# Patient Record
Sex: MALE | Race: OTHER | Employment: OTHER | URBAN - METROPOLITAN AREA
[De-identification: names, ages, dates, MRNs, and addresses within clinical notes are randomized per-mention and may not be internally consistent; named-entity substitution may affect disease eponyms.]

---

## 2017-03-21 ENCOUNTER — OFFICE VISIT (OUTPATIENT)
Dept: FAMILY MEDICINE CLINIC | Facility: CLINIC | Age: 67
End: 2017-03-21

## 2017-03-21 ENCOUNTER — HOSPITAL ENCOUNTER (OUTPATIENT)
Dept: GENERAL RADIOLOGY | Age: 67
Discharge: HOME | End: 2017-03-21
Attending: FAMILY MEDICINE
Payer: COMMERCIAL

## 2017-03-21 VITALS
HEART RATE: 120 BPM | SYSTOLIC BLOOD PRESSURE: 133 MMHG | HEIGHT: 69.5 IN | DIASTOLIC BLOOD PRESSURE: 80 MMHG | TEMPERATURE: 98 F | RESPIRATION RATE: 20 BRPM | WEIGHT: 161 LBS | BODY MASS INDEX: 23.31 KG/M2

## 2017-03-21 DIAGNOSIS — Z79.4 UNCONTROLLED TYPE 2 DIABETES MELLITUS WITH CHRONIC KIDNEY DISEASE, WITH LONG-TERM CURRENT USE OF INSULIN, UNSPECIFIED CKD STAGE: ICD-10-CM

## 2017-03-21 DIAGNOSIS — R05.9 COUGH: ICD-10-CM

## 2017-03-21 DIAGNOSIS — Z72.0 TOBACCO USE: ICD-10-CM

## 2017-03-21 DIAGNOSIS — J18.9 PNEUMONIA OF RIGHT UPPER LOBE DUE TO INFECTIOUS ORGANISM: ICD-10-CM

## 2017-03-21 DIAGNOSIS — I10 ESSENTIAL HYPERTENSION: Primary | ICD-10-CM

## 2017-03-21 DIAGNOSIS — E11.22 UNCONTROLLED TYPE 2 DIABETES MELLITUS WITH CHRONIC KIDNEY DISEASE, WITH LONG-TERM CURRENT USE OF INSULIN, UNSPECIFIED CKD STAGE: ICD-10-CM

## 2017-03-21 DIAGNOSIS — E11.65 UNCONTROLLED TYPE 2 DIABETES MELLITUS WITH CHRONIC KIDNEY DISEASE, WITH LONG-TERM CURRENT USE OF INSULIN, UNSPECIFIED CKD STAGE: ICD-10-CM

## 2017-03-21 PROCEDURE — 99212 OFFICE O/P EST SF 10 MIN: CPT | Performed by: FAMILY MEDICINE

## 2017-03-21 PROCEDURE — 71020 XR CHEST PA + LAT CHEST (CPT=71020): CPT

## 2017-03-21 PROCEDURE — 99214 OFFICE O/P EST MOD 30 MIN: CPT | Performed by: FAMILY MEDICINE

## 2017-03-21 RX ORDER — AMOXICILLIN AND CLAVULANATE POTASSIUM 875; 125 MG/1; MG/1
1 TABLET, FILM COATED ORAL 2 TIMES DAILY
Qty: 20 TABLET | Refills: 0 | Status: SHIPPED | OUTPATIENT
Start: 2017-03-21 | End: 2017-03-31

## 2017-03-21 NOTE — PROGRESS NOTES
HPI:   Ellen Milian is a 61year old male who presents for upper respiratory symptoms for  3  weeks. Patient reports dry cough mostly but sometimes mucous. No fevers. Has not smoked since the 2nd. Was smoking a pack a day before that.    I asked pt why he Cigarettes    Smokeless Status: Former User                         Quit date: 03/01/2017    Comment: Pt has quit    Alcohol Use: No                  REVIEW OF SYSTEMS:   GENERAL: feels well otherwise  SKIN: no rashes  EYES:no redness or discharge  HEENT: or worsen.     Carlos Enrique Grimaldo MD

## 2017-04-01 ENCOUNTER — LAB ENCOUNTER (OUTPATIENT)
Dept: LAB | Age: 67
End: 2017-04-01
Attending: FAMILY MEDICINE
Payer: COMMERCIAL

## 2017-04-01 DIAGNOSIS — E11.22 UNCONTROLLED TYPE 2 DIABETES MELLITUS WITH CHRONIC KIDNEY DISEASE, WITH LONG-TERM CURRENT USE OF INSULIN, UNSPECIFIED CKD STAGE: ICD-10-CM

## 2017-04-01 DIAGNOSIS — E11.65 UNCONTROLLED TYPE 2 DIABETES MELLITUS WITH CHRONIC KIDNEY DISEASE, WITH LONG-TERM CURRENT USE OF INSULIN, UNSPECIFIED CKD STAGE: ICD-10-CM

## 2017-04-01 DIAGNOSIS — R05.9 COUGH: ICD-10-CM

## 2017-04-01 DIAGNOSIS — Z79.4 UNCONTROLLED TYPE 2 DIABETES MELLITUS WITH CHRONIC KIDNEY DISEASE, WITH LONG-TERM CURRENT USE OF INSULIN, UNSPECIFIED CKD STAGE: ICD-10-CM

## 2017-04-01 PROCEDURE — 80053 COMPREHEN METABOLIC PANEL: CPT

## 2017-04-01 PROCEDURE — 36415 COLL VENOUS BLD VENIPUNCTURE: CPT

## 2017-04-01 PROCEDURE — 82043 UR ALBUMIN QUANTITATIVE: CPT

## 2017-04-01 PROCEDURE — 82570 ASSAY OF URINE CREATININE: CPT

## 2017-04-01 PROCEDURE — 85025 COMPLETE CBC W/AUTO DIFF WBC: CPT

## 2017-04-01 PROCEDURE — 83036 HEMOGLOBIN GLYCOSYLATED A1C: CPT

## 2017-04-03 NOTE — PROGRESS NOTES
Quick Note:    Diabetes is significantly worse than last time seen. Please call pt. Has to see Dr. Mindy Tran.  And new anemia - make sure he sets up an appointment with me also for follow up  ______

## 2017-04-04 ENCOUNTER — TELEPHONE (OUTPATIENT)
Dept: NEPHROLOGY | Facility: CLINIC | Age: 67
End: 2017-04-04

## 2017-04-04 ENCOUNTER — TELEPHONE (OUTPATIENT)
Dept: FAMILY MEDICINE CLINIC | Facility: CLINIC | Age: 67
End: 2017-04-04

## 2017-04-04 ENCOUNTER — OFFICE VISIT (OUTPATIENT)
Dept: NEPHROLOGY | Facility: CLINIC | Age: 67
End: 2017-04-04

## 2017-04-04 VITALS
HEIGHT: 69.5 IN | WEIGHT: 167 LBS | BODY MASS INDEX: 24.18 KG/M2 | DIASTOLIC BLOOD PRESSURE: 71 MMHG | HEART RATE: 99 BPM | SYSTOLIC BLOOD PRESSURE: 146 MMHG | TEMPERATURE: 99 F

## 2017-04-04 DIAGNOSIS — D64.89 ANEMIA DUE TO OTHER CAUSE: ICD-10-CM

## 2017-04-04 DIAGNOSIS — N18.2 CKD (CHRONIC KIDNEY DISEASE), STAGE II: ICD-10-CM

## 2017-04-04 DIAGNOSIS — E83.52 HYPERCALCEMIA: ICD-10-CM

## 2017-04-04 DIAGNOSIS — R80.9 PROTEINURIA, UNSPECIFIED TYPE: Primary | ICD-10-CM

## 2017-04-04 DIAGNOSIS — E11.21 DIABETIC NEPHROPATHY ASSOCIATED WITH TYPE 2 DIABETES MELLITUS (HCC): ICD-10-CM

## 2017-04-04 PROCEDURE — 99244 OFF/OP CNSLTJ NEW/EST MOD 40: CPT | Performed by: INTERNAL MEDICINE

## 2017-04-04 NOTE — TELEPHONE ENCOUNTER
Dr. Daisy Taylor please see message below. Spoke to pt, informed pt of additional lab work that were ordered after he left clinic by Dr. Daisy Taylor, Pt verbalized understanding of whole message. Pt states had last Colonoscopy done 5 years ago by Dr. Saeid Christina.

## 2017-04-04 NOTE — TELEPHONE ENCOUNTER
Please inform patient that Some more lab work was ordered after he left clinic   Iron studies given anemia, stool for occult blood.  Please inform to get all the test done ordered by me    Also pls inquire about his last colonoscopy

## 2017-04-04 NOTE — PROGRESS NOTES
Consult Requested By: Dr. Christina Patten    Reason for Consult: CKD     HPI:     Helene Berkowitz is a 61 yrs old male with pmh of DM II x II poorly control, HTN, HL, CKD stage II, tobacco abuse who presented today for work up and follow up     Recent lab w today's visit):    Current Outpatient Prescriptions:  Insulin Pen Needle 31G X 8 MM Does not apply Misc by Does not apply route. Disp:  Rfl:    Glucose Blood In Vitro Strip by Other route.  Disp:  Rfl:    MetFORMIN HCl 500 MG Oral Tab Take 1 tablet (500 mg alert and responsive   Head/Face: normocephalic  Eyes/Vision: normal extraocular motion is intact  Nose/Mouth/Throat:mucous membranes are moist   Neck/Thyroid: neck is supple without adenopathy  Lymphatic: no abnormal cervical, supraclavicular adenopathy i

## 2017-04-04 NOTE — TELEPHONE ENCOUNTER
----- Message from Kobe August MD sent at 4/3/2017 12:56 PM CDT -----  Diabetes is significantly worse than last time seen. Please call pt. Has to see Dr. Cally Babcock.  And new anemia - make sure he sets up an appointment with me also for follow up

## 2017-04-10 NOTE — TELEPHONE ENCOUNTER
Patient read Dr Lindy Loco result note on MyChart on 4/4/17, and has scheduled appt with Dr Erlin Marquis for 4/18/17.     Entered by Afia Monaco MD at 4/3/2017 12:56 PM      Read by Mayelin Chaparro at 4/4/2017 11:38 AM     Diabetes is significantly worse than las

## 2017-04-18 ENCOUNTER — OFFICE VISIT (OUTPATIENT)
Dept: ENDOCRINOLOGY CLINIC | Facility: CLINIC | Age: 67
End: 2017-04-18

## 2017-04-18 VITALS
BODY MASS INDEX: 24.51 KG/M2 | WEIGHT: 171.19 LBS | DIASTOLIC BLOOD PRESSURE: 68 MMHG | HEART RATE: 84 BPM | HEIGHT: 70 IN | SYSTOLIC BLOOD PRESSURE: 140 MMHG

## 2017-04-18 DIAGNOSIS — E11.69 DYSLIPIDEMIA ASSOCIATED WITH TYPE 2 DIABETES MELLITUS (HCC): ICD-10-CM

## 2017-04-18 DIAGNOSIS — Z79.4 UNCONTROLLED TYPE 2 DIABETES MELLITUS WITH HYPERGLYCEMIA, WITH LONG-TERM CURRENT USE OF INSULIN (HCC): Primary | ICD-10-CM

## 2017-04-18 DIAGNOSIS — E78.5 DYSLIPIDEMIA ASSOCIATED WITH TYPE 2 DIABETES MELLITUS (HCC): ICD-10-CM

## 2017-04-18 DIAGNOSIS — E11.65 UNCONTROLLED TYPE 2 DIABETES MELLITUS WITH HYPERGLYCEMIA, WITH LONG-TERM CURRENT USE OF INSULIN (HCC): Primary | ICD-10-CM

## 2017-04-18 PROCEDURE — 99204 OFFICE O/P NEW MOD 45 MIN: CPT | Performed by: INTERNAL MEDICINE

## 2017-04-18 PROCEDURE — 82962 GLUCOSE BLOOD TEST: CPT | Performed by: INTERNAL MEDICINE

## 2017-04-18 PROCEDURE — 36416 COLLJ CAPILLARY BLOOD SPEC: CPT | Performed by: INTERNAL MEDICINE

## 2017-04-18 RX ORDER — GLIPIZIDE 5 MG/1
7.5 TABLET ORAL
Qty: 90 TABLET | Refills: 0 | Status: SHIPPED | OUTPATIENT
Start: 2017-04-18 | End: 2017-05-16

## 2017-04-18 NOTE — H&P
New Patient Visit - Diabetes    CONSULT - Reason for Visit:  Diabetes management. Requesting Physician: Yolanda Fox    CHIEF COMPLAINT:    DM     HISTORY OF PRESENT ILLNESS:   Riley Rock is a 61year old male who presents to establish care for DM. SURGICAL HISTORY:       Past Surgical History    OTHER SURGICAL HISTORY  7/6/2010    Comment Polyps Dr.M Josefa South    COLONOSCOPY  7/6/15       ALLERGIES:    Sulfa Antibiotics       Unknown    Comment:As a child    SOCIAL HISTORY:    Social History    Blanca Figueroa affect  EYES:  No proptosis, no ptosis, conjunctiva normal  ENT:  Normocephalic, atraumatic  NECK: no adenopathy, thyroid symmetric, not enlarged and no tenderness,  LUNGS: clear to auscultation bilaterally, no crackles or wheezing  CARDIOVASCULAR:  regula next visit. f). Life style changes: Diet: low carbohydrate diet discussed, Exercise: should target a weight loss of 7% and increase exercise to at least 150min a week.  g). Hypoglycemia recognition and management discussed    2.  Patient’s BP is normal tod

## 2017-04-18 NOTE — H&P
New Patient Visit - Diabetes    CONSULT - Reason for Visit:  Diabetes management. Requesting Physician: Jeanne Stahl    CHIEF COMPLAINT:    DM     HISTORY OF PRESENT ILLNESS:   Gabi Church is a 61year old male who presents to establish care for DM. PAST SURGICAL HISTORY:       Past Surgical History    OTHER SURGICAL HISTORY  7/6/2010    Comment Polyps Dr.M Purvi Jordan    COLONOSCOPY  7/6/15       ALLERGIES:    Sulfa Antibiotics       Unknown    Comment:As a child    SOCIAL HISTORY:    Social Histo distress  PSYCH: normal affect  EYES:  No proptosis, no ptosis, conjunctiva normal  ENT:  Normocephalic, atraumatic  NECK: no adenopathy, thyroid symmetric, not enlarged and no tenderness,  LUNGS: clear to auscultation bilaterally, no crackles or wheezing get log and glucometer on next visit. f). Life style changes: Diet: low carbohydrate diet discussed, Exercise: should target a weight loss of 7% and increase exercise to at least 150min a week.  g). Hypoglycemia recognition and management discussed    2.

## 2017-04-21 ENCOUNTER — APPOINTMENT (OUTPATIENT)
Dept: LAB | Age: 67
End: 2017-04-21
Attending: INTERNAL MEDICINE
Payer: COMMERCIAL

## 2017-04-21 DIAGNOSIS — E83.52 HYPERCALCEMIA: ICD-10-CM

## 2017-04-21 DIAGNOSIS — E11.21 DIABETIC NEPHROPATHY ASSOCIATED WITH TYPE 2 DIABETES MELLITUS (HCC): ICD-10-CM

## 2017-04-21 DIAGNOSIS — N18.2 CKD (CHRONIC KIDNEY DISEASE), STAGE II: ICD-10-CM

## 2017-04-21 DIAGNOSIS — R80.9 PROTEINURIA, UNSPECIFIED TYPE: ICD-10-CM

## 2017-04-21 DIAGNOSIS — D64.89 ANEMIA DUE TO OTHER CAUSE: ICD-10-CM

## 2017-04-21 PROCEDURE — 82652 VIT D 1 25-DIHYDROXY: CPT

## 2017-04-21 PROCEDURE — 86334 IMMUNOFIX E-PHORESIS SERUM: CPT

## 2017-04-21 PROCEDURE — 80069 RENAL FUNCTION PANEL: CPT

## 2017-04-21 PROCEDURE — 36415 COLL VENOUS BLD VENIPUNCTURE: CPT

## 2017-04-21 PROCEDURE — 84466 ASSAY OF TRANSFERRIN: CPT

## 2017-04-21 PROCEDURE — 83970 ASSAY OF PARATHORMONE: CPT | Performed by: INTERNAL MEDICINE

## 2017-04-21 PROCEDURE — 82306 VITAMIN D 25 HYDROXY: CPT | Performed by: INTERNAL MEDICINE

## 2017-04-21 PROCEDURE — 82728 ASSAY OF FERRITIN: CPT

## 2017-04-21 PROCEDURE — 81001 URINALYSIS AUTO W/SCOPE: CPT

## 2017-04-21 PROCEDURE — 84165 PROTEIN E-PHORESIS SERUM: CPT

## 2017-04-21 PROCEDURE — 83540 ASSAY OF IRON: CPT

## 2017-04-24 ENCOUNTER — APPOINTMENT (OUTPATIENT)
Dept: LAB | Age: 67
End: 2017-04-24
Attending: INTERNAL MEDICINE
Payer: COMMERCIAL

## 2017-04-24 PROCEDURE — 84156 ASSAY OF PROTEIN URINE: CPT

## 2017-04-26 ENCOUNTER — APPOINTMENT (OUTPATIENT)
Dept: LAB | Age: 67
End: 2017-04-26
Attending: FAMILY MEDICINE
Payer: COMMERCIAL

## 2017-04-26 ENCOUNTER — HOSPITAL ENCOUNTER (OUTPATIENT)
Dept: CT IMAGING | Facility: HOSPITAL | Age: 67
Discharge: HOME | End: 2017-04-26
Attending: FAMILY MEDICINE
Payer: COMMERCIAL

## 2017-04-26 DIAGNOSIS — D64.89 ANEMIA DUE TO OTHER CAUSE: ICD-10-CM

## 2017-04-26 DIAGNOSIS — Z72.0 TOBACCO USE: ICD-10-CM

## 2017-04-26 PROCEDURE — 82272 OCCULT BLD FECES 1-3 TESTS: CPT

## 2017-04-28 ENCOUNTER — TELEPHONE (OUTPATIENT)
Dept: NEPHROLOGY | Facility: CLINIC | Age: 67
End: 2017-04-28

## 2017-04-28 NOTE — TELEPHONE ENCOUNTER
Pts wife Landon Blackwell called to find out if pt is still supposed to have renal u/s done. Pt has not scheduled appt yet. Please call.

## 2017-04-28 NOTE — TELEPHONE ENCOUNTER
Contacted wife. Notified her Roz Mustafa is to do the renal ultrasound. She stated understanding and that she would call to schedule the appointment now.

## 2017-05-01 NOTE — PROGRESS NOTES
Quick Note:    Please make appointment for follow up on the pneumonia and to review the results this week. You may still have an infection present.  You will need follow up imaging in 1-2 months.  ______

## 2017-05-02 ENCOUNTER — TELEPHONE (OUTPATIENT)
Dept: FAMILY MEDICINE CLINIC | Facility: CLINIC | Age: 67
End: 2017-05-02

## 2017-05-02 ENCOUNTER — HOSPITAL ENCOUNTER (OUTPATIENT)
Dept: ULTRASOUND IMAGING | Age: 67
Discharge: HOME | End: 2017-05-02
Attending: INTERNAL MEDICINE
Payer: COMMERCIAL

## 2017-05-02 DIAGNOSIS — R80.9 PROTEINURIA, UNSPECIFIED TYPE: ICD-10-CM

## 2017-05-02 DIAGNOSIS — E11.21 DIABETIC NEPHROPATHY ASSOCIATED WITH TYPE 2 DIABETES MELLITUS (HCC): ICD-10-CM

## 2017-05-02 DIAGNOSIS — N18.2 CKD (CHRONIC KIDNEY DISEASE), STAGE II: ICD-10-CM

## 2017-05-02 PROCEDURE — 76770 US EXAM ABDO BACK WALL COMP: CPT

## 2017-05-02 NOTE — TELEPHONE ENCOUNTER
Wife calling states Dr asked to see him to have a follow up appt and discuss results this week. No appt are available this week after 4pm or on a Saturday. Wife requesting pt be added please advise.

## 2017-05-03 ENCOUNTER — TELEPHONE (OUTPATIENT)
Dept: FAMILY MEDICINE CLINIC | Facility: CLINIC | Age: 67
End: 2017-05-03

## 2017-05-03 ENCOUNTER — OFFICE VISIT (OUTPATIENT)
Dept: FAMILY MEDICINE CLINIC | Facility: CLINIC | Age: 67
End: 2017-05-03

## 2017-05-03 VITALS
SYSTOLIC BLOOD PRESSURE: 157 MMHG | HEART RATE: 83 BPM | BODY MASS INDEX: 24.94 KG/M2 | DIASTOLIC BLOOD PRESSURE: 79 MMHG | WEIGHT: 174.19 LBS | HEIGHT: 70 IN

## 2017-05-03 DIAGNOSIS — R93.89 ABNORMAL CHEST CT: Primary | ICD-10-CM

## 2017-05-03 PROCEDURE — 99213 OFFICE O/P EST LOW 20 MIN: CPT | Performed by: FAMILY MEDICINE

## 2017-05-03 PROCEDURE — 99212 OFFICE O/P EST SF 10 MIN: CPT | Performed by: FAMILY MEDICINE

## 2017-05-03 RX ORDER — LEVOFLOXACIN 500 MG/1
500 TABLET, FILM COATED ORAL DAILY
Qty: 10 TABLET | Refills: 0 | Status: SHIPPED | OUTPATIENT
Start: 2017-05-03 | End: 2017-05-13

## 2017-05-03 NOTE — TELEPHONE ENCOUNTER
----- Message from Joanna Beach MD sent at 5/1/2017  4:00 PM CDT -----  Please make appointment for follow up on the pneumonia and to review the results  this week. You may still have an infection present. You will need follow up imaging in 1-2 months.

## 2017-05-03 NOTE — PROGRESS NOTES
Daksha Garcia is a 61year old male. Patient presents with:  Test Results: Ct lungs - 04/26/2017. HPI:   Follow up on CT chest.  Pt reports continued cough and mild pain in right mid back. Reports feels much better than before. Finished off antibiotics. apparent distress  SKIN: no rashes,no suspicious lesions  NECK: supple,no adenopathy,no bruits  LUNGS: clear to auscultation  CARDIO: RRR without murmur  EXTREMITIES: no cyanosis, clubbing or edema      ASSESSMENT AND PLAN:   1.  Abnormal chest CT  Looks li

## 2017-05-04 ENCOUNTER — OFFICE VISIT (OUTPATIENT)
Dept: NEPHROLOGY | Facility: CLINIC | Age: 67
End: 2017-05-04

## 2017-05-04 VITALS
WEIGHT: 174.19 LBS | SYSTOLIC BLOOD PRESSURE: 170 MMHG | HEART RATE: 77 BPM | DIASTOLIC BLOOD PRESSURE: 73 MMHG | TEMPERATURE: 98 F | BODY MASS INDEX: 24.94 KG/M2 | HEIGHT: 70.25 IN

## 2017-05-04 DIAGNOSIS — E11.21 DIABETIC NEPHROPATHY ASSOCIATED WITH TYPE 2 DIABETES MELLITUS (HCC): Primary | ICD-10-CM

## 2017-05-04 DIAGNOSIS — E83.52 HYPERCALCEMIA: ICD-10-CM

## 2017-05-04 PROCEDURE — 99214 OFFICE O/P EST MOD 30 MIN: CPT | Performed by: INTERNAL MEDICINE

## 2017-05-04 PROCEDURE — 99212 OFFICE O/P EST SF 10 MIN: CPT | Performed by: INTERNAL MEDICINE

## 2017-05-04 NOTE — PROGRESS NOTES
Progress Note:     Odalis Winter is a 61 yrs old male with pmh of DM II x 45 yrs poorly control, HTN, HL, CKD stage II, tobacco abuse who presented today for follow up    Recent lab work showed BUN/Cr 18/1.06 mg/dl with an eGFR >60 ml/min, albumin 2.4, K 5. visit):    Current Outpatient Prescriptions:  levofloxacin (LEVAQUIN) 500 MG Oral Tab Take 1 tablet (500 mg total) by mouth daily. Disp: 10 tablet Rfl: 0   NIFEdipine ER 30 MG Oral Tablet 24 Hr Take 1 tablet (30 mg total) by mouth daily.  Disp: 90 tablet Rf neck is supple without adenopathy  Lymphatic: no abnormal cervical, supraclavicular adenopathy is noted  Respiratory:  lungs are clear to auscultation bilaterally  Cardiovascular: regular rate and rhythm  Abdomen: soft, non-tender, non-distended, BS normal

## 2017-05-04 NOTE — PATIENT INSTRUCTIONS
Urine calcium test   Follow up with Dr. Jesusita Abad for high calcium  Follow up in 3 - 4 months   Call back in 2 weeks with home blood pressure readings

## 2017-05-11 ENCOUNTER — PATIENT MESSAGE (OUTPATIENT)
Dept: FAMILY MEDICINE CLINIC | Facility: CLINIC | Age: 67
End: 2017-05-11

## 2017-05-13 NOTE — TELEPHONE ENCOUNTER
From: Juani Litter  To: Gen Garcia MD  Sent: 5/11/2017 1:57 PM CDT  Subject: Prescription Question    Dr Sterling Villalobos,  Tuesday when I got up from my chair at work to go home I had a muscle cramp in my right calf. It has not gone away since.  In fact I think

## 2017-05-13 NOTE — TELEPHONE ENCOUNTER
I spoke with pt and advised that when he has symptoms, he should call the office as [employer] messages can take up to 72 business hours to address. Pt reports he completed abx and his calf pain has subsided.  I advise he call back or go to ER if symptom recur

## 2017-05-16 ENCOUNTER — TELEPHONE (OUTPATIENT)
Dept: CASE MANAGEMENT | Age: 67
End: 2017-05-16

## 2017-05-16 ENCOUNTER — OFFICE VISIT (OUTPATIENT)
Dept: ENDOCRINOLOGY CLINIC | Facility: CLINIC | Age: 67
End: 2017-05-16

## 2017-05-16 VITALS
BODY MASS INDEX: 25 KG/M2 | DIASTOLIC BLOOD PRESSURE: 74 MMHG | WEIGHT: 174.63 LBS | HEART RATE: 102 BPM | HEIGHT: 70 IN | SYSTOLIC BLOOD PRESSURE: 146 MMHG

## 2017-05-16 DIAGNOSIS — E78.5 DYSLIPIDEMIA ASSOCIATED WITH TYPE 2 DIABETES MELLITUS (HCC): ICD-10-CM

## 2017-05-16 DIAGNOSIS — E11.69 DYSLIPIDEMIA ASSOCIATED WITH TYPE 2 DIABETES MELLITUS (HCC): ICD-10-CM

## 2017-05-16 DIAGNOSIS — E11.65 UNCONTROLLED TYPE 2 DIABETES MELLITUS WITH HYPERGLYCEMIA, WITHOUT LONG-TERM CURRENT USE OF INSULIN (HCC): Primary | ICD-10-CM

## 2017-05-16 PROCEDURE — 36416 COLLJ CAPILLARY BLOOD SPEC: CPT | Performed by: INTERNAL MEDICINE

## 2017-05-16 PROCEDURE — 99213 OFFICE O/P EST LOW 20 MIN: CPT | Performed by: INTERNAL MEDICINE

## 2017-05-16 PROCEDURE — 82962 GLUCOSE BLOOD TEST: CPT | Performed by: INTERNAL MEDICINE

## 2017-05-16 RX ORDER — GLIPIZIDE 10 MG/1
10 TABLET ORAL
Qty: 180 TABLET | Refills: 0 | Status: SHIPPED | OUTPATIENT
Start: 2017-05-16 | End: 2017-09-30

## 2017-05-16 RX ORDER — ATORVASTATIN CALCIUM 40 MG/1
40 TABLET, FILM COATED ORAL DAILY
Qty: 90 TABLET | Refills: 0 | Status: SHIPPED | OUTPATIENT
Start: 2017-05-16 | End: 2017-11-10

## 2017-05-16 NOTE — TELEPHONE ENCOUNTER
Please have RN do this.  This was recommended based off abnormal CT lungs not because of ekg and chest x-ray

## 2017-05-16 NOTE — PROGRESS NOTES
Return Office Visit     CHIEF COMPLAINT:    DM  Dyslipidemia     HISTORY OF PRESENT ILLNESS:  Lyndsay Green is a 61year old male who presents for follow up for for DM. DM HISTORY  He states he had \" borderline DM\" as a teenager.   He was on diabinese f SYSTEMS:  Constitutional: Negative for: weight change, fever, fatigue, cold/heat intolerance  Eyes: Negative for:  Visual changes, proptosis, blurring  ENT: Negative for:  dysphagia, neck swelling, dysphonia  Respiratory: Negative for:  dyspnea, cough  Car PLAN:    1. DM:       Plan:  Discussed the pathogenesis, natural course of diabetes.  Patient understands the importance of glycemic control and the implications of uncontrolled diabetes including Diabetic ketoacidosis and various micro vascular and macrova

## 2017-05-16 NOTE — TELEPHONE ENCOUNTER
Doctor needs to do a peer to peer did not meet medial necessity   Please reference to case #[id number] call [phone] option 3  Fax [phone] the hours of operation is 7 - 7 pm eastern standard time  Results details clinical evaluation ECG, Xray test

## 2017-05-17 ENCOUNTER — TELEPHONE (OUTPATIENT)
Dept: ENDOCRINOLOGY CLINIC | Facility: CLINIC | Age: 67
End: 2017-05-17

## 2017-05-17 NOTE — TELEPHONE ENCOUNTER
Current Outpatient Prescriptions:  Dapagliflozin Propanediol (FARXIGA) 10 MG Oral Tab Take 10 mg by mouth daily.  Disp: 30 tablet Rfl: 0     PA request pls call [phone]

## 2017-05-17 NOTE — TELEPHONE ENCOUNTER
Kenya العلي called pharmacy. Patient will get medication for free. No PA needed. Pharmacy will contact patient when Rx is ready for .

## 2017-05-18 ENCOUNTER — TELEPHONE (OUTPATIENT)
Dept: FAMILY MEDICINE CLINIC | Facility: CLINIC | Age: 67
End: 2017-05-18

## 2017-05-18 NOTE — TELEPHONE ENCOUNTER
----- Message from Marga Reed RN sent at 2017  9:13 AM CDT -----  Regarding: LD CT Screening F/U  Hi Dr. Lu Yip,    Your patient, Pita Ramsey,  , was presented at the Multidisciplinary Lung Conference this week.   We routinely present any

## 2017-05-18 NOTE — TELEPHONE ENCOUNTER
Dr Victorino Rodriguez, I could not obtain approval  [facility] requires Nurse Practitioner, [de-identified] assistant or MD to call, we have until 5/24/17, 14 days after denial which was 5/10/17

## 2017-05-18 NOTE — TELEPHONE ENCOUNTER
Dr Damián Knox, I could not obtain approval  [facility] requires nurse practitioner, [de-identified] assistant or physician to call in  Due 5/24/17, 14 days after procedure denies on 5/10/17

## 2017-05-18 NOTE — TELEPHONE ENCOUNTER
Dr Kelvin Stratton, I could not obtain approval  [facility] required nurse practitioner, [de-identified] assistant or physician to call, due by 5/24/17 10 days after denial which was 5/10/17

## 2017-05-19 NOTE — TELEPHONE ENCOUNTER
I called. Cannot get approval yet. Has to be 3 months after the last one.  So please tell pt to call me in 1 month so I can place the order again and seek approval.

## 2017-05-25 ENCOUNTER — TELEPHONE (OUTPATIENT)
Dept: CASE MANAGEMENT | Age: 67
End: 2017-05-25

## 2017-05-25 NOTE — TELEPHONE ENCOUNTER
This case was denied due to the  repeat CT supportive of evaluation of a pulmonary nogel is greater that equal to 8 mg, at 3 months, 9 months, 24 months, of the time of discovery does not meet criteria.  Doctor can do appeal please call  option

## 2017-06-19 RX ORDER — DAPAGLIFLOZIN 10 MG/1
TABLET, FILM COATED ORAL
Qty: 30 TABLET | Refills: 2 | Status: SHIPPED | OUTPATIENT
Start: 2017-06-19 | End: 2017-11-10

## 2017-07-15 ENCOUNTER — TELEPHONE (OUTPATIENT)
Dept: ENDOCRINOLOGY CLINIC | Facility: CLINIC | Age: 67
End: 2017-07-15

## 2017-07-15 NOTE — TELEPHONE ENCOUNTER
Current Outpatient Prescriptions:  FARXIGA 10 MG Oral Tab TAKE 1 TABLET BY MOUTH EVERY DAY Disp: 30 tablet Rfl: 2     PA request pls call [phone]

## 2017-07-17 NOTE — TELEPHONE ENCOUNTER
Called farxiga help line. They will call pharmacy to help them process co pay card. Per representative, she will call back if there is a problem. Otherwise no call back is needed.

## 2017-07-19 RX ORDER — EXENATIDE 2 MG/.65ML
2 INJECTION, SUSPENSION, EXTENDED RELEASE SUBCUTANEOUS
Qty: 4 EACH | Refills: 2 | Status: SHIPPED | OUTPATIENT
Start: 2017-07-19 | End: 2017-07-26

## 2017-08-15 ENCOUNTER — TELEPHONE (OUTPATIENT)
Dept: FAMILY MEDICINE CLINIC | Facility: CLINIC | Age: 67
End: 2017-08-15

## 2017-08-15 NOTE — TELEPHONE ENCOUNTER
This patient is scheduled for tomorrow but we have received a denial from his insurance for this test . Information regarding this denial was left om his vmail.     The ordering physician does have the option of appealing this decision by doing a peer to pe

## 2017-08-16 ENCOUNTER — TELEPHONE (OUTPATIENT)
Dept: FAMILY MEDICINE CLINIC | Facility: CLINIC | Age: 67
End: 2017-08-16

## 2017-08-16 NOTE — TELEPHONE ENCOUNTER
Guille Freitas from Outpatient Scheduling called in stating they had to cancel pt's CT scan for today because the insurance denied the scan.   Guille Freitas states pt already had this scan back in April and if this is a repeat/follow up CT scan on the lungs then a CT scan o

## 2017-08-16 NOTE — TELEPHONE ENCOUNTER
Pt wife calling and is very upset about just finding out about the denial. Pt wife stts she contacted the insurance company and [address] [place] [address] that the denial was made 3 weeks ago. Pt wife stts a peer to peer can be happen. Number provided in the message below.

## 2017-08-16 NOTE — TELEPHONE ENCOUNTER
I called and got approved. CPT code is [phone]. For some reason along the way the wrong CPT code was given for this test and that is why it was not approved. Not sure why took 3 weeks for this approval or denial to be told to me.    Must be done by 9/30/2017

## 2017-08-17 ENCOUNTER — TELEPHONE (OUTPATIENT)
Dept: FAMILY MEDICINE CLINIC | Facility: CLINIC | Age: 67
End: 2017-08-17

## 2017-08-17 DIAGNOSIS — R91.1 LUNG NODULE: Primary | ICD-10-CM

## 2017-08-17 NOTE — TELEPHONE ENCOUNTER
Spouse/Genevieve [phone] is calling for status of pts CT Scan. Per spouse she will be in a meeting from 10:00-10:30 and it is ok to leave a message for her voice mail to let her know if Prior Authorization has been received.  Spouse states that it is her d

## 2017-08-17 NOTE — TELEPHONE ENCOUNTER
Spouse/Genevieve [phone] is calling for status of pts CT Scan. Per spouse she will be in a meeting from 10:00-10:30 and it is ok to leave a message for her voice mail to let her know if Prior Authorization has been received.  Spouse states that it is her d

## 2017-08-17 NOTE — TELEPHONE ENCOUNTER
Left detail message on [facility] [employer]) personal voicemail stating CT has authorized status. Ct order and referral can be viewed in EPIC.

## 2017-08-21 ENCOUNTER — HOSPITAL ENCOUNTER (OUTPATIENT)
Dept: CT IMAGING | Facility: HOSPITAL | Age: 67
Discharge: HOME | End: 2017-08-21
Attending: FAMILY MEDICINE
Payer: COMMERCIAL

## 2017-08-21 DIAGNOSIS — R91.1 LUNG NODULE: ICD-10-CM

## 2017-08-21 PROCEDURE — 71250 CT THORAX DX C-: CPT | Performed by: FAMILY MEDICINE

## 2017-08-23 ENCOUNTER — OFFICE VISIT (OUTPATIENT)
Dept: FAMILY MEDICINE CLINIC | Facility: CLINIC | Age: 67
End: 2017-08-23

## 2017-08-23 VITALS
SYSTOLIC BLOOD PRESSURE: 132 MMHG | HEART RATE: 87 BPM | DIASTOLIC BLOOD PRESSURE: 72 MMHG | BODY MASS INDEX: 24 KG/M2 | WEIGHT: 170.38 LBS

## 2017-08-23 DIAGNOSIS — I25.10 ATHEROSCLEROSIS OF CORONARY ARTERY OF NATIVE HEART WITHOUT ANGINA PECTORIS, UNSPECIFIED VESSEL OR LESION TYPE: ICD-10-CM

## 2017-08-23 DIAGNOSIS — R93.89 ABNORMAL CHEST CT: Primary | ICD-10-CM

## 2017-08-23 DIAGNOSIS — R29.898 LEG FATIGUE: ICD-10-CM

## 2017-08-23 DIAGNOSIS — Z72.0 TOBACCO USE: ICD-10-CM

## 2017-08-23 PROCEDURE — 99214 OFFICE O/P EST MOD 30 MIN: CPT | Performed by: FAMILY MEDICINE

## 2017-08-23 PROCEDURE — 99212 OFFICE O/P EST SF 10 MIN: CPT | Performed by: FAMILY MEDICINE

## 2017-08-23 RX ORDER — EXENATIDE 2 MG/.65ML
2 INJECTION, SUSPENSION, EXTENDED RELEASE SUBCUTANEOUS WEEKLY
Refills: 2 | COMMUNITY
Start: 2017-07-19 | End: 2017-09-19

## 2017-08-23 RX ORDER — GLYBURIDE 5 MG/1
1 TABLET ORAL 2 TIMES DAILY
COMMUNITY
Start: 2015-08-27 | End: 2017-09-19

## 2017-08-23 RX ORDER — VALSARTAN AND HYDROCHLOROTHIAZIDE 320; 25 MG/1; MG/1
1 TABLET, FILM COATED ORAL DAILY
COMMUNITY
Start: 2016-05-02 | End: 2017-08-23

## 2017-08-23 NOTE — PROGRESS NOTES
Deandra Porter is a 61year old male. Patient presents with:  Test Results: CT    HPI:   Here for review CT report. CONCLUSION:       8/21/2017     1.  Multifocal peripheral nodular airspace opacities in both lungs have significantly decreased in size since Types: Cigarettes     Last attempt to quit: 2/26/2017  Smokeless tobacco: Current User                       Last attempt to quit: 3/1/2017  Alcohol use:  No                 REVIEW OF SYSTEMS:   GENERAL HEALTH: feels well otherwise  SKIN: denies any unusual

## 2017-08-26 ENCOUNTER — TELEPHONE (OUTPATIENT)
Dept: ENDOCRINOLOGY CLINIC | Facility: CLINIC | Age: 67
End: 2017-08-26

## 2017-08-26 NOTE — TELEPHONE ENCOUNTER
PA request received for Farxiga through [employer]. PA approved: PA Case [id number] is Approved. For further questions, call [phone].

## 2017-09-16 ENCOUNTER — HOSPITAL ENCOUNTER (OUTPATIENT)
Dept: NUCLEAR MEDICINE | Facility: HOSPITAL | Age: 67
Discharge: HOME | End: 2017-09-16
Attending: FAMILY MEDICINE
Payer: COMMERCIAL

## 2017-09-16 ENCOUNTER — TELEPHONE (OUTPATIENT)
Dept: FAMILY MEDICINE CLINIC | Facility: CLINIC | Age: 67
End: 2017-09-16

## 2017-09-16 ENCOUNTER — HOSPITAL ENCOUNTER (OUTPATIENT)
Dept: CV DIAGNOSTICS | Facility: HOSPITAL | Age: 67
Discharge: HOME | End: 2017-09-16
Attending: FAMILY MEDICINE
Payer: COMMERCIAL

## 2017-09-16 DIAGNOSIS — Z72.0 TOBACCO USE: ICD-10-CM

## 2017-09-16 DIAGNOSIS — R94.39 ABNORMAL STRESS TEST: Primary | ICD-10-CM

## 2017-09-16 DIAGNOSIS — I25.10 ATHEROSCLEROSIS OF CORONARY ARTERY OF NATIVE HEART WITHOUT ANGINA PECTORIS, UNSPECIFIED VESSEL OR LESION TYPE: ICD-10-CM

## 2017-09-16 PROCEDURE — 93017 CV STRESS TEST TRACING ONLY: CPT | Performed by: FAMILY MEDICINE

## 2017-09-16 PROCEDURE — 93018 CV STRESS TEST I&R ONLY: CPT | Performed by: FAMILY MEDICINE

## 2017-09-16 PROCEDURE — 93016 CV STRESS TEST SUPVJ ONLY: CPT | Performed by: FAMILY MEDICINE

## 2017-09-16 PROCEDURE — 78452 HT MUSCLE IMAGE SPECT MULT: CPT | Performed by: FAMILY MEDICINE

## 2017-09-16 RX ORDER — 0.9 % SODIUM CHLORIDE 0.9 %
VIAL (ML) INJECTION
Status: COMPLETED
Start: 2017-09-16 | End: 2017-09-16

## 2017-09-16 RX ADMIN — 0.9 % SODIUM CHLORIDE: 0.9 % VIAL (ML) INJECTION at 10:42:00

## 2017-09-17 NOTE — TELEPHONE ENCOUNTER
On call:  Received a page from Dr Phylicia Alexander, re abnormal stress test. He stated he had called Dr Viviane An in error re this. She is out of town.   Dr Viviane An did also call me and updated me re abnormal stress test.  I called patient and he states he feels\"fine\"  Daphne Luna

## 2017-09-18 ENCOUNTER — TELEPHONE (OUTPATIENT)
Dept: CARDIOLOGY CLINIC | Facility: CLINIC | Age: 67
End: 2017-09-18

## 2017-09-18 NOTE — TELEPHONE ENCOUNTER
Pts spouse calling to confirm appt 09/19 at 10:30am, spouse indicates he can make the appt, pls call to advise appt is confirmed at:[phone],thanks.

## 2017-09-19 ENCOUNTER — TELEPHONE (OUTPATIENT)
Dept: CARDIOLOGY CLINIC | Facility: CLINIC | Age: 67
End: 2017-09-19

## 2017-09-19 ENCOUNTER — OFFICE VISIT (OUTPATIENT)
Dept: CARDIOLOGY CLINIC | Facility: CLINIC | Age: 67
End: 2017-09-19

## 2017-09-19 ENCOUNTER — OFFICE VISIT (OUTPATIENT)
Dept: ENDOCRINOLOGY CLINIC | Facility: CLINIC | Age: 67
End: 2017-09-19

## 2017-09-19 ENCOUNTER — APPOINTMENT (OUTPATIENT)
Dept: LAB | Age: 67
End: 2017-09-19
Attending: INTERNAL MEDICINE
Payer: COMMERCIAL

## 2017-09-19 VITALS
HEART RATE: 98 BPM | RESPIRATION RATE: 20 BRPM | DIASTOLIC BLOOD PRESSURE: 70 MMHG | SYSTOLIC BLOOD PRESSURE: 118 MMHG | BODY MASS INDEX: 22 KG/M2 | WEIGHT: 156 LBS

## 2017-09-19 VITALS
SYSTOLIC BLOOD PRESSURE: 151 MMHG | HEIGHT: 69.5 IN | HEART RATE: 102 BPM | DIASTOLIC BLOOD PRESSURE: 91 MMHG | WEIGHT: 159.81 LBS | BODY MASS INDEX: 23.14 KG/M2

## 2017-09-19 DIAGNOSIS — E78.5 DYSLIPIDEMIA: ICD-10-CM

## 2017-09-19 DIAGNOSIS — R94.39 ABNORMAL STRESS TEST: ICD-10-CM

## 2017-09-19 DIAGNOSIS — R94.39 ABNORMAL STRESS TEST: Primary | ICD-10-CM

## 2017-09-19 DIAGNOSIS — E78.5 HYPERLIPIDEMIA, UNSPECIFIED HYPERLIPIDEMIA TYPE: ICD-10-CM

## 2017-09-19 DIAGNOSIS — I10 ESSENTIAL HYPERTENSION: ICD-10-CM

## 2017-09-19 DIAGNOSIS — E11.65 UNCONTROLLED TYPE 2 DIABETES MELLITUS WITH HYPERGLYCEMIA, WITHOUT LONG-TERM CURRENT USE OF INSULIN (HCC): Primary | ICD-10-CM

## 2017-09-19 LAB
CARTRIDGE LOT#: ABNORMAL NUMERIC
GLUCOSE BLOOD: 121
HEMOGLOBIN A1C: 8 % (ref 4.3–5.6)
TEST STRIP LOT #: NORMAL NUMERIC

## 2017-09-19 PROCEDURE — 36416 COLLJ CAPILLARY BLOOD SPEC: CPT | Performed by: INTERNAL MEDICINE

## 2017-09-19 PROCEDURE — 85027 COMPLETE CBC AUTOMATED: CPT

## 2017-09-19 PROCEDURE — 80048 BASIC METABOLIC PNL TOTAL CA: CPT

## 2017-09-19 PROCEDURE — 99245 OFF/OP CONSLTJ NEW/EST HI 55: CPT | Performed by: INTERNAL MEDICINE

## 2017-09-19 PROCEDURE — 99212 OFFICE O/P EST SF 10 MIN: CPT | Performed by: INTERNAL MEDICINE

## 2017-09-19 PROCEDURE — 83036 HEMOGLOBIN GLYCOSYLATED A1C: CPT | Performed by: INTERNAL MEDICINE

## 2017-09-19 PROCEDURE — 82962 GLUCOSE BLOOD TEST: CPT | Performed by: INTERNAL MEDICINE

## 2017-09-19 PROCEDURE — 99214 OFFICE O/P EST MOD 30 MIN: CPT | Performed by: INTERNAL MEDICINE

## 2017-09-19 PROCEDURE — 36415 COLL VENOUS BLD VENIPUNCTURE: CPT

## 2017-09-19 RX ORDER — SODIUM CHLORIDE 9 MG/ML
INJECTION, SOLUTION INTRAVENOUS ONCE
Status: COMPLETED | OUTPATIENT
Start: 2017-09-20 | End: 2017-09-20

## 2017-09-19 RX ORDER — ACETYLCYSTEINE 600 MG
CAPSULE ORAL
Qty: 2 CAPSULE | Refills: 0 | OUTPATIENT
Start: 2017-09-19 | End: 2017-10-25 | Stop reason: ALTCHOICE

## 2017-09-19 NOTE — TELEPHONE ENCOUNTER
Per Dr. Brielle Alcantar,, pt is to take Mucomyst 600 mg tonite and in the am prior to procedure. RX was called into pharmacy. Pt is to drink plenty of water and he is to hold Valsartan/hctz. Pt and wife were both made aware of this.

## 2017-09-19 NOTE — TELEPHONE ENCOUNTER
S/w [facility] today to initiate PA for left heart cath [id number]. All clinical information was given. Per [facility], this case requires more clinical information. [facility] is aware this Left heart Cath is scheduled for tomorrow 09/20/17.      Please call [facility] for a peer to

## 2017-09-19 NOTE — PROGRESS NOTES
Return Office Visit     CHIEF COMPLAINT:    DM  Dyslipidemia     HISTORY OF PRESENT ILLNESS:  Deandra Porter is a 61year old male who presents for follow up for for DM. DM HISTORY  He states he had \" borderline DM\" as a teenager.   He was on diabinese f 1 dose po tonite and one dose po int he AM Disp: 2 capsule Rfl: 0   Insulin Pen Needle (BD PEN NEEDLE SHORT U/F) 31G X 8 MM Does not apply Misc USE DAILY AS DIRECTED Disp:  Rfl:          ALLERGY:    Sulfa Antibiotics       Unknown    Comment:As a child wheezing  CARDIOVASCULAR:  regular rate and rhythm, normal S1 and S2  ABDOMEN:  normal bowel sounds, soft, non-distended, non-tender  SKIN:  no bruising or bleeding, no rashes and no lesions  EXTREMITIES: normal pulses, no edema      DATA:      Ref.  Range  maintainence explained in great detail, to get log and glucometer on next visit. f). Life style changes: Diet: low carbohydrate diet discussed, Exercise: should target a weight loss of 7% and increase exercise to at least 150min a week.  g).  Hypoglycemia

## 2017-09-19 NOTE — H&P
Dhruv Ravi is a 61year old male. HPI:   This is a pleasant 82-year-old diabetic with hypertension elevated cholesterol and tobacco use who now presents for assessment of abnormal stress test.  Stress test was performed to assess risk factors.   Patient History:  Smoking status: Current Every Day Smoker                                                   Packs/day: 0.00      Years: 0.00         Types: Cigarettes     Last attempt to quit: 2/26/2017  Smokeless tobacco: Current User                       Last indicates understanding of these issues and agrees to the plan.   The patient is asked to return after angiogram.           Kemar Perez MD  9/19/2017  11:10 AM

## 2017-09-19 NOTE — PROGRESS NOTES
Xenia Homans Dr. Almira Cote already reviewed these results with you as I was out of town. I see you have an appointment with Dr. Yazmin Milton tomorrow to review and for the next step.  It is important that you keep that appointment. - Dr. Amaury Reddy

## 2017-09-20 ENCOUNTER — TELEPHONE (OUTPATIENT)
Dept: CARDIOLOGY CLINIC | Facility: CLINIC | Age: 67
End: 2017-09-20

## 2017-09-20 ENCOUNTER — HOSPITAL ENCOUNTER (OUTPATIENT)
Dept: INTERVENTIONAL RADIOLOGY/VASCULAR | Facility: HOSPITAL | Age: 67
Discharge: HOME | End: 2017-09-20
Attending: INTERNAL MEDICINE | Admitting: INTERNAL MEDICINE
Payer: COMMERCIAL

## 2017-09-20 VITALS
HEART RATE: 70 BPM | SYSTOLIC BLOOD PRESSURE: 119 MMHG | DIASTOLIC BLOOD PRESSURE: 67 MMHG | RESPIRATION RATE: 19 BRPM | BODY MASS INDEX: 22 KG/M2 | WEIGHT: 156 LBS | OXYGEN SATURATION: 97 %

## 2017-09-20 DIAGNOSIS — I25.10 CORONARY ARTERY DISEASE INVOLVING NATIVE HEART WITHOUT ANGINA PECTORIS, UNSPECIFIED VESSEL OR LESION TYPE: Primary | ICD-10-CM

## 2017-09-20 DIAGNOSIS — R94.39 ABNORMAL STRESS TEST: ICD-10-CM

## 2017-09-20 LAB
ANION GAP SERPL CALC-SCNC: 8 MMOL/L (ref 0–18)
BUN SERPL-MCNC: 30 MG/DL (ref 8–20)
BUN/CREAT SERPL: 21.6 (ref 10–20)
CALCIUM SERPL-MCNC: 11.1 MG/DL (ref 8.5–10.5)
CHLORIDE SERPL-SCNC: 100 MMOL/L (ref 95–110)
CO2 SERPL-SCNC: 27 MMOL/L (ref 22–32)
CREAT SERPL-MCNC: 1.39 MG/DL (ref 0.5–1.5)
GLUCOSE SERPL-MCNC: 197 MG/DL (ref 70–99)
OSMOLALITY UR CALC.SUM OF ELEC: 292 MOSM/KG (ref 275–295)
POTASSIUM SERPL-SCNC: 4 MMOL/L (ref 3.3–5.1)
SODIUM SERPL-SCNC: 135 MMOL/L (ref 136–144)

## 2017-09-20 PROCEDURE — 93458 L HRT ARTERY/VENTRICLE ANGIO: CPT

## 2017-09-20 PROCEDURE — B2111ZZ FLUOROSCOPY OF MULTIPLE CORONARY ARTERIES USING LOW OSMOLAR CONTRAST: ICD-10-PCS | Performed by: INTERNAL MEDICINE

## 2017-09-20 PROCEDURE — 80048 BASIC METABOLIC PNL TOTAL CA: CPT | Performed by: INTERNAL MEDICINE

## 2017-09-20 PROCEDURE — 99153 MOD SED SAME PHYS/QHP EA: CPT

## 2017-09-20 PROCEDURE — 99152 MOD SED SAME PHYS/QHP 5/>YRS: CPT

## 2017-09-20 PROCEDURE — 4A023N7 MEASUREMENT OF CARDIAC SAMPLING AND PRESSURE, LEFT HEART, PERCUTANEOUS APPROACH: ICD-10-PCS | Performed by: INTERNAL MEDICINE

## 2017-09-20 RX ORDER — ASPIRIN 81 MG/1
TABLET, CHEWABLE ORAL
Status: COMPLETED
Start: 2017-09-20 | End: 2017-09-20

## 2017-09-20 RX ORDER — MIDAZOLAM HYDROCHLORIDE 1 MG/ML
INJECTION INTRAMUSCULAR; INTRAVENOUS
Status: COMPLETED
Start: 2017-09-20 | End: 2017-09-20

## 2017-09-20 RX ORDER — METOPROLOL SUCCINATE 25 MG/1
25 TABLET, EXTENDED RELEASE ORAL
Status: DISCONTINUED | OUTPATIENT
Start: 2017-09-21 | End: 2017-09-20

## 2017-09-20 RX ORDER — LIDOCAINE HYDROCHLORIDE 20 MG/ML
INJECTION, SOLUTION EPIDURAL; INFILTRATION; INTRACAUDAL; PERINEURAL
Status: COMPLETED
Start: 2017-09-20 | End: 2017-09-20

## 2017-09-20 RX ORDER — SODIUM CHLORIDE 9 MG/ML
INJECTION, SOLUTION INTRAVENOUS
Status: COMPLETED
Start: 2017-09-20 | End: 2017-09-20

## 2017-09-20 RX ORDER — SODIUM CHLORIDE 9 MG/ML
INJECTION, SOLUTION INTRAVENOUS CONTINUOUS
Status: DISCONTINUED | OUTPATIENT
Start: 2017-09-20 | End: 2017-09-20

## 2017-09-20 RX ORDER — METOPROLOL SUCCINATE 25 MG/1
25 TABLET, EXTENDED RELEASE ORAL
Qty: 30 TABLET | Refills: 0 | Status: SHIPPED | OUTPATIENT
Start: 2017-09-21 | End: 2017-10-16

## 2017-09-20 RX ADMIN — SODIUM CHLORIDE: 9 INJECTION, SOLUTION INTRAVENOUS at 11:15:00

## 2017-09-20 NOTE — PROGRESS NOTES
This is a patient of Dr. Vero Bautista admitted to [facility] for elective cardiac cath because of an abnormal nuclear stress test showing reversible ischemia of the apex and inferoseptal wall. .  The patient is asymptomatic.   BMP from a couple of days

## 2017-09-20 NOTE — PROCEDURES
[facility]    PATIENT'S NAME: Carleen Montalvo TOSHIA   ATTENDING PHYSICIAN: Oli Mccrary. Kisha Mcduffie MD   OPERATING PHYSICIAN: Lynda Garcia.  Argenis Rivers MD   PATIENT ACCOUNT#:   [id number]    LOCATION:  [place] [address]  MEDICAL RECORD #:   [id number]       SKYLER has severe proximal disease. The circumflex then continues on in the AV groove and gives off a small posterolateral branch. The posterolateral branch itself has an 80% to 90% proximal stenosis.     The left anterior descending artery has a mild ostial christina ventricular angiogram not performed due to renal insufficiency. Dictated By Rue Closs. Neo Dent MD  d: 09/20/2017 12:02:30  t: 09/20/2017 12:15:58  [facility] [id number]  [facility]/    cc: Qing Aguayo. MD Hamlet Romero MD

## 2017-09-20 NOTE — TELEPHONE ENCOUNTER
Received fax from [employer] (CPT 36723) is approved and is valid from 9/19/17-11/3/17. Pt is currently in cath lab.

## 2017-09-20 NOTE — PROCEDURES
Pre-op: Abnormal stress test  Post-op: Same  Procedure: Left heart cath, coronary angiogram.  Angio-Seal closure device RFA. Findings: Severe three-vessel CAD. LVEDP equals 6.   Sedation: Versed and fentanyl  EBL: 10 cc  Specimen: None  Complication: None

## 2017-09-20 NOTE — PRE-SEDATION ASSESSMENT
Pre-Procedure Sedation Assessment    Chief Complaint/Indication for procedure: Abnormal stress test    History of snoring or sleep or apnea?    No    History of previous problems with anesthesia or sedation  No    Physical Findings:  Neck: nl ROM  CV: Danielle Number

## 2017-09-21 NOTE — DISCHARGE SUMMARY
[facility]    PATIENT'S NAME: Keerthi Bernabe TOSHIA   ATTENDING PHYSICIAN: Aleta Meier.  Amandeep Farrell MD   PATIENT ACCOUNT#:   [id number]    LOCATION:  [place] [address]  MEDICAL RECORD #:   [id number]       YOB: 1950  ADMISSION DATE:

## 2017-09-22 ENCOUNTER — LAB ENCOUNTER (OUTPATIENT)
Dept: LAB | Age: 67
End: 2017-09-22
Attending: INTERNAL MEDICINE
Payer: COMMERCIAL

## 2017-09-22 ENCOUNTER — TELEPHONE (OUTPATIENT)
Dept: ENDOCRINOLOGY CLINIC | Facility: CLINIC | Age: 67
End: 2017-09-22

## 2017-09-22 DIAGNOSIS — E83.52 HYPERCALCEMIA: ICD-10-CM

## 2017-09-22 DIAGNOSIS — E11.21 DIABETIC NEPHROPATHY ASSOCIATED WITH TYPE 2 DIABETES MELLITUS (HCC): ICD-10-CM

## 2017-09-22 DIAGNOSIS — R94.39 ABNORMAL STRESS TEST: ICD-10-CM

## 2017-09-22 DIAGNOSIS — E11.65 UNCONTROLLED TYPE 2 DIABETES MELLITUS WITH HYPERGLYCEMIA, WITHOUT LONG-TERM CURRENT USE OF INSULIN (HCC): ICD-10-CM

## 2017-09-22 DIAGNOSIS — E78.5 DYSLIPIDEMIA: ICD-10-CM

## 2017-09-22 PROCEDURE — 80048 BASIC METABOLIC PNL TOTAL CA: CPT

## 2017-09-22 PROCEDURE — 36415 COLL VENOUS BLD VENIPUNCTURE: CPT

## 2017-09-22 PROCEDURE — 83721 ASSAY OF BLOOD LIPOPROTEIN: CPT

## 2017-09-22 NOTE — TELEPHONE ENCOUNTER
Kidney function is normal. Resume all DM medications and call with BG in two weeks. LDL is better but still high at 199. This is very high. Is he doing atorvastatin 40 mg daily. If yes, increase to 80 mg daily  Thanks .

## 2017-09-25 NOTE — TELEPHONE ENCOUNTER
Called Stanley at the preferred number listed below. Informed him of Dr. Mandie Hickman message. Patient states that he is scheduled for bypass surgery on 10/12/17. He was advised by his surgeon to stay off of the Metformin.  He is wondering if he can just resum

## 2017-09-28 NOTE — TELEPHONE ENCOUNTER
Spoke with Estella Romo and let him know ok per AM to resume Glipizide and [place] and ok to try taking Atorvastatin daily, consistently prior to increasing dose.

## 2017-10-02 RX ORDER — GLIPIZIDE 10 MG/1
10 TABLET ORAL
Qty: 180 TABLET | Refills: 0 | Status: SHIPPED | OUTPATIENT
Start: 2017-10-02 | End: 2018-02-06

## 2017-10-06 ENCOUNTER — HOSPITAL ENCOUNTER (OUTPATIENT)
Dept: CV DIAGNOSTICS | Facility: HOSPITAL | Age: 67
Discharge: HOME | End: 2017-10-06
Attending: NURSE PRACTITIONER
Payer: COMMERCIAL

## 2017-10-06 ENCOUNTER — HOSPITAL ENCOUNTER (OUTPATIENT)
Dept: ULTRASOUND IMAGING | Facility: HOSPITAL | Age: 67
Discharge: HOME | End: 2017-10-06
Attending: FAMILY MEDICINE
Payer: COMMERCIAL

## 2017-10-06 ENCOUNTER — HOSPITAL ENCOUNTER (OUTPATIENT)
Dept: ULTRASOUND IMAGING | Facility: HOSPITAL | Age: 67
Discharge: HOME | End: 2017-10-06
Attending: NURSE PRACTITIONER
Payer: COMMERCIAL

## 2017-10-06 DIAGNOSIS — I25.10 ATHEROSCLEROSIS OF CORONARY ARTERY OF NATIVE HEART WITHOUT ANGINA PECTORIS, UNSPECIFIED VESSEL OR LESION TYPE: ICD-10-CM

## 2017-10-06 DIAGNOSIS — I25.118 CORONARY ARTERY DISEASE OF NATIVE ARTERY OF NATIVE HEART WITH STABLE ANGINA PECTORIS (HCC): ICD-10-CM

## 2017-10-06 DIAGNOSIS — Z72.0 TOBACCO USE: ICD-10-CM

## 2017-10-06 DIAGNOSIS — R29.898 LEG FATIGUE: ICD-10-CM

## 2017-10-06 PROCEDURE — 93923 UPR/LXTR ART STDY 3+ LVLS: CPT | Performed by: FAMILY MEDICINE

## 2017-10-06 PROCEDURE — 93880 EXTRACRANIAL BILAT STUDY: CPT | Performed by: NURSE PRACTITIONER

## 2017-10-06 PROCEDURE — 93306 TTE W/DOPPLER COMPLETE: CPT | Performed by: NURSE PRACTITIONER

## 2017-10-09 ENCOUNTER — HOSPITAL ENCOUNTER (OUTPATIENT)
Dept: GENERAL RADIOLOGY | Facility: HOSPITAL | Age: 67
Discharge: HOME | End: 2017-10-09
Attending: NURSE PRACTITIONER
Payer: COMMERCIAL

## 2017-10-09 ENCOUNTER — ANESTHESIA EVENT (OUTPATIENT)
Dept: SURGERY | Facility: HOSPITAL | Age: 67
DRG: 235 | End: 2017-10-09
Payer: COMMERCIAL

## 2017-10-09 DIAGNOSIS — Z01.818 PRE-OPERATIVE CLEARANCE: ICD-10-CM

## 2017-10-09 PROCEDURE — 83735 ASSAY OF MAGNESIUM: CPT | Performed by: NURSE PRACTITIONER

## 2017-10-09 PROCEDURE — 85610 PROTHROMBIN TIME: CPT | Performed by: NURSE PRACTITIONER

## 2017-10-09 PROCEDURE — 80053 COMPREHEN METABOLIC PANEL: CPT | Performed by: NURSE PRACTITIONER

## 2017-10-09 PROCEDURE — 93010 ELECTROCARDIOGRAM REPORT: CPT | Performed by: NURSE PRACTITIONER

## 2017-10-09 PROCEDURE — 86900 BLOOD TYPING SEROLOGIC ABO: CPT | Performed by: NURSE PRACTITIONER

## 2017-10-09 PROCEDURE — 81001 URINALYSIS AUTO W/SCOPE: CPT | Performed by: NURSE PRACTITIONER

## 2017-10-09 PROCEDURE — 83036 HEMOGLOBIN GLYCOSYLATED A1C: CPT | Performed by: NURSE PRACTITIONER

## 2017-10-09 PROCEDURE — 85730 THROMBOPLASTIN TIME PARTIAL: CPT | Performed by: NURSE PRACTITIONER

## 2017-10-09 PROCEDURE — 85025 COMPLETE CBC W/AUTO DIFF WBC: CPT | Performed by: NURSE PRACTITIONER

## 2017-10-09 PROCEDURE — 86920 COMPATIBILITY TEST SPIN: CPT

## 2017-10-09 PROCEDURE — 71020 XR CHEST PA + LAT CHEST (CPT=71020): CPT | Performed by: NURSE PRACTITIONER

## 2017-10-09 PROCEDURE — 86850 RBC ANTIBODY SCREEN: CPT | Performed by: NURSE PRACTITIONER

## 2017-10-09 PROCEDURE — 86901 BLOOD TYPING SEROLOGIC RH(D): CPT | Performed by: NURSE PRACTITIONER

## 2017-10-09 RX ORDER — FAMOTIDINE 20 MG/1
20 TABLET ORAL ONCE
Status: CANCELLED | OUTPATIENT
Start: 2017-10-09 | End: 2017-10-09

## 2017-10-09 RX ORDER — SODIUM CHLORIDE 9 MG/ML
83 INJECTION, SOLUTION INTRAVENOUS CONTINUOUS
Status: CANCELLED | OUTPATIENT
Start: 2017-10-10

## 2017-10-09 RX ORDER — LORAZEPAM 1 MG/1
1 TABLET ORAL ONCE
Status: CANCELLED | OUTPATIENT
Start: 2017-10-09 | End: 2017-10-09

## 2017-10-09 RX ORDER — MORPHINE SULFATE 2 MG/ML
2 INJECTION, SOLUTION INTRAMUSCULAR; INTRAVENOUS ONCE
Status: CANCELLED | OUTPATIENT
Start: 2017-10-09 | End: 2017-10-09

## 2017-10-09 RX ORDER — METOCLOPRAMIDE 10 MG/1
10 TABLET ORAL ONCE
Status: CANCELLED | OUTPATIENT
Start: 2017-10-09 | End: 2017-10-09

## 2017-10-09 NOTE — ANESTHESIA PREPROCEDURE EVALUATION
Anesthesia PreOp Note    HPI:     Robet Apgar is a 61year old male who presents for preoperative consultation requested by: Ambrose Mota MD    Date of Surgery: 10/12/2017    Procedure(s):   HEART CORONARY ARTERY BYPASS GRAFT  ENDOSCOPIC LEG VEIN HA int he AM Disp: 2 capsule Rfl: 0   aspirin 81 MG Oral Tab Take 81 mg by mouth daily.  Disp:  Rfl:    Insulin Pen Needle (BD PEN NEEDLE SHORT U/F) 31G X 8 MM Does not apply Misc USE DAILY AS DIRECTED Disp:  Rfl:    Glucose Blood (RELION BLOOD GLUCOSE TEST) I weight is 74.8 kg (165 lb).     10/09/17  1306   Weight: 74.8 kg (165 lb)   Height: 1.765 m (5' 9.5\")        Anesthesia ROS/Med Hx and Physical Exam     Patient summary reviewed and Nursing notes reviewed    No history of anesthetic complications   Airway

## 2017-10-09 NOTE — PROGRESS NOTES
Misc. Note  Met with patient and wife discussed pre and post-op open heart instructions for Thursday 10/12/17. reveiwed vein mapping and carotid U/S results with patient and wife. Npo after midnight; OK to take Metoprolol with small sip of water.  Bring wr

## 2017-10-09 NOTE — PROGRESS NOTES
Misc. Note  Patient + nasal staph aureus;  Neg MRSA; called patient and he is aware of bactroban ointment which was called into his [facility] pharmacy 10/9/17 instructed to use twice daily in each nare until thursday    Kelley Nissen, NP  10/9/2017

## 2017-10-12 ENCOUNTER — APPOINTMENT (OUTPATIENT)
Dept: GENERAL RADIOLOGY | Facility: HOSPITAL | Age: 67
DRG: 235 | End: 2017-10-12
Attending: NURSE PRACTITIONER
Payer: COMMERCIAL

## 2017-10-12 ENCOUNTER — SURGERY (OUTPATIENT)
Age: 67
End: 2017-10-12

## 2017-10-12 ENCOUNTER — HOSPITAL ENCOUNTER (INPATIENT)
Facility: HOSPITAL | Age: 67
LOS: 4 days | Discharge: HOME HEALTH | DRG: 235 | End: 2017-10-16
Attending: THORACIC SURGERY (CARDIOTHORACIC VASCULAR SURGERY) | Admitting: THORACIC SURGERY (CARDIOTHORACIC VASCULAR SURGERY)
Payer: COMMERCIAL

## 2017-10-12 ENCOUNTER — ANESTHESIA (OUTPATIENT)
Dept: SURGERY | Facility: HOSPITAL | Age: 67
DRG: 235 | End: 2017-10-12
Payer: COMMERCIAL

## 2017-10-12 DIAGNOSIS — I25.810 CAD (CORONARY ARTERY DISEASE) OF ARTERY BYPASS GRAFT: Primary | ICD-10-CM

## 2017-10-12 PROCEDURE — 71010 XR CHEST AP PORTABLE  (CPT=71010): CPT | Performed by: NURSE PRACTITIONER

## 2017-10-12 PROCEDURE — 99255 IP/OBS CONSLTJ NEW/EST HI 80: CPT | Performed by: INTERNAL MEDICINE

## 2017-10-12 PROCEDURE — B246ZZ4 ULTRASONOGRAPHY OF RIGHT AND LEFT HEART, TRANSESOPHAGEAL: ICD-10-PCS | Performed by: THORACIC SURGERY (CARDIOTHORACIC VASCULAR SURGERY)

## 2017-10-12 PROCEDURE — 0211093 BYPASS CORONARY ARTERY, TWO ARTERIES FROM CORONARY ARTERY WITH AUTOLOGOUS VENOUS TISSUE, OPEN APPROACH: ICD-10-PCS | Performed by: THORACIC SURGERY (CARDIOTHORACIC VASCULAR SURGERY)

## 2017-10-12 PROCEDURE — 5A1935Z RESPIRATORY VENTILATION, LESS THAN 24 CONSECUTIVE HOURS: ICD-10-PCS | Performed by: THORACIC SURGERY (CARDIOTHORACIC VASCULAR SURGERY)

## 2017-10-12 PROCEDURE — 02100Z9 BYPASS CORONARY ARTERY, ONE ARTERY FROM LEFT INTERNAL MAMMARY, OPEN APPROACH: ICD-10-PCS | Performed by: THORACIC SURGERY (CARDIOTHORACIC VASCULAR SURGERY)

## 2017-10-12 PROCEDURE — 06BQ4ZZ EXCISION OF LEFT SAPHENOUS VEIN, PERCUTANEOUS ENDOSCOPIC APPROACH: ICD-10-PCS | Performed by: THORACIC SURGERY (CARDIOTHORACIC VASCULAR SURGERY)

## 2017-10-12 PROCEDURE — 93312 ECHO TRANSESOPHAGEAL: CPT | Performed by: ANESTHESIOLOGY

## 2017-10-12 PROCEDURE — 5A1221Z PERFORMANCE OF CARDIAC OUTPUT, CONTINUOUS: ICD-10-PCS | Performed by: THORACIC SURGERY (CARDIOTHORACIC VASCULAR SURGERY)

## 2017-10-12 RX ORDER — SODIUM CHLORIDE 9 MG/ML
INJECTION, SOLUTION INTRAVENOUS CONTINUOUS
Status: DISCONTINUED | OUTPATIENT
Start: 2017-10-12 | End: 2017-10-12

## 2017-10-12 RX ORDER — DOBUTAMINE HYDROCHLORIDE 100 MG/100ML
INJECTION INTRAVENOUS CONTINUOUS PRN
Status: DISCONTINUED | OUTPATIENT
Start: 2017-10-12 | End: 2017-10-12 | Stop reason: SURG

## 2017-10-12 RX ORDER — LORAZEPAM 1 MG/1
1 TABLET ORAL ONCE
Status: COMPLETED | OUTPATIENT
Start: 2017-10-12 | End: 2017-10-12

## 2017-10-12 RX ORDER — ALBUTEROL SULFATE 2.5 MG/3ML
2.5 SOLUTION RESPIRATORY (INHALATION)
Status: DISCONTINUED | OUTPATIENT
Start: 2017-10-12 | End: 2017-10-16

## 2017-10-12 RX ORDER — CLOPIDOGREL BISULFATE 75 MG/1
75 TABLET ORAL DAILY
Status: DISCONTINUED | OUTPATIENT
Start: 2017-10-12 | End: 2017-10-16

## 2017-10-12 RX ORDER — MIDAZOLAM HYDROCHLORIDE 1 MG/ML
INJECTION INTRAMUSCULAR; INTRAVENOUS AS NEEDED
Status: DISCONTINUED | OUTPATIENT
Start: 2017-10-12 | End: 2017-10-12 | Stop reason: SURG

## 2017-10-12 RX ORDER — MORPHINE SULFATE 2 MG/ML
2 INJECTION, SOLUTION INTRAMUSCULAR; INTRAVENOUS ONCE
Status: COMPLETED | OUTPATIENT
Start: 2017-10-12 | End: 2017-10-12

## 2017-10-12 RX ORDER — HYDROCODONE BITARTRATE AND ACETAMINOPHEN 5; 325 MG/1; MG/1
2 TABLET ORAL EVERY 4 HOURS PRN
Status: DISCONTINUED | OUTPATIENT
Start: 2017-10-12 | End: 2017-10-16

## 2017-10-12 RX ORDER — HEPARIN SODIUM 1000 [USP'U]/ML
INJECTION, SOLUTION INTRAVENOUS; SUBCUTANEOUS AS NEEDED
Status: DISCONTINUED | OUTPATIENT
Start: 2017-10-12 | End: 2017-10-12 | Stop reason: HOSPADM

## 2017-10-12 RX ORDER — SODIUM CHLORIDE 9 MG/ML
INJECTION, SOLUTION INTRAVENOUS
Status: DISPENSED
Start: 2017-10-12 | End: 2017-10-13

## 2017-10-12 RX ORDER — PROTAMINE SULFATE 10 MG/ML
INJECTION, SOLUTION INTRAVENOUS AS NEEDED
Status: DISCONTINUED | OUTPATIENT
Start: 2017-10-12 | End: 2017-10-12 | Stop reason: SURG

## 2017-10-12 RX ORDER — TEMAZEPAM 15 MG/1
15 CAPSULE ORAL NIGHTLY PRN
Status: DISCONTINUED | OUTPATIENT
Start: 2017-10-12 | End: 2017-10-16

## 2017-10-12 RX ORDER — LIDOCAINE HYDROCHLORIDE 10 MG/ML
INJECTION, SOLUTION EPIDURAL; INFILTRATION; INTRACAUDAL; PERINEURAL AS NEEDED
Status: DISCONTINUED | OUTPATIENT
Start: 2017-10-12 | End: 2017-10-12 | Stop reason: SURG

## 2017-10-12 RX ORDER — METOCLOPRAMIDE 10 MG/1
10 TABLET ORAL ONCE
Status: COMPLETED | OUTPATIENT
Start: 2017-10-12 | End: 2017-10-12

## 2017-10-12 RX ORDER — GLYCOPYRROLATE 0.2 MG/ML
0.6 INJECTION, SOLUTION INTRAMUSCULAR; INTRAVENOUS ONCE
Status: COMPLETED | OUTPATIENT
Start: 2017-10-12 | End: 2017-10-12

## 2017-10-12 RX ORDER — MILRINONE LACTATE 0.2 MG/ML
0.38 INJECTION, SOLUTION INTRAVENOUS AS NEEDED
Status: DISCONTINUED | OUTPATIENT
Start: 2017-10-12 | End: 2017-10-16

## 2017-10-12 RX ORDER — POTASSIUM CHLORIDE 29.8 MG/ML
40 INJECTION INTRAVENOUS AS NEEDED
Status: DISCONTINUED | OUTPATIENT
Start: 2017-10-12 | End: 2017-10-16

## 2017-10-12 RX ORDER — CEFAZOLIN SODIUM 1 G/3ML
INJECTION, POWDER, FOR SOLUTION INTRAMUSCULAR; INTRAVENOUS AS NEEDED
Status: DISCONTINUED | OUTPATIENT
Start: 2017-10-12 | End: 2017-10-12 | Stop reason: HOSPADM

## 2017-10-12 RX ORDER — NEOSTIGMINE METHYLSULFATE 0.5 MG/ML
3 INJECTION INTRAVENOUS ONCE
Status: COMPLETED | OUTPATIENT
Start: 2017-10-12 | End: 2017-10-12

## 2017-10-12 RX ORDER — METOCLOPRAMIDE HYDROCHLORIDE 5 MG/ML
10 INJECTION INTRAMUSCULAR; INTRAVENOUS EVERY 6 HOURS
Status: DISCONTINUED | OUTPATIENT
Start: 2017-10-12 | End: 2017-10-13

## 2017-10-12 RX ORDER — CHLORHEXIDINE GLUCONATE 0.12 MG/ML
15 RINSE ORAL
Status: DISCONTINUED | OUTPATIENT
Start: 2017-10-12 | End: 2017-10-12

## 2017-10-12 RX ORDER — HYDROCODONE BITARTRATE AND ACETAMINOPHEN 5; 325 MG/1; MG/1
1 TABLET ORAL EVERY 4 HOURS PRN
Status: DISCONTINUED | OUTPATIENT
Start: 2017-10-12 | End: 2017-10-16

## 2017-10-12 RX ORDER — METOCLOPRAMIDE HYDROCHLORIDE 5 MG/ML
10 INJECTION INTRAMUSCULAR; INTRAVENOUS EVERY 6 HOURS
Status: DISCONTINUED | OUTPATIENT
Start: 2017-10-12 | End: 2017-10-12

## 2017-10-12 RX ORDER — ALBUMIN, HUMAN INJ 5% 5 %
250 SOLUTION INTRAVENOUS ONCE AS NEEDED
Status: COMPLETED | OUTPATIENT
Start: 2017-10-12 | End: 2017-10-12

## 2017-10-12 RX ORDER — SODIUM CHLORIDE 9 MG/ML
INJECTION, SOLUTION INTRAVENOUS CONTINUOUS
Status: DISCONTINUED | OUTPATIENT
Start: 2017-10-12 | End: 2017-10-16

## 2017-10-12 RX ORDER — ASCORBIC ACID 500 MG
500 TABLET ORAL 3 TIMES DAILY
Status: DISCONTINUED | OUTPATIENT
Start: 2017-10-13 | End: 2017-10-16

## 2017-10-12 RX ORDER — SODIUM CHLORIDE 0.9 % (FLUSH) 0.9 %
10 SYRINGE (ML) INJECTION AS NEEDED
Status: DISCONTINUED | OUTPATIENT
Start: 2017-10-12 | End: 2017-10-16

## 2017-10-12 RX ORDER — ACETAMINOPHEN 10 MG/ML
1000 INJECTION, SOLUTION INTRAVENOUS EVERY 8 HOURS
Status: COMPLETED | OUTPATIENT
Start: 2017-10-13 | End: 2017-10-13

## 2017-10-12 RX ORDER — ROCURONIUM BROMIDE 10 MG/ML
INJECTION, SOLUTION INTRAVENOUS AS NEEDED
Status: DISCONTINUED | OUTPATIENT
Start: 2017-10-12 | End: 2017-10-12 | Stop reason: SURG

## 2017-10-12 RX ORDER — NITROGLYCERIN 20 MG/100ML
INJECTION INTRAVENOUS CONTINUOUS PRN
Status: DISCONTINUED | OUTPATIENT
Start: 2017-10-12 | End: 2017-10-16

## 2017-10-12 RX ORDER — CHLORHEXIDINE GLUCONATE 0.12 MG/ML
15 RINSE ORAL 2 TIMES DAILY
Status: DISCONTINUED | OUTPATIENT
Start: 2017-10-12 | End: 2017-10-16

## 2017-10-12 RX ORDER — SODIUM CHLORIDE 9 MG/ML
83 INJECTION, SOLUTION INTRAVENOUS CONTINUOUS
Status: DISCONTINUED | OUTPATIENT
Start: 2017-10-12 | End: 2017-10-12

## 2017-10-12 RX ORDER — NITROGLYCERIN 20 MG/100ML
INJECTION INTRAVENOUS CONTINUOUS PRN
Status: DISCONTINUED | OUTPATIENT
Start: 2017-10-12 | End: 2017-10-12 | Stop reason: SURG

## 2017-10-12 RX ORDER — ACETAMINOPHEN 325 MG/1
650 TABLET ORAL EVERY 4 HOURS PRN
Status: DISCONTINUED | OUTPATIENT
Start: 2017-10-12 | End: 2017-10-16

## 2017-10-12 RX ORDER — DOBUTAMINE HYDROCHLORIDE 100 MG/100ML
INJECTION INTRAVENOUS CONTINUOUS PRN
Status: DISCONTINUED | OUTPATIENT
Start: 2017-10-12 | End: 2017-10-16

## 2017-10-12 RX ORDER — ASCORBIC ACID 500 MG
1000 TABLET ORAL ONCE
Status: COMPLETED | OUTPATIENT
Start: 2017-10-12 | End: 2017-10-12

## 2017-10-12 RX ORDER — DEXTROSE, SODIUM CHLORIDE, SODIUM LACTATE, POTASSIUM CHLORIDE, AND CALCIUM CHLORIDE 5; .6; .31; .03; .02 G/100ML; G/100ML; G/100ML; G/100ML; G/100ML
INJECTION, SOLUTION INTRAVENOUS CONTINUOUS
Status: DISCONTINUED | OUTPATIENT
Start: 2017-10-12 | End: 2017-10-13

## 2017-10-12 RX ORDER — ONDANSETRON 2 MG/ML
4 INJECTION INTRAMUSCULAR; INTRAVENOUS EVERY 6 HOURS PRN
Status: DISCONTINUED | OUTPATIENT
Start: 2017-10-12 | End: 2017-10-16

## 2017-10-12 RX ORDER — SENNOSIDES 8.6 MG
8.6 TABLET ORAL 2 TIMES DAILY
Status: DISCONTINUED | OUTPATIENT
Start: 2017-10-12 | End: 2017-10-16

## 2017-10-12 RX ORDER — BISACODYL 10 MG
10 SUPPOSITORY, RECTAL RECTAL
Status: DISCONTINUED | OUTPATIENT
Start: 2017-10-12 | End: 2017-10-16

## 2017-10-12 RX ORDER — MORPHINE SULFATE 4 MG/ML
8 INJECTION, SOLUTION INTRAMUSCULAR; INTRAVENOUS EVERY 2 HOUR PRN
Status: DISCONTINUED | OUTPATIENT
Start: 2017-10-12 | End: 2017-10-16

## 2017-10-12 RX ORDER — MORPHINE SULFATE 2 MG/ML
2 INJECTION, SOLUTION INTRAMUSCULAR; INTRAVENOUS EVERY 2 HOUR PRN
Status: DISCONTINUED | OUTPATIENT
Start: 2017-10-12 | End: 2017-10-16

## 2017-10-12 RX ORDER — MAGNESIUM SULFATE 1 G/100ML
1 INJECTION INTRAVENOUS AS NEEDED
Status: DISCONTINUED | OUTPATIENT
Start: 2017-10-12 | End: 2017-10-16

## 2017-10-12 RX ORDER — MAGNESIUM SULFATE HEPTAHYDRATE 40 MG/ML
2 INJECTION, SOLUTION INTRAVENOUS AS NEEDED
Status: DISCONTINUED | OUTPATIENT
Start: 2017-10-12 | End: 2017-10-16

## 2017-10-12 RX ORDER — FAMOTIDINE 20 MG/1
20 TABLET ORAL ONCE
Status: COMPLETED | OUTPATIENT
Start: 2017-10-12 | End: 2017-10-12

## 2017-10-12 RX ORDER — MORPHINE SULFATE 4 MG/ML
4 INJECTION, SOLUTION INTRAMUSCULAR; INTRAVENOUS EVERY 2 HOUR PRN
Status: DISCONTINUED | OUTPATIENT
Start: 2017-10-12 | End: 2017-10-16

## 2017-10-12 RX ORDER — DOXEPIN HYDROCHLORIDE 50 MG/1
1 CAPSULE ORAL DAILY
Status: DISCONTINUED | OUTPATIENT
Start: 2017-10-13 | End: 2017-10-16

## 2017-10-12 RX ORDER — ACETAMINOPHEN 10 MG/ML
1000 INJECTION, SOLUTION INTRAVENOUS EVERY 8 HOURS
Status: DISCONTINUED | OUTPATIENT
Start: 2017-10-12 | End: 2017-10-12

## 2017-10-12 RX ORDER — DEXTROSE, SODIUM CHLORIDE, SODIUM LACTATE, POTASSIUM CHLORIDE, AND CALCIUM CHLORIDE 5; .6; .31; .03; .02 G/100ML; G/100ML; G/100ML; G/100ML; G/100ML
INJECTION, SOLUTION INTRAVENOUS
Status: COMPLETED
Start: 2017-10-12 | End: 2017-10-12

## 2017-10-12 RX ORDER — FAMOTIDINE 10 MG/ML
20 INJECTION, SOLUTION INTRAVENOUS 2 TIMES DAILY
Status: DISCONTINUED | OUTPATIENT
Start: 2017-10-12 | End: 2017-10-16

## 2017-10-12 RX ORDER — PHENYLEPHRINE HCL 10 MG/ML
VIAL (ML) INJECTION AS NEEDED
Status: DISCONTINUED | OUTPATIENT
Start: 2017-10-12 | End: 2017-10-12 | Stop reason: SURG

## 2017-10-12 RX ORDER — FAMOTIDINE 20 MG/1
20 TABLET ORAL 2 TIMES DAILY
Status: DISCONTINUED | OUTPATIENT
Start: 2017-10-12 | End: 2017-10-16

## 2017-10-12 RX ORDER — ASPIRIN 81 MG/1
81 TABLET ORAL DAILY
Status: DISCONTINUED | OUTPATIENT
Start: 2017-10-12 | End: 2017-10-16

## 2017-10-12 RX ADMIN — ROCURONIUM BROMIDE 30 MG: 10 INJECTION, SOLUTION INTRAVENOUS at 11:52:00

## 2017-10-12 RX ADMIN — NITROGLYCERIN 5 MCG/MIN: 20 INJECTION INTRAVENOUS at 09:50:00

## 2017-10-12 RX ADMIN — PHENYLEPHRINE HCL 200 MCG: 10 MG/ML VIAL (ML) INJECTION at 09:34:00

## 2017-10-12 RX ADMIN — MIDAZOLAM HYDROCHLORIDE 2 MG: 1 INJECTION INTRAMUSCULAR; INTRAVENOUS at 06:51:00

## 2017-10-12 RX ADMIN — PHENYLEPHRINE HCL 200 MCG: 10 MG/ML VIAL (ML) INJECTION at 09:32:00

## 2017-10-12 RX ADMIN — ROCURONIUM BROMIDE 50 MG: 10 INJECTION, SOLUTION INTRAVENOUS at 08:30:00

## 2017-10-12 RX ADMIN — DOBUTAMINE HYDROCHLORIDE 1 MCG/KG/MIN: 100 INJECTION INTRAVENOUS at 12:30:00

## 2017-10-12 RX ADMIN — HEPARIN SODIUM 29000 UNITS: 1000 INJECTION, SOLUTION INTRAVENOUS; SUBCUTANEOUS at 09:23:00

## 2017-10-12 RX ADMIN — PROTAMINE SULFATE 500 MG: 10 INJECTION, SOLUTION INTRAVENOUS at 11:25:00

## 2017-10-12 RX ADMIN — PROTAMINE SULFATE 50 MG: 10 INJECTION, SOLUTION INTRAVENOUS at 11:55:00

## 2017-10-12 RX ADMIN — ROCURONIUM BROMIDE 50 MG: 10 INJECTION, SOLUTION INTRAVENOUS at 07:25:00

## 2017-10-12 RX ADMIN — LIDOCAINE HYDROCHLORIDE 50 MG: 10 INJECTION, SOLUTION EPIDURAL; INFILTRATION; INTRACAUDAL; PERINEURAL at 07:25:00

## 2017-10-12 RX ADMIN — DOBUTAMINE HYDROCHLORIDE 2.5 MCG/KG/MIN: 100 INJECTION INTRAVENOUS at 11:30:00

## 2017-10-12 RX ADMIN — SODIUM CHLORIDE: 9 INJECTION, SOLUTION INTRAVENOUS at 06:51:00

## 2017-10-12 RX ADMIN — PHENYLEPHRINE HCL 200 MCG: 10 MG/ML VIAL (ML) INJECTION at 09:29:00

## 2017-10-12 RX ADMIN — PHENYLEPHRINE HCL 100 MCG: 10 MG/ML VIAL (ML) INJECTION at 09:00:00

## 2017-10-12 RX ADMIN — DOBUTAMINE HYDROCHLORIDE 5 MCG/KG/MIN: 100 INJECTION INTRAVENOUS at 10:58:00

## 2017-10-12 NOTE — PROGRESS NOTES
[facility]  Progress Note    Tara Plascencia Patient Status:  Inpatient    1950 MRN [id number]   Location [facility] 2W/SW Attending Ruth Ann Elaine MD   Hosp Day # 0 PCP Raina Cobb MD     Assessment:    1. CAD           S/P CA neostigmine methylsulfate  3 mg Intravenous Once   • glycopyrrolate  0.6 mg Intravenous Once   • metoprolol tartrate  25 mg Oral 2x Daily(Beta Blocker)   • Metoclopramide HCl  10 mg Intravenous Q6H   • famoTIDine  20 mg Oral BID    Or   • famoTIDine  20 mg

## 2017-10-12 NOTE — ANESTHESIA PROCEDURE NOTES
Procedure Performed: SANJEEV       Start Time:  10/12/2017 8:26 AM       End Time:   10/12/2017 8:26 AM    Preanesthesia Checklist:  Patient identified, IV assessed, risks and benefits discussed, monitors and equipment assessed, procedure being performed at [place]

## 2017-10-12 NOTE — ANESTHESIA PROCEDURE NOTES
Central Line  Performed by: Ailyn Paula by: Cassandra Rouse     Procedure Start:  10/12/2017 8:23 AM  Procedure End:  10/12/2017 8:23 AM  Site Identification: real time ultrasound guided    Patient Location:  OR  Indication: central venous

## 2017-10-12 NOTE — H&P (VIEW-ONLY)
[facility] [place]    Report of Consultation    Davidsonalejandro Graf     1950 MRN [id number]   Referring Provider Napoleon Momin MD  [address] [place] [address] PCP Paulo Hamilton MD     Date of Consult:  17  Reason for Consu total) by mouth Daily Beta Blocker. Disp: 30 tablet Rfl: 0   Acetylcysteine (N-ACETYL-L-CYSTEINE) 600 MG Oral Cap Take 1 dose po tonite and one dose po int he AM Disp: 2 capsule Rfl: 0   aspirin 81 MG Oral Tab Take 81 mg by mouth daily.  Disp:  Rfl:    Insu 09/19/2017   HGB 15.2 09/19/2017   HCT 44.8 09/19/2017    (H) 09/19/2017   CREATSERUM 1.39 09/20/2017   BUN 30 (H) 09/20/2017    (L) 09/20/2017   K 4.0 09/20/2017    09/20/2017   CO2 27 09/20/2017    (H) 09/20/2017   CA 11.1 (H) 0 lesion. MEDIASTINUM/LUAN:    No mass or lymphadenopathy. Prominent but nonenlarged mediastinal lymph nodes are noted. CHEST WALL:  No axillary mass or lymphadenopathy. There is moderate bilateral gynecomastia.   LIMITED ABDOMEN:     A small hiatal herni proximal stenosis. The left anterior descending artery has a mild ostial stenosis. It gives off a large high takeoff first diagonal branch.   That diagonal has an 80% to 90% focal stenosis in its proximal portion and then moderate segmental disease in th atherosclerotic disease involving his lower extremities. He is a heavy smoker. He continues to smoke.   Recommendations are for multivessel coronary artery bypass grafting to the LAD, obtuse marginal and distal right coronary artery which is very diffusel

## 2017-10-12 NOTE — ANESTHESIA POSTPROCEDURE EVALUATION
Patient: Lyndsay Green    Procedure Summary     Date:  10/12/17 Room / Location:  [facility] OR  / [facility] OR    Anesthesia Start:  5032 Anesthesia Stop:  0436    Procedure:  HEART CORONARY ARTERY BYPASS GRAFT (N/A ) Diagnosis:  (Coronary artery disease )

## 2017-10-12 NOTE — PROGRESS NOTES
Received patient s/p CABG x3 at 1245. AET 1254. Pt is intubated and sedated. ETT in place, mechanical ventilator, settings as charted. R IJ cordis/swan in place, L radial a-line in place. Hemodynamics as charted. VS as charted.   Mediastinal chest tubes x2

## 2017-10-12 NOTE — OR PREOP
Pt. States has \"a little heartburn,\"  Denies chest pain or other pain . States sometimes gets it after taking some of his meds for diabetes and since starting Metoprolol. Vitals stable .

## 2017-10-12 NOTE — PROGRESS NOTES
Pt should see Dr. Álvaro Ray about this. Has peripheral arterial disease - has cardiac bypass scheduled for tomorrow so will be dealt with at later time.

## 2017-10-12 NOTE — CONSULTS
Pulmonary/Critical Care Consult Note    HPI:   Thomas Laboy is a 61year old male with No chief complaint on file. Salvador Fuentes MD    Pt is a 60 yo with CAD, COPD, DM, HTN, and HL who recently had eval for SOB.  Stress test was + and LHC showed multi Oral Once   Dexmedetomidine HCl (PRECEDEX) 400 mcg in sodium chloride 0.9 % 100 mL infusion 0.3-0.7 mcg/kg/hr Intravenous Continuous   [COMPLETED] aminocaproic acid (AMICAR) bolus from bag 5 g Intravenous Once   And      [COMPLETED] aminocaproic acid (AMIC injection 10 mg 10 mg Intravenous Q6H   famoTIDine (PEPCID) tab 20 mg 20 mg Oral BID   Or      famoTIDine (PEPCID) injection 20 mg 20 mg Intravenous BID   potassium chloride 20 mEq in sodium chloride 0.9% 100 mL IVPB 20 mEq Intravenous PRN   Or      potass child    Social History:  Social History    Marital status:              Spouse name:                       Years of education:                 Number of children:               Social History Main Topics    Smoking status: Former Smoker clear that pt was dx with COPD  Plan per op nebs    Abnl CT chest   Screening ct cavitary lung disease RUL post segment 4/17 8/21/17 nearly resolved  Regino Stokes not clear   Plan      f/u CT   Get more hx after extubation   Quant gold    HL  Plant statin    DV

## 2017-10-12 NOTE — PROGRESS NOTES
Pt awake, weaning parameters completed by RT. CPAP trial per protocol. ABGs drawn per protocol. Pt extubated per protocol @1825. O2 NC @ 4LNC. O2 sats >95%.  CPM

## 2017-10-12 NOTE — PLAN OF CARE
Problem: PAIN - ADULT  Goal: Verbalizes/displays adequate comfort level or patient's stated pain goal  INTERVENTIONS:  - Encourage pt to monitor pain and request assistance  - Assess pain using appropriate pain scale  - Administer analgesics based on type for interpreters to assist at discharge as needed  - Consider post-discharge preferences of patient/family/discharge partner  - Complete POLST form as appropriate  - Assess patient's ability to be responsible for managing their own health  - Refer to Case oxygenation and minimize respiratory effort  - Oxygen supplementation based on oxygen saturation or ABGs  - Provide Smoking Cessation handout, if applicable  - Encourage broncho-pulmonary hygiene including cough, deep breathe, Incentive Spirometry  - Asses patient's medication profile  - Implement strategies to promote bladder emptying  Outcome: Progressing  Valencia cath in place. Strict I/O.  CPM    Problem: METABOLIC/FLUID AND ELECTROLYTES - ADULT  Goal: Glucose maintained within prescribed range  INTERVENTIO development  - Assess and document skin integrity  - Monitor for areas of redness and/or skin breakdown  - Initiate interventions, skin care algorithm/standards of care as needed  Outcome: Progressing    Goal: Incision(s), wounds(s) or drain site(s) healin

## 2017-10-12 NOTE — CM/SW NOTE
SW following up on d/c planning for the patient. Patient was admitted for a CABG 10/12. Patient is intubated and no family at bedside. SW will follow up when he is extubated to discuss d/c planning, likely rehab vs. [facility].     Emily Mantilla [facility]  [id number]

## 2017-10-12 NOTE — ANESTHESIA PROCEDURE NOTES
Arterial Line  Performed by: Radhames Díaz by: Ha Redd     Procedure Start:  10/12/2017 8:22 AM  Procedure End:  10/12/2017 8:22 AM  Site Identification: real time ultrasound guided    Patient Location:  OR  Indication: continuous blo

## 2017-10-13 ENCOUNTER — PRIOR ORIGINAL RECORDS (OUTPATIENT)
Dept: OTHER | Age: 67
End: 2017-10-13

## 2017-10-13 ENCOUNTER — APPOINTMENT (OUTPATIENT)
Dept: GENERAL RADIOLOGY | Facility: HOSPITAL | Age: 67
DRG: 235 | End: 2017-10-13
Attending: NURSE PRACTITIONER
Payer: COMMERCIAL

## 2017-10-13 PROCEDURE — 71010 XR CHEST AP PORTABLE  (CPT=71010): CPT | Performed by: NURSE PRACTITIONER

## 2017-10-13 PROCEDURE — 99233 SBSQ HOSP IP/OBS HIGH 50: CPT | Performed by: INTERNAL MEDICINE

## 2017-10-13 RX ORDER — METOCLOPRAMIDE HYDROCHLORIDE 5 MG/ML
10 INJECTION INTRAMUSCULAR; INTRAVENOUS EVERY 6 HOURS
Status: DISCONTINUED | OUTPATIENT
Start: 2017-10-14 | End: 2017-10-15

## 2017-10-13 RX ORDER — DEXTROSE, SODIUM CHLORIDE, SODIUM LACTATE, POTASSIUM CHLORIDE, AND CALCIUM CHLORIDE 5; .6; .31; .03; .02 G/100ML; G/100ML; G/100ML; G/100ML; G/100ML
INJECTION, SOLUTION INTRAVENOUS
Status: DISPENSED
Start: 2017-10-13 | End: 2017-10-14

## 2017-10-13 RX ORDER — ENOXAPARIN SODIUM 100 MG/ML
40 INJECTION SUBCUTANEOUS EVERY 24 HOURS
Status: DISCONTINUED | OUTPATIENT
Start: 2017-10-13 | End: 2017-10-16

## 2017-10-13 RX ORDER — METOPROLOL TARTRATE 50 MG/1
50 TABLET, FILM COATED ORAL
Status: DISCONTINUED | OUTPATIENT
Start: 2017-10-13 | End: 2017-10-16

## 2017-10-13 RX ORDER — AMLODIPINE BESYLATE 5 MG/1
5 TABLET ORAL DAILY
Status: DISCONTINUED | OUTPATIENT
Start: 2017-10-13 | End: 2017-10-16

## 2017-10-13 RX ORDER — ATORVASTATIN CALCIUM 40 MG/1
40 TABLET, FILM COATED ORAL DAILY
Status: DISCONTINUED | OUTPATIENT
Start: 2017-10-13 | End: 2017-10-16

## 2017-10-13 RX ORDER — AMIODARONE HYDROCHLORIDE 200 MG/1
200 TABLET ORAL
Status: DISCONTINUED | OUTPATIENT
Start: 2017-10-13 | End: 2017-10-16

## 2017-10-13 NOTE — CM/SW NOTE
SW following up on d/c planning for the patient. SW waiting on recommendations from IDT for [facility] vs. Rehab. Referral however initiated to [facility] to check insurance coverage at home and the patient is in agreement.      Nasrin Levin [facility]  [id number]

## 2017-10-13 NOTE — PLAN OF CARE
Problem: PAIN - ADULT  Goal: Verbalizes/displays adequate comfort level or patient's stated pain goal  INTERVENTIONS:  - Encourage pt to monitor pain and request assistance  - Assess pain using appropriate pain scale  - Administer analgesics based on type OFF~SR/ST, S/P CABG X3~NTG CURRENTLY @ 30MCG.  AM LOPRESSOR GIVEN EARLY    Problem: SKIN/TISSUE INTEGRITY - ADULT  Goal: Incision(s), wounds(s) or drain site(s) healing without S/S of infection  INTERVENTIONS:  - Assess and document risk factors for pressur

## 2017-10-13 NOTE — PROGRESS NOTES
[facility] HOSP - [facility]    Progress Note    Thaddeus Doyle Patient Status:  Inpatient    1950 MRN [id number]   Location [facility] 2W/SW Attending Tyrell Aguilar MD   Hosp Day # 1 PCP Wing Cutler MD     Subjective:  Pt.  Sitting no LE edema bilat  Pulses: 1+ bilat DP  Skin: sternotomy incision drsg: CDI - TPW intact; left leg ACE wrap intact  Neurological:  AAOx3, MAEW    Assessment/Plan:  S/P CABG x 3 POD # 1  Encourage pulm toilet: IS & Acapella  Pain meds as needed  Increase ac

## 2017-10-13 NOTE — PROGRESS NOTES
Pulmonary/Critical Care Follow Up Note    HPI:   Shannon Lai is a 61year old male with No chief complaint on file.       PCP Alea Davies MD  Admission Attending Jude Luz MD    Hospital Day #1    Mild to no pain  + sob  No cough  No hx TB ex Continuous  •  temazepam (RESTORIL) cap 15 mg, 15 mg, Oral, Nightly PRN  •  DOBUTamine in D5W (DOBUTREX) 250 mg/250 ml infusion, 2.5-10 mcg/kg/min, Intravenous, Continuous PRN  •  nitroGLYCERIN infusion 50mg in D5W 250ml, 5-300 mcg/min, Intravenous, Contin sulfate (PF) 4 MG/ML injection 4 mg, 4 mg, Intravenous, Q2H PRN **OR** morphINE sulfate (PF) 4 MG/ML injection 8 mg, 8 mg, Intravenous, Q2H PRN  •  Clopidogrel Bisulfate (PLAVIX) tab 75 mg, 75 mg, Oral, Daily  •  aspirin EC tab 81 mg, 81 mg, Oral, Daily  • cavitary lung disease RUL post segment 4/17 8/21/17 nearly resolved  [place] not clear  No TB exposure  Suspect fungal PNA and now better   Plan       f/u CT in future                     HL  Plant statin     DVT px  Lovenox      Geeta Mendez MD

## 2017-10-13 NOTE — DIETARY NOTE
NUTRITION:  Diet Education    Consult received for diet education per protocol. Education deferred until s/p intervention and when appropriate for teaching.     [address], [address]  Ext [phone]

## 2017-10-13 NOTE — PLAN OF CARE
Problem: PAIN - ADULT  Goal: Verbalizes/displays adequate comfort level or patient's stated pain goal  INTERVENTIONS:  - Encourage pt to monitor pain and request assistance  - Assess pain using appropriate pain scale  - Administer analgesics based on type health  - Refer to Case Management Department for coordinating discharge planning if the patient needs post-hospital services based on physician/LIP order or complex needs related to functional status, cognitive ability or social support system   Outcome: suctioning and perform as needed  - Assess and instruct to report SOB or any respiratory difficulty  - Respiratory Therapy support as indicated  - Manage/alleviate anxiety  - Monitor for signs/symptoms of CO2 retention   Outcome: Progressing  Currently com intake and initiate nutrition consult as needed  - Instruct patient on self management of diabetes    Outcome: Progressing  Plan to transition off iv insulin this evening  Goal: Electrolytes maintained within normal limits  INTERVENTIONS:  - Monitor labs a prevention bundle as indicated   Outcome: Progressing    Goal: Oral mucous membranes remain intact  INTERVENTIONS  - Assess oral mucosa and hygiene practices  - Implement preventative oral hygiene regimen  - Implement oral medicated treatments as ordered

## 2017-10-13 NOTE — PROGRESS NOTES
[facility]    Progress Note    Tesha Chaves Patient Status:  Inpatient    1950 MRN [id number]   Location [facility] 2W/SW Attending Jacqueline Nunez MD   Hosp Day # 1 PCP Viky Coelho MD         Assessment and Plan: mupirocin  1 Application Nasal BID   • Chlorhexidine Gluconate  15 mL Mouth/Throat BID   • Clopidogrel Bisulfate  75 mg Oral Daily   • aspirin  81 mg Oral Daily   • Senna  8.6 mg Oral BID   • insulin (NOVOLIN R) bolus from bag  0.1 Units/kg Intravenous Onc effusion, or pneumothorax is evident. Mild diffuse coarsening of interstitial markings. BONES: Median sternotomy wires are demonstrated. Mild degenerative changes of the thoracic spine and shoulders are apparent.  OTHER: Tube is identified with tip project

## 2017-10-13 NOTE — CARDIAC REHAB
Cardiac Rehab Phase I    Activity:    refused to get up in chair this am and this afternoon. Education:  Handouts provided and reviewed: Heart Surgery Binder. Patient instructed on: I.S. / Acapella and Cough and Deep Breathe.        Disease Process:

## 2017-10-14 PROCEDURE — 99232 SBSQ HOSP IP/OBS MODERATE 35: CPT | Performed by: INTERNAL MEDICINE

## 2017-10-14 PROCEDURE — 99232 SBSQ HOSP IP/OBS MODERATE 35: CPT | Performed by: HOSPITALIST

## 2017-10-14 RX ORDER — FUROSEMIDE 10 MG/ML
20 INJECTION INTRAMUSCULAR; INTRAVENOUS ONCE
Status: COMPLETED | OUTPATIENT
Start: 2017-10-14 | End: 2017-10-14

## 2017-10-14 RX ORDER — POTASSIUM CHLORIDE 20 MEQ/1
20 TABLET, EXTENDED RELEASE ORAL ONCE
Status: COMPLETED | OUTPATIENT
Start: 2017-10-14 | End: 2017-10-14

## 2017-10-14 NOTE — PROGRESS NOTES
Assessment/Plan:  S/P CABG x 3 POD # 2  Encourage pulm toilet: IS & Acapella  Pain meds as needed  Increase activity- oob to chair ; to ambulate in mckeon today as tolerated  SCDs and Lovenox prophylaxis DVT prevention   Chest tube; cordis and miller removal

## 2017-10-14 NOTE — PLAN OF CARE
CARDIOVASCULAR - ADULT    • Maintains optimal cardiac output and hemodynamic stability Progressing    • Absence of cardiac arrhythmias or at baseline Progressing        Diabetes/Glucose Control    • Glucose maintained within prescribed range Progressing alarm on, callsl for help when needed, pt looking forward to getting ct and lines out so he can move and walk more easily, wife home for nite, st 105-110 at [place], sched lopressor given, up to chair in am

## 2017-10-14 NOTE — PROGRESS NOTES
[facility]  Progress Note    Carlene Hawkins Patient Status:  Inpatient    1950 MRN [id number]   Location [facility] 2W/SW Attending Mati Jacobo MD   Hosp Day # 1 PCP Magaly Gramajo MD     Assessment:   POD 2- CABG X3- preserved metoprolol tartrate  50 mg Oral 2x Daily(Beta Blocker)   • AmLODIPine Besylate  5 mg Oral Daily   • Amiodarone HCl  200 mg Oral TID CC   • enoxaparin  40 mg Subcutaneous Q24H   • insulin detemir  20 Units Subcutaneous Daily   • Insulin Aspart Pen  1-5 Unit

## 2017-10-14 NOTE — PROGRESS NOTES
[facility]     Progress Note        Stephanie Potter Patient Status:  Inpatient    1950 MRN [id number]   Location [facility] 2W/SW Attending Daquan Lema MD   Hosp Day # 2 PCP Estella Duarte MD       Subjective:   Patient Continuous   fentaNYL citrate (SUBLIMAZE) 0.05 MG/ML injection 25 mcg 25 mcg Intravenous Q30 Min PRN   Or      fentaNYL citrate (SUBLIMAZE) 0.05 MG/ML injection 50 mcg 50 mcg Intravenous Q30 Min PRN   fentaNYL PCA bolus from bag 50 mcg Intravenous Once   A (TYLENOL) tab 650 mg 650 mg Oral Q4H PRN   Or      HYDROcodone-acetaminophen (NORCO) 5-325 MG per tab 1 tablet 1 tablet Oral Q4H PRN   Or      HYDROcodone-acetaminophen (NORCO) 5-325 MG per tab 2 tablet 2 tablet Oral Q4H PRN   morphINE sulfate (PF) 2 MG/ML for this exam.    PROCEDURE: XR CHEST AP PORTABLE (CPT=71010) TIME: 12:55. COMPARISON: [facility], XR CHEST PA + LAT CHEST (CPT=71020), 10/09/2017, 14:41. INDICATIONS: S/P Cabg x4. TECHNIQUE:   Single view.    FINDINGS:  Overlying wires uncomplicated. 2. No convincing focal consolidation or large pleural effusion.            Ekg 12-lead    Result Date: 10/14/2017  ECG Report  Interpretation  --------------------------     Ekg 12-lead    Result Date: 10/13/2017  ECG Report  Interpretation

## 2017-10-14 NOTE — PLAN OF CARE
Diabetes/Glucose Control    • Glucose maintained within prescribed range Completed        METABOLIC/FLUID AND ELECTROLYTES - ADULT    • Hemodynamic stability and optimal renal function maintained Completed        PAIN - ADULT    • Verbalizes/displays adequ OOB to chair TID and PRN    SKIN/TISSUE INTEGRITY - ADULT    • Skin integrity remains intact Progressing    • Incision(s), wounds(s) or drain site(s) healing without S/S of infection Progressing    Chest tubes removed and dressings to sternum and left leg

## 2017-10-14 NOTE — PROGRESS NOTES
[facility] HOSP - [facility]    Progress Note    Óscar Garcia Patient Status:  Inpatient    1950 MRN [id number]   Location [facility] 2W/SW Attending Brian Hartman MD   Hosp Day # 2 PCP Chares Kehr, MD       Subjective:   Óscar Garcia Aspart Pen  1-5 Units Subcutaneous TID CC   • Insulin Aspart Pen  4 Units Subcutaneous TID CC   • Metoclopramide HCl  10 mg Intravenous Q6H   • fentaNYL PCA bolus from bag  50 mcg Intravenous Once   • famoTIDine  20 mg Oral BID    Or   • famoTIDine  20 mg 34.0  33.7   RDW  14.4  14.9   WBC  16.9*  18.1*   PLT  208  220     Recent Labs   Lab  10/12/17   1235  10/13/17   0511   GLU  145*  134*   BUN  20  22*   CREATSERUM  1.20  1.32   GFRAA  >60  >60   GFRNAA  >60  55*   CA  8.5  9.1   NA  143  141   K  4.0

## 2017-10-14 NOTE — HOME CARE LIAISON
DIAGNOSES AND PERTINENT MEDICAL HISTORY: CABG    FACILITY NAME AND DC DATE: [facility]    BEDSIDE VISIT WITH: PTNT    SERVICES ORDERED: RN PT    VERIFIED PATIENT ADDRESS, PHONE NUMBER AND CAREGIVER: YES    PCP IS DR. Agustin Dailey

## 2017-10-15 PROCEDURE — 99232 SBSQ HOSP IP/OBS MODERATE 35: CPT | Performed by: INTERNAL MEDICINE

## 2017-10-15 PROCEDURE — 99232 SBSQ HOSP IP/OBS MODERATE 35: CPT | Performed by: HOSPITALIST

## 2017-10-15 RX ORDER — 0.9 % SODIUM CHLORIDE 0.9 %
VIAL (ML) INJECTION
Status: DISPENSED
Start: 2017-10-15 | End: 2017-10-15

## 2017-10-15 NOTE — PROGRESS NOTES
[facility]  Progress Note    Thomas Laboy Patient Status:  Inpatient    1950 MRN [id number]   Location [facility] 2W/SW Attending Frederick Luque MD   Hosp Day # 3 PCP Salvador Fuentes MD     Assessment:   POD 2- CABG X4- preserved Intravenous BID   • multivitamin  1 tablet Oral Daily   • Vitamin C  500 mg Oral TID   • mupirocin  1 Application Nasal BID   • Chlorhexidine Gluconate  15 mL Mouth/Throat BID   • Clopidogrel Bisulfate  75 mg Oral Daily   • aspirin  81 mg Oral Daily   • Se

## 2017-10-15 NOTE — PROGRESS NOTES
[facility]     Progress Note        Frutoso Versailles Patient Status:  Inpatient    1950 MRN [id number]   Location [facility] 2W/SW Attending Ruth Ann Carrillo MD   Hosp Day # 3 PCP Rosana Mcallister MD       Subjective:   Patient Continuous PRN   nitroGLYCERIN infusion 50mg in D5W 250ml 5-300 mcg/min Intravenous Continuous PRN   norepinephrine (LEVOPHED) 4 mg/250 ml premix infusion 0.5-30 mcg/min Intravenous Continuous PRN   Nitroprusside Sodium (NIPRIDE) 50 mg in dextrose 5 % 250 (VENTOLIN) (2.5 MG/3ML) 0.083% nebulizer solution 2.5 mg 2.5 mg Nebulization TID       Continuous Infusions:   • sodium bicarbonate 150 mEq/ D5W 850 mL infusion 37.4 mL/hr (10/12/17 0651)   • sodium chloride 10 mL/hr at 10/12/17 1536   • DOBUTamine Stopped

## 2017-10-15 NOTE — PLAN OF CARE
- pt up to chair & ambulating in hallway x 3; needs encouragement & rationale for walking- pt would prefer to sit in the chair.   - refused to order lunch because cafe would not combine turkey and roast beef to his sandwich  - shower tomorrow  - no labs

## 2017-10-15 NOTE — PROGRESS NOTES
Assessment/Plan:  S/P CABG x 3 POD # 3  Encourage pulm toilet: IS & Acapella  Pain meds as needed  Increase activity- ambulating in halls with minimal assistance  SCDs and Lovenox prophylaxis DVT prevention   Chest tube; cordis and miller removed 10/14  htn

## 2017-10-15 NOTE — PLAN OF CARE
Problem: SAFETY ADULT - FALL  Goal: Free from fall injury  INTERVENTIONS:  - Assess pt frequently for physical needs  - Identify cognitive and physical deficits and behaviors that affect risk of falls.   - [place] fall precautions as indicated by assessme Initiate Pressure Ulcer prevention bundle as indicated   Outcome: Progressing  SURGICAL DSG'S CDI

## 2017-10-15 NOTE — PROGRESS NOTES
[facility]    Progress Note    Davidson Graf Patient Status:  Inpatient    1950 MRN [id number]   Location [facility] 2W/SW Attending Misha Coleman MD   Hosp Day # 3 PCP Paulo Hamilton MD       Subjective:   Davidson Graf Subcutaneous Q24H   • Insulin Aspart Pen  1-5 Units Subcutaneous TID CC   • Insulin Aspart Pen  4 Units Subcutaneous TID CC   • famoTIDine  20 mg Oral BID    Or   • famoTIDine  20 mg Intravenous BID   • multivitamin  1 tablet Oral Daily   • Vitamin C  500 Date   INR 1.5 (H) 10/12/2017   INR 1.0 10/09/2017       Culture:  No results found for this visit on 10/12/17.     Imaging/EKG:       Ekg 12-lead    Result Date: 10/14/2017  ECG Report  Interpretation  -------------------------- Sinus Tachycardia -Right bu

## 2017-10-16 ENCOUNTER — TELEPHONE (OUTPATIENT)
Dept: CARDIOLOGY CLINIC | Facility: CLINIC | Age: 67
End: 2017-10-16

## 2017-10-16 ENCOUNTER — APPOINTMENT (OUTPATIENT)
Dept: GENERAL RADIOLOGY | Facility: HOSPITAL | Age: 67
DRG: 235 | End: 2017-10-16
Attending: THORACIC SURGERY (CARDIOTHORACIC VASCULAR SURGERY)
Payer: COMMERCIAL

## 2017-10-16 ENCOUNTER — TELEPHONE (OUTPATIENT)
Dept: FAMILY MEDICINE CLINIC | Facility: CLINIC | Age: 67
End: 2017-10-16

## 2017-10-16 ENCOUNTER — APPOINTMENT (OUTPATIENT)
Dept: CV DIAGNOSTICS | Facility: HOSPITAL | Age: 67
DRG: 235 | End: 2017-10-16
Attending: INTERNAL MEDICINE
Payer: COMMERCIAL

## 2017-10-16 VITALS
HEIGHT: 69.5 IN | SYSTOLIC BLOOD PRESSURE: 150 MMHG | TEMPERATURE: 98 F | HEART RATE: 101 BPM | RESPIRATION RATE: 19 BRPM | WEIGHT: 160.69 LBS | BODY MASS INDEX: 23.27 KG/M2 | OXYGEN SATURATION: 95 % | DIASTOLIC BLOOD PRESSURE: 87 MMHG

## 2017-10-16 PROCEDURE — 99232 SBSQ HOSP IP/OBS MODERATE 35: CPT | Performed by: INTERNAL MEDICINE

## 2017-10-16 PROCEDURE — 71020 XR CHEST PA + LAT CHEST (CPT=71020): CPT | Performed by: THORACIC SURGERY (CARDIOTHORACIC VASCULAR SURGERY)

## 2017-10-16 PROCEDURE — 99239 HOSP IP/OBS DSCHRG MGMT >30: CPT | Performed by: HOSPITALIST

## 2017-10-16 PROCEDURE — 93306 TTE W/DOPPLER COMPLETE: CPT | Performed by: INTERNAL MEDICINE

## 2017-10-16 RX ORDER — FAMOTIDINE 20 MG/1
20 TABLET ORAL 2 TIMES DAILY
Qty: 60 TABLET | Refills: 0 | Status: SHIPPED | OUTPATIENT
Start: 2017-10-16 | End: 2018-03-10 | Stop reason: ALTCHOICE

## 2017-10-16 RX ORDER — PREDNISONE 20 MG/1
40 TABLET ORAL ONCE
Status: COMPLETED | OUTPATIENT
Start: 2017-10-16 | End: 2017-10-16

## 2017-10-16 RX ORDER — ASCORBIC ACID 500 MG
500 TABLET ORAL 3 TIMES DAILY
Qty: 90 TABLET | Refills: 0 | Status: SHIPPED | OUTPATIENT
Start: 2017-10-16

## 2017-10-16 RX ORDER — SENNA PLUS 8.6 MG/1
8.6 TABLET ORAL 2 TIMES DAILY
Qty: 60 TABLET | Refills: 0 | Status: SHIPPED | OUTPATIENT
Start: 2017-10-16 | End: 2017-10-25 | Stop reason: ALTCHOICE

## 2017-10-16 RX ORDER — PREDNISONE 10 MG/1
30 TABLET ORAL ONCE
Qty: 3 TABLET | Refills: 0 | Status: SHIPPED | OUTPATIENT
Start: 2017-10-17 | End: 2017-10-17

## 2017-10-16 RX ORDER — AMIODARONE HYDROCHLORIDE 200 MG/1
200 TABLET ORAL 2 TIMES DAILY WITH MEALS
Qty: 60 TABLET | Refills: 1 | Status: SHIPPED | OUTPATIENT
Start: 2017-10-17 | End: 2017-12-18

## 2017-10-16 RX ORDER — DOXEPIN HYDROCHLORIDE 50 MG/1
1 CAPSULE ORAL DAILY
Qty: 30 TABLET | Refills: 0 | Status: SHIPPED | OUTPATIENT
Start: 2017-10-17

## 2017-10-16 RX ORDER — HYDROCODONE BITARTRATE AND ACETAMINOPHEN 5; 325 MG/1; MG/1
2 TABLET ORAL EVERY 4 HOURS PRN
Qty: 30 TABLET | Refills: 0 | Status: SHIPPED | OUTPATIENT
Start: 2017-10-16 | End: 2017-10-25 | Stop reason: ALTCHOICE

## 2017-10-16 RX ORDER — PREDNISONE 20 MG/1
20 TABLET ORAL ONCE
Qty: 1 TABLET | Refills: 0 | Status: SHIPPED | OUTPATIENT
Start: 2017-10-18 | End: 2017-10-18

## 2017-10-16 RX ORDER — ALBUTEROL SULFATE 2.5 MG/3ML
2.5 SOLUTION RESPIRATORY (INHALATION) EVERY 6 HOURS PRN
Status: DISCONTINUED | OUTPATIENT
Start: 2017-10-16 | End: 2017-10-16

## 2017-10-16 RX ORDER — PREDNISONE 10 MG/1
10 TABLET ORAL ONCE
Qty: 1 TABLET | Refills: 0 | Status: SHIPPED | OUTPATIENT
Start: 2017-10-19 | End: 2017-10-19

## 2017-10-16 RX ORDER — CLOPIDOGREL BISULFATE 75 MG/1
75 TABLET ORAL DAILY
Qty: 30 TABLET | Refills: 1 | Status: SHIPPED | OUTPATIENT
Start: 2017-10-17 | End: 2017-12-18

## 2017-10-16 RX ORDER — PREDNISONE 10 MG/1
10 TABLET ORAL ONCE
Status: DISCONTINUED | OUTPATIENT
Start: 2017-10-19 | End: 2017-10-16

## 2017-10-16 RX ORDER — AMLODIPINE BESYLATE 10 MG/1
10 TABLET ORAL DAILY
Qty: 30 TABLET | Refills: 0 | Status: SHIPPED | OUTPATIENT
Start: 2017-10-17 | End: 2020-09-28

## 2017-10-16 RX ORDER — ALBUTEROL SULFATE 2.5 MG/3ML
2.5 SOLUTION RESPIRATORY (INHALATION)
Status: DISCONTINUED | OUTPATIENT
Start: 2017-10-16 | End: 2017-10-16

## 2017-10-16 RX ORDER — METOPROLOL TARTRATE 50 MG/1
50 TABLET, FILM COATED ORAL
Qty: 60 TABLET | Refills: 0 | Status: SHIPPED | OUTPATIENT
Start: 2017-10-17 | End: 2018-07-25

## 2017-10-16 RX ORDER — PREDNISONE 20 MG/1
20 TABLET ORAL ONCE
Status: DISCONTINUED | OUTPATIENT
Start: 2017-10-18 | End: 2017-10-16

## 2017-10-16 RX ORDER — AMIODARONE HYDROCHLORIDE 200 MG/1
200 TABLET ORAL 2 TIMES DAILY WITH MEALS
Status: DISCONTINUED | OUTPATIENT
Start: 2017-10-17 | End: 2017-10-16

## 2017-10-16 RX ORDER — AMLODIPINE BESYLATE 10 MG/1
10 TABLET ORAL DAILY
Status: DISCONTINUED | OUTPATIENT
Start: 2017-10-17 | End: 2017-10-16

## 2017-10-16 NOTE — PLAN OF CARE
Problem: SAFETY ADULT - FALL  Goal: Free from fall injury  INTERVENTIONS:  - Assess pt frequently for physical needs  - Identify cognitive and physical deficits and behaviors that affect risk of falls.   - [place] fall precautions as indicated by assessme

## 2017-10-16 NOTE — PROGRESS NOTES
[facility]  Cardiology Progress Note    [place] Model Patient Status:  Inpatient    1950 MRN [id number]   Location [facility] 2W/SW Attending Addie Garcia MD   Hosp Day # 4 PCP Moses Sharma MD       Assessment and Pl constitutional distress. HEENT: Normocephalic,Neck: No JVD, Cardiac: Regular rate and rhythm. S1, S2 normal. No murmur, pericardial rub, S3,  Lungs: Clear decreased BS left baseAbdomen: Soft,Extremities: Without edema.   Peripheral pulses are present  Neur 10 mL 10 mL Intravenous PRN   0.9%  NaCl infusion  Intravenous Continuous   temazepam (RESTORIL) cap 15 mg 15 mg Oral Nightly PRN   DOBUTamine in D5W (DOBUTREX) 250 mg/250 ml infusion 2.5-10 mcg/kg/min Intravenous Continuous PRN   nitroGLYCERIN infusion 50 injection 8 mg 8 mg Intravenous Q2H PRN   Clopidogrel Bisulfate (PLAVIX) tab 75 mg 75 mg Oral Daily   aspirin EC tab 81 mg 81 mg Oral Daily   Senna (SENOKOT) tab TABS 8.6 mg 8.6 mg Oral BID       Aniceto Villasenor MD  10/16/2017  1:28 PM  Addendum:  ECHO Norm

## 2017-10-16 NOTE — TELEPHONE ENCOUNTER
Daphney from [facility] is calling to find out of Dr. Yvonne Hagen will be the signing Dr for 's home health.

## 2017-10-16 NOTE — TELEPHONE ENCOUNTER
Adiel Nurse Practioner/ [facility] requesting appt for pt. She scheduled hosp f/up with Nurse Obi on 10/20 and also requesting appt for pt the week of 11/9 or 11/16, pls call pt direct at home: [phone] or wife cell# [phone],[facility].

## 2017-10-16 NOTE — CARDIAC REHAB
Cardiac Rehab Phase I    Activity:   Chair: yes   Ambulation: in mckeon   Assistive Device: none   Distance: 50 feet to shower   Assistance needed: SBA only   Patient tolerated activity: very well.    Shower Date: done today 10/16/17 Tolerated Shower Activity

## 2017-10-16 NOTE — DISCHARGE SUMMARY
[facility]    Discharge Summary    [facility] Patient Status:  Inpatient    1950 MRN [id number]   Location [facility] 2W/SW Attending Chivo Cerda MD   Hosp Day # 4 PCP Latoya Gauthier MD     Date of Admission: 10/ 10/6/2017  CONCLUSION:   1. 0-49% luminal stenosis left internal carotid artery. 2. >70% stenosis right internal carotid artery. 3. High-grade stenoses both ECAs. Xr Chest Ap Portable  (cpt=71010)    Result Date: 10/13/2017  CONCLUSION:  1.  Kenneth Denise large pleural effusion. Dictated by (CST): Alec Tejada MD on 10/12/2017 at 13:26     Approved by (CST): Alec Tejada MD on 10/12/2017 at 13:29     ADDENDUM:  Also noted is new linear opacity in the right midlung, likely reflecting atelectasis. 0     multivitamin Tabs  Start taking on:  10/17/2017      Take 1 tablet by mouth daily. Quantity:  30 tablet  Refills:  0     sennosides 8.6 MG Tabs  Commonly known as:  SENOKOT      Take 1 tablet (8.6 mg total) by mouth 2 (two) times daily.    Quantity: Tabs  · multivitamin Tabs  · sennosides 8.6 MG Tabs         Follow up Visits  Krystyna Rendon MD  [address]  [phone]    On 11/7/2017  3:15pm    N Cardiology - [address] [place] [address]  [address]  [phone]    On

## 2017-10-16 NOTE — PROGRESS NOTES
Pulmonary/Critical Care Follow Up Note    HPI:   Tara Plascencia is a 61year old male with No chief complaint on file.       PCP Raina Cobb MD  Admission Attending Dar Leroy MD    Hospital Day #4    No pain  No sob  Mild cough  No fever    PMH: sodium bicarbonate 150 mEq in dextrose 5 % 1,000 mL infusion, 0.5 mL/kg/hr, Intravenous, Continuous  •  Normal Saline Flush 0.9 % injection 10 mL, 10 mL, Intravenous, PRN  •  0.9%  NaCl infusion, , Intravenous, Continuous  •  temazepam (RESTORIL) cap 15 mg MG per tab 2 tablet, 2 tablet, Oral, Q4H PRN  •  morphINE sulfate (PF) 2 MG/ML injection 2 mg, 2 mg, Intravenous, Q2H PRN **OR** morphINE sulfate (PF) 4 MG/ML injection 4 mg, 4 mg, Intravenous, Q2H PRN **OR** morphINE sulfate (PF) 4 MG/ML injection 8 mg, 8 call with any questions or concerns  Pt should f/u pulm in 4 weeks      Robson Saleem MD

## 2017-10-16 NOTE — CM/SW NOTE
ADRIANA following up on d/c planning for the patient. Per report, d/c today to home with [facility] aware. Orders being entered.      Arianne Serrano \[facility]\""FARHAT  [id number]

## 2017-10-16 NOTE — PROGRESS NOTES
[facility] - [facility]    Progress Note    Wicho Hirsch Patient Status:  Inpatient    1950 MRN [id number]   Location [facility] 2W/SW Attending Logan Penaloza MD   Hosp Day # 4 PCP Dain Dodge MD     Subjective:  Pt. Just retu POD # 4  Encourage pulm toilet: IS & Acapella  Pain meds as needed  Increase activity- showered today; ambulates well in hallway   SCDs and Lovenox prophylaxis DVT prevention   Pacing wires removed earlier this AM- tolerate well.    Right carotid stenosis n

## 2017-10-17 ENCOUNTER — TELEPHONE (OUTPATIENT)
Dept: CARDIOLOGY CLINIC | Facility: CLINIC | Age: 67
End: 2017-10-17

## 2017-10-17 ENCOUNTER — PATIENT OUTREACH (OUTPATIENT)
Dept: FAMILY MEDICINE CLINIC | Facility: CLINIC | Age: 67
End: 2017-10-17

## 2017-10-17 ENCOUNTER — TELEPHONE (OUTPATIENT)
Dept: INTERNAL MEDICINE UNIT | Facility: HOSPITAL | Age: 67
End: 2017-10-17

## 2017-10-17 NOTE — TELEPHONE ENCOUNTER
S/w Oliver Fire and states she spoke with NICK Carbone at the hospital and she will clarify with pharmacy.

## 2017-10-17 NOTE — PROGRESS NOTES
Initial Post Discharge Follow Up   Discharge Date: 10/16/17  Contact Date: 10/17/2017    Consent Verification:  Assessment Completed With: Patient  HIPAA Verified? Yes    1. Tell me why you were in the hospital? I had a Triple Bypass that was scheduled. predniSONE 10 MG Oral Tab Take 3 tablets (30 mg total) by mouth one time. Disp: 3 tablet Rfl: 0   amiodarone HCl 200 MG Oral Tab Take 1 tablet (200 mg total) by mouth 2 (two) times daily with meals.  Disp: 60 tablet Rfl: 1   Metoprolol Tartrate 50 MG Oral discharge? Home PT and Home RN     11. Are you able to do normal activities as you did prior to your hospitalization? No    12. In need of referral for:  N/a    13.  Do you have a follow up visit scheduled with your Primary Care Physician or Specialist? denies any s/s of infection. He has no bandage on wound and it is open to air. He did not take his blood sugar today and was in the hospital yesterday and doesn't remember what it was but that it has been running around 100.  He states that he is feeling go

## 2017-10-17 NOTE — TELEPHONE ENCOUNTER
Jossie/Nurse Care manager Dr. Samson Arizmendi office calling for pt to find out if rx: amiodarone was ordered pharm will not release with doctors ok due to pt taking rx:atorvastatin drug interaction pls call at:[phone],thanks.     Current Outpatient Prescriptions

## 2017-10-17 NOTE — PAYOR COMM NOTE
--------------  DISCHARGE REVIEW    Payor: [address] #:  [id number]  Authorization Number: [id number]    Admit date: 10/12/17  Admit time:  5981  Discharge Date: 10/16/2017  6:00 PM     Admitting Physician: Sreekanth Calle pulm     HL  -statin  -LDL <70     HTN  -CPM     PVD  -consider ACEi as outpt      Elevated trop not MI  -per cards     VTE P: lovenox      IMAGING STUDIES: SOME MAY NEED FOLLOW UP WITH PCP   Xr Chest Pa + Lat Chest (mrl=[id number])    Result Date: 10/16/2017 coarsening of interstitial markings. BONES: Median sternotomy wires are demonstrated. Mild degenerative changes of the thoracic spine and shoulders are apparent.  OTHER: Tube is identified with tip projecting approximately 7.8 cm above the level of the dickson daily.   Quantity:  90 tablet  Refills:  0     Clopidogrel Bisulfate 75 MG Tabs  Commonly known as:  PLAVIX  Start taking on:  10/17/2017      Take 1 tablet (75 mg total) by mouth daily.    Quantity:  30 tablet  Refills:  1     famoTIDine 20 MG Tabs  Common XL      Take 1 tablet (25 mg total) by mouth Daily Beta Blocker.    Quantity:  30 tablet  Refills:  0     N-Acetyl-L-Cysteine 600 MG Caps      Take 1 dose po tonite and one dose po int he AM   Quantity:  2 capsule  Refills:  0     NIFEdipine ER 30 MG Tb24

## 2017-10-18 ENCOUNTER — TELEPHONE (OUTPATIENT)
Dept: MEDSURG UNIT | Facility: HOSPITAL | Age: 67
End: 2017-10-18

## 2017-10-23 ENCOUNTER — TELEPHONE (OUTPATIENT)
Dept: MEDSURG UNIT | Facility: HOSPITAL | Age: 67
End: 2017-10-23

## 2017-10-25 ENCOUNTER — OFFICE VISIT (OUTPATIENT)
Dept: FAMILY MEDICINE CLINIC | Facility: CLINIC | Age: 67
End: 2017-10-25

## 2017-10-25 VITALS
BODY MASS INDEX: 23 KG/M2 | DIASTOLIC BLOOD PRESSURE: 70 MMHG | HEART RATE: 76 BPM | SYSTOLIC BLOOD PRESSURE: 140 MMHG | WEIGHT: 159 LBS

## 2017-10-25 DIAGNOSIS — Z79.4 TYPE 2 DIABETES MELLITUS WITHOUT COMPLICATION, WITH LONG-TERM CURRENT USE OF INSULIN (HCC): ICD-10-CM

## 2017-10-25 DIAGNOSIS — I10 ESSENTIAL HYPERTENSION: ICD-10-CM

## 2017-10-25 DIAGNOSIS — Z95.1 S/P CABG X 3: ICD-10-CM

## 2017-10-25 DIAGNOSIS — E11.9 TYPE 2 DIABETES MELLITUS WITHOUT COMPLICATION, WITH LONG-TERM CURRENT USE OF INSULIN (HCC): ICD-10-CM

## 2017-10-25 DIAGNOSIS — I25.708 CORONARY ARTERY DISEASE INVOLVING CORONARY BYPASS GRAFT OF NATIVE HEART WITH OTHER FORMS OF ANGINA PECTORIS (HCC): Primary | ICD-10-CM

## 2017-10-25 PROCEDURE — 90686 IIV4 VACC NO PRSV 0.5 ML IM: CPT | Performed by: FAMILY MEDICINE

## 2017-10-25 PROCEDURE — 90471 IMMUNIZATION ADMIN: CPT | Performed by: FAMILY MEDICINE

## 2017-10-25 PROCEDURE — 99495 TRANSJ CARE MGMT MOD F2F 14D: CPT | Performed by: FAMILY MEDICINE

## 2017-10-25 NOTE — PROGRESS NOTES
HPI:    Shawn Treadwell is a 61year old male here today for hospital follow up.    He was discharged from Inpatient hospital, [facility]  to Home   Admission Date: 10/12/17   Discharge Date: 10/16/17  Hospital Discharge Diagnosis: CABG  TCM Diagnosis been a bit uncontrolled. Had stopped the metformin pre operatively. Appetite back to normal.   Has follow up with Dr. Preston Mar on the 1st.   Allergies:  He is allergic to sulfa antibiotics.     Current Meds:    Current Outpatient Prescriptions on File Prior (10/12/2017). He family history includes Cancer in his mother; Diabetes in his father and maternal grandfather; Heart Disorder in his father; Lipids in his father. He  reports that he quit smoking about 4 weeks ago.  His smoking use included Cigarettes discharge to 30 days:   · Number of Possible Diagnoses and/or Management Options: moderate  · Amount and/or Complexity of Data to Be Reviewed: moderate  · Risk of Significant Complications, Morbidity, and/or Mortality: moderate    Overall Risk:   moderate

## 2017-10-27 ENCOUNTER — OFFICE VISIT (OUTPATIENT)
Dept: CARDIOLOGY CLINIC | Facility: CLINIC | Age: 67
End: 2017-10-27

## 2017-10-27 VITALS
WEIGHT: 158 LBS | RESPIRATION RATE: 18 BRPM | DIASTOLIC BLOOD PRESSURE: 62 MMHG | HEART RATE: 72 BPM | BODY MASS INDEX: 22.62 KG/M2 | SYSTOLIC BLOOD PRESSURE: 120 MMHG | HEIGHT: 70 IN

## 2017-10-27 DIAGNOSIS — I25.10 ATHEROSCLEROSIS OF NATIVE CORONARY ARTERY OF NATIVE HEART WITHOUT ANGINA PECTORIS: Primary | ICD-10-CM

## 2017-10-27 PROCEDURE — 99212 OFFICE O/P EST SF 10 MIN: CPT | Performed by: NURSE PRACTITIONER

## 2017-10-27 PROCEDURE — 99214 OFFICE O/P EST MOD 30 MIN: CPT | Performed by: NURSE PRACTITIONER

## 2017-10-27 RX ORDER — SENNA PLUS 8.6 MG/1
1 TABLET ORAL DAILY
COMMUNITY
End: 2018-03-10 | Stop reason: ALTCHOICE

## 2017-10-27 RX ORDER — HYDROCODONE BITARTRATE AND ACETAMINOPHEN 5; 325 MG/1; MG/1
1 TABLET ORAL EVERY 6 HOURS PRN
COMMUNITY
End: 2018-03-10 | Stop reason: ALTCHOICE

## 2017-10-27 NOTE — PROGRESS NOTES
Lo Stewart is a 61year old male. Patient presents with:  CAD: CABG X3  10/12/2017  Hypertension  Hyperlipidemia    HPI:   Patient comes in today for a checkup after bypass surgery.   He had a stress test done due to several risk factors which was found to daily with meals. Disp: 60 tablet Rfl: 1   Metoprolol Tartrate 50 MG Oral Tab Take 1 tablet (50 mg total) by mouth 2x Daily(Beta Blocker). Disp: 60 tablet Rfl: 0   AmLODIPine Besylate 10 MG Oral Tab Take 1 tablet (10 mg total) by mouth daily.  Disp: 30 tabl well developed, well nourished,in no apparent distress  SKIN: no rashes,no suspicious lesions  HEENT: atraumatic, normocephalic,ears and throat are clear  NECK: supple,no adenopathy,no bruits  LUNGS: clear to auscultation  CARDIO: Regular rate and rhythm

## 2017-11-07 ENCOUNTER — OFFICE VISIT (OUTPATIENT)
Dept: CARDIOLOGY CLINIC | Facility: CLINIC | Age: 67
End: 2017-11-07

## 2017-11-07 VITALS
DIASTOLIC BLOOD PRESSURE: 60 MMHG | HEART RATE: 68 BPM | WEIGHT: 164 LBS | BODY MASS INDEX: 24 KG/M2 | SYSTOLIC BLOOD PRESSURE: 136 MMHG | RESPIRATION RATE: 16 BRPM

## 2017-11-07 DIAGNOSIS — I10 ESSENTIAL HYPERTENSION: ICD-10-CM

## 2017-11-07 DIAGNOSIS — E78.5 HYPERLIPIDEMIA, UNSPECIFIED HYPERLIPIDEMIA TYPE: Primary | ICD-10-CM

## 2017-11-07 DIAGNOSIS — I25.708 CORONARY ARTERY DISEASE INVOLVING CORONARY BYPASS GRAFT OF NATIVE HEART WITH OTHER FORMS OF ANGINA PECTORIS (HCC): ICD-10-CM

## 2017-11-07 PROBLEM — I65.21 STENOSIS OF RIGHT CAROTID ARTERY: Status: ACTIVE | Noted: 2017-11-07

## 2017-11-07 PROCEDURE — 99214 OFFICE O/P EST MOD 30 MIN: CPT | Performed by: INTERNAL MEDICINE

## 2017-11-07 PROCEDURE — 99212 OFFICE O/P EST SF 10 MIN: CPT | Performed by: INTERNAL MEDICINE

## 2017-11-07 NOTE — PATIENT INSTRUCTIONS
Follow-up in 3 months  Will begin rehab  Cardiac surgery to call for follow-up regarding right carotid artery narrowing

## 2017-11-07 NOTE — PROGRESS NOTES
Dhruv Ravi is a 74year old male. Patient presents with: Follow - Up    HPI:   This is a pleasant 22-year-old with hypertension elevated cholesterol tobacco use in the past who presents for follow-up after bypass surgery on 10/12/2019.   He is presently f 90 tablet Rfl: 0   HYDROcodone-acetaminophen 5-325 MG Oral Tab Take 1 tablet by mouth every 6 (six) hours as needed for Pain.  Disp:  Rfl:       Past Medical History:   Diagnosis Date   • Atherosclerosis of coronary artery    • Cataract     stage 1 cataract disease) of artery bypass graft      Other Visit Diagnoses    None. In conclusion this is a pleasant 72-year-old with coronary disease with status post bypass and overall doing well. He will continue all the same meds and begin rehab.   Cholesterol

## 2017-11-10 ENCOUNTER — CARDPULM VISIT (OUTPATIENT)
Dept: CARDIAC REHAB | Facility: HOSPITAL | Age: 67
End: 2017-11-10
Attending: INTERNAL MEDICINE
Payer: COMMERCIAL

## 2017-11-10 DIAGNOSIS — E11.9 TYPE 2 DIABETES MELLITUS WITHOUT COMPLICATION, WITH LONG-TERM CURRENT USE OF INSULIN (HCC): ICD-10-CM

## 2017-11-10 DIAGNOSIS — Z00.00 ROUTINE MEDICAL EXAM: ICD-10-CM

## 2017-11-10 DIAGNOSIS — E78.5 HYPERLIPIDEMIA, UNSPECIFIED HYPERLIPIDEMIA TYPE: ICD-10-CM

## 2017-11-10 DIAGNOSIS — Z95.1 S/P CABG (CORONARY ARTERY BYPASS GRAFT): ICD-10-CM

## 2017-11-10 DIAGNOSIS — I10 ESSENTIAL HYPERTENSION: ICD-10-CM

## 2017-11-10 DIAGNOSIS — Z79.4 TYPE 2 DIABETES MELLITUS WITHOUT COMPLICATION, WITH LONG-TERM CURRENT USE OF INSULIN (HCC): ICD-10-CM

## 2017-11-10 PROCEDURE — 93798 PHYS/QHP OP CAR RHAB W/ECG: CPT

## 2017-11-12 RX ORDER — DAPAGLIFLOZIN 10 MG/1
TABLET, FILM COATED ORAL
Qty: 30 TABLET | Refills: 2 | Status: SHIPPED | OUTPATIENT
Start: 2017-11-12 | End: 2018-04-17

## 2017-11-12 RX ORDER — ATORVASTATIN CALCIUM 40 MG/1
40 TABLET, FILM COATED ORAL DAILY
Qty: 90 TABLET | Refills: 0 | Status: SHIPPED | OUTPATIENT
Start: 2017-11-12 | End: 2018-06-20

## 2017-11-13 ENCOUNTER — APPOINTMENT (OUTPATIENT)
Dept: CARDIAC REHAB | Facility: HOSPITAL | Age: 67
End: 2017-11-13
Attending: INTERNAL MEDICINE
Payer: COMMERCIAL

## 2017-11-13 RX ORDER — VALSARTAN AND HYDROCHLOROTHIAZIDE 320; 25 MG/1; MG/1
TABLET, FILM COATED ORAL
Qty: 90 TABLET | Refills: 3 | OUTPATIENT
Start: 2017-11-13

## 2017-11-13 NOTE — TELEPHONE ENCOUNTER
MAR states medication was discontinued on 9/20/17. Contacted patient and he advised he is no longer taking this medication.        Refused Prescriptions Disp Refills    VALSARTAN-HYDROCHLOROTHIAZIDE 320-25 MG Oral Tab [Pharmacy Med Name: Terese Ojeda 320- Medical Office [facility], [address] [place] [address], [address] [place] [address] Follow-up Visit  Follow up    In 1 week [facility] CARD PHASE 2 [address] [facility] 10/12/17    In 1 week [facility] CARD PHASE 2 [address] [place] [address]  [facility] CARD PHASE 2 ARIES Tompkins 14 [facility] [place] CABG x3 10/12/17    In 1 month [facility] CARD PHASE 2 [address] [facility] [place] CABG x3 10/12/17    In 1 month [facility] CARD PHASE [address] VIVIAN Arce Results  Component Value Date    (H) 10/16/2017   BUN 20 10/16/2017   CREATSERUM 1.19 10/16/2017   BUNCREA 16.8 10/16/2017   GFRNAA >60 10/16/2017   GFRAA >60 10/16/2017   CA 9.9 10/16/2017   ALKPHOS 118 (H) 08/18/2016   AST 21 10/09/2017   ALT 16 (

## 2017-11-15 ENCOUNTER — CARDPULM VISIT (OUTPATIENT)
Dept: CARDIAC REHAB | Facility: HOSPITAL | Age: 67
End: 2017-11-15
Attending: INTERNAL MEDICINE
Payer: COMMERCIAL

## 2017-11-15 PROCEDURE — 93798 PHYS/QHP OP CAR RHAB W/ECG: CPT

## 2017-11-16 ENCOUNTER — MED REC SCAN ONLY (OUTPATIENT)
Dept: CARDIOLOGY CLINIC | Facility: CLINIC | Age: 67
End: 2017-11-16

## 2017-11-17 ENCOUNTER — CARDPULM VISIT (OUTPATIENT)
Dept: CARDIAC REHAB | Facility: HOSPITAL | Age: 67
End: 2017-11-17
Attending: INTERNAL MEDICINE
Payer: COMMERCIAL

## 2017-11-17 PROCEDURE — 93798 PHYS/QHP OP CAR RHAB W/ECG: CPT

## 2017-11-20 ENCOUNTER — APPOINTMENT (OUTPATIENT)
Dept: CARDIAC REHAB | Facility: HOSPITAL | Age: 67
End: 2017-11-20
Attending: INTERNAL MEDICINE
Payer: COMMERCIAL

## 2017-11-22 ENCOUNTER — APPOINTMENT (OUTPATIENT)
Dept: CARDIAC REHAB | Facility: HOSPITAL | Age: 67
End: 2017-11-22
Attending: INTERNAL MEDICINE
Payer: COMMERCIAL

## 2017-11-24 ENCOUNTER — APPOINTMENT (OUTPATIENT)
Dept: CARDIAC REHAB | Facility: HOSPITAL | Age: 67
End: 2017-11-24
Attending: INTERNAL MEDICINE
Payer: COMMERCIAL

## 2017-11-29 ENCOUNTER — CARDPULM VISIT (OUTPATIENT)
Dept: CARDIAC REHAB | Facility: HOSPITAL | Age: 67
End: 2017-11-29
Attending: INTERNAL MEDICINE
Payer: COMMERCIAL

## 2017-11-29 PROCEDURE — 93798 PHYS/QHP OP CAR RHAB W/ECG: CPT

## 2017-12-02 ENCOUNTER — OFFICE VISIT (OUTPATIENT)
Dept: ENDOCRINOLOGY CLINIC | Facility: CLINIC | Age: 67
End: 2017-12-02

## 2017-12-02 VITALS
HEIGHT: 70 IN | WEIGHT: 173.81 LBS | BODY MASS INDEX: 24.88 KG/M2 | HEART RATE: 45 BPM | DIASTOLIC BLOOD PRESSURE: 74 MMHG | SYSTOLIC BLOOD PRESSURE: 166 MMHG

## 2017-12-02 DIAGNOSIS — E78.5 DYSLIPIDEMIA: ICD-10-CM

## 2017-12-02 DIAGNOSIS — E11.65 CONTROLLED TYPE 2 DIABETES MELLITUS WITH HYPERGLYCEMIA, WITHOUT LONG-TERM CURRENT USE OF INSULIN (HCC): Primary | ICD-10-CM

## 2017-12-02 PROCEDURE — 83036 HEMOGLOBIN GLYCOSYLATED A1C: CPT | Performed by: INTERNAL MEDICINE

## 2017-12-02 PROCEDURE — 99214 OFFICE O/P EST MOD 30 MIN: CPT | Performed by: INTERNAL MEDICINE

## 2017-12-02 PROCEDURE — 36416 COLLJ CAPILLARY BLOOD SPEC: CPT | Performed by: INTERNAL MEDICINE

## 2017-12-02 PROCEDURE — 82962 GLUCOSE BLOOD TEST: CPT | Performed by: INTERNAL MEDICINE

## 2017-12-02 NOTE — PROGRESS NOTES
Return Office Visit     CHIEF COMPLAINT:    DM  Dyslipidemia     HISTORY OF PRESENT ILLNESS:  Davidson Graf is a 74year old male who presents for follow up for for DM. He recently underwent a CABG.      DM HISTORY  He states he had \" borderline DM\" as Blocker). Disp: 60 tablet Rfl: 0   AmLODIPine Besylate 10 MG Oral Tab Take 1 tablet (10 mg total) by mouth daily. Disp: 30 tablet Rfl: 0   GLIPIZIDE 10 MG Oral Tab TAKE 1 TABLET (10 MG TOTAL) BY MOUTH 2 (TWO) TIMES DAILY BEFORE MEALS.  Disp: 180 tablet Rfl: (!) 45   Weight: 173 lb 12.8 oz (78.8 kg)   Height: 5' 10\" (1.778 m)     BMI: Body mass index is 24.94 kg/m².      CONSTITUTIONAL:  awake, alert, cooperative, no apparent distress, and appears stated age  PSYCH: normal affect  EYES:  No proptosis, no ptosi importance of good BG control. c). He is up to date with eye exam.   d). Foot exam: Daily feet exam explained, Monofilament sensation normal.   e). BG log maintainence explained in great detail, to get log and glucometer on next visit. f).  Life style ch

## 2017-12-04 ENCOUNTER — APPOINTMENT (OUTPATIENT)
Dept: CARDIAC REHAB | Facility: HOSPITAL | Age: 67
End: 2017-12-04
Attending: INTERNAL MEDICINE
Payer: COMMERCIAL

## 2017-12-04 ENCOUNTER — PATIENT MESSAGE (OUTPATIENT)
Dept: CARDIOLOGY CLINIC | Facility: CLINIC | Age: 67
End: 2017-12-04

## 2017-12-04 NOTE — TELEPHONE ENCOUNTER
From: Óscar Garcia  To: Pincus Osler, MD  Sent: 12/4/2017 8:40 AM CST  Subject: Other    Dr Jo Michel,  For the past week or so my heartrate has been down around 45 or so at times. When it is this low I get some slight dizziness. Is this too low?  Is th

## 2017-12-04 NOTE — TELEPHONE ENCOUNTER
Pt states he has noticed his heart rates have been low into the 45 bpm. He states his blood pressures have been in the 120-140's.  He states his heart rate right now is in the 60's and feels fine, but when the heart rates decrease into the 40's he will feel

## 2017-12-05 NOTE — TELEPHONE ENCOUNTER
Pt notified. Will take 25mg BID and report back in 1 week. Advised to contact clinic if symptoms progress. Report to ER if they become severe, or if CP or SOB develop. Pt verbalizes understanding and agrees with plan.

## 2017-12-06 ENCOUNTER — APPOINTMENT (OUTPATIENT)
Dept: CARDIAC REHAB | Facility: HOSPITAL | Age: 67
End: 2017-12-06
Attending: INTERNAL MEDICINE
Payer: COMMERCIAL

## 2017-12-11 ENCOUNTER — CARDPULM VISIT (OUTPATIENT)
Dept: CARDIAC REHAB | Facility: HOSPITAL | Age: 67
End: 2017-12-11
Attending: INTERNAL MEDICINE
Payer: COMMERCIAL

## 2017-12-11 PROCEDURE — 93798 PHYS/QHP OP CAR RHAB W/ECG: CPT

## 2017-12-13 ENCOUNTER — CARDPULM VISIT (OUTPATIENT)
Dept: CARDIAC REHAB | Facility: HOSPITAL | Age: 67
End: 2017-12-13
Attending: INTERNAL MEDICINE
Payer: COMMERCIAL

## 2017-12-13 PROCEDURE — 93798 PHYS/QHP OP CAR RHAB W/ECG: CPT

## 2017-12-17 ENCOUNTER — PATIENT MESSAGE (OUTPATIENT)
Dept: CARDIOLOGY CLINIC | Facility: CLINIC | Age: 67
End: 2017-12-17

## 2017-12-18 ENCOUNTER — APPOINTMENT (OUTPATIENT)
Dept: CARDIAC REHAB | Facility: HOSPITAL | Age: 67
End: 2017-12-18
Attending: INTERNAL MEDICINE
Payer: COMMERCIAL

## 2017-12-18 RX ORDER — CLOPIDOGREL BISULFATE 75 MG/1
75 TABLET ORAL DAILY
Qty: 30 TABLET | Refills: 0 | Status: SHIPPED | OUTPATIENT
Start: 2017-12-18 | End: 2018-03-10 | Stop reason: ALTCHOICE

## 2017-12-18 RX ORDER — AMIODARONE HYDROCHLORIDE 200 MG/1
200 TABLET ORAL 2 TIMES DAILY WITH MEALS
Qty: 60 TABLET | Refills: 0 | Status: SHIPPED | OUTPATIENT
Start: 2017-12-18 | End: 2018-02-06

## 2017-12-18 NOTE — TELEPHONE ENCOUNTER
Ruddy Leal,       Please see pts message below and advise. It looks like the 2 scrpits the pt is referring to was written for a 2 month supply not 3.    2 scripts are pending for an additional month if needed.      thanks

## 2017-12-18 NOTE — TELEPHONE ENCOUNTER
From: Dhruv Ravi  To:  Ghassan Kang MD  Sent: 12/17/2017 10:52 AM CST  Subject: Prescription Question    Dr. Kisha Mcduffie  I have two prescriptions that were orders by Cesia Zuñiga on release from hospital. One is CLOPIDOGREL 75 MG TABLET. this one I w

## 2017-12-20 ENCOUNTER — APPOINTMENT (OUTPATIENT)
Dept: CARDIAC REHAB | Facility: HOSPITAL | Age: 67
End: 2017-12-20
Attending: INTERNAL MEDICINE
Payer: COMMERCIAL

## 2017-12-22 ENCOUNTER — APPOINTMENT (OUTPATIENT)
Dept: CARDIAC REHAB | Facility: HOSPITAL | Age: 67
End: 2017-12-22
Attending: INTERNAL MEDICINE
Payer: COMMERCIAL

## 2017-12-27 ENCOUNTER — APPOINTMENT (OUTPATIENT)
Dept: CARDIAC REHAB | Facility: HOSPITAL | Age: 67
End: 2017-12-27
Attending: INTERNAL MEDICINE
Payer: COMMERCIAL

## 2018-01-03 ENCOUNTER — APPOINTMENT (OUTPATIENT)
Dept: CARDIAC REHAB | Facility: HOSPITAL | Age: 68
End: 2018-01-03
Attending: INTERNAL MEDICINE
Payer: COMMERCIAL

## 2018-01-05 ENCOUNTER — APPOINTMENT (OUTPATIENT)
Dept: CARDIAC REHAB | Facility: HOSPITAL | Age: 68
End: 2018-01-05
Attending: INTERNAL MEDICINE
Payer: COMMERCIAL

## 2018-01-08 ENCOUNTER — APPOINTMENT (OUTPATIENT)
Dept: CARDIAC REHAB | Facility: HOSPITAL | Age: 68
End: 2018-01-08
Attending: INTERNAL MEDICINE
Payer: COMMERCIAL

## 2018-01-10 ENCOUNTER — APPOINTMENT (OUTPATIENT)
Dept: CARDIAC REHAB | Facility: HOSPITAL | Age: 68
End: 2018-01-10
Attending: INTERNAL MEDICINE
Payer: COMMERCIAL

## 2018-01-17 ENCOUNTER — APPOINTMENT (OUTPATIENT)
Dept: CARDIAC REHAB | Facility: HOSPITAL | Age: 68
End: 2018-01-17
Attending: INTERNAL MEDICINE
Payer: COMMERCIAL

## 2018-01-19 ENCOUNTER — APPOINTMENT (OUTPATIENT)
Dept: CARDIAC REHAB | Facility: HOSPITAL | Age: 68
End: 2018-01-19
Attending: INTERNAL MEDICINE
Payer: COMMERCIAL

## 2018-01-22 ENCOUNTER — APPOINTMENT (OUTPATIENT)
Dept: CARDIAC REHAB | Facility: HOSPITAL | Age: 68
End: 2018-01-22
Attending: INTERNAL MEDICINE
Payer: COMMERCIAL

## 2018-01-24 ENCOUNTER — APPOINTMENT (OUTPATIENT)
Dept: CARDIAC REHAB | Facility: HOSPITAL | Age: 68
End: 2018-01-24
Attending: INTERNAL MEDICINE
Payer: COMMERCIAL

## 2018-01-26 ENCOUNTER — APPOINTMENT (OUTPATIENT)
Dept: CARDIAC REHAB | Facility: HOSPITAL | Age: 68
End: 2018-01-26
Attending: INTERNAL MEDICINE
Payer: COMMERCIAL

## 2018-01-29 ENCOUNTER — APPOINTMENT (OUTPATIENT)
Dept: CARDIAC REHAB | Facility: HOSPITAL | Age: 68
End: 2018-01-29
Attending: INTERNAL MEDICINE
Payer: COMMERCIAL

## 2018-01-31 ENCOUNTER — APPOINTMENT (OUTPATIENT)
Dept: CARDIAC REHAB | Facility: HOSPITAL | Age: 68
End: 2018-01-31
Attending: INTERNAL MEDICINE
Payer: COMMERCIAL

## 2018-02-02 ENCOUNTER — APPOINTMENT (OUTPATIENT)
Dept: CARDIAC REHAB | Facility: HOSPITAL | Age: 68
End: 2018-02-02
Attending: INTERNAL MEDICINE
Payer: COMMERCIAL

## 2018-02-06 RX ORDER — GLIPIZIDE 10 MG/1
10 TABLET ORAL
Qty: 180 TABLET | Refills: 0 | Status: SHIPPED | OUTPATIENT
Start: 2018-02-06 | End: 2018-05-06

## 2018-02-06 RX ORDER — AMIODARONE HYDROCHLORIDE 200 MG/1
200 TABLET ORAL 2 TIMES DAILY WITH MEALS
Qty: 60 TABLET | Refills: 0 | Status: SHIPPED | OUTPATIENT
Start: 2018-02-06 | End: 2018-03-06

## 2018-02-07 ENCOUNTER — APPOINTMENT (OUTPATIENT)
Dept: CARDIAC REHAB | Facility: HOSPITAL | Age: 68
End: 2018-02-07
Attending: INTERNAL MEDICINE
Payer: COMMERCIAL

## 2018-02-09 ENCOUNTER — APPOINTMENT (OUTPATIENT)
Dept: CARDIAC REHAB | Facility: HOSPITAL | Age: 68
End: 2018-02-09
Attending: INTERNAL MEDICINE
Payer: COMMERCIAL

## 2018-02-12 ENCOUNTER — CARDPULM VISIT (OUTPATIENT)
Dept: CARDIAC REHAB | Facility: HOSPITAL | Age: 68
End: 2018-02-12
Attending: INTERNAL MEDICINE
Payer: COMMERCIAL

## 2018-02-12 PROCEDURE — 93798 PHYS/QHP OP CAR RHAB W/ECG: CPT

## 2018-02-14 ENCOUNTER — CARDPULM VISIT (OUTPATIENT)
Dept: CARDIAC REHAB | Facility: HOSPITAL | Age: 68
End: 2018-02-14
Attending: INTERNAL MEDICINE
Payer: COMMERCIAL

## 2018-02-14 PROCEDURE — 93798 PHYS/QHP OP CAR RHAB W/ECG: CPT

## 2018-02-16 ENCOUNTER — CARDPULM VISIT (OUTPATIENT)
Dept: CARDIAC REHAB | Facility: HOSPITAL | Age: 68
End: 2018-02-16
Attending: INTERNAL MEDICINE
Payer: COMMERCIAL

## 2018-02-16 PROCEDURE — 93798 PHYS/QHP OP CAR RHAB W/ECG: CPT

## 2018-02-23 ENCOUNTER — CARDPULM VISIT (OUTPATIENT)
Dept: CARDIAC REHAB | Facility: HOSPITAL | Age: 68
End: 2018-02-23
Attending: INTERNAL MEDICINE
Payer: COMMERCIAL

## 2018-02-23 PROCEDURE — 93798 PHYS/QHP OP CAR RHAB W/ECG: CPT

## 2018-02-27 ENCOUNTER — OFFICE VISIT (OUTPATIENT)
Dept: CARDIOLOGY CLINIC | Facility: CLINIC | Age: 68
End: 2018-02-27

## 2018-02-27 VITALS
HEART RATE: 64 BPM | DIASTOLIC BLOOD PRESSURE: 70 MMHG | WEIGHT: 176 LBS | RESPIRATION RATE: 12 BRPM | BODY MASS INDEX: 25 KG/M2 | SYSTOLIC BLOOD PRESSURE: 142 MMHG

## 2018-02-27 DIAGNOSIS — I65.21 STENOSIS OF RIGHT CAROTID ARTERY: ICD-10-CM

## 2018-02-27 DIAGNOSIS — I25.708 CORONARY ARTERY DISEASE INVOLVING CORONARY BYPASS GRAFT OF NATIVE HEART WITH OTHER FORMS OF ANGINA PECTORIS (HCC): Primary | ICD-10-CM

## 2018-02-27 DIAGNOSIS — I10 ESSENTIAL HYPERTENSION: ICD-10-CM

## 2018-02-27 PROCEDURE — 99212 OFFICE O/P EST SF 10 MIN: CPT | Performed by: INTERNAL MEDICINE

## 2018-02-27 PROCEDURE — 99214 OFFICE O/P EST MOD 30 MIN: CPT | Performed by: INTERNAL MEDICINE

## 2018-02-27 NOTE — PROGRESS NOTES
Wicho Hirsch is a 74year old male. Patient presents with: Follow - Up    HPI:   This is a pleasant 44-year-old with hypertension elevated cholesterol prior tobacco use and A. fib who had surgery in October and is overall doing well.   He is completing reha 30 tablet Rfl: 0   HYDROcodone-acetaminophen 5-325 MG Oral Tab Take 1 tablet by mouth every 6 (six) hours as needed for Pain. Disp:  Rfl:    aspirin 81 MG Oral Tab Take 81 mg by mouth daily.  Disp:  Rfl:       Past Medical History:   Diagnosis Date   • Athe CAD (coronary artery disease) of artery bypass graft - Primary          In conclusion this is a pleasant 29-year-old with coronary disease who is status post bypass and doing well.   He has been in sinus rhythm since surgery and may decrease amiodarone t

## 2018-02-27 NOTE — PATIENT INSTRUCTIONS
Decrease amiodarone to 200 mg daily. Stop in 30 days call if heart rate sustained less than 50 or greater than 90  Continue cardiac rehab  Call Dr. Que Conner at [phone] for appointment to assess leg claudication.   He can see you Friday   CV surgery anastacio

## 2018-02-28 ENCOUNTER — APPOINTMENT (OUTPATIENT)
Dept: CARDIAC REHAB | Facility: HOSPITAL | Age: 68
End: 2018-02-28
Attending: INTERNAL MEDICINE
Payer: COMMERCIAL

## 2018-03-02 ENCOUNTER — HOSPITAL ENCOUNTER (INPATIENT)
Facility: HOSPITAL | Age: 68
LOS: 4 days | Discharge: HOME | DRG: 629 | End: 2018-03-06
Attending: HOSPITALIST | Admitting: HOSPITALIST
Payer: COMMERCIAL

## 2018-03-02 ENCOUNTER — APPOINTMENT (OUTPATIENT)
Dept: GENERAL RADIOLOGY | Facility: HOSPITAL | Age: 68
DRG: 629 | End: 2018-03-02
Attending: NURSE PRACTITIONER
Payer: COMMERCIAL

## 2018-03-02 ENCOUNTER — APPOINTMENT (OUTPATIENT)
Dept: MRI IMAGING | Facility: HOSPITAL | Age: 68
DRG: 629 | End: 2018-03-02
Attending: NURSE PRACTITIONER
Payer: COMMERCIAL

## 2018-03-02 ENCOUNTER — PRIOR ORIGINAL RECORDS (OUTPATIENT)
Dept: OTHER | Age: 68
End: 2018-03-02

## 2018-03-02 DIAGNOSIS — L03.116 CELLULITIS OF LEFT FOOT: Primary | ICD-10-CM

## 2018-03-02 DIAGNOSIS — E11.59 TYPE 2 DIABETES MELLITUS WITH OTHER CIRCULATORY COMPLICATION, UNSPECIFIED LONG TERM INSULIN USE STATUS: ICD-10-CM

## 2018-03-02 LAB
ANION GAP SERPL CALC-SCNC: 14 MMOL/L (ref 0–18)
BASOPHILS # BLD: 0.1 K/UL (ref 0–0.2)
BASOPHILS NFR BLD: 1 %
BUN SERPL-MCNC: 28 MG/DL (ref 8–20)
BUN/CREAT SERPL: 19.2 (ref 10–20)
CALCIUM SERPL-MCNC: 10.3 MG/DL (ref 8.5–10.5)
CHLORIDE SERPL-SCNC: 103 MMOL/L (ref 95–110)
CO2 SERPL-SCNC: 22 MMOL/L (ref 22–32)
CREAT SERPL-MCNC: 1.46 MG/DL (ref 0.5–1.5)
CRP SERPL-MCNC: 0.6 MG/DL (ref 0–0.9)
EOSINOPHIL # BLD: 0.2 K/UL (ref 0–0.7)
EOSINOPHIL NFR BLD: 2 %
ERYTHROCYTE [DISTWIDTH] IN BLOOD BY AUTOMATED COUNT: 15.5 % (ref 11–15)
ERYTHROCYTE [SEDIMENTATION RATE] IN BLOOD: 63 MM/HR (ref 0–20)
GLUCOSE BLDC GLUCOMTR-MCNC: 168 MG/DL (ref 70–99)
GLUCOSE BLDC GLUCOMTR-MCNC: 76 MG/DL (ref 70–99)
GLUCOSE SERPL-MCNC: 130 MG/DL (ref 70–99)
HCT VFR BLD AUTO: 39.4 % (ref 41–52)
HGB BLD-MCNC: 13.1 G/DL (ref 13.5–17.5)
LACTATE SERPL-SCNC: 1 MMOL/L (ref 0.5–2.2)
LACTATE SERPL-SCNC: 2.1 MMOL/L (ref 0.5–2.2)
LYMPHOCYTES # BLD: 1 K/UL (ref 1–4)
LYMPHOCYTES NFR BLD: 10 %
MCH RBC QN AUTO: 27.7 PG (ref 27–32)
MCHC RBC AUTO-ENTMCNC: 33.3 G/DL (ref 32–37)
MCV RBC AUTO: 83.3 FL (ref 80–100)
MONOCYTES # BLD: 0.7 K/UL (ref 0–1)
MONOCYTES NFR BLD: 7 %
NEUTROPHILS # BLD AUTO: 7.8 K/UL (ref 1.8–7.7)
NEUTROPHILS NFR BLD: 79 %
OSMOLALITY UR CALC.SUM OF ELEC: 295 MOSM/KG (ref 275–295)
PLATELET # BLD AUTO: 371 K/UL (ref 140–400)
PMV BLD AUTO: 8 FL (ref 7.4–10.3)
POTASSIUM SERPL-SCNC: 4.5 MMOL/L (ref 3.3–5.1)
RBC # BLD AUTO: 4.72 M/UL (ref 4.5–5.9)
SODIUM SERPL-SCNC: 139 MMOL/L (ref 136–144)
WBC # BLD AUTO: 9.8 K/UL (ref 4–11)

## 2018-03-02 PROCEDURE — 73720 MRI LWR EXTREMITY W/O&W/DYE: CPT | Performed by: NURSE PRACTITIONER

## 2018-03-02 PROCEDURE — 73723 MRI JOINT LWR EXTR W/O&W/DYE: CPT | Performed by: NURSE PRACTITIONER

## 2018-03-02 PROCEDURE — 73630 X-RAY EXAM OF FOOT: CPT | Performed by: NURSE PRACTITIONER

## 2018-03-02 PROCEDURE — 99223 1ST HOSP IP/OBS HIGH 75: CPT | Performed by: HOSPITALIST

## 2018-03-02 RX ORDER — SODIUM CHLORIDE 9 MG/ML
INJECTION, SOLUTION INTRAVENOUS
Status: COMPLETED
Start: 2018-03-02 | End: 2018-03-02

## 2018-03-02 RX ORDER — AMLODIPINE BESYLATE 10 MG/1
10 TABLET ORAL DAILY
Status: DISCONTINUED | OUTPATIENT
Start: 2018-03-02 | End: 2018-03-06

## 2018-03-02 RX ORDER — SODIUM CHLORIDE 0.9 % (FLUSH) 0.9 %
3 SYRINGE (ML) INJECTION AS NEEDED
Status: DISCONTINUED | OUTPATIENT
Start: 2018-03-02 | End: 2018-03-06

## 2018-03-02 RX ORDER — CLOPIDOGREL BISULFATE 75 MG/1
75 TABLET ORAL DAILY
Status: DISCONTINUED | OUTPATIENT
Start: 2018-03-02 | End: 2018-03-04

## 2018-03-02 RX ORDER — AMIODARONE HYDROCHLORIDE 200 MG/1
200 TABLET ORAL DAILY
Status: DISCONTINUED | OUTPATIENT
Start: 2018-03-02 | End: 2018-03-06

## 2018-03-02 RX ORDER — FAMOTIDINE 20 MG/1
20 TABLET ORAL 2 TIMES DAILY
Status: DISCONTINUED | OUTPATIENT
Start: 2018-03-02 | End: 2018-03-04

## 2018-03-02 RX ORDER — DEXTROSE MONOHYDRATE 25 G/50ML
50 INJECTION, SOLUTION INTRAVENOUS AS NEEDED
Status: DISCONTINUED | OUTPATIENT
Start: 2018-03-02 | End: 2018-03-06

## 2018-03-02 RX ORDER — HYDROCODONE BITARTRATE AND ACETAMINOPHEN 5; 325 MG/1; MG/1
1 TABLET ORAL EVERY 6 HOURS PRN
Status: DISCONTINUED | OUTPATIENT
Start: 2018-03-02 | End: 2018-03-05

## 2018-03-02 RX ORDER — ASPIRIN 325 MG
325 TABLET ORAL DAILY
Status: DISCONTINUED | OUTPATIENT
Start: 2018-03-02 | End: 2018-03-04

## 2018-03-02 RX ORDER — GLIPIZIDE 10 MG/1
10 TABLET ORAL
Status: DISCONTINUED | OUTPATIENT
Start: 2018-03-02 | End: 2018-03-06

## 2018-03-02 RX ORDER — HEPARIN SODIUM 5000 [USP'U]/ML
5000 INJECTION, SOLUTION INTRAVENOUS; SUBCUTANEOUS EVERY 12 HOURS SCHEDULED
Status: DISCONTINUED | OUTPATIENT
Start: 2018-03-02 | End: 2018-03-06

## 2018-03-02 RX ORDER — SENNOSIDES 8.6 MG
8.6 TABLET ORAL DAILY
Status: DISCONTINUED | OUTPATIENT
Start: 2018-03-02 | End: 2018-03-04

## 2018-03-02 RX ORDER — ONDANSETRON 2 MG/ML
4 INJECTION INTRAMUSCULAR; INTRAVENOUS EVERY 6 HOURS PRN
Status: DISCONTINUED | OUTPATIENT
Start: 2018-03-02 | End: 2018-03-06

## 2018-03-02 RX ORDER — ATORVASTATIN CALCIUM 40 MG/1
40 TABLET, FILM COATED ORAL DAILY
Status: DISCONTINUED | OUTPATIENT
Start: 2018-03-02 | End: 2018-03-03

## 2018-03-02 NOTE — CONSULTS
[facility] AND [facility]  Report of Consultation    Junai Kolb Patient Status:  Inpatient    1950 MRN [id number]   Location [facility] 5SW/SE Attending Reina Boyd MD   Hosp Day # 0 PCP Charlotte Kelley MD     Date of Admission:  3/2/2018  D Diabetes Father    • Heart Disorder Father    • Lipids Father    • Diabetes Maternal Grandfather    • Cancer Mother       reports that he quit smoking about 5 months ago. His smoking use included Cigarettes. He smoked 0.00 packs per day.  He quit smokeless results:  PROCEDURE:  ULTRASOUND ARTERIAL LOWER EXTREMITY WITHOUT EXERCISE     COMPARISON: None. INDICATIONS:  Leg pain     TECHNIQUE:    Segmental pressures and Doppler wave forms were obtained in the lower extremities bilaterally.        SEGMENTAL BP 12:40       Approved by (CST): Scott Bloom MD on 3/02/2018 at 12:41          =====  CONCLUSION:   1.  No radiographic evidence of osteomyelitis  MRI results were reviewed    PROCEDURE:  MRI FOOT (W+WO), LEFT (CPT=73720)     COMPARISON: Tonya Diffuse ill-defined enhancement is seen throughout the subcutaneous tissues and within the plantar foot musculature. No intramuscular or subcutaneous collection.      Dictated by (CST): Joe Murray MD on 3/02/2018 at 16:23       Approved by (CST): Amaris Mcneal

## 2018-03-02 NOTE — H&P
[facility]    PATIENT'S NAME: Isabel Westfall TOSHIA   ATTENDING PHYSICIAN: Kevin Rodriguez MD   PATIENT ACCOUNT#:   [id number]    LOCATION:  [place] [address]  MEDICAL RECORD #:   [id number]       YOB: 1950  ADMISSION DATE:       03/02/2018 dorsum of the left foot. No fever or chills. No significant pain. Other 12-point review of systems negative. PHYSICAL EXAMINATION:    GENERAL:  Alert and oriented to time, place and person, mild distress.   VITAL SIGNS:  Temperature 98.8, pulse 50,

## 2018-03-02 NOTE — HISTORICAL OFFICE NOTE
Tova Mosher  : 1950  ACCOUNT:  [id number]  [phone]  PCP: Dr. Nakul Krishnan     TODAY'S DATE: 2018  DICTATED BY:  [Dr. Queen Nation: [Consultation.]  Hernanbrooklyn Huffman is here for a new patient appointment for me, is a known patient o foot to rule out osteomyelitis  Plan for peripheral angiogram with me next week. PRESCRIPTIONS:   11/15/17 *AmLODIPine Besylate  10MG      1 TAB DAILY AS DIRECTED.  PT NEEDS AP     03/02/18 Amiodarone HCl        200MG     one daily

## 2018-03-02 NOTE — ED PROVIDER NOTES
Patient Seen in: [facility] AND [facility] Emergency Department    History   CC: foot wound  HPI: Shannon Lai 74year old male w/ hx DMII who presents to the ER c/o left foot wounds that have ultimately been present since October following cardiac bypass surger 3/1/2017  Alcohol use: No                ROS:  Review of Systems    Positive for stated complaint: left foot ulcer  Other systems are as noted in HPI. Constitutional and vital signs reviewed.       All other systems reviewed and negative except as noted ab air)    Current:/57   Pulse 50   Temp 98.8 °F (37.1 °C)   Resp 18   Ht 177.8 cm (5' 10\")   Wt 79.4 kg   SpO2 99%   BMI 25.11 kg/m²         General - Appears well, non-toxic and in NAD  Head - Appears symmetrical without deformity/swelling cranium, s BASIC METABOLIC PANEL (8)   C-REACTIVE PROTEIN   LACTIC ACID, PLASMA   BLOOD CULTURE   BLOOD CULTURE       MDM     1235: Spoke to Sam from [address] who states MRI for inpt will be ordered and advises Dr. Andreina Díaz will perform angio himself - no need to [facility]

## 2018-03-02 NOTE — ED NOTES
Pt resting on cart, denies any complaints of pain at this time. Left foot is swollen and redness noted. Pt has two open wounds to the foot, there is an odor noted as well. No active drainage noted at this time.

## 2018-03-02 NOTE — CONSULTS
INFECTIOUS DISEASE CONSULT NOTE    Stephanie Potter Patient Status:  Emergency    1950 MRN [id number]   Location [address] Attending Jessie Key MD   [address] Day # 0 TONSILLECTOMY  Family History   Problem Relation Age of Onset   • Diabetes Father    • Heart Disorder Father    • Lipids Father    • Diabetes Maternal Grandfather    • Cancer Mother       reports that he quit smoking about 5 months ago.  His smoking use inc palpate L DP pulse; R weak DP pulse  Integument: Lmedial and lateral foot necrotic ulcer of unclear depth with foul odor and +ttp with surrounding erythema    Laboratory Data:    Recent Labs   Lab  03/02/18   1217   RBC  4.72   HGB  13.1*   HCT  39.4*   MC continue to follow with you and will make further recommendations based on his progress.     Ruben Kolb  3/2/2018

## 2018-03-03 PROBLEM — E11.59 TYPE 2 DIABETES MELLITUS WITH CIRCULATORY DISORDER (HCC): Status: ACTIVE | Noted: 2018-03-02

## 2018-03-03 LAB
ANION GAP SERPL CALC-SCNC: 9 MMOL/L (ref 0–18)
BASOPHILS # BLD: 0.1 K/UL (ref 0–0.2)
BASOPHILS NFR BLD: 1 %
BUN SERPL-MCNC: 22 MG/DL (ref 8–20)
BUN/CREAT SERPL: 14 (ref 10–20)
CALCIUM SERPL-MCNC: 9.9 MG/DL (ref 8.5–10.5)
CHLORIDE SERPL-SCNC: 103 MMOL/L (ref 95–110)
CO2 SERPL-SCNC: 25 MMOL/L (ref 22–32)
CREAT SERPL-MCNC: 1.57 MG/DL (ref 0.5–1.5)
EOSINOPHIL # BLD: 0.3 K/UL (ref 0–0.7)
EOSINOPHIL NFR BLD: 3 %
ERYTHROCYTE [DISTWIDTH] IN BLOOD BY AUTOMATED COUNT: 15.3 % (ref 11–15)
GLUCOSE BLDC GLUCOMTR-MCNC: 156 MG/DL (ref 70–99)
GLUCOSE BLDC GLUCOMTR-MCNC: 157 MG/DL (ref 70–99)
GLUCOSE BLDC GLUCOMTR-MCNC: 183 MG/DL (ref 70–99)
GLUCOSE SERPL-MCNC: 117 MG/DL (ref 70–99)
HBA1C MFR BLD: 9.7 % (ref 4–6)
HCT VFR BLD AUTO: 35.9 % (ref 41–52)
HGB BLD-MCNC: 11.7 G/DL (ref 13.5–17.5)
LYMPHOCYTES # BLD: 0.4 K/UL (ref 1–4)
LYMPHOCYTES NFR BLD: 3 %
MCH RBC QN AUTO: 27.1 PG (ref 27–32)
MCHC RBC AUTO-ENTMCNC: 32.6 G/DL (ref 32–37)
MCV RBC AUTO: 83.1 FL (ref 80–100)
MONOCYTES # BLD: 0.6 K/UL (ref 0–1)
MONOCYTES NFR BLD: 6 %
NEUTROPHILS # BLD AUTO: 9.8 K/UL (ref 1.8–7.7)
NEUTROPHILS NFR BLD: 87 %
OSMOLALITY UR CALC.SUM OF ELEC: 288 MOSM/KG (ref 275–295)
PLATELET # BLD AUTO: 358 K/UL (ref 140–400)
PMV BLD AUTO: 8.3 FL (ref 7.4–10.3)
POTASSIUM SERPL-SCNC: 4.2 MMOL/L (ref 3.3–5.1)
RBC # BLD AUTO: 4.33 M/UL (ref 4.5–5.9)
SODIUM SERPL-SCNC: 137 MMOL/L (ref 136–144)
WBC # BLD AUTO: 11.2 K/UL (ref 4–11)

## 2018-03-03 PROCEDURE — 99233 SBSQ HOSP IP/OBS HIGH 50: CPT | Performed by: HOSPITALIST

## 2018-03-03 RX ORDER — ATORVASTATIN CALCIUM 40 MG/1
40 TABLET, FILM COATED ORAL NIGHTLY
Status: DISCONTINUED | OUTPATIENT
Start: 2018-03-03 | End: 2018-03-06

## 2018-03-03 NOTE — PROGRESS NOTES
[facility]    Cardiology Progress Note    Lyndsay Green Patient Status:  Inpatient    1950 MRN [id number]   Location [facility] 5SW/SE Attending Kenneth Winters MD   Hosp Day # 1 PCP Joanna Beach MD     Primary Cardiologi Cooperative    Objective:    Assessment and Plan:          Critical limb ischemia of left foot c/b cellulitis and possible OM   - MRI shows early OM   - further recs pending per podiatry and ID    CAD status post CABG 10/2017   - stableo on asa, statin, pl plantar foot myositis without abscess. Mri Ankle (w+wo), Left (cpt=73723)    Result Date: 3/2/2018  CONCLUSION:   Mild degenerative changes throughout the ankle and hind foot without osteomyelitis.   Extensive subcutaneous edema/cellulitis along the

## 2018-03-03 NOTE — PLAN OF CARE
Problem: Patient/Family Goals  Goal: Patient/Family Long Term Goal  Patient's Long Term Goal: to be able to return home    Interventions:  - administer medications as prescribed  Monitor labs  Monitor vitals    - See additional Care Plan goals for specific

## 2018-03-03 NOTE — PROGRESS NOTES
[facility] dosing service    Initial Pharmacokinetic Consult for Vancomycin Dosing     Srinivasa Fisher is a 74year old male who is being treated for cellulitis.   Pharmacy has been asked to dose Vancomycin by Dr. Sam Ortiz    He is allergic to sulfa antibiot the opportunity to assist in his care.     Jere Watkins, PharmD  3/2/2018  6:48 PM  [address]: [phone]

## 2018-03-03 NOTE — PLAN OF CARE
Problem: Patient/Family Goals  Goal: Patient/Family Short Term Goal  Patient's Short Term Goal: pt will be free from pain    Interventions:   - pain assessment   Administer pain medications as prescribed   - See additional Care Plan goals for specific inte

## 2018-03-03 NOTE — PROGRESS NOTES
[facility] ID PROGRESS NOTE    Chon Foote Patient Status:  Inpatient    1950 MRN [id number]   Location [facility] 5SW/SE Attending Leah García MD   Hosp Day # 1 PCP Matthew Kimbrough MD     Subjective:  No fever.  Sitting on couch comfortably initially clear drainage noted but over the past 1 week some purulent drainage. No fevers or chills. No new trauma. Previous arterial ultrasound in October 2017 showing moderate PAD bilaterally. Has not been on any antibiotics as an outpatient.   On adm

## 2018-03-04 LAB
ANION GAP SERPL CALC-SCNC: 10 MMOL/L (ref 0–18)
APTT PPP: 29.3 SECONDS (ref 23.2–35.3)
BASOPHILS # BLD: 0.1 K/UL (ref 0–0.2)
BASOPHILS NFR BLD: 1 %
BUN SERPL-MCNC: 21 MG/DL (ref 8–20)
BUN/CREAT SERPL: 15 (ref 10–20)
CALCIUM SERPL-MCNC: 9.9 MG/DL (ref 8.5–10.5)
CHLORIDE SERPL-SCNC: 102 MMOL/L (ref 95–110)
CO2 SERPL-SCNC: 24 MMOL/L (ref 22–32)
CREAT SERPL-MCNC: 1.4 MG/DL (ref 0.5–1.5)
EOSINOPHIL # BLD: 0.4 K/UL (ref 0–0.7)
EOSINOPHIL NFR BLD: 5 %
ERYTHROCYTE [DISTWIDTH] IN BLOOD BY AUTOMATED COUNT: 15.5 % (ref 11–15)
GLUCOSE BLDC GLUCOMTR-MCNC: 128 MG/DL (ref 70–99)
GLUCOSE BLDC GLUCOMTR-MCNC: 158 MG/DL (ref 70–99)
GLUCOSE BLDC GLUCOMTR-MCNC: 188 MG/DL (ref 70–99)
GLUCOSE BLDC GLUCOMTR-MCNC: 279 MG/DL (ref 70–99)
GLUCOSE SERPL-MCNC: 148 MG/DL (ref 70–99)
HCT VFR BLD AUTO: 38.7 % (ref 41–52)
HGB BLD-MCNC: 12.8 G/DL (ref 13.5–17.5)
INR BLD: 1.1 (ref 0.9–1.2)
LYMPHOCYTES # BLD: 1 K/UL (ref 1–4)
LYMPHOCYTES NFR BLD: 11 %
MAGNESIUM SERPL-MCNC: 1.8 MG/DL (ref 1.8–2.5)
MCH RBC QN AUTO: 27.7 PG (ref 27–32)
MCHC RBC AUTO-ENTMCNC: 33.1 G/DL (ref 32–37)
MCV RBC AUTO: 83.6 FL (ref 80–100)
MONOCYTES # BLD: 0.6 K/UL (ref 0–1)
MONOCYTES NFR BLD: 6 %
NEUTROPHILS # BLD AUTO: 7.1 K/UL (ref 1.8–7.7)
NEUTROPHILS NFR BLD: 77 %
OSMOLALITY UR CALC.SUM OF ELEC: 288 MOSM/KG (ref 275–295)
PLATELET # BLD AUTO: 419 K/UL (ref 140–400)
PMV BLD AUTO: 8.2 FL (ref 7.4–10.3)
POTASSIUM SERPL-SCNC: 4.2 MMOL/L (ref 3.3–5.1)
PROTHROMBIN TIME: 13.6 SECONDS (ref 11.8–14.5)
RBC # BLD AUTO: 4.63 M/UL (ref 4.5–5.9)
SODIUM SERPL-SCNC: 136 MMOL/L (ref 136–144)
WBC # BLD AUTO: 9.2 K/UL (ref 4–11)

## 2018-03-04 PROCEDURE — 99233 SBSQ HOSP IP/OBS HIGH 50: CPT | Performed by: HOSPITALIST

## 2018-03-04 RX ORDER — ASPIRIN 325 MG
325 TABLET ORAL DAILY
Status: DISCONTINUED | OUTPATIENT
Start: 2018-03-06 | End: 2018-03-06

## 2018-03-04 RX ORDER — MAGNESIUM OXIDE 400 MG (241.3 MG MAGNESIUM) TABLET
400 TABLET ONCE
Status: COMPLETED | OUTPATIENT
Start: 2018-03-04 | End: 2018-03-04

## 2018-03-04 RX ORDER — HYDRALAZINE HYDROCHLORIDE 20 MG/ML
10 INJECTION INTRAMUSCULAR; INTRAVENOUS ONCE
Status: DISCONTINUED | OUTPATIENT
Start: 2018-03-04 | End: 2018-03-06

## 2018-03-04 RX ORDER — CHLORHEXIDINE GLUCONATE 4 G/100ML
30 SOLUTION TOPICAL
Status: COMPLETED | OUTPATIENT
Start: 2018-03-05 | End: 2018-03-05

## 2018-03-04 RX ORDER — SODIUM CHLORIDE 9 MG/ML
INJECTION, SOLUTION INTRAVENOUS CONTINUOUS
Status: DISCONTINUED | OUTPATIENT
Start: 2018-03-05 | End: 2018-03-06

## 2018-03-04 RX ORDER — ASPIRIN 81 MG/1
324 TABLET, CHEWABLE ORAL ONCE
Status: COMPLETED | OUTPATIENT
Start: 2018-03-05 | End: 2018-03-05

## 2018-03-04 NOTE — PROGRESS NOTES
[facility] - [facility]    Cardiology Progress Note    Thomas Laboy Patient Status:  Inpatient    1950 MRN [id number]   Location [facility] 5SW/SE Attending Kina Marquis MD   Hosp Day # 2 PCP Salvador Fuentes MD     Primary Cardiologi possible OM   - MRI shows early OM   - further recs pending per podiatry and ID    - noted edema to L lower extremity d/t PVD, consider diuretics if worsens    CAD status post CABG 10/2017   - stable on asa, statin, plavix, BB    Carotid disease    - Known cellulitis without abscess. Diffuse plantar foot myositis without abscess. Mri Ankle (w+wo), Left (cpt=73723)    Result Date: 3/2/2018  CONCLUSION:   Mild degenerative changes throughout the ankle and hind foot without osteomyelitis.   Extensive sub

## 2018-03-04 NOTE — PLAN OF CARE
Patient refusing morning vital signs, am lab draw, daily weight, and am medication (metoprolol). Pt teaching on importance of am medication, lab draw, daily weight, and vital signs assessment provided.  Pt continues to refuse, states he will allow lab draw

## 2018-03-05 ENCOUNTER — APPOINTMENT (OUTPATIENT)
Dept: CARDIAC REHAB | Facility: HOSPITAL | Age: 68
End: 2018-03-05
Attending: INTERNAL MEDICINE
Payer: COMMERCIAL

## 2018-03-05 ENCOUNTER — APPOINTMENT (OUTPATIENT)
Dept: INTERVENTIONAL RADIOLOGY/VASCULAR | Facility: HOSPITAL | Age: 68
DRG: 629 | End: 2018-03-05
Attending: NURSE PRACTITIONER
Payer: COMMERCIAL

## 2018-03-05 ENCOUNTER — APPOINTMENT (OUTPATIENT)
Dept: CT IMAGING | Facility: HOSPITAL | Age: 68
DRG: 629 | End: 2018-03-05
Attending: PHYSICIAN ASSISTANT
Payer: COMMERCIAL

## 2018-03-05 LAB
ANION GAP SERPL CALC-SCNC: 9 MMOL/L (ref 0–18)
BASOPHILS # BLD: 0.1 K/UL (ref 0–0.2)
BASOPHILS NFR BLD: 1 %
BUN SERPL-MCNC: 18 MG/DL (ref 8–20)
BUN/CREAT SERPL: 14.3 (ref 10–20)
CALCIUM SERPL-MCNC: 9.9 MG/DL (ref 8.5–10.5)
CHLORIDE SERPL-SCNC: 106 MMOL/L (ref 95–110)
CO2 SERPL-SCNC: 24 MMOL/L (ref 22–32)
CREAT SERPL-MCNC: 1.26 MG/DL (ref 0.5–1.5)
EOSINOPHIL # BLD: 0.5 K/UL (ref 0–0.7)
EOSINOPHIL NFR BLD: 5 %
ERYTHROCYTE [DISTWIDTH] IN BLOOD BY AUTOMATED COUNT: 15.5 % (ref 11–15)
GLUCOSE BLDC GLUCOMTR-MCNC: 109 MG/DL (ref 70–99)
GLUCOSE BLDC GLUCOMTR-MCNC: 111 MG/DL (ref 70–99)
GLUCOSE BLDC GLUCOMTR-MCNC: 182 MG/DL (ref 70–99)
GLUCOSE SERPL-MCNC: 126 MG/DL (ref 70–99)
HCT VFR BLD AUTO: 38 % (ref 41–52)
HGB BLD-MCNC: 12.6 G/DL (ref 13.5–17.5)
LYMPHOCYTES # BLD: 0.8 K/UL (ref 1–4)
LYMPHOCYTES NFR BLD: 9 %
MAGNESIUM SERPL-MCNC: 2 MG/DL (ref 1.8–2.5)
MCH RBC QN AUTO: 27.5 PG (ref 27–32)
MCHC RBC AUTO-ENTMCNC: 33.1 G/DL (ref 32–37)
MCV RBC AUTO: 83 FL (ref 80–100)
MONOCYTES # BLD: 0.8 K/UL (ref 0–1)
MONOCYTES NFR BLD: 9 %
NEUTROPHILS # BLD AUTO: 6.9 K/UL (ref 1.8–7.7)
NEUTROPHILS NFR BLD: 76 %
OSMOLALITY UR CALC.SUM OF ELEC: 291 MOSM/KG (ref 275–295)
PLATELET # BLD AUTO: 379 K/UL (ref 140–400)
PMV BLD AUTO: 7.5 FL (ref 7.4–10.3)
POTASSIUM SERPL-SCNC: 3.8 MMOL/L (ref 3.3–5.1)
RBC # BLD AUTO: 4.57 M/UL (ref 4.5–5.9)
SODIUM SERPL-SCNC: 139 MMOL/L (ref 136–144)
VANCOMYCIN TROUGH SERPL-MCNC: 9.7 MCG/ML (ref 10–20)
WBC # BLD AUTO: 9 K/UL (ref 4–11)

## 2018-03-05 PROCEDURE — B41G1ZZ FLUOROSCOPY OF LEFT LOWER EXTREMITY ARTERIES USING LOW OSMOLAR CONTRAST: ICD-10-PCS | Performed by: INTERNAL MEDICINE

## 2018-03-05 PROCEDURE — 047L3ZZ DILATION OF LEFT FEMORAL ARTERY, PERCUTANEOUS APPROACH: ICD-10-PCS | Performed by: INTERNAL MEDICINE

## 2018-03-05 PROCEDURE — 047S3ZZ DILATION OF LEFT POSTERIOR TIBIAL ARTERY, PERCUTANEOUS APPROACH: ICD-10-PCS | Performed by: INTERNAL MEDICINE

## 2018-03-05 PROCEDURE — 047N3ZZ DILATION OF LEFT POPLITEAL ARTERY, PERCUTANEOUS APPROACH: ICD-10-PCS | Performed by: INTERNAL MEDICINE

## 2018-03-05 PROCEDURE — 047U3ZZ DILATION OF LEFT PERONEAL ARTERY, PERCUTANEOUS APPROACH: ICD-10-PCS | Performed by: INTERNAL MEDICINE

## 2018-03-05 PROCEDURE — 99233 SBSQ HOSP IP/OBS HIGH 50: CPT | Performed by: HOSPITALIST

## 2018-03-05 RX ORDER — SODIUM CHLORIDE 0.9 % (FLUSH) 0.9 %
10 SYRINGE (ML) INJECTION AS NEEDED
Status: DISCONTINUED | OUTPATIENT
Start: 2018-03-05 | End: 2018-03-06

## 2018-03-05 RX ORDER — NITROGLYCERIN 20 MG/100ML
INJECTION INTRAVENOUS
Status: COMPLETED
Start: 2018-03-05 | End: 2018-03-05

## 2018-03-05 RX ORDER — CLOPIDOGREL BISULFATE 75 MG/1
TABLET ORAL
Status: COMPLETED
Start: 2018-03-05 | End: 2018-03-05

## 2018-03-05 RX ORDER — ACETAMINOPHEN 325 MG/1
650 TABLET ORAL EVERY 4 HOURS PRN
Status: DISCONTINUED | OUTPATIENT
Start: 2018-03-05 | End: 2018-03-06

## 2018-03-05 RX ORDER — MIDAZOLAM HYDROCHLORIDE 1 MG/ML
INJECTION INTRAMUSCULAR; INTRAVENOUS
Status: COMPLETED
Start: 2018-03-05 | End: 2018-03-05

## 2018-03-05 RX ORDER — LIDOCAINE HYDROCHLORIDE 20 MG/ML
INJECTION, SOLUTION EPIDURAL; INFILTRATION; INTRACAUDAL; PERINEURAL
Status: COMPLETED
Start: 2018-03-05 | End: 2018-03-05

## 2018-03-05 RX ORDER — HYDROCODONE BITARTRATE AND ACETAMINOPHEN 5; 325 MG/1; MG/1
2 TABLET ORAL EVERY 4 HOURS PRN
Status: DISCONTINUED | OUTPATIENT
Start: 2018-03-05 | End: 2018-03-06

## 2018-03-05 RX ORDER — SODIUM CHLORIDE 9 MG/ML
INJECTION, SOLUTION INTRAVENOUS
Status: COMPLETED
Start: 2018-03-05 | End: 2018-03-05

## 2018-03-05 RX ORDER — POTASSIUM CHLORIDE 20 MEQ/1
40 TABLET, EXTENDED RELEASE ORAL ONCE
Status: COMPLETED | OUTPATIENT
Start: 2018-03-05 | End: 2018-03-05

## 2018-03-05 RX ORDER — CLOPIDOGREL BISULFATE 75 MG/1
75 TABLET ORAL DAILY
Status: DISCONTINUED | OUTPATIENT
Start: 2018-03-06 | End: 2018-03-06

## 2018-03-05 RX ORDER — HYDROCODONE BITARTRATE AND ACETAMINOPHEN 5; 325 MG/1; MG/1
1 TABLET ORAL EVERY 4 HOURS PRN
Status: DISCONTINUED | OUTPATIENT
Start: 2018-03-05 | End: 2018-03-06

## 2018-03-05 NOTE — PLAN OF CARE
METABOLIC/FLUID AND ELECTROLYTES - ADULT    • Electrolytes maintained within normal limits- electrolyte protocol in use  Progressing        Patient/Family Goals    • Patient/Family Long Term Goal-plan home at discharge Progressing    • Patient/Family Short

## 2018-03-05 NOTE — WOUND PROGRESS NOTE
WOUND CARE NOTE      PLAN   Recommendations:  Dietary consult for recommendations for nutrition to optimize wound healing  Diabetic Education for optimal glucose control  Glucose control to help promote wound healing    Wound(s)  Location: left dorsal/la

## 2018-03-05 NOTE — PLAN OF CARE
Problem: Diabetes/Glucose Control  Goal: Glucose maintained within prescribed range  INTERVENTIONS:  - Monitor Blood Glucose as ordered  - Assess for signs and symptoms of hyperglycemia and hypoglycemia  - Administer ordered medications to maintain glucose infection  INTERVENTIONS:  - Assess and document risk factors for pressure ulcer development  - Assess and document skin integrity  - Assess and document dressing/incision, wound bed, drain sites and surrounding tissue  - Implement wound care per orders  -

## 2018-03-05 NOTE — PROGRESS NOTES
[facility] ID PROGRESS NOTE    Robet Apgar Patient Status:  Inpatient    1950 MRN [id number]   Location [facility] 5SW/SE Attending Brandie Flores MD   Hosp Day # 3 PCP Yoselyn Morales MD     Subjective:  Awake, afebrile. No complaints.  Going ulceration and initially clear drainage noted but over the past 1 week some purulent drainage. No fevers or chills. No new trauma. Previous arterial ultrasound in October 2017 showing moderate PAD bilaterally.   Has not been on any antibiotics as an outp

## 2018-03-05 NOTE — PROGRESS NOTES
CV Surgery Miscellaneous Note    Patient known to us after undergoing CABG in 11/2017. The patient was found to have carotid stenosis of >70% of the right carotid during pre op evalution by ultrasound.  Patient was instructed to follow up with a CTA of the

## 2018-03-05 NOTE — BRIEF PROCEDURE NOTE
Nonhealing ulcer of the left foot    Bilateral lower extremity runoff/left lower extremity angiogram/balloon angioplasty of entire left SFA and popliteal artery/drug-coated balloon angioplasty of entire left SFA and popliteal artery/balloon angioplasty o

## 2018-03-06 VITALS
OXYGEN SATURATION: 96 % | SYSTOLIC BLOOD PRESSURE: 169 MMHG | BODY MASS INDEX: 24.12 KG/M2 | WEIGHT: 168.5 LBS | HEART RATE: 68 BPM | RESPIRATION RATE: 16 BRPM | HEIGHT: 70 IN | DIASTOLIC BLOOD PRESSURE: 80 MMHG | TEMPERATURE: 98 F

## 2018-03-06 LAB
ANION GAP SERPL CALC-SCNC: 9 MMOL/L (ref 0–18)
BASOPHILS # BLD: 0.1 K/UL (ref 0–0.2)
BASOPHILS NFR BLD: 1 %
BUN SERPL-MCNC: 16 MG/DL (ref 8–20)
BUN/CREAT SERPL: 9.8 (ref 10–20)
CALCIUM SERPL-MCNC: 9.8 MG/DL (ref 8.5–10.5)
CHLORIDE SERPL-SCNC: 106 MMOL/L (ref 95–110)
CO2 SERPL-SCNC: 21 MMOL/L (ref 22–32)
CREAT SERPL-MCNC: 1.63 MG/DL (ref 0.5–1.5)
EOSINOPHIL # BLD: 0.2 K/UL (ref 0–0.7)
EOSINOPHIL NFR BLD: 3 %
ERYTHROCYTE [DISTWIDTH] IN BLOOD BY AUTOMATED COUNT: 15.5 % (ref 11–15)
GLUCOSE SERPL-MCNC: 124 MG/DL (ref 70–99)
HCT VFR BLD AUTO: 38.4 % (ref 41–52)
HGB BLD-MCNC: 12.5 G/DL (ref 13.5–17.5)
LYMPHOCYTES # BLD: 0.4 K/UL (ref 1–4)
LYMPHOCYTES NFR BLD: 5 %
MAGNESIUM SERPL-MCNC: 1.9 MG/DL (ref 1.8–2.5)
MCH RBC QN AUTO: 27.2 PG (ref 27–32)
MCHC RBC AUTO-ENTMCNC: 32.7 G/DL (ref 32–37)
MCV RBC AUTO: 83.3 FL (ref 80–100)
MONOCYTES # BLD: 0.8 K/UL (ref 0–1)
MONOCYTES NFR BLD: 8 %
NEUTROPHILS # BLD AUTO: 8.1 K/UL (ref 1.8–7.7)
NEUTROPHILS NFR BLD: 84 %
OSMOLALITY UR CALC.SUM OF ELEC: 285 MOSM/KG (ref 275–295)
PLATELET # BLD AUTO: 403 K/UL (ref 140–400)
PMV BLD AUTO: 7.8 FL (ref 7.4–10.3)
POTASSIUM SERPL-SCNC: 4 MMOL/L (ref 3.3–5.1)
RBC # BLD AUTO: 4.61 M/UL (ref 4.5–5.9)
SODIUM SERPL-SCNC: 136 MMOL/L (ref 136–144)
WBC # BLD AUTO: 9.6 K/UL (ref 4–11)

## 2018-03-06 PROCEDURE — 99239 HOSP IP/OBS DSCHRG MGMT >30: CPT | Performed by: HOSPITALIST

## 2018-03-06 RX ORDER — DOXYCYCLINE HYCLATE 100 MG
100 TABLET ORAL 2 TIMES DAILY
Qty: 28 TABLET | Refills: 0 | Status: SHIPPED | OUTPATIENT
Start: 2018-03-06 | End: 2018-03-20

## 2018-03-06 RX ORDER — AMOXICILLIN AND CLAVULANATE POTASSIUM 875; 125 MG/1; MG/1
1 TABLET, FILM COATED ORAL 2 TIMES DAILY
Qty: 28 TABLET | Refills: 0 | Status: SHIPPED | OUTPATIENT
Start: 2018-03-06 | End: 2018-03-20

## 2018-03-06 NOTE — PAYOR COMM NOTE
--------------  ADMISSION REVIEW     Payor: [address] #:  [id number]  Authorization Number: [id number]    Admit date: 3/2/18  Admit time: 1       Admitting Physician: Jessie Key MD  Attending Physician:  Sophia Merchant Author:  NICK Horton Service:  (none) Author Type:  Nurse Practitioner    Filed:  3/2/2018  2:17 PM Date of Service:  3/2/2018 11:59 AM Status:  Attested    :  NICK Horton (Nurse Practitioner) Cosigner:  Naila Finn MD at • Heart Disorder Father    • Lipids Father    • Diabetes Maternal Grandfather    • Cancer Mother        Smoking status: Former Smoker                                                              Packs/day: 0.00      Years: 0.00         Types: Cigarettes AmLODIPine Besylate 10 MG Oral Tab,  Take 1 tablet (10 mg total) by mouth daily. aspirin 81 MG Oral Tab,  Take 81 mg by mouth daily. Glucose Blood (RELION BLOOD GLUCOSE TEST) In Vitro Strip,  twice daily[HL. 3]           Constitutional and vital signs Neutrophil Absolute 7.8 (*)     All other components within normal limits   CBC WITH DIFFERENTIAL WITH PLATELET    Narrative: The following orders were created for panel order CBC WITH DIFFERENTIAL WITH PLATELET.   Procedure HL.1 Ghanshyam Bentley, APN on 3/2/2018 11:59 AM   HL. 2 - Scarlet Lolling, APN on 3/2/2018 12:10 PM   HL. 3 - Scarlet Lolling, APN on 3/2/2018  1:02 PM   HL. 4 - Scarlet Lolling, APN on 3/2/2018 12:56 PM   HL. 5 - Scarlet Lolling, APN on 3/2/2018  1:59 P MEDICATIONS:  Please see medication reconciliation list.    ALLERGIES:  Sulfa. FAMILY HISTORY:  Mother  of cancer. Father had diabetes and heart disease. SOCIAL HISTORY:  Ex-tobacco user. No current tobacco, alcohol, or drug use.   Lives with h 4.   Hyperlipidemia. 5.   Diabetes mellitus type 2. PLAN:  The patient will be admitted to the general medical floor. MRI scan of the left ankle was ordered by Cardiology.   Cardiology consult, Dr. Andreina Díaz, for possible left lower extremity angiogram. Iopamidol (ISOVUE-M) 61 % injection 150 mL     Date Action Dose Route User    3/5/2018 1724 Given 145 mL Injection Tory Brown, RN      metoprolol Tartrate (LOPRESSOR) tab 25 mg     Date Action Dose Route User    3/6/2018 0629 Given 25 mg Oral Roberto Satish Zuñiga is a a(n) 74year old male. Patient is a 62-year-old male with a history of CAD status post CABG, diabetes, HTN, HLD, COPD, PAD who presents with worsening left dorsal foot wound.   Patient underwent cardiac bypass on 10/12/17 with development reports that he quit smoking about 5 months ago. His smoking use included Cigarettes. He smoked 0.00 packs per day. He quit smokeless tobacco use about a year ago.  He reports that he does not drink alcohol or use drugs.     Allergies:     Sulfa Antibiotic        Recent Labs   Lab  03/02/18   1217   RBC  4.72   HGB  13.1*   HCT  39.4*   MCV  83.3   MCH  27.7   MCHC  33.3   RDW  15.5*   WBC  9.8   PLT  371          Recent Labs   Lab  03/02/18   1224   GLU  130*   BUN  28*   CREATSERUM  1.46   GFRAA  58*   GFR Author: Daisy Reeves DPM Service: Podiatry Author Type: Podiatrist   Filed: 3/2/2018  5:42 PM Date of Service: 3/2/2018  5:31 PM Status: Signed   : Daisy Reeves DPM (Podiatrist)   Consult Orders:   1.  IP consult to Podiatry Once [[id number] • Pneumonia due to organism     • Visual impairment       wears glasses      Past Surgical History:  10/12/2017: ANESTH,OPEN HEART SURGERY      Comment: triple bypass  7/6/15: COLONOSCOPY  No date: COLONOSCOPY      Comment: 5 years \"was fine\"  7/6/2010: Integument: The integument on both feet is cool and dry pedal hair growth is absent nails have some thickening.   There is a oval-shaped ulceration over the medial and slightly dorsal aspect of the mid first metatarsal area and dorsal lateral aspect of the COMPARISON: None.     INDICATIONS:  Multiple left foot diabetic ulcers     TECHNIQUE:    3 views were obtained.       FINDINGS:          BONES:             Normal.  No significant arthropathy, fracture, or acute abnormality.    SOFT TISSUES:            Ath EFFUSIONS:     Small joint effusion at the first MTP joint. Increased fluid seen within the second and third intermetatarsal bursae.   TENDONS:        Normal.  No visible tendinitis, tear, or tenosynovitis.    OTHER:             There is a large dorsomedial Due to the fact that he may have osteomyelitis beginning in the first metatarsal it may be important to reestablish his circulation for proper blood flow for antibiotic management.   At this point time I would favor topical treatments may be been Santyl oin Paroxysmal atrial fibrillation     DVT Prophylaxis: Heparin  CODE status: Full  Dispo: Pending     SUBJECTIVE  No pain in the left foot. No chest pain or shortness of breath.   Patient and wife are extremely frustrated because of subcu heparin and sliding CO2  22  25      No results for input(s): PT, INR, PTT in the last 72 hours.     • atorvastatin  40 mg Oral Nightly   • piperacillin-tazobactam  3.375 g Intravenous Q8H   • vancomycin  1,500 mg Intravenous Daily   • Insulin Aspart Pen  1-7 Units Subcutaneo -We will need better assessment of perfusion prior to major surgical intervention     Chronic renal failure stage III. Creatinine is up a little bit. No clear medication as the cause. Patient is well hydrated.   -Repeat BMP in the morning  -Avoid nephrot HGB  13.1*  11.7*  12.8*   HCT  39.4*  35.9*  38.7*   MCV  83.3  83.1  83.6   MCH  27.7  27.1  27.7   MCHC  33.3  32.6  33.1   RDW  15.5*  15.3*  15.5*   WBC  9.8  11.2*  9.2   PLT  371  358  419*            Recent Labs   Lab  03/02/18   1224  03/03/18   0 Location 541/541-A Attending Marbella Leyva MD   Hosp Day # 3 PCP Yoselyn Morales MD      ASSESSMENT/PLAN     Left foot osteomyelitis. There is a significant component of soft tissue infection as well. No significant pain, possibly due to neuropathy.   - MS: No joint effusions. Left foot has diffuse erythema across the dorsum, slightly better than yesterday. There is 2 large clean-based ulcers, the largest is about 3 cm in diameter. It is malodorous. Nontender, no fluctuance or induration.   The foot is >35min spent, >50% spent counseling and coordinating care in the form of educating pt/family and d/w consultants and staff.   Most of the time spent discussing with the patient, wife the plan for angiogram later today with surgical plan to be determined aft Net              435 ml                    This shift: I/O this shift:  In: 240 [P.O.:240]  Out: -          Exam  Gen: No acute distress, alert and oriented x3,   Neck:supple,no JVD  Pulm: Lungs clear, normal respiratory effort  CV: Heart with regular rate

## 2018-03-06 NOTE — PROGRESS NOTES
120 [facility] dosing service    Follow-up Pharmacokinetic Consult for Vancomycin Dosing     Deandra Porter is a 74year old male admitted on 3/2 who is being treated for cellulitis. Patient is on day 4 of Vancomycin 1250mg IV q24h}.   Goal trough is 10-15 ug assess renal function. 4.  Pharmacy will follow and monitor renal function changes, toxicity and efficacy.     DONALD Hsu [facility]  3/5/2018  9:04 PM  [address]: [phone]

## 2018-03-06 NOTE — PROGRESS NOTES
[facility] ID PROGRESS NOTE    Alessandro Lee Patient Status:  Inpatient    1950 MRN [id number]   Location [facility] 5SW/SE Attending Suzette Jones MD   [facility] Day [facility] PCP Carrie Wilkinson MD     Subjective:  Awake, afebrile. Agitated.  Per RN, was been progressive over the past 6 weeks with ulceration and initially clear drainage noted but over the past 1 week some purulent drainage. No fevers or chills. No new trauma. Previous arterial ultrasound in October 2017 showing moderate PAD bilaterally.

## 2018-03-06 NOTE — PROGRESS NOTES
120 [facility] dosing service    Follow-up Pharmacokinetic Consult for Vancomycin Dosing     Trevon Valiente is a 74year old male admitted on 3/2 who is being treated for cellulitis. Patient is on day 4 of Vancomycin 1250mg IV q24h}.   Goal trough is 10-15 ug assess renal function. 4.  Pharmacy will follow and monitor renal function changes, toxicity and efficacy.     Vik Arechiga [facility]  3/5/2018  9:04 PM  [address]: [phone]

## 2018-03-06 NOTE — DISCHARGE SUMMARY
[facility] HOSPITALIST  DISCHARGE SUMMARY     Robet Apgar Patient Status:  Inpatient    1950 MRN [id number]   Location [facility] 3W/SW Attending No att. providers found   [facility] Day # 4 PCP Yoselyn Morales MD     DATE OF ADMISSION: 3/2/2018 unclear reasons. He was cursing and yelling at us and would not allow us to examine his foot. I discussed that examining his foot may assist us in choosing the correct antibiotic and the correct course.   He would not allow us to examine foot, stating miko 0        CONTINUE taking these medications      Instructions Prescription details   AmLODIPine Besylate 10 MG Tabs  Commonly known as:  NORVASC      Take 1 tablet (10 mg total) by mouth daily.    Quantity:  30 tablet  Refills:  0     aspirin 81 MG Tabs medications  · Amoxicillin-Pot Clavulanate 875-125 MG Tabs  · Doxycycline Hyclate 100 MG Tabs         CONSULTANTS  Consultants     Provider Role Specialty    Forest Duncan DPM Consulting Physician  Jasmin Torres MD Consulting Phys

## 2018-03-06 NOTE — PLAN OF CARE
Problem: Diabetes/Glucose Control  Goal: Glucose maintained within prescribed range  INTERVENTIONS:  - Monitor Blood Glucose as ordered  - Assess for signs and symptoms of hyperglycemia and hypoglycemia  - Administer ordered medications to maintain glucose wounds(s) or drain site(s) healing without S/S of infection  INTERVENTIONS:  - Assess and document risk factors for pressure ulcer development  - Assess and document skin integrity  - Assess and document dressing/incision, wound bed, drain sites and surrou

## 2018-03-06 NOTE — HOME CARE LIAISON
MET WITH PATIENT AND HIS WIFE TO [address] BOTH IN AGREEMENT WITH SERVICES BEING PROVIDED BY [facility]. PROVIDED [facility]IDENT[facility] BROCHURE WITH CONTACT INFORMATION.

## 2018-03-06 NOTE — PROGRESS NOTES
[facility]    Progress Note    Frutoso Rosie Patient Status:  Inpatient    1950 MRN [id number]   Location [facility] 3W/SW Attending Charlie Hassan MD   [facility] Day # 4 PCP Rosana Mcallister MD         Assessment and Plan:     Nasrin • vancomycin  15 mg/kg Intravenous Daily   • Insulin Aspart Pen  1-7 Units Subcutaneous TID CC   • Heparin Sodium (Porcine)  5,000 Units Subcutaneous 2 times per day   • amiodarone HCl  200 mg Oral Daily   • AmLODIPine Besylate  10 mg Oral Daily   • Dapa

## 2018-03-06 NOTE — PLAN OF CARE
Diabetes/Glucose Control    • Glucose maintained within prescribed range Progressing        SKIN/TISSUE INTEGRITY - ADULT    • Incision(s), wounds(s) or drain site(s) healing without S/S of infection Progressing        R groin site CDI, no hematoma noted.

## 2018-03-06 NOTE — PROGRESS NOTES
Carlene Hawkins  [id number]  @[facility]@    Post procedure/ recovery hand-off report given to * Deangelo Watson and Karlos Mercedes RN. Patient's vital signs stable, access site dry and intact, no signs and symptoms of bleeding/ hematoma.     Derrell Quintana RN

## 2018-03-06 NOTE — PROGRESS NOTES
The patient was not agreeable to my interview or exam.  He was raising his voice and using profanities. He told me that he was getting out of that this \"f*cking\" place.   I asked if I could examine his foot and he turned away from in bed and pulled the c

## 2018-03-07 ENCOUNTER — TELEPHONE (OUTPATIENT)
Dept: CARDIOLOGY UNIT | Facility: HOSPITAL | Age: 68
End: 2018-03-07

## 2018-03-07 ENCOUNTER — APPOINTMENT (OUTPATIENT)
Dept: CARDIAC REHAB | Facility: HOSPITAL | Age: 68
End: 2018-03-07
Attending: INTERNAL MEDICINE
Payer: COMMERCIAL

## 2018-03-07 ENCOUNTER — TELEPHONE (OUTPATIENT)
Dept: INTERNAL MEDICINE UNIT | Facility: HOSPITAL | Age: 68
End: 2018-03-07

## 2018-03-07 ENCOUNTER — PATIENT OUTREACH (OUTPATIENT)
Dept: CASE MANAGEMENT | Age: 68
End: 2018-03-07

## 2018-03-07 NOTE — PROGRESS NOTES
Initial Post Discharge Follow Up   Discharge Date: 3/6/18  Contact Date: 3/7/2018    Consent Verification:  Assessment Completed With: Patient  HIPAA Verified?   Yes    Discharge Dx:   Cellulitis of left foot     General:   • How have you been since your 30 mL by mouth daily as needed for constipation. Disp:  Rfl: 0   multivitamin Oral Tab Take 1 tablet by mouth daily. Disp: 30 tablet Rfl: 0   famoTIDine 20 MG Oral Tab Take 1 tablet (20 mg total) by mouth 2 (two) times daily.  Disp: 60 tablet Rfl: 0   [facility])    Mar 10, 2018 11:30 AM [facility] Follow Up with Jon Mason MD [facility], [facility], [address], [address] [facility] ([address])    Mar 12, 2018  5:00 PM CDT Tonya Phase 2 Cardiac Rehab with [facility] OF THE [facility] PHASE 2 [facility] with [facility] CARD PHASE 2 [id number] Cardiopulmonary Rehab ([facility])    Apr 06, 2018  5:00 PM CDT [facility] Phase 2 Cardiac Rehab with [facility] OF [facility] PHASE 2 [address] Cardiopulmonary Rehab ([facility] Cardiopulmonary Rehab ([facility])    Apr 25, 2018  5:00 PM CDT [place] Phase 2 Cardiac Rehab with [facility] PHASE 2 [facility] Cardiopulmonary Rehab [facility] orders reviewed and discussed. Pt refused medication reconciliation.

## 2018-03-07 NOTE — TRANSITION NOTE
HPI    The patient is a 29-year-old  male who presented to the emergency department for evaluation of left foot necrotic ulceration and cellulitis. CBC and chemistry were unremarkable. Estimated GFR 50 which is around his baseline.   Sedimentatio Tabs  Commonly known as:  VITAMIN C  What changed:  when to take this      Take 1 tablet (500 mg total) by mouth 3 (three) times daily.    Quantity:  90 tablet  Refills:  0     Metoprolol Tartrate 50 MG Tabs  Commonly known as:  LOPRESSOR  What changed:  ho Tabs  Commonly known as:  SENOKOT      Take 1 tablet by mouth daily.    Refills:  0        STOP taking these medications    MetFORMIN HCl 1000 MG Tabs  Commonly known as:  GLUCOPHAGE              Where to Get Your Medications      Please  your prescr

## 2018-03-08 ENCOUNTER — TELEPHONE (OUTPATIENT)
Dept: FAMILY MEDICINE CLINIC | Facility: CLINIC | Age: 68
End: 2018-03-08

## 2018-03-08 RX ORDER — AMIODARONE HYDROCHLORIDE 200 MG/1
200 TABLET ORAL 2 TIMES DAILY WITH MEALS
Qty: 60 TABLET | Refills: 0 | Status: SHIPPED | OUTPATIENT
Start: 2018-03-08 | End: 2018-04-06

## 2018-03-08 NOTE — PAYOR COMM NOTE
--------------  DISCHARGE REVIEW    Payor: [address] #:  [id number]  Authorization Number: [id number]    Admit date: 3/2/18  Admit time:  9253  Discharge Date: 3/6/2018  2:35 PM     Admitting Physician: Ilene Humphreys hospitalization. MRI shows possible very early osteomyelitis though this may be just cellulitis as well. He was taken for angioplasty of the Involved extremity by cardiology and had angioplasty of the superficial femoral artery and the popliteal artery. CHANGE how you take these medications      Instructions Prescription details   amiodarone HCl 200 MG Tabs  Commonly known as:  PACERONE  What changed:  when to take this      TAKE 1 TABLET (200 MG TOTAL) BY MOUTH 2 (TWO) TIMES DAILY WITH MEALS.    Misael Coleman OF MAGNESIA      Take 30 mL by mouth daily as needed for constipation. Refills:  0     multivitamin Tabs      Take 1 tablet by mouth daily.    Quantity:  30 tablet  Refills:  0     RELION BLOOD GLUCOSE TEST Strp  Generic drug:  Glucose Blood      twice da Result status: Final result   Ordering provider: NICK Alonso  03/02/18 1357 Resulted by: Diamond Squires MD   Performed: 03/02/18 1549 - 03/02/18 1616 Resulting lab: [facility]   Narrative:  PROCEDURE: MRI FOOT (W+WO), LEFT (CPT=73720)     C throughout the subcutaneous tissues and within the plantar foot musculature.  No intramuscular or subcutaneous collection.     Dictated by (CST): Kori Hernandez MD on 3/02/2018 at 16:23       Approved by (CST): Kori Hernandez MD on 3/02/2018 at 16:38        lt SFA and popliteal artery. Balloon angioplasty of lt TP trunk and  peroneal artery with excellent one vessel run-off. RECOMMENDATIONS:    1. Exercise program.  2. Tobacco cessation counseling. 3. Aspirin 81 mg, indefinitely.   4. Clopidogrel (Plavix) angioplasty was  performed, resulting in an excellent angiographic appearance (see 4th lesion  intervention). Following intervention, there is a residual 20% stenosis. There  were no site complications.   Intervention was performed on a multiple stenoses or  advanced into the vessel. 10. Sheath exchange. The right femoral artery sheath was exchanged for a 7F x      45cm Destination Renal Guiding Shealth MP-CCV sheath. 11. Selective left common femoral angiography, under fluoroscopic guidance.  A      cathete lesion intervention:  Percutaneous intervention on the 80% stenosis in the proximal left superficial  femoral artery. 1. A 300cm x .014&quot; [employer] Balance Middleweight Universal II wire was used to cross     the lesion. 2. Balloon angioplasty.  A 4mm (D) EXTREMITY [[id number]]   Resulted: 03/07/18 1727, Result status: Final result   Ordering provider: NICK King  03/05/18 9382 Resulted by: Diana Graf MD   Performed: 03/05/18 1601 - 03/05/18 1737 Resulting lab: [facility]   Narrative:    iliac:  Minor luminal irregularities. Left external iliac:  Minor luminal irregularities. Left deep femoral:  Proximal vessel lesion: There is an 85% stenosis. Mid-vessel lesion: There is a 90% stenosis.   Left superficial femoral:  Proximal vessel jareth lesion intervention: Balloon angioplasty. - 4th lesion intervention: Balloon angioplasty. -------------------------------------------------------------------------------    NARRATIVE:    1.  Initial setup. The patient was brought to the laboratory.  A b measurement. ACT was 198sec. 15. Selective left superficial femoral angiography, under fluoroscopic      guidance. A catheter was advanced into the left superficial femoral artery.      Contrast was injected by hand. Images were obtained.   16. Activated c intervention:  Percutaneous intervention on the 95% stenosis in the proximal left peroneal  artery.  Balloon angioplasty.  A 2mm (D) x 150mm (L), [place] MR balloon was  positioned across the lesion and given a single inflation with a maximum  inflation pres

## 2018-03-08 NOTE — TELEPHONE ENCOUNTER
[facility] calling to confirm that Dr. Victorino Rodriguez will be signing orders for [facility] Viet Gutierrez for nursing for the Pt and to report drug interaction with meds below and VIBRAMYCIN.      •  Vitamin C 500 MG Oral Tab, Take 1 tablet (500 mg total) by mouth 3 (three) quinn

## 2018-03-10 ENCOUNTER — OFFICE VISIT (OUTPATIENT)
Dept: FAMILY MEDICINE CLINIC | Facility: CLINIC | Age: 68
End: 2018-03-10

## 2018-03-10 VITALS
SYSTOLIC BLOOD PRESSURE: 148 MMHG | HEART RATE: 48 BPM | BODY MASS INDEX: 24 KG/M2 | DIASTOLIC BLOOD PRESSURE: 63 MMHG | WEIGHT: 167.19 LBS

## 2018-03-10 DIAGNOSIS — L03.116 CELLULITIS OF LEFT FOOT: ICD-10-CM

## 2018-03-10 DIAGNOSIS — L97.522 SKIN ULCER OF LEFT FOOT WITH FAT LAYER EXPOSED (HCC): Primary | ICD-10-CM

## 2018-03-10 DIAGNOSIS — I25.708 CORONARY ARTERY DISEASE INVOLVING CORONARY BYPASS GRAFT OF NATIVE HEART WITH OTHER FORMS OF ANGINA PECTORIS (HCC): ICD-10-CM

## 2018-03-10 PROCEDURE — 99495 TRANSJ CARE MGMT MOD F2F 14D: CPT | Performed by: FAMILY MEDICINE

## 2018-03-10 PROCEDURE — 1111F DSCHRG MED/CURRENT MED MERGE: CPT | Performed by: FAMILY MEDICINE

## 2018-03-10 NOTE — PROGRESS NOTES
HPI:    Mark Yu is a 74year old male here today for hospital follow up.    He was discharged from {Discharged from which location:7092} to {Discharged to which location:7093::\"Home\"}   Admission Date: 3/2/18   Discharge Date: 3/6/18  [facility] Disc BY MOUTH 2 (TWO) TIMES DAILY BEFORE MEALS. ATORVASTATIN 40 MG Oral Tab TAKE 1 TABLET (40 MG TOTAL) BY MOUTH DAILY. FARXIGA 10 MG Oral Tab TAKE 1 TABLET BY MOUTH EVERY DAY   multivitamin Oral Tab Take 1 tablet by mouth daily.    Metoprolol Tartrate 50 MG the following:    Height as of 3/5/18: 5' 10\" (1.778 m). Weight as of this encounter: 167 lb 3.2 oz (75.8 kg).    /63 (BP Location: Left arm)   Pulse (!) 48   Wt 167 lb 3.2 oz (75.8 kg)   BMI 23.99 kg/m²   Physical Exam  ASSESSMENT/ PLAN:   There

## 2018-03-10 NOTE — PROGRESS NOTES
HPI:    Wicho Hirsch is a 74year old male here today for hospital follow up.    He was discharged from Inpatient hospital, [facility] AND [facility]  to Home   Admission Date: 3/2/18   Discharge Date: 3/6/18  Hospital Discharge Diagnoses (since 2/8/2018)     Lef  The patient was started on antibiotics, and he will be admitted to the hospital for further management.  [address]:  Patient admitted and placed on IV antibiotics. Infectious disease, podiatry, cardiology were consulted.   Patient's cellulitis im antibiotics. Current Meds:    Current Outpatient Prescriptions on File Prior to Visit:  AMIODARONE  MG Oral Tab TAKE 1 TABLET (200 MG TOTAL) BY MOUTH 2 (TWO) TIMES DAILY WITH MEALS.    Amoxicillin-Pot Clavulanate 875-125 MG Oral Tab Take 1 tablet father and maternal grandfather; Heart Disorder in his father; Lipids in his father. He  reports that he quit smoking about 5 months ago. His smoking use included Cigarettes. He smoked 0.00 packs per day. He quit smokeless tobacco use about a year ago.  H the hospital     3. Coronary artery disease involving coronary bypass graft of native heart with other forms of angina pectoris (HCC)  Stable. Cardiac rehab put on hold because of the foot. No orders of the defined types were placed in this encounter.

## 2018-03-12 ENCOUNTER — TELEPHONE (OUTPATIENT)
Dept: FAMILY MEDICINE CLINIC | Facility: CLINIC | Age: 68
End: 2018-03-12

## 2018-03-12 NOTE — TELEPHONE ENCOUNTER
That is ok if wound care nurse is seeing him regularly. She had sent me some orders for treatment plan that I agree to.

## 2018-03-12 NOTE — TELEPHONE ENCOUNTER
Hiwot Lee called in from [facility] requesting to start wound care for pt. Jacglo Salazar states that pt's left foot has 2 wounds, she would like to continue with Santyl until empty, then switch to honey and cover with petroleum gauze and foam dressing 3 times per week.

## 2018-03-12 NOTE — TELEPHONE ENCOUNTER
Pt called in stating he was instructed to give Dr. Maynor Wolf a call today. Pt states that he has a question as to the instruction from his last OV. Pt will not give CSS the details of the question. Please advise.

## 2018-03-12 NOTE — TELEPHONE ENCOUNTER
Dr Corinna Walker,    See message below. Pt has appt with wound clinic in 2 weeks, wondering if this can be kept since [facility] today told him wound looks good. Pt would like to give this current treatment time to work.   I informed pt recommendations was to follow

## 2018-03-12 NOTE — TELEPHONE ENCOUNTER
[facility] [phone] is wrong telephone number. Alpa Jewell RN confirmed correct [facility] tel#[phone] is correct number to reach nurse. I informed Alpa Jewell of Dr [employer] telephone number and verbalized understanding.

## 2018-03-14 ENCOUNTER — APPOINTMENT (OUTPATIENT)
Dept: CARDIAC REHAB | Facility: HOSPITAL | Age: 68
End: 2018-03-14
Attending: INTERNAL MEDICINE
Payer: COMMERCIAL

## 2018-03-19 ENCOUNTER — APPOINTMENT (OUTPATIENT)
Dept: CARDIAC REHAB | Facility: HOSPITAL | Age: 68
End: 2018-03-19
Attending: INTERNAL MEDICINE
Payer: COMMERCIAL

## 2018-03-20 ENCOUNTER — TELEPHONE (OUTPATIENT)
Dept: FAMILY MEDICINE CLINIC | Facility: CLINIC | Age: 68
End: 2018-03-20

## 2018-03-20 NOTE — TELEPHONE ENCOUNTER
[facility] is requesting a peer to peer for the CT Chest.  Patient is scheduled for tomorrow, 03/21/18. A peer to peer can be initiated at:     [phone]  Ref: [id number]    Thank you!  Tobias Echevarria [phone] [facility]

## 2018-03-21 ENCOUNTER — NURSE ONLY (OUTPATIENT)
Dept: WOUND CARE | Facility: HOSPITAL | Age: 68
End: 2018-03-21
Attending: NURSE PRACTITIONER
Payer: COMMERCIAL

## 2018-03-21 ENCOUNTER — APPOINTMENT (OUTPATIENT)
Dept: CARDIAC REHAB | Facility: HOSPITAL | Age: 68
End: 2018-03-21
Attending: INTERNAL MEDICINE
Payer: COMMERCIAL

## 2018-03-21 ENCOUNTER — HOSPITAL ENCOUNTER (OUTPATIENT)
Dept: CT IMAGING | Facility: HOSPITAL | Age: 68
Discharge: HOME | End: 2018-03-21
Attending: FAMILY MEDICINE
Payer: COMMERCIAL

## 2018-03-21 ENCOUNTER — HOSPITAL ENCOUNTER (OUTPATIENT)
Dept: CT IMAGING | Facility: HOSPITAL | Age: 68
Discharge: HOME | End: 2018-03-21
Attending: CLINICAL NURSE SPECIALIST
Payer: COMMERCIAL

## 2018-03-21 DIAGNOSIS — R91.1 LUNG NODULE: ICD-10-CM

## 2018-03-21 DIAGNOSIS — I65.23 BILATERAL CAROTID ARTERY STENOSIS: ICD-10-CM

## 2018-03-21 DIAGNOSIS — L97.509 DIABETIC FOOT ULCER WITH OSTEOMYELITIS (HCC): Primary | ICD-10-CM

## 2018-03-21 DIAGNOSIS — E11.69 DIABETIC FOOT ULCER WITH OSTEOMYELITIS (HCC): Primary | ICD-10-CM

## 2018-03-21 DIAGNOSIS — E11.621 DIABETIC FOOT ULCER WITH OSTEOMYELITIS (HCC): Primary | ICD-10-CM

## 2018-03-21 DIAGNOSIS — M86.9 DIABETIC FOOT ULCER WITH OSTEOMYELITIS (HCC): Primary | ICD-10-CM

## 2018-03-21 DIAGNOSIS — L03.116 CELLULITIS OF LEFT FOOT: ICD-10-CM

## 2018-03-21 DIAGNOSIS — M86.172 ACUTE OSTEOMYELITIS OF LEFT ANKLE OR FOOT (HCC): ICD-10-CM

## 2018-03-21 DIAGNOSIS — S91.302A OPEN WOUND OF LEFT FOOT, INITIAL ENCOUNTER: ICD-10-CM

## 2018-03-21 LAB — CREAT BLD-MCNC: 1.5 MG/DL (ref 0.5–1.5)

## 2018-03-21 PROCEDURE — 99214 OFFICE O/P EST MOD 30 MIN: CPT | Performed by: NURSE PRACTITIONER

## 2018-03-21 PROCEDURE — 70498 CT ANGIOGRAPHY NECK: CPT | Performed by: CLINICAL NURSE SPECIALIST

## 2018-03-21 PROCEDURE — 97597 DBRDMT OPN WND 1ST 20 CM/<: CPT

## 2018-03-21 PROCEDURE — 82565 ASSAY OF CREATININE: CPT

## 2018-03-21 PROCEDURE — 99213 OFFICE O/P EST LOW 20 MIN: CPT

## 2018-03-21 PROCEDURE — 97597 DBRDMT OPN WND 1ST 20 CM/<: CPT | Performed by: NURSE PRACTITIONER

## 2018-03-21 PROCEDURE — 71250 CT THORAX DX C-: CPT | Performed by: FAMILY MEDICINE

## 2018-03-21 PROCEDURE — 97602 WOUND(S) CARE NON-SELECTIVE: CPT

## 2018-03-21 NOTE — PROGRESS NOTES
Much improved. You do not need to continue with the imaging. The nodules have mostly resolved so believed to be due to the acute infection at the time. - Dr. Viviane An

## 2018-03-21 NOTE — PROGRESS NOTES
Chief Complaint  This information was obtained from the patient  The patient is new to the [address] [facility],[address] here for an initial visit for the evaluation and management of non-healing wound(s).     Allergies  sulfa antibiotics    HPI  This information was obtain CONCLUSION:   Mild degenerative changes throughout the ankle and hind foot without osteomyelitis. Extensive subcutaneous edema/cellulitis along the dorsal lateral ankle without abscess. Mild plantar foot myositis without intramuscular abscess.   Severe ch This information was obtained from the patient  Other - Mother:  cancer, Father:  diabetes,heart disorder    Social History  This information was obtained from the patient  Former smoker - quit date2017, Caffeine Use - 2 times weekly, atorvastatin 40 mg tablet oral tablet oral once daily  metoprolol tartrate 25 mg tablet oral tablet oral twice daily  amlodipine 10 mg tablet oral tablet oral once daily  multivitamin tablet oral tablet oral once daily  Aspir-81 81 mg tablet,delayed releas Wound #2 Left, Lateral, Dorsal Foot is a Diabetic Ulcer and has received a status of Not Healed. Initial wound encounter measurements are 2cm length x 1.5cm width x 0.1cm depth, with an area of 3 sq cm and a volume of 0.3 cubic cm.  No tunneling has been no L03.116: Cellulitis of left lower limb  E11.621: Type 2 diabetes mellitus with foot ulcer  S91.302A: Unspecified open wound, left foot, initial encounter        Patient is a 46-year-old male with history of hypertension, coronary artery disease disease, ty Wound #1  Wound #1 (Diabetic Ulcer) is located on the left, medial, dorsal foot. A selective debridement with a total area debrided of 4.84 sq cm was performed by ELPIDIO Burns. to remove devitalized tissue: necrotic/eschar and slough.  The following i Wound Cleansing & Dressings  Clean wound with Normal Saline or Wound Cleanser. Clean wound with soap and water. May shower with protection. - Cast Cover  Santyl  Dry gauze  Other: - Change dressing with santyl and moist gauze daily.  When you no longer h Applied Primary Wound Dressing. using Gauze (sterile) 4x4 (1)  Dressing secured with non-allergenic tape/stockinet/wrap. using Kerlix (1), Silk tape (1)  Selective / Non-selective debridement  Cleansed wound and periwound with non-cytotoxic agent. using 0. 3/21/2018 10:00:08 AM Version Signed By: Date:  Lakeisha Maravilla 3/21/2018 10:12:53 AM  Lakeisha Maravilla 3/21/2018 10:16:32 AM  Lakeisha Maravilla 3/21/2018 11:18:29 AM    Entered By: Melissa Salomon on 03/21/2018 12:14:47 PM          Header Image    Debridement

## 2018-03-21 NOTE — PROGRESS NOTES
Chief Complaint  This information was obtained from the patient  The patient is new to the [address] [facility],[address] here for an initial visit for the evaluation and management of non-healing wound(s).     Allergies  sulfa antibiotics    HPI  This information was obtain CONCLUSION:   Mild degenerative changes throughout the ankle and hind foot without osteomyelitis. Extensive subcutaneous edema/cellulitis along the dorsal lateral ankle without abscess. Mild plantar foot myositis without intramuscular abscess.   Severe ch This information was obtained from the patient  Other - Mother:  cancer, Father:  diabetes,heart disorder    Social History  This information was obtained from the patient  Former smoker - quit date2017, Caffeine Use - 2 times weekly, atorvastatin 40 mg tablet oral tablet oral once daily  metoprolol tartrate 25 mg tablet oral tablet oral twice daily  amlodipine 10 mg tablet oral tablet oral once daily  multivitamin tablet oral tablet oral once daily  Aspir-81 81 mg tablet,delayed releas Wound #2 Left, Lateral, Dorsal Foot is a Diabetic Ulcer and has received a status of Not Healed. Initial wound encounter measurements are 2cm length x 1.5cm width x 0.1cm depth, with an area of 3 sq cm and a volume of 0.3 cubic cm.  No tunneling has been no E11.621: Type 2 diabetes mellitus with foot ulcer  S91.302A: Unspecified open wound, left foot, initial encounter        Patient is a 74-year-old male with history of hypertension, coronary artery disease disease, type 2 diabetes, and recent open wound on Wound #1 (Diabetic Ulcer) is located on the left, medial, dorsal foot. A selective debridement with a total area debrided of 4.84 sq cm was performed by BIJU Burns to remove devitalized tissue: necrotic/eschar and slough.  The following instrument( Wound Cleansing & Dressings  Clean wound with Normal Saline or Wound Cleanser. Clean wound with soap and water. May shower with protection. - Cast Cover  Santyl  Dry gauze  Other: - Change dressing with santyl and moist gauze daily.  When you no longer h Applied enzymatic agent to base of wound bed. using Collagenase Santyl (1)  Applied Primary Wound Dressing.  using Gauze (sterile) 4x4 (1)  Dressing secured with non-allergenic tape/stockinet/wrap. using Kerlix (1), Silk tape (1)  Selective / Non-selective Electronic Signature(s)  Signed By: Date:  Wilber Buckner FNP-BC 03/21/2018 12:36:23 PM  Raul Grant 03/21/2018 2:00:29 PM     3/21/2018 10:00:08 AM Version Signed By: Date:  Raul Grant 3/21/2018 10:12:53 AM  Raul Grant 3/21/2018 10:16:32 AM  Saleem Finn

## 2018-03-23 ENCOUNTER — APPOINTMENT (OUTPATIENT)
Dept: CARDIAC REHAB | Facility: HOSPITAL | Age: 68
End: 2018-03-23
Attending: INTERNAL MEDICINE
Payer: COMMERCIAL

## 2018-03-26 ENCOUNTER — APPOINTMENT (OUTPATIENT)
Dept: CARDIAC REHAB | Facility: HOSPITAL | Age: 68
End: 2018-03-26
Attending: INTERNAL MEDICINE
Payer: COMMERCIAL

## 2018-03-28 ENCOUNTER — APPOINTMENT (OUTPATIENT)
Dept: WOUND CARE | Facility: HOSPITAL | Age: 68
End: 2018-03-28
Payer: COMMERCIAL

## 2018-03-28 ENCOUNTER — APPOINTMENT (OUTPATIENT)
Dept: CARDIAC REHAB | Facility: HOSPITAL | Age: 68
End: 2018-03-28
Attending: INTERNAL MEDICINE
Payer: COMMERCIAL

## 2018-03-29 ENCOUNTER — TELEPHONE (OUTPATIENT)
Dept: FAMILY MEDICINE CLINIC | Facility: CLINIC | Age: 68
End: 2018-03-29

## 2018-03-29 NOTE — TELEPHONE ENCOUNTER
Jackie/RN at [facility] notified of message below. Priscila Monreal at [facility] stated she would try to work with patient. Jessica Oden stated if patient does cancel next appointment she would notify us. Please sign and close encounter.

## 2018-03-29 NOTE — TELEPHONE ENCOUNTER
Per Jackie/RN, Pt is refusing this wk visit due to he will be at the wound clinic tomorrow. The Problem is 15 days was the last time [facility] assess, so the plan is to go and see pt Tuesday 04/03/2018. Is this ok? Pls advise.

## 2018-03-29 NOTE — TELEPHONE ENCOUNTER
Unfortunately, as much as he needs the home health care, we cannot force him into visits. Please advise if pt cancels appt next week and we may need to discuss whether we can con't with home health. Due to ulcer on foot he does need wound care.

## 2018-03-30 ENCOUNTER — APPOINTMENT (OUTPATIENT)
Dept: CARDIAC REHAB | Facility: HOSPITAL | Age: 68
End: 2018-03-30
Attending: INTERNAL MEDICINE
Payer: COMMERCIAL

## 2018-04-02 ENCOUNTER — APPOINTMENT (OUTPATIENT)
Dept: CARDIAC REHAB | Facility: HOSPITAL | Age: 68
End: 2018-04-02
Attending: INTERNAL MEDICINE
Payer: COMMERCIAL

## 2018-04-03 NOTE — TELEPHONE ENCOUNTER
Jackie/RN called back stating Pt had refused his visit today, so they will be discharging him from [address] [place] [address]. She wanted to advise the office and if there is any questions she says to give her a call.

## 2018-04-04 ENCOUNTER — APPOINTMENT (OUTPATIENT)
Dept: CARDIAC REHAB | Facility: HOSPITAL | Age: 68
End: 2018-04-04
Attending: INTERNAL MEDICINE
Payer: COMMERCIAL

## 2018-04-04 ENCOUNTER — NURSE ONLY (OUTPATIENT)
Dept: WOUND CARE | Facility: HOSPITAL | Age: 68
End: 2018-04-04
Attending: NURSE PRACTITIONER
Payer: COMMERCIAL

## 2018-04-04 DIAGNOSIS — M86.9 DIABETIC FOOT ULCER WITH OSTEOMYELITIS (HCC): Primary | ICD-10-CM

## 2018-04-04 DIAGNOSIS — E11.69 DIABETIC FOOT ULCER WITH OSTEOMYELITIS (HCC): Primary | ICD-10-CM

## 2018-04-04 DIAGNOSIS — E11.621 DIABETIC FOOT ULCER WITH OSTEOMYELITIS (HCC): Primary | ICD-10-CM

## 2018-04-04 DIAGNOSIS — L97.509 DIABETIC FOOT ULCER WITH OSTEOMYELITIS (HCC): Primary | ICD-10-CM

## 2018-04-04 PROCEDURE — 99211 OFF/OP EST MAY X REQ PHY/QHP: CPT | Performed by: NURSE PRACTITIONER

## 2018-04-04 PROCEDURE — 97607 NEG PRS WND THR NDME<=50SQCM: CPT

## 2018-04-04 NOTE — PROGRESS NOTES
Chief Complaint  This information was obtained from the patient  The patient is new to the [address] [facility],[address] here for an initial visit for the evaluation and management of non-healing wound(s).   04/04/2018 no concerns for today    Allergies  sulfa antibiotics CONCLUSION: Mild degenerative changes throughout the ankle and hind foot without osteomyelitis. Extensive subcutaneous edema/cellulitis along the dorsal lateral ankle without abscess. Mild plantar foot myositis without intramuscular abscess.  Severe chronic Height/Length: 70 in (177.8 cm), Weight: 169.9 lbs (77.23 kgs), BMI: 24.4, Temperature: 97.0 °F (36.11 °C), Pulse: 62 bpm, Respiratory Rate: 17 breaths/min, Blood Pressure: 187/85 mmHg, Pulse Oximetry: 97 %.      Integumentary (Hair, Skin)  Wound #1 Left, M (Encounter Diagnosis) E11.621 - Type 2 diabetes mellitus with foot ulcer  (Encounter Diagnosis) S91.302A - Unspecified open wound, left foot, initial encounter    Diagnoses    ICD-10  M86.172: Other acute osteomyelitis, left ankle and foot  L03.116: Cellul Wound #2 (Diabetic Ulcer) is located on the left, lateral, dorsal foot. A selective debridement with a total area debrided of 2.66 sq cm was performed by ELPIDIO Velazquez. to remove devitalized tissue: slough.  The following instrument(s) were used: curet Other: - No tight shoe or socks on the left foot, remove all pressures from the wounds and tissue surrounding the wounds. NPWT Orders  Other negative pressure wound therapy device. See custom orders. - EMRE 80 mmHg.     Misc / Additional orders  Suppleme Assured hemostasis using Pressure (1)   Notes  EMRE 80 mmHg   Wound #2 (Left, Lateral, Dorsal Foot)    . Wound Treatment Note  Assessed patient’s pain status and effectiveness of pain management plan.    Limb cleansed using Betasept and water (1)   Cleansed

## 2018-04-06 ENCOUNTER — NURSE ONLY (OUTPATIENT)
Dept: WOUND CARE | Facility: HOSPITAL | Age: 68
End: 2018-04-06
Attending: NURSE PRACTITIONER
Payer: COMMERCIAL

## 2018-04-06 ENCOUNTER — APPOINTMENT (OUTPATIENT)
Dept: CARDIAC REHAB | Facility: HOSPITAL | Age: 68
End: 2018-04-06
Attending: INTERNAL MEDICINE
Payer: COMMERCIAL

## 2018-04-06 DIAGNOSIS — E08.621 DIABETIC ULCER OF MIDFOOT ASSOCIATED WITH DIABETES MELLITUS DUE TO UNDERLYING CONDITION, WITH BONE INVOLVEMENT WITHOUT EVIDENCE OF NECROSIS, UNSPECIFIED LATERALITY (HCC): ICD-10-CM

## 2018-04-06 DIAGNOSIS — L97.406 DIABETIC ULCER OF HEEL ASSOCIATED WITH DIABETES MELLITUS DUE TO UNDERLYING CONDITION, WITH BONE INVOLVEMENT WITHOUT EVIDENCE OF NECROSIS, UNSPECIFIED LATERALITY (HCC): Primary | ICD-10-CM

## 2018-04-06 DIAGNOSIS — E08.621 DIABETIC ULCER OF HEEL ASSOCIATED WITH DIABETES MELLITUS DUE TO UNDERLYING CONDITION, WITH BONE INVOLVEMENT WITHOUT EVIDENCE OF NECROSIS, UNSPECIFIED LATERALITY (HCC): Primary | ICD-10-CM

## 2018-04-06 DIAGNOSIS — L97.406 DIABETIC ULCER OF MIDFOOT ASSOCIATED WITH DIABETES MELLITUS DUE TO UNDERLYING CONDITION, WITH BONE INVOLVEMENT WITHOUT EVIDENCE OF NECROSIS, UNSPECIFIED LATERALITY (HCC): ICD-10-CM

## 2018-04-06 PROCEDURE — 11042 DBRDMT SUBQ TIS 1ST 20SQCM/<: CPT

## 2018-04-06 RX ORDER — AMIODARONE HYDROCHLORIDE 200 MG/1
200 TABLET ORAL 2 TIMES DAILY WITH MEALS
Qty: 60 TABLET | Refills: 5 | Status: SHIPPED | OUTPATIENT
Start: 2018-04-06 | End: 2018-07-25

## 2018-04-06 RX ORDER — AMIODARONE HYDROCHLORIDE 200 MG/1
200 TABLET ORAL 2 TIMES DAILY WITH MEALS
Qty: 60 TABLET | Refills: 1 | Status: SHIPPED | OUTPATIENT
Start: 2018-04-06 | End: 2018-04-06

## 2018-04-06 NOTE — PROGRESS NOTES
Chief Complaint  This information was obtained from the patient  The patient is new to the [address] [facility],[address] here for an initial visit for the evaluation and management of non-healing wound(s). 4/6/18 no complains for today visit.     Allergies  sulfa antibioti CONCLUSION: Mild degenerative changes throughout the ankle and hind foot without osteomyelitis. Extensive subcutaneous edema/cellulitis along the dorsal lateral ankle without abscess. Mild plantar foot myositis without intramuscular abscess.  Severe chronic Patient is not wearing his boot because it makes him on study will have him get an even up for his contralateral foot but I today discussed with him the fact that he needs to have immobilization.     Past Medical History  This information was obtained from Left, Medial, Dorsal Foot is a Diabetic Ulcer and has received a status of Not Healed. Subsequent wound encounter measurements are 2.3cm length x 2cm width x 0.2cm depth, with an area of 4.6 sq cm and a volume of 0.92 cubic cm.  No tunneling has been noted (Encounter Diagnosis) L03.116 - Cellulitis of left lower limb  (Encounter Diagnosis) E11.621 - Type 2 diabetes mellitus with foot ulcer  (Encounter Diagnosis) S91.302A - Unspecified open wound, left foot, initial encounter    Diagnoses    ICD-10  M86.172: Wound #2 (Diabetic Ulcer) is located on the left, lateral, dorsal foot.  A skin/subcutaneous tissue level excisional/surgical debridement with a total area debrided of 3 sq cm was performed by Dheeraj Cao. Adipose and Dermis were removed along with dev Sam Chou FNP-BC 04/06/2018 4:29:59 PM        Entered By: Sam Chou on 04/06/2018 4:29:46 PM      Treatment Notes Summary    Wound #1 (Left, Medial, Dorsal Foot)  . Wound Treatment Note  Assessed patient’s pain status and effectiveness of pain manag CNA/CHT/CMA: Tiara Moreau     Physician / Nelli Face: Carman Lesches   Patient Number: [id number] Facility: Outpatient   Patient YOB: 1950             Debridement Details   Patient Name: Bryn Petit  Date: 4/6/2018     RN: Cristi Dallas

## 2018-04-09 ENCOUNTER — APPOINTMENT (OUTPATIENT)
Dept: CARDIAC REHAB | Facility: HOSPITAL | Age: 68
End: 2018-04-09
Attending: INTERNAL MEDICINE
Payer: COMMERCIAL

## 2018-04-11 ENCOUNTER — APPOINTMENT (OUTPATIENT)
Dept: CARDIAC REHAB | Facility: HOSPITAL | Age: 68
End: 2018-04-11
Attending: INTERNAL MEDICINE
Payer: COMMERCIAL

## 2018-04-13 ENCOUNTER — APPOINTMENT (OUTPATIENT)
Dept: CARDIAC REHAB | Facility: HOSPITAL | Age: 68
End: 2018-04-13
Attending: INTERNAL MEDICINE
Payer: COMMERCIAL

## 2018-04-13 ENCOUNTER — NURSE ONLY (OUTPATIENT)
Dept: WOUND CARE | Facility: HOSPITAL | Age: 68
End: 2018-04-13
Attending: NURSE PRACTITIONER
Payer: COMMERCIAL

## 2018-04-13 DIAGNOSIS — S91.302D OPEN WOUND OF LEFT FOOT, SUBSEQUENT ENCOUNTER: Primary | ICD-10-CM

## 2018-04-13 PROCEDURE — 97607 NEG PRS WND THR NDME<=50SQCM: CPT

## 2018-04-13 NOTE — PROGRESS NOTES
Chief Complaint  This information was obtained from the patient  The patient is new to the [address] [facility],[address] here for an initial visit for the evaluation and management of non-healing wound(s). 4/13/18 no complains for today visit.     Allergies  sulfa antibiot CONCLUSION:   Mild degenerative changes throughout the ankle and hind foot without osteomyelitis. Extensive subcutaneous edema/cellulitis along the dorsal lateral ankle without abscess. Mild plantar foot myositis without intramuscular abscess.   Severe ch Patient is not wearing his boot because it makes him on study will have him get an even up for his contralateral foot but I today discussed with him the fact that he needs to have immobilization.         Objective    Constitutional  Height/Length: 70 in (17 The periwound skin exhibited: Moist. The periwound skin did not exhibit: Brawny Induration, Edema, Excoriation, Induration, Callus, Crepitus, Fluctuance, Friable, Rash, Dry/Scaly, Maceration, Atrophie Irene, Cyanosis, Ecchymosis, Erythema, Hemosiderosis, May shower with protection. - Cast Cover  Collagen  Honey gel  Change dressing every: - week    Additional Orders: Follow-Up Appointments  Return Appointment in 1 week.     Offloading  Other: - No tight shoe or socks on the left foot, remove all pressure Negative pressure wound therapy  Negative pressure system used: using EMRE (1)  Number of dressings removed from wound: using 1 (1)  NPWT dressing cut to fit wound size: using EMRE Bordred Foam (1)  Dressing secured with drape / transparent film.   Drain tu

## 2018-04-16 ENCOUNTER — APPOINTMENT (OUTPATIENT)
Dept: CARDIAC REHAB | Facility: HOSPITAL | Age: 68
End: 2018-04-16
Attending: INTERNAL MEDICINE
Payer: COMMERCIAL

## 2018-04-17 ENCOUNTER — OFFICE VISIT (OUTPATIENT)
Dept: ENDOCRINOLOGY CLINIC | Facility: CLINIC | Age: 68
End: 2018-04-17

## 2018-04-17 VITALS
BODY MASS INDEX: 24.34 KG/M2 | HEIGHT: 70 IN | WEIGHT: 170 LBS | SYSTOLIC BLOOD PRESSURE: 158 MMHG | DIASTOLIC BLOOD PRESSURE: 78 MMHG | HEART RATE: 58 BPM

## 2018-04-17 DIAGNOSIS — E11.65 UNCONTROLLED TYPE 2 DIABETES MELLITUS WITH HYPERGLYCEMIA, WITHOUT LONG-TERM CURRENT USE OF INSULIN (HCC): Primary | ICD-10-CM

## 2018-04-17 DIAGNOSIS — E78.5 DYSLIPIDEMIA: ICD-10-CM

## 2018-04-17 PROCEDURE — 99214 OFFICE O/P EST MOD 30 MIN: CPT | Performed by: INTERNAL MEDICINE

## 2018-04-17 PROCEDURE — 82962 GLUCOSE BLOOD TEST: CPT | Performed by: INTERNAL MEDICINE

## 2018-04-17 PROCEDURE — 36416 COLLJ CAPILLARY BLOOD SPEC: CPT | Performed by: INTERNAL MEDICINE

## 2018-04-17 RX ORDER — CLOPIDOGREL BISULFATE 75 MG/1
TABLET ORAL
COMMUNITY
Start: 2018-03-10 | End: 2018-05-22

## 2018-04-17 NOTE — PATIENT INSTRUCTIONS
Metformin 500 mg before breakfast and before dinner  Start tradjenta 5 mg daily   Continue with glipizide 10 mg twice a day    Stop the [address]    Call with sugars in a week    Check before breakfast and before dinner

## 2018-04-17 NOTE — PROGRESS NOTES
Return Office Visit     CHIEF COMPLAINT:    DM  Dyslipidemia     HISTORY OF PRESENT ILLNESS:  Shawn Treadwell is a 74year old male who presents for follow up for for DM. He recently underwent a CABG.      DM HISTORY  He states he had \" borderline DM\" as (50 mg total) by mouth 2x Daily(Beta Blocker). (Patient taking differently: Take 25 mg by mouth 2x Daily(Beta Blocker). ) Disp: 60 tablet Rfl: 0   AmLODIPine Besylate 10 MG Oral Tab Take 1 tablet (10 mg total) by mouth daily.  Disp: 30 tablet Rfl: 0   aspi atraumatic  NECK:  Supple, symmetrical, trachea midline, no adenopathy, thyroid symmetric, not enlarged and no tenderness  HEMATOLOGIC/LYMPHATICS:  no cervical lymphadenopathy and no supraclavicular lymphadenopathy  LUNGS: clear to auscultation bilaterally Exercise: should target a weight loss of 7% and increase exercise to at least 150min a week.  g). Hypoglycemia recognition and management discussed    2. Patient’s BP is high today. States it is normal at home. Monitor closely. Low salt diet.  FU with PCP

## 2018-04-18 ENCOUNTER — APPOINTMENT (OUTPATIENT)
Dept: CARDIAC REHAB | Facility: HOSPITAL | Age: 68
End: 2018-04-18
Attending: INTERNAL MEDICINE
Payer: COMMERCIAL

## 2018-04-19 ENCOUNTER — HOSPITAL ENCOUNTER (OUTPATIENT)
Dept: ULTRASOUND IMAGING | Age: 68
Discharge: HOME | End: 2018-04-19
Attending: PHYSICIAN ASSISTANT
Payer: COMMERCIAL

## 2018-04-19 ENCOUNTER — HOSPITAL ENCOUNTER (OUTPATIENT)
Dept: MRI IMAGING | Age: 68
Discharge: HOME | End: 2018-04-19
Attending: PODIATRIST
Payer: COMMERCIAL

## 2018-04-19 ENCOUNTER — HOSPITAL ENCOUNTER (OUTPATIENT)
Dept: GENERAL RADIOLOGY | Age: 68
Discharge: HOME | End: 2018-04-19
Attending: RADIOLOGY
Payer: COMMERCIAL

## 2018-04-19 DIAGNOSIS — I65.23 BILATERAL CAROTID ARTERY STENOSIS: ICD-10-CM

## 2018-04-19 DIAGNOSIS — L97.409: ICD-10-CM

## 2018-04-19 DIAGNOSIS — E08.621: ICD-10-CM

## 2018-04-19 DIAGNOSIS — L97.406 DIABETIC ULCER OF MIDFOOT ASSOCIATED WITH DIABETES MELLITUS DUE TO UNDERLYING CONDITION, WITH BONE INVOLVEMENT WITHOUT EVIDENCE OF NECROSIS, UNSPECIFIED LATERALITY (HCC): ICD-10-CM

## 2018-04-19 DIAGNOSIS — E08.621 DIABETIC ULCER OF MIDFOOT ASSOCIATED WITH DIABETES MELLITUS DUE TO UNDERLYING CONDITION, WITH BONE INVOLVEMENT WITHOUT EVIDENCE OF NECROSIS, UNSPECIFIED LATERALITY (HCC): ICD-10-CM

## 2018-04-19 PROCEDURE — 73720 MRI LWR EXTREMITY W/O&W/DYE: CPT | Performed by: PODIATRIST

## 2018-04-19 PROCEDURE — 73620 X-RAY EXAM OF FOOT: CPT | Performed by: RADIOLOGY

## 2018-04-19 PROCEDURE — 93880 EXTRACRANIAL BILAT STUDY: CPT | Performed by: PHYSICIAN ASSISTANT

## 2018-04-19 PROCEDURE — A9576 INJ PROHANCE MULTIPACK: HCPCS | Performed by: PODIATRIST

## 2018-04-20 ENCOUNTER — APPOINTMENT (OUTPATIENT)
Dept: CARDIAC REHAB | Facility: HOSPITAL | Age: 68
End: 2018-04-20
Attending: INTERNAL MEDICINE
Payer: COMMERCIAL

## 2018-04-20 ENCOUNTER — NURSE ONLY (OUTPATIENT)
Dept: WOUND CARE | Facility: HOSPITAL | Age: 68
End: 2018-04-20
Attending: NURSE PRACTITIONER
Payer: COMMERCIAL

## 2018-04-20 DIAGNOSIS — S91.302D OPEN WOUND OF LEFT FOOT, SUBSEQUENT ENCOUNTER: Primary | ICD-10-CM

## 2018-04-20 PROCEDURE — 97607 NEG PRS WND THR NDME<=50SQCM: CPT

## 2018-04-20 NOTE — PROGRESS NOTES
Chief Complaint  This information was obtained from the patient  The patient is new to the [address] [facility],[address] here for an initial visit for the evaluation and management of non-healing wound(s). 4/20/18 no complains for today visit.     Allergies  sulfa antibiot CONCLUSION: Mild degenerative changes throughout the ankle and hind foot without osteomyelitis. Extensive subcutaneous edema/cellulitis along the dorsal lateral ankle without abscess. Mild plantar foot myositis without intramuscular abscess.  Severe chronic Patient is not wearing his boot because it makes him on study will have him get an even up for his contralateral foot but I today discussed with him the fact that he needs to have immobilization.         Objective    Constitutional  Height/Length: 70 in (17 The periwound skin exhibited: Moist. The periwound skin did not exhibit: Brawny Induration, Edema, Excoriation, Induration, Callus, Crepitus, Fluctuance, Friable, Rash, Dry/Scaly, Maceration, Atrophie Irene, Cyanosis, Ecchymosis, Erythema, Hemosiderosis, CAM boot to immobilize the foot    Misc / Additional orders  Supplement with a daily multivitamin. Increase dietary protein intake. Decrease salt intake. Other: - glycemic control, blood sugar .     Follow-Up Appointments:  A follow-up appointme Drain tubing attached: using EMRE Drain Tubing (1)   NPWT pressure type: using Continuous (1)   Patient tolerated treatment well without abnormal reactions           Negative Pressure Wound Therapy Maintenance (NPWT) Details   Patient Name: Pb Jean

## 2018-04-23 ENCOUNTER — APPOINTMENT (OUTPATIENT)
Dept: CARDIAC REHAB | Facility: HOSPITAL | Age: 68
End: 2018-04-23
Attending: INTERNAL MEDICINE
Payer: COMMERCIAL

## 2018-04-25 ENCOUNTER — APPOINTMENT (OUTPATIENT)
Dept: CARDIAC REHAB | Facility: HOSPITAL | Age: 68
End: 2018-04-25
Attending: INTERNAL MEDICINE
Payer: COMMERCIAL

## 2018-04-27 ENCOUNTER — NURSE ONLY (OUTPATIENT)
Dept: WOUND CARE | Facility: HOSPITAL | Age: 68
End: 2018-04-27
Attending: NURSE PRACTITIONER
Payer: COMMERCIAL

## 2018-04-27 DIAGNOSIS — S91.302D OPEN WOUND OF LEFT FOOT, SUBSEQUENT ENCOUNTER: Primary | ICD-10-CM

## 2018-04-27 DIAGNOSIS — E08.621 DIABETIC ULCER OF HEEL ASSOCIATED WITH DIABETES MELLITUS DUE TO UNDERLYING CONDITION, WITH BONE INVOLVEMENT WITHOUT EVIDENCE OF NECROSIS, UNSPECIFIED LATERALITY (HCC): ICD-10-CM

## 2018-04-27 DIAGNOSIS — L97.406 DIABETIC ULCER OF HEEL ASSOCIATED WITH DIABETES MELLITUS DUE TO UNDERLYING CONDITION, WITH BONE INVOLVEMENT WITHOUT EVIDENCE OF NECROSIS, UNSPECIFIED LATERALITY (HCC): ICD-10-CM

## 2018-04-27 PROCEDURE — 99211 OFF/OP EST MAY X REQ PHY/QHP: CPT

## 2018-04-27 PROCEDURE — 97607 NEG PRS WND THR NDME<=50SQCM: CPT

## 2018-04-27 NOTE — PROGRESS NOTES
Chief Complaint  This information was obtained from the patient  The patient is new to the [address] [facility],[address] here for an initial visit for the evaluation and management of non-healing wound(s).  4/27/18 no complains for today visit.     Allergies  sulfa antibiot CONCLUSION:   Mild degenerative changes throughout the ankle and hind foot without osteomyelitis. Extensive subcutaneous edema/cellulitis along the dorsal lateral ankle without abscess. Mild plantar foot myositis without intramuscular abscess.   Severe ch Patient is not wearing his boot because it makes him on study will have him get an even up for his contralateral foot but I today discussed with him the fact that he needs to have immobilization.         Objective    Constitutional  Height/Length: 70 in (17 The periwound skin exhibited: Moist. The periwound skin did not exhibit: Brawny Induration, Edema, Excoriation, Induration, Callus, Crepitus, Fluctuance, Friable, Rash, Dry/Scaly, Maceration, Atrophie Irene, Cyanosis, Ecchymosis, Erythema, Hemosiderosis, Other negative pressure wound therapy device. See custom orders. - EMRE 80 mmHg, change EMRE every week     Wound #2 Left, Lateral, Dorsal Foot     Wound Cleansing & Dressings  Clean wound with Normal Saline or Wound Cleanser.   Clean wound with soap and wa Patient tolerated treatment well without abnormal reactions  Wound #2 (Left, Lateral, Dorsal Foot)  . Wound Treatment Note  Assessed patient’s pain status and effectiveness of pain management plan.   Limb cleansed using Betasept and water (1)  Cleansed wound Notes  EMRE at 80 mmHg.  New machine applied 4/27/18   Electronic Signature(s)  Signed By: Date:  Hildreth Goldmann 04/27/2018 3:01:05 PM       Entered By: Hildreth Goldmann on 04/27/2018 2:49:30 PM

## 2018-05-04 ENCOUNTER — NURSE ONLY (OUTPATIENT)
Dept: WOUND CARE | Facility: HOSPITAL | Age: 68
End: 2018-05-04
Attending: NURSE PRACTITIONER
Payer: COMMERCIAL

## 2018-05-04 DIAGNOSIS — L97.406 DIABETIC ULCER OF MIDFOOT ASSOCIATED WITH DIABETES MELLITUS DUE TO UNDERLYING CONDITION, WITH BONE INVOLVEMENT WITHOUT EVIDENCE OF NECROSIS, UNSPECIFIED LATERALITY (HCC): ICD-10-CM

## 2018-05-04 DIAGNOSIS — L97.509 DIABETIC FOOT ULCER WITH OSTEOMYELITIS (HCC): ICD-10-CM

## 2018-05-04 DIAGNOSIS — L97.406 DIABETIC ULCER OF HEEL ASSOCIATED WITH DIABETES MELLITUS DUE TO UNDERLYING CONDITION, WITH BONE INVOLVEMENT WITHOUT EVIDENCE OF NECROSIS, UNSPECIFIED LATERALITY (HCC): Primary | ICD-10-CM

## 2018-05-04 DIAGNOSIS — M86.9 DIABETIC FOOT ULCER WITH OSTEOMYELITIS (HCC): ICD-10-CM

## 2018-05-04 DIAGNOSIS — E11.69 DIABETIC FOOT ULCER WITH OSTEOMYELITIS (HCC): ICD-10-CM

## 2018-05-04 DIAGNOSIS — E08.621 DIABETIC ULCER OF HEEL ASSOCIATED WITH DIABETES MELLITUS DUE TO UNDERLYING CONDITION, WITH BONE INVOLVEMENT WITHOUT EVIDENCE OF NECROSIS, UNSPECIFIED LATERALITY (HCC): Primary | ICD-10-CM

## 2018-05-04 DIAGNOSIS — E11.621 DIABETIC FOOT ULCER WITH OSTEOMYELITIS (HCC): ICD-10-CM

## 2018-05-04 DIAGNOSIS — E08.621 DIABETIC ULCER OF MIDFOOT ASSOCIATED WITH DIABETES MELLITUS DUE TO UNDERLYING CONDITION, WITH BONE INVOLVEMENT WITHOUT EVIDENCE OF NECROSIS, UNSPECIFIED LATERALITY (HCC): ICD-10-CM

## 2018-05-04 PROCEDURE — 97597 DBRDMT OPN WND 1ST 20 CM/<: CPT | Performed by: NURSE PRACTITIONER

## 2018-05-04 PROCEDURE — 97597 DBRDMT OPN WND 1ST 20 CM/<: CPT

## 2018-05-04 NOTE — PROGRESS NOTES
Chief Complaint  This information was obtained from the patient  The patient is new to the [address] [facility],[address] here for an initial visit for the evaluation and management of non-healing wound(s). 5/4/18 no complains for today visit.     Allergies  sulfa antibioti CONCLUSION:   Mild degenerative changes throughout the ankle and hind foot without osteomyelitis. Extensive subcutaneous edema/cellulitis along the dorsal lateral ankle without abscess. Mild plantar foot myositis without intramuscular abscess.   Severe ch Patient is not wearing his boot because it makes him on study will have him get an even up for his contralateral foot but I today discussed with him the fact that he needs to have immobilization.         Objective    Constitutional  Height/Length: 70 in (17 The periwound skin exhibited: Moist. The periwound skin did not exhibit: Brawny Induration, Edema, Excoriation, Induration, Callus, Crepitus, Fluctuance, Friable, Rash, Dry/Scaly, Maceration, Atrophie Irene, Cyanosis, Ecchymosis, Erythema, Hemosiderosis, The new lateral right heel wound covered with eschar, remove small area of the necrosis, patient did not tolerated well. Initiated santyl for enzymatic debridement and we will f/u with selective debridement to allow wound healing.   Discussed offlaodign and Wound #3 (Pressure Ulcer) is located on the right, lateral heel. A selective debridement with a total area debrided of 0.272 sq cm was performed by ELPIDIO Novoa. to remove devitalized tissue: callus and necrotic/eschar.  The following instrument(s) we Follow-Up Appointments:  A follow-up appointment should be scheduled.           Electronic Signature(s)  Signed By: Date:  Wilber FORTE 05/04/2018 3:02:29 PM     5/4/2018 2:28:41 PM Version Signed By: Date:  Raul Grant 5/4/2018 2:28:49 PM    Ent Assessed patient’s pain status and effectiveness of pain management plan. Limb cleansed using Betasept and water (1)  Cleansed wound and periwound with non-cytotoxic agent. using Wound Cleanser Spray (1)  Applied Primary Wound Dressing.  using Wet to Moist Percent Debrided: 30  Total Area Debrided: (sq cm) 0.495  Volume: (cm³) 0.165  Devitalized Tissue Debrided:  Biofilm, Slough  Instrument: Curette  Bleeding: Minimal  Hemostasis Achieved: Pressure  Procedural Pain: 0  Post Procedural Pain: 0  Response to Tr

## 2018-05-07 RX ORDER — GLIPIZIDE 10 MG/1
10 TABLET ORAL
Qty: 180 TABLET | Refills: 0 | Status: SHIPPED | OUTPATIENT
Start: 2018-05-07 | End: 2018-08-12

## 2018-05-11 ENCOUNTER — NURSE ONLY (OUTPATIENT)
Dept: WOUND CARE | Facility: HOSPITAL | Age: 68
End: 2018-05-11
Attending: NURSE PRACTITIONER
Payer: COMMERCIAL

## 2018-05-11 DIAGNOSIS — L84 CORNS AND CALLOSITIES: ICD-10-CM

## 2018-05-11 DIAGNOSIS — S91.302D OPEN WOUND OF LEFT FOOT, SUBSEQUENT ENCOUNTER: ICD-10-CM

## 2018-05-11 DIAGNOSIS — E08.621 DIABETIC ULCER OF HEEL ASSOCIATED WITH DIABETES MELLITUS DUE TO UNDERLYING CONDITION, WITH BONE INVOLVEMENT WITHOUT EVIDENCE OF NECROSIS, UNSPECIFIED LATERALITY (HCC): Primary | ICD-10-CM

## 2018-05-11 DIAGNOSIS — L97.406 DIABETIC ULCER OF HEEL ASSOCIATED WITH DIABETES MELLITUS DUE TO UNDERLYING CONDITION, WITH BONE INVOLVEMENT WITHOUT EVIDENCE OF NECROSIS, UNSPECIFIED LATERALITY (HCC): Primary | ICD-10-CM

## 2018-05-11 PROCEDURE — 97597 DBRDMT OPN WND 1ST 20 CM/<: CPT

## 2018-05-11 PROCEDURE — 97597 DBRDMT OPN WND 1ST 20 CM/<: CPT | Performed by: NURSE PRACTITIONER

## 2018-05-11 NOTE — PROGRESS NOTES
Chief Complaint  This information was obtained from the patient  The patient is new to the [address] [facility],[address] here for an initial visit for the evaluation and management of non-healing wound(s). 5/11/18 no complains for today visit.     Allergies  sulfa antibiot CONCLUSION: Mild degenerative changes throughout the ankle and hind foot without osteomyelitis. Extensive subcutaneous edema/cellulitis along the dorsal lateral ankle without abscess. Mild plantar foot myositis without intramuscular abscess.  Severe chronic Patient is not wearing his boot because it makes him on study will have him get an even up for his contralateral foot but I today discussed with him the fact that he needs to have immobilization.         Objective    Constitutional  Height/Length: 70 in (17 The periwound skin exhibited: Moist. The periwound skin did not exhibit: Brawny Induration, Edema, Excoriation, Induration, Callus, Crepitus, Fluctuance, Friable, Rash, Dry/Scaly, Maceration, Atrophie Irene, Cyanosis, Ecchymosis, Erythema, Hemosiderosis, Wound #1 (Diabetic Ulcer) is located on the left, medial, dorsal foot. A selective debridement with a total area debrided of 2.8 sq cm was performed by BIJU Burns to remove devitalized tissue: exudate, fibrin, and slough.  The following instrument( Clean wound with soap and water. May shower with protection. - Cast Cover  Santyl  Dry gauze  Other: - secure with tape  Change dressing daily and PRN    Additional Orders: Follow-Up Appointments  Return Appointment in 1 week.     Wound Cleansing & Joseluis

## 2018-05-18 ENCOUNTER — APPOINTMENT (OUTPATIENT)
Dept: WOUND CARE | Facility: HOSPITAL | Age: 68
End: 2018-05-18
Attending: NURSE PRACTITIONER
Payer: COMMERCIAL

## 2018-05-18 DIAGNOSIS — E08.621 DIABETIC ULCER OF MIDFOOT ASSOCIATED WITH DIABETES MELLITUS DUE TO UNDERLYING CONDITION, WITH BONE INVOLVEMENT WITHOUT EVIDENCE OF NECROSIS, UNSPECIFIED LATERALITY (HCC): ICD-10-CM

## 2018-05-18 DIAGNOSIS — L97.406 DIABETIC ULCER OF HEEL ASSOCIATED WITH DIABETES MELLITUS DUE TO UNDERLYING CONDITION, WITH BONE INVOLVEMENT WITHOUT EVIDENCE OF NECROSIS, UNSPECIFIED LATERALITY (HCC): Primary | ICD-10-CM

## 2018-05-18 DIAGNOSIS — L97.406 DIABETIC ULCER OF MIDFOOT ASSOCIATED WITH DIABETES MELLITUS DUE TO UNDERLYING CONDITION, WITH BONE INVOLVEMENT WITHOUT EVIDENCE OF NECROSIS, UNSPECIFIED LATERALITY (HCC): ICD-10-CM

## 2018-05-18 DIAGNOSIS — E08.621 DIABETIC ULCER OF HEEL ASSOCIATED WITH DIABETES MELLITUS DUE TO UNDERLYING CONDITION, WITH BONE INVOLVEMENT WITHOUT EVIDENCE OF NECROSIS, UNSPECIFIED LATERALITY (HCC): Primary | ICD-10-CM

## 2018-05-18 PROCEDURE — 97607 NEG PRS WND THR NDME<=50SQCM: CPT

## 2018-05-18 PROCEDURE — 97602 WOUND(S) CARE NON-SELECTIVE: CPT

## 2018-05-18 PROCEDURE — 99212 OFFICE O/P EST SF 10 MIN: CPT

## 2018-05-18 PROCEDURE — 99212 OFFICE O/P EST SF 10 MIN: CPT | Performed by: NURSE PRACTITIONER

## 2018-05-18 NOTE — PROGRESS NOTES
Chief Complaint  This information was obtained from the patient  The patient is new to the [address] [facility],[address] here for an initial visit for the evaluation and management of non-healing wound(s). 5/18/18 no complains for today visit.     Allergies  sulfa antibiot CONCLUSION:   Mild degenerative changes throughout the ankle and hind foot without osteomyelitis. Extensive subcutaneous edema/cellulitis along the dorsal lateral ankle without abscess. Mild plantar foot myositis without intramuscular abscess.   Severe ch Patient is not wearing his boot because it makes him on study will have him get an even up for his contralateral foot but I today discussed with him the fact that he needs to have immobilization. Objective    Constitutional  elevated.  well developed The periwound skin exhibited: Moist. The periwound skin did not exhibit: Brawny Induration, Edema, Excoriation, Induration, Callus, Crepitus, Fluctuance, Friable, Rash, Dry/Scaly, Maceration, Atrophie Irene, Cyanosis, Ecchymosis, Erythema, Hemosiderosis, P39.600D: Unspecified open wound, left foot, initial encounter  L89.613: Pressure ulcer of right heel, stage 3        The dorsal left foot wounds are improving, more granulation in wound bed with decreased in wound measurements.   The right heel wound deter Decrease salt intake. Other: - glycemic control, blood sugar . Follow-Up Appointments:  A follow-up appointment should be scheduled.           Electronic Signature(s)  Signed By: Date:  Sam FORTE 05/18/2018 3:32:37 PM     5/18/2018 3:2 Performed selective sharp debridement to wound  Wound #3 (Right, Lateral Heel)    . Wound Treatment Note  Assessed patient’s pain status and effectiveness of pain management plan.   Limb cleansed using Betasept and water (1)  Cleansed wound and periwound wit

## 2018-05-22 ENCOUNTER — OFFICE VISIT (OUTPATIENT)
Dept: CARDIOLOGY CLINIC | Facility: CLINIC | Age: 68
End: 2018-05-22

## 2018-05-22 VITALS
SYSTOLIC BLOOD PRESSURE: 136 MMHG | RESPIRATION RATE: 16 BRPM | HEART RATE: 64 BPM | BODY MASS INDEX: 25 KG/M2 | DIASTOLIC BLOOD PRESSURE: 70 MMHG | WEIGHT: 175 LBS

## 2018-05-22 DIAGNOSIS — I25.708 CORONARY ARTERY DISEASE INVOLVING CORONARY BYPASS GRAFT OF NATIVE HEART WITH OTHER FORMS OF ANGINA PECTORIS (HCC): ICD-10-CM

## 2018-05-22 DIAGNOSIS — E78.5 HYPERLIPIDEMIA, UNSPECIFIED HYPERLIPIDEMIA TYPE: ICD-10-CM

## 2018-05-22 DIAGNOSIS — I10 ESSENTIAL HYPERTENSION: Primary | ICD-10-CM

## 2018-05-22 PROCEDURE — 99214 OFFICE O/P EST MOD 30 MIN: CPT | Performed by: INTERNAL MEDICINE

## 2018-05-22 PROCEDURE — 99212 OFFICE O/P EST SF 10 MIN: CPT | Performed by: INTERNAL MEDICINE

## 2018-05-22 RX ORDER — CLOPIDOGREL BISULFATE 75 MG/1
75 TABLET ORAL DAILY
Qty: 30 TABLET | Refills: 5 | Status: SHIPPED | OUTPATIENT
Start: 2018-05-22 | End: 2018-10-18

## 2018-05-22 NOTE — PATIENT INSTRUCTIONS
Resume clopidogrel  Stop amiodarone and periodically check your pulse calling if heart rate at rest greater than 90 or feel heart racing

## 2018-05-22 NOTE — PROGRESS NOTES
Robet Apgar is a 74year old male. Patient presents with: Follow - Up    HPI:   This is a pleasant 53-year-old with hypertension elevated cholesterol tobacco use A. fib who had bypass in October.   Since then he has had treatment for peripheral vascular di Glucose Blood (RELION BLOOD GLUCOSE TEST) In Vitro Strip twice daily Disp:  Rfl:       Past Medical History:   Diagnosis Date   • Atherosclerosis of coronary artery    • Cataract     stage 1 cataracts   • COPD (chronic obstructive pulmonary disease) ([facility] 77. a 16-year-old with hypertension Elevated cholesterol prior tobacco use and A. fib who is doing well from a cardiac standpoint since his bypass.   Since he has been maintaining sinus rhythm and his echo showed normal chamber sizes we will stop the amiodarone

## 2018-05-23 ENCOUNTER — HOSPITAL ENCOUNTER (OUTPATIENT)
Dept: GENERAL RADIOLOGY | Facility: HOSPITAL | Age: 68
Discharge: HOME | End: 2018-05-23
Attending: INTERNAL MEDICINE
Payer: COMMERCIAL

## 2018-05-23 ENCOUNTER — LAB ENCOUNTER (OUTPATIENT)
Dept: LAB | Facility: HOSPITAL | Age: 68
End: 2018-05-23
Attending: INTERNAL MEDICINE
Payer: COMMERCIAL

## 2018-05-23 ENCOUNTER — PRIOR ORIGINAL RECORDS (OUTPATIENT)
Dept: OTHER | Age: 68
End: 2018-05-23

## 2018-05-23 ENCOUNTER — NURSE ONLY (OUTPATIENT)
Dept: WOUND CARE | Facility: HOSPITAL | Age: 68
End: 2018-05-23
Attending: NURSE PRACTITIONER
Payer: COMMERCIAL

## 2018-05-23 DIAGNOSIS — E08.621 DIABETIC ULCER OF HEEL ASSOCIATED WITH DIABETES MELLITUS DUE TO UNDERLYING CONDITION, WITH BONE INVOLVEMENT WITHOUT EVIDENCE OF NECROSIS, UNSPECIFIED LATERALITY (HCC): Primary | ICD-10-CM

## 2018-05-23 DIAGNOSIS — L97.406 DIABETIC ULCER OF HEEL ASSOCIATED WITH DIABETES MELLITUS DUE TO UNDERLYING CONDITION, WITH BONE INVOLVEMENT WITHOUT EVIDENCE OF NECROSIS, UNSPECIFIED LATERALITY (HCC): Primary | ICD-10-CM

## 2018-05-23 DIAGNOSIS — S91.302D OPEN WOUND OF LEFT FOOT, SUBSEQUENT ENCOUNTER: ICD-10-CM

## 2018-05-23 DIAGNOSIS — I25.709 CORONARY ARTERY DISEASE INVOLVING CORONARY BYPASS GRAFT WITH ANGINA PECTORIS, UNSPECIFIED WHETHER NATIVE OR TRANSPLANTED HEART (HCC): ICD-10-CM

## 2018-05-23 PROCEDURE — 99212 OFFICE O/P EST SF 10 MIN: CPT | Performed by: NURSE PRACTITIONER

## 2018-05-23 PROCEDURE — 36415 COLL VENOUS BLD VENIPUNCTURE: CPT

## 2018-05-23 PROCEDURE — 99212 OFFICE O/P EST SF 10 MIN: CPT

## 2018-05-23 PROCEDURE — 93010 ELECTROCARDIOGRAM REPORT: CPT | Performed by: INTERNAL MEDICINE

## 2018-05-23 PROCEDURE — 80048 BASIC METABOLIC PNL TOTAL CA: CPT

## 2018-05-23 PROCEDURE — 85025 COMPLETE CBC W/AUTO DIFF WBC: CPT

## 2018-05-23 PROCEDURE — 93005 ELECTROCARDIOGRAM TRACING: CPT

## 2018-05-23 PROCEDURE — 71046 X-RAY EXAM CHEST 2 VIEWS: CPT | Performed by: INTERNAL MEDICINE

## 2018-05-23 PROCEDURE — 97602 WOUND(S) CARE NON-SELECTIVE: CPT

## 2018-05-23 PROCEDURE — 97607 NEG PRS WND THR NDME<=50SQCM: CPT

## 2018-05-23 NOTE — PROGRESS NOTES
Subjective    Chief Complaint  This information was obtained from the patient  The patient is new to the [address] [facility],[address] here for an initial visit for the evaluation and management of non-healing wound(s).  5/23/18 no complains for today visit.     Allergies CONCLUSION:   Mild degenerative changes throughout the ankle and hind foot without osteomyelitis. Extensive subcutaneous edema/cellulitis along the dorsal lateral ankle without abscess. Mild plantar foot myositis without intramuscular abscess.   Severe ch Patient is not wearing his boot because it makes him on study will have him get an even up for his contralateral foot but I today discussed with him the fact that he needs to have immobilization.         Objective    Constitutional  Height/Length: 70 in (17 The periwound skin exhibited: Moist. The periwound skin did not exhibit: Brawny Induration, Edema, Excoriation, Induration, Callus, Crepitus, Fluctuance, Friable, Rash, Dry/Scaly, Maceration, Atrophie Irene, Cyanosis, Ecchymosis, Erythema, Hemosiderosis, The left dorsal foot wound is more granular and smaller in measurement. The left foot is warm and adequate circulation to heal this wound. The right heel wound is deteriorating, almost three times larger than the original wound.   Encouraged patient to see Other negative pressure wound therapy device. See custom orders. - EMRE mechanical negative pressure dressing    Additional Orders: Follow-Up Appointments  Return Appointment in 1 week.     Wound Cleansing & Dressings  Clean wound with Normal Saline or W Limb cleansed using Betasept and water (1)  Cleansed wound and periwound with non-cytotoxic agent. using 0.9% normal saline (1)  Applied Primary Wound Dressing.  using Fibracol (1)  Negative pressure wound therapy  Negative pressure system used: using EMRE Electronic Signature(s)  Signed By: Black Oviedo 05/23/2018 3:17:03 PM  Josephine PÉREZP-BC 05/23/2018 4:47:40 PM       Entered By: Maria A Vegas on 05/23/2018 3:08:26 PM       Header Image    Debridement Details  Patient Name: Ya Jeevan ROB

## 2018-05-24 ENCOUNTER — PRIOR ORIGINAL RECORDS (OUTPATIENT)
Dept: OTHER | Age: 68
End: 2018-05-24

## 2018-05-24 LAB
BUN: 23 MG/DL
CALCIUM: 10.2 MG/DL
CHLORIDE: 102 MEQ/L
CREATININE, SERUM: 1.41 MG/DL
GLUCOSE: 278 MG/DL
HEMATOCRIT: 39.6 %
HEMOGLOBIN: 13.1 G/DL
MCH: 27.8 PG
MCHC: 33.2 G/DL
MCV: 83.7 FL
PLATELETS: 451 K/UL
POTASSIUM, SERUM: 4.8 MEQ/L
RED BLOOD COUNT: 4.73 X 10-6/U
SODIUM: 136 MEQ/L
WHITE BLOOD COUNT: 9.9 X 10-3/U

## 2018-05-24 RX ORDER — SODIUM CHLORIDE 9 MG/ML
1 INJECTION, SOLUTION INTRAVENOUS CONTINUOUS
Status: DISCONTINUED | OUTPATIENT
Start: 2018-05-25 | End: 2018-05-24

## 2018-05-24 RX ORDER — SODIUM CHLORIDE 9 MG/ML
INJECTION, SOLUTION INTRAVENOUS ONCE
Status: DISCONTINUED | OUTPATIENT
Start: 2018-05-25 | End: 2018-05-26

## 2018-05-24 RX ORDER — SODIUM CHLORIDE 9 MG/ML
INJECTION, SOLUTION INTRAVENOUS ONCE
Status: DISCONTINUED | OUTPATIENT
Start: 2018-05-25 | End: 2018-05-24

## 2018-05-24 RX ORDER — SODIUM CHLORIDE 9 MG/ML
INJECTION, SOLUTION INTRAVENOUS CONTINUOUS
Status: DISCONTINUED | OUTPATIENT
Start: 2018-05-25 | End: 2018-05-24

## 2018-05-25 ENCOUNTER — HOSPITAL ENCOUNTER (OUTPATIENT)
Dept: INTERVENTIONAL RADIOLOGY/VASCULAR | Facility: HOSPITAL | Age: 68
Discharge: HOME | End: 2018-05-26
Attending: INTERNAL MEDICINE | Admitting: INTERNAL MEDICINE
Payer: COMMERCIAL

## 2018-05-25 DIAGNOSIS — M79.604 ACUTE PAIN OF RIGHT LOWER EXTREMITY: ICD-10-CM

## 2018-05-25 PROCEDURE — 82962 GLUCOSE BLOOD TEST: CPT

## 2018-05-25 PROCEDURE — B41F1ZZ FLUOROSCOPY OF RIGHT LOWER EXTREMITY ARTERIES USING LOW OSMOLAR CONTRAST: ICD-10-PCS | Performed by: INTERNAL MEDICINE

## 2018-05-25 PROCEDURE — 37224 HC TRANSLUMINAL ANGIOPLASTY FEM POP UNILATERAL: CPT

## 2018-05-25 PROCEDURE — 99153 MOD SED SAME PHYS/QHP EA: CPT

## 2018-05-25 PROCEDURE — 047M3ZZ DILATION OF RIGHT POPLITEAL ARTERY, PERCUTANEOUS APPROACH: ICD-10-PCS | Performed by: INTERNAL MEDICINE

## 2018-05-25 PROCEDURE — 99152 MOD SED SAME PHYS/QHP 5/>YRS: CPT

## 2018-05-25 PROCEDURE — 75716 ARTERY X-RAYS ARMS/LEGS: CPT

## 2018-05-25 PROCEDURE — B41G1ZZ FLUOROSCOPY OF LEFT LOWER EXTREMITY ARTERIES USING LOW OSMOLAR CONTRAST: ICD-10-PCS | Performed by: INTERNAL MEDICINE

## 2018-05-25 PROCEDURE — 85347 COAGULATION TIME ACTIVATED: CPT

## 2018-05-25 RX ORDER — AMLODIPINE BESYLATE 10 MG/1
10 TABLET ORAL DAILY
Status: DISCONTINUED | OUTPATIENT
Start: 2018-05-25 | End: 2018-05-26

## 2018-05-25 RX ORDER — SODIUM CHLORIDE 0.9 % (FLUSH) 0.9 %
10 SYRINGE (ML) INJECTION AS NEEDED
Status: DISCONTINUED | OUTPATIENT
Start: 2018-05-25 | End: 2018-05-26

## 2018-05-25 RX ORDER — HYDROCODONE BITARTRATE AND ACETAMINOPHEN 5; 325 MG/1; MG/1
2 TABLET ORAL EVERY 4 HOURS PRN
Status: DISCONTINUED | OUTPATIENT
Start: 2018-05-25 | End: 2018-05-26

## 2018-05-25 RX ORDER — ACETAMINOPHEN 325 MG/1
650 TABLET ORAL EVERY 4 HOURS PRN
Status: DISCONTINUED | OUTPATIENT
Start: 2018-05-25 | End: 2018-05-26

## 2018-05-25 RX ORDER — MIDAZOLAM HYDROCHLORIDE 1 MG/ML
INJECTION INTRAMUSCULAR; INTRAVENOUS
Status: DISCONTINUED
Start: 2018-05-25 | End: 2018-05-25 | Stop reason: WASHOUT

## 2018-05-25 RX ORDER — SODIUM CHLORIDE 9 MG/ML
INJECTION, SOLUTION INTRAVENOUS
Status: DISPENSED
Start: 2018-05-25 | End: 2018-05-26

## 2018-05-25 RX ORDER — CLOPIDOGREL BISULFATE 75 MG/1
75 TABLET ORAL DAILY
Status: DISCONTINUED | OUTPATIENT
Start: 2018-05-25 | End: 2018-05-26

## 2018-05-25 RX ORDER — LIDOCAINE HYDROCHLORIDE 20 MG/ML
INJECTION, SOLUTION EPIDURAL; INFILTRATION; INTRACAUDAL; PERINEURAL
Status: COMPLETED
Start: 2018-05-25 | End: 2018-05-25

## 2018-05-25 RX ORDER — GLIPIZIDE 5 MG/1
10 TABLET ORAL
Status: DISCONTINUED | OUTPATIENT
Start: 2018-05-25 | End: 2018-05-26

## 2018-05-25 RX ORDER — MIDAZOLAM HYDROCHLORIDE 1 MG/ML
INJECTION INTRAMUSCULAR; INTRAVENOUS
Status: COMPLETED
Start: 2018-05-25 | End: 2018-05-25

## 2018-05-25 RX ORDER — ASPIRIN 325 MG
325 TABLET ORAL DAILY
Status: DISCONTINUED | OUTPATIENT
Start: 2018-05-25 | End: 2018-05-26

## 2018-05-25 RX ORDER — ATORVASTATIN CALCIUM 40 MG/1
40 TABLET, FILM COATED ORAL NIGHTLY
Status: DISCONTINUED | OUTPATIENT
Start: 2018-05-25 | End: 2018-05-26

## 2018-05-25 RX ORDER — HYDROCODONE BITARTRATE AND ACETAMINOPHEN 5; 325 MG/1; MG/1
1 TABLET ORAL EVERY 4 HOURS PRN
Status: DISCONTINUED | OUTPATIENT
Start: 2018-05-25 | End: 2018-05-26

## 2018-05-25 RX ORDER — HEPARIN SODIUM 1000 [USP'U]/ML
INJECTION, SOLUTION INTRAVENOUS; SUBCUTANEOUS
Status: COMPLETED
Start: 2018-05-25 | End: 2018-05-25

## 2018-05-25 RX ADMIN — ATORVASTATIN CALCIUM 40 MG: 40 TABLET, FILM COATED ORAL at 20:46:00

## 2018-05-25 RX ADMIN — CLOPIDOGREL BISULFATE 75 MG: 75 TABLET ORAL at 18:50:00

## 2018-05-25 RX ADMIN — GLIPIZIDE 10 MG: 5 TABLET ORAL at 18:50:00

## 2018-05-25 RX ADMIN — Medication 25 MG: at 18:50:00

## 2018-05-25 NOTE — BRIEF PROCEDURE NOTE
[facility] - [facility]    Brief Cardiac Cath Note  Wicho Hirshc Patient Status:  Outpatient in a Bed    1950 MRN [id number]   Location [facility] Attending Yadiel Stoner MD   Hosp Day # 0 PCP Dain Dodge MD

## 2018-05-26 VITALS
DIASTOLIC BLOOD PRESSURE: 70 MMHG | BODY MASS INDEX: 25.05 KG/M2 | RESPIRATION RATE: 18 BRPM | WEIGHT: 175 LBS | HEIGHT: 70 IN | SYSTOLIC BLOOD PRESSURE: 164 MMHG | HEART RATE: 59 BPM | OXYGEN SATURATION: 97 % | TEMPERATURE: 98 F

## 2018-05-26 RX ADMIN — ACETAMINOPHEN 650 MG: 325 TABLET ORAL at 10:33:00

## 2018-05-26 RX ADMIN — CLOPIDOGREL BISULFATE 75 MG: 75 TABLET ORAL at 09:12:00

## 2018-05-26 RX ADMIN — GLIPIZIDE 10 MG: 5 TABLET ORAL at 09:12:00

## 2018-05-26 RX ADMIN — ASPIRIN 325 MG: 325 MG TABLET ORAL at 09:12:00

## 2018-05-26 RX ADMIN — Medication 25 MG: at 09:12:00

## 2018-05-26 RX ADMIN — AMLODIPINE BESYLATE 10 MG: 10 TABLET ORAL at 09:12:00

## 2018-05-26 NOTE — TRANSITION NOTE
[facility] HOSP - [facility]    Cardiology Discharge Summary    Chon Foote Patient Status:  Outpatient in a Bed    1950 MRN [id number]   Location [facility] Attending Eliseo Castillo MD   Hosp Day # 0 PCP Matthew Kimbrough MD     Primary Ca RBC  4.73   HGB  13.1*   HCT  39.6*   MCV  83.7   MCH  27.8   MCHC  33.2   RDW  15.9*   WBC  9.9   PLT  451*          Discharge Medications      CHANGE how you take these medications      Instructions Prescription details   ascorbic acid 500 MG Tabs  Com Application topically nightly.    Stop taking on:  6/10/2018  Quantity:  1 Tube  Refills:  0              Lab Results  Component Value Date   AST 21 10/09/2017   ALT 16 (L) 10/09/2017   PTT 29.3 03/04/2018   INR 1.1 03/04/2018   ESRML 63 (H) 03/02/2018   CR

## 2018-05-26 NOTE — PROGRESS NOTES
[facility] - [facility]    Progress Note    Wicho Hirsch Patient Status:  Outpatient in a Bed    1950 MRN [id number]   Location [facility] Attending Yadiel Stoner, [address] [facility] Day # 0 PCP Dain Dodge MD       Subjective:   Lexie Barrera femoral artery/balloon angioplasty of right proximal popliteal artery  Date of Procedure: 5/25/2018   Indication: Nonhealing ulcer of the right heel  Post procedure findings:  70-80% stenosis of ostial right SFA with 60% stenosis of right common femoral ar

## 2018-05-30 ENCOUNTER — NURSE ONLY (OUTPATIENT)
Dept: WOUND CARE | Facility: HOSPITAL | Age: 68
End: 2018-05-30
Attending: NURSE PRACTITIONER
Payer: COMMERCIAL

## 2018-05-30 DIAGNOSIS — L97.406 DIABETIC ULCER OF HEEL ASSOCIATED WITH DIABETES MELLITUS DUE TO UNDERLYING CONDITION, WITH BONE INVOLVEMENT WITHOUT EVIDENCE OF NECROSIS, UNSPECIFIED LATERALITY (HCC): Primary | ICD-10-CM

## 2018-05-30 DIAGNOSIS — E08.621 DIABETIC ULCER OF HEEL ASSOCIATED WITH DIABETES MELLITUS DUE TO UNDERLYING CONDITION, WITH BONE INVOLVEMENT WITHOUT EVIDENCE OF NECROSIS, UNSPECIFIED LATERALITY (HCC): Primary | ICD-10-CM

## 2018-05-30 DIAGNOSIS — L97.406 DIABETIC ULCER OF MIDFOOT ASSOCIATED WITH DIABETES MELLITUS DUE TO UNDERLYING CONDITION, WITH BONE INVOLVEMENT WITHOUT EVIDENCE OF NECROSIS, UNSPECIFIED LATERALITY (HCC): ICD-10-CM

## 2018-05-30 DIAGNOSIS — E08.621 DIABETIC ULCER OF MIDFOOT ASSOCIATED WITH DIABETES MELLITUS DUE TO UNDERLYING CONDITION, WITH BONE INVOLVEMENT WITHOUT EVIDENCE OF NECROSIS, UNSPECIFIED LATERALITY (HCC): ICD-10-CM

## 2018-05-30 DIAGNOSIS — S91.302D OPEN WOUND OF LEFT FOOT, SUBSEQUENT ENCOUNTER: ICD-10-CM

## 2018-05-30 PROCEDURE — 99212 OFFICE O/P EST SF 10 MIN: CPT | Performed by: NURSE PRACTITIONER

## 2018-05-30 PROCEDURE — 99211 OFF/OP EST MAY X REQ PHY/QHP: CPT

## 2018-05-30 PROCEDURE — 97607 NEG PRS WND THR NDME<=50SQCM: CPT

## 2018-05-30 NOTE — PROGRESS NOTES
Chief Complaint  This information was obtained from the patient  The patient is new to the [address] [facility],[address] here for an initial visit for the evaluation and management of non-healing wound(s). 5/30/18 no complains for today visit.     Allergies  sulfa antibiot CONCLUSION:   Mild degenerative changes throughout the ankle and hind foot without osteomyelitis. Extensive subcutaneous edema/cellulitis along the dorsal lateral ankle without abscess. Mild plantar foot myositis without intramuscular abscess.   Severe ch Vitals Signs Notes: B/S this     Respiratory:  effortless breathing,. Musculoskeletal:  no clubbing, no cyanosis.      Integumentary (Hair, Skin)  left dorsal foot wounds with no erythema and granulation to the wound bed, the right heel eshcar cov The periwound skin exhibited: Moist. The periwound skin did not exhibit: Brawny Induration, Edema, Excoriation, Induration, Callus, Crepitus, Fluctuance, Friable, Rash, Dry/Scaly, Maceration, Atrophie Irene, Cyanosis, Ecchymosis, Erythema, Hemosiderosis, Patient had Right lower extremity angiogram/shockwave lithotripsy of right popliteal and right SFA and right common femoral artery/balloon angioplasty of right proximal popliteal artery on last Friday 5/25/2018 with excellent result.  Currently right foot i Other: - No tight shoe or socks on the left foot, remove all pressures from the wounds and tissue surrounding the wounds. Darco offloading heel shoe    Misc / Additional orders  Supplement with a daily multivitamin. Increase dietary protein intake.    Tona Turner Dressing secured with drape / transparent film.   TRAC pad bridged to following: using Dorsal foot (1)  Drain tubing attached: using EMRE Drain Tubing (1)  NPWT pressure type: using Continuous (1)  Patient tolerated treatment well without abnormal reactions

## 2018-05-31 ENCOUNTER — TELEPHONE (OUTPATIENT)
Dept: CARDIOLOGY CLINIC | Facility: CLINIC | Age: 68
End: 2018-05-31

## 2018-05-31 NOTE — TELEPHONE ENCOUNTER
Pt calling to find out why rx was denied       Current Outpatient Prescriptions:                                                  Metoprolol Tartrate 50 MG Oral Tab Take 1 tablet (50 mg total) by mouth 2x Daily(Beta Blocker). (Patient taking differently: Take 25 mg by mouth 2x Daily(Beta Blocker).   ) Disp: 60 tablet Rfl: 0

## 2018-05-31 NOTE — TELEPHONE ENCOUNTER
Called patient to clarify how he takes medication. Requesting Metoprolol Tartrate. There was confusion in the order one saying 50 mg bid and one 25 mg bid and refill denied. Patient states he takes a 25 mg tab, bid. LOV 5/22 MB      Metoprolol tartrate 25 mg tab 90 tabs with 3 refills pended.     Please advise

## 2018-06-06 ENCOUNTER — NURSE ONLY (OUTPATIENT)
Dept: WOUND CARE | Facility: HOSPITAL | Age: 68
End: 2018-06-06
Attending: NURSE PRACTITIONER
Payer: COMMERCIAL

## 2018-06-06 DIAGNOSIS — S91.302D OPEN WOUND OF LEFT FOOT, SUBSEQUENT ENCOUNTER: Primary | ICD-10-CM

## 2018-06-06 PROCEDURE — 99212 OFFICE O/P EST SF 10 MIN: CPT | Performed by: NURSE PRACTITIONER

## 2018-06-06 PROCEDURE — 97607 NEG PRS WND THR NDME<=50SQCM: CPT

## 2018-06-06 PROCEDURE — 99211 OFF/OP EST MAY X REQ PHY/QHP: CPT

## 2018-06-07 NOTE — PROGRESS NOTES
Chief Complaint  This information was obtained from the patient  The patient is new to the [address] [facility],[address] here for an initial visit for the evaluation and management of non-healing wound(s). 06/06/18 no complains for today visit.     Allergies  sulfa antibio CONCLUSION:   Mild degenerative changes throughout the ankle and hind foot without osteomyelitis. Extensive subcutaneous edema/cellulitis along the dorsal lateral ankle without abscess. Mild plantar foot myositis without intramuscular abscess.   Severe ch Left, Medial, Dorsal Foot is a Munoz Grade 1 Diabetic Ulcer and has received a status of Not Healed. Subsequent wound encounter measurements are 1.3cm length x 0.4cm width x 0.1cm depth, with an area of 0.52 sq cm and a volume of 0.052 cubic cm.  Tendon an Wound #3 Right, Lateral Heel is an Unstageable Pressure Injury Obscured full-thickness skin and tissue loss Pressure Ulcer and has received a status of Not Healed.  Subsequent wound encounter measurements are 2.5cm length x 2.5cm width x 0.1cm depth, with We will continue with EMRE mechanical negative pressure dressing to heal this wound. Left lateral heel adherent stable eshcar, foot is warm now after recent re-vascularization.  We will continue with supportive measures till either the wound heals to escha Clean wound with soap and water. May shower with protection. - Cast Cover  Betadyne  Dry gauze  Change dressing daily and PRN    Additional Orders: Follow-Up Appointments  Return Appointment in 1 week.     Offloading  Other: - No tight shoe or socks on Applied periwound skin protectant(s) using Skin prep (1)  Dressing secured with drape / transparent film.   TRAC pad bridged to following: using Dorsal foot (1)  Drain tubing attached: using EMRE Drain Tubing (1)  NPWT pressure type: using Continuous (1)  P

## 2018-06-13 ENCOUNTER — NURSE ONLY (OUTPATIENT)
Dept: WOUND CARE | Facility: HOSPITAL | Age: 68
End: 2018-06-13
Attending: NURSE PRACTITIONER
Payer: COMMERCIAL

## 2018-06-13 DIAGNOSIS — S91.302D OPEN WOUND OF LEFT FOOT, SUBSEQUENT ENCOUNTER: Primary | ICD-10-CM

## 2018-06-13 PROCEDURE — 99212 OFFICE O/P EST SF 10 MIN: CPT | Performed by: NURSE PRACTITIONER

## 2018-06-13 PROCEDURE — 97607 NEG PRS WND THR NDME<=50SQCM: CPT

## 2018-06-13 PROCEDURE — 97602 WOUND(S) CARE NON-SELECTIVE: CPT

## 2018-06-14 NOTE — PROGRESS NOTES
Subjective    Chief Complaint  This information was obtained from the patient  The patient is new to the [address] [facility],[address] here for an initial visit for the evaluation and management of non-healing wound(s).  06/13/18 no complains for today visit.     Allergies CONCLUSION:   Mild degenerative changes throughout the ankle and hind foot without osteomyelitis. Extensive subcutaneous edema/cellulitis along the dorsal lateral ankle without abscess. Mild plantar foot myositis without intramuscular abscess.   Severe ch Wound #1 Left, Medial, Dorsal Foot is a Munoz Grade 2 Diabetic Ulcer and has received a status of Not Healed. Subsequent wound encounter measurements are 1cm length x 0.5cm width x 0.1cm depth, with an area of 0.5 sq cm and a volume of 0.05 cubic cm.  Tend Wound #3 Right, Lateral Heel is an Unstageable Pressure Injury Obscured full-thickness skin and tissue loss Pressure Ulcer and has received a status of Not Healed.  Subsequent wound encounter measurements are 2.2cm length x 2.5cm width x 0.1cm depth, with a Right foot warm with increased perfusion after the vascular procedure. continue with offloading heel shoe to reduce the pressure on the wound area. Procedures    Wound #3  Wound #3 (Pressure Ulcer) is located on the right, lateral heel.  A non-selective c Follow-Up Appointments:  A follow-up appointment should be scheduled.       Electronic Signature(s)  Signed By: Date:  Raf French FNP-BC 06/14/2018 9:52:50 AM       Entered By: Raf French on 06/14/2018 9:52:28 AM        Treatment Notes Summary  Wound Applied enzymatic agent to base of wound bed. using Collagenase Santyl (1)  Applied Primary Wound Dressing. using Gauze (sterile) 2x2 (1)  Applied Secondary Wound Dressing.  using ABD (1)  Notes  moist gauze over sentyl    Header Image    Negative Pressure

## 2018-06-20 ENCOUNTER — NURSE ONLY (OUTPATIENT)
Dept: WOUND CARE | Facility: HOSPITAL | Age: 68
End: 2018-06-20
Attending: NURSE PRACTITIONER
Payer: COMMERCIAL

## 2018-06-20 DIAGNOSIS — E08.621 DIABETIC ULCER OF RIGHT HEEL ASSOCIATED WITH DIABETES MELLITUS DUE TO UNDERLYING CONDITION, WITH OTHER ULCER SEVERITY (HCC): Primary | ICD-10-CM

## 2018-06-20 DIAGNOSIS — L97.418 DIABETIC ULCER OF RIGHT HEEL ASSOCIATED WITH DIABETES MELLITUS DUE TO UNDERLYING CONDITION, WITH OTHER ULCER SEVERITY (HCC): Primary | ICD-10-CM

## 2018-06-20 PROBLEM — L97.509 DIABETIC FOOT ULCER (HCC): Status: ACTIVE | Noted: 2018-06-20

## 2018-06-20 PROBLEM — E11.621 DIABETIC FOOT ULCER (HCC): Status: ACTIVE | Noted: 2018-06-20

## 2018-06-20 PROCEDURE — 97602 WOUND(S) CARE NON-SELECTIVE: CPT

## 2018-06-20 PROCEDURE — 99213 OFFICE O/P EST LOW 20 MIN: CPT | Performed by: NURSE PRACTITIONER

## 2018-06-20 PROCEDURE — 99213 OFFICE O/P EST LOW 20 MIN: CPT

## 2018-06-20 RX ORDER — AMOXICILLIN AND CLAVULANATE POTASSIUM 500; 125 MG/1; MG/1
1 TABLET, FILM COATED ORAL 2 TIMES DAILY
Qty: 14 TABLET | Refills: 0 | Status: SHIPPED | OUTPATIENT
Start: 2018-06-20 | End: 2018-06-27

## 2018-06-20 RX ORDER — ATORVASTATIN CALCIUM 40 MG/1
40 TABLET, FILM COATED ORAL DAILY
Qty: 90 TABLET | Refills: 0 | Status: SHIPPED | OUTPATIENT
Start: 2018-06-20 | End: 2018-09-19

## 2018-06-20 NOTE — PROGRESS NOTES
Chief Complaint  This information was obtained from the patient  The patient is new to the [address] [facility],[address] here for an initial visit for the evaluation and management of non-healing wound(s). 06/20/18 no complains for today visit.     Allergies  sulfa antib CONCLUSION:   Mild degenerative changes throughout the ankle and hind foot without osteomyelitis. Extensive subcutaneous edema/cellulitis along the dorsal lateral ankle without abscess. Mild plantar foot myositis without intramuscular abscess.   Severe ch elevated. well developed, no acute distress. Height/Length: 70 in (177.8 cm), Weight: 169.9 lbs (77.23 kgs), BMI: 24.4, Temperature: 98.2 °F (36.78 °C), Pulse: 63 bpm, Respiratory Rate: 16 breaths/min, Blood Pressure: 162/73 mmHg, Pulse Oximetry: 95 %.    V Left, Lateral, Dorsal Foot is a Munoz Grade 2 Diabetic Ulcer and has received a status of Not Healed. Subsequent wound encounter measurements are 1cm length x 0.3cm width x 0.1cm depth, with an area of 0.3 sq cm and a volume of 0.03 cubic cm.  Tendon and a Appropriate judgement and insight. Oriented to time, place and person.         Assessment    Active Problems    ICD-10  (Encounter Diagnosis) M86.172 - Other acute osteomyelitis, left ankle and foot  (Encounter Diagnosis) L03.116 - Cellulitis of left lower Hydrofera ready  Other: - secure with tape    Wound #3 Right, Lateral Heel     Wound Cleansing & Dressings  Clean wound with Normal Saline or Wound Cleanser.   Santyl  Other: - saline moist gauze  Change dressing daily and PRN    Offloading  Heel offloading Applied offloading shoe wear: using Rearfoot offloading shoe (1)  Notes  moist gauze over sentyl    Header Image    Debridement Details  Patient Name: Mary Pritchard   Patient Number: [id number]  Patient YOB: 1950  Date: 6/20/2018  RN: Alison Rios

## 2018-06-27 ENCOUNTER — NURSE ONLY (OUTPATIENT)
Dept: WOUND CARE | Facility: HOSPITAL | Age: 68
End: 2018-06-27
Attending: NURSE PRACTITIONER
Payer: COMMERCIAL

## 2018-06-27 DIAGNOSIS — E08.621 DIABETIC ULCER OF MIDFOOT ASSOCIATED WITH DIABETES MELLITUS DUE TO UNDERLYING CONDITION, WITH FAT LAYER EXPOSED, UNSPECIFIED LATERALITY (HCC): Primary | ICD-10-CM

## 2018-06-27 DIAGNOSIS — L97.402 DIABETIC ULCER OF MIDFOOT ASSOCIATED WITH DIABETES MELLITUS DUE TO UNDERLYING CONDITION, WITH FAT LAYER EXPOSED, UNSPECIFIED LATERALITY (HCC): Primary | ICD-10-CM

## 2018-06-27 PROCEDURE — 99211 OFF/OP EST MAY X REQ PHY/QHP: CPT

## 2018-06-27 PROCEDURE — 97597 DBRDMT OPN WND 1ST 20 CM/<: CPT | Performed by: NURSE PRACTITIONER

## 2018-06-27 PROCEDURE — 97597 DBRDMT OPN WND 1ST 20 CM/<: CPT

## 2018-06-27 RX ORDER — AMOXICILLIN AND CLAVULANATE POTASSIUM 500; 125 MG/1; MG/1
1 TABLET, FILM COATED ORAL 2 TIMES DAILY
Qty: 14 TABLET | Refills: 0 | Status: SHIPPED | OUTPATIENT
Start: 2018-06-27 | End: 2018-07-25 | Stop reason: ALTCHOICE

## 2018-06-27 NOTE — PROGRESS NOTES
Chief Complaint  This information was obtained from the patient  The patient is new to the [address] [facility],[address] here for an initial visit for the evaluation and management of non-healing wound(s). 06/27/18 no complains for today visit.     Allergies  sulfa antibio CONCLUSION: Mild degenerative changes throughout the ankle and hind foot without osteomyelitis. Extensive subcutaneous edema/cellulitis along the dorsal lateral ankle without abscess. Mild plantar foot myositis without intramuscular abscess.  Severe chronic no clubbing, no cyanosis. Integumentary (Hair, Skin)  right heel wound with erythema to the tissue surrounding the right lateral heel. no edema, no induration.      Wound #1     Left, Medial, Dorsal Foot is a Munoz Grade 2 Diabetic Ulcer and has receiv Wound #3     Right, Lateral Heel is an Unstageable Pressure Injury Obscured full-thickness skin and tissue loss Pressure Ulcer and has received a status of Not Healed.  Subsequent wound encounter measurements are 3cm length x 2.5cm width with no measurable Right heel wound with necrotic tissue, foul smell, erythema to the tissue surrounding the wound. This wound slightly larger than last week, however the erythema slightly less than last week.   Patient has started Augmentin twice a day 500–1 25mg (due to bear Other: - soak wound for 10 minutes with Dakin Solution 1/8 strength then apply santyl with moist saline gauze follow  Change dressing every: - daily    Offloading  Heel offloading boot - heel off loading boot    Follow-Up Appointments:  A follow-up appoint

## 2018-07-03 ENCOUNTER — NURSE ONLY (OUTPATIENT)
Dept: WOUND CARE | Facility: HOSPITAL | Age: 68
End: 2018-07-03
Attending: CLINICAL NURSE SPECIALIST
Payer: COMMERCIAL

## 2018-07-03 DIAGNOSIS — E08.621 DIABETIC ULCER OF MIDFOOT ASSOCIATED WITH DIABETES MELLITUS DUE TO UNDERLYING CONDITION, WITH FAT LAYER EXPOSED, UNSPECIFIED LATERALITY (HCC): Primary | ICD-10-CM

## 2018-07-03 DIAGNOSIS — L97.402 DIABETIC ULCER OF MIDFOOT ASSOCIATED WITH DIABETES MELLITUS DUE TO UNDERLYING CONDITION, WITH FAT LAYER EXPOSED, UNSPECIFIED LATERALITY (HCC): Primary | ICD-10-CM

## 2018-07-03 PROCEDURE — 99211 OFF/OP EST MAY X REQ PHY/QHP: CPT

## 2018-07-03 PROCEDURE — 97607 NEG PRS WND THR NDME<=50SQCM: CPT

## 2018-07-03 NOTE — PROGRESS NOTES
Nursing Visit:              Treatment Notes  Wound #1 (Left, Medial, Dorsal Foot)  . Wound Treatment Note  Assessed patient’s pain status and effectiveness of pain management plan.    Limb cleansed using Betasept and water (1)   Cleansed wound and periwound

## 2018-07-10 ENCOUNTER — APPOINTMENT (OUTPATIENT)
Dept: WOUND CARE | Facility: HOSPITAL | Age: 68
End: 2018-07-10
Payer: COMMERCIAL

## 2018-07-11 ENCOUNTER — OFFICE VISIT (OUTPATIENT)
Dept: PODIATRY CLINIC | Facility: CLINIC | Age: 68
End: 2018-07-11

## 2018-07-11 DIAGNOSIS — E11.59 TYPE 2 DIABETES MELLITUS WITH OTHER CIRCULATORY COMPLICATION, UNSPECIFIED WHETHER LONG TERM INSULIN USE (HCC): Primary | ICD-10-CM

## 2018-07-11 DIAGNOSIS — S91.302D OPEN WOUND OF LEFT FOOT, SUBSEQUENT ENCOUNTER: ICD-10-CM

## 2018-07-11 DIAGNOSIS — L97.412 DIABETIC ULCER OF RIGHT HEEL ASSOCIATED WITH DIABETES MELLITUS DUE TO UNDERLYING CONDITION, WITH FAT LAYER EXPOSED (HCC): ICD-10-CM

## 2018-07-11 DIAGNOSIS — E08.621 DIABETIC ULCER OF RIGHT HEEL ASSOCIATED WITH DIABETES MELLITUS DUE TO UNDERLYING CONDITION, WITH FAT LAYER EXPOSED (HCC): ICD-10-CM

## 2018-07-11 PROCEDURE — 99213 OFFICE O/P EST LOW 20 MIN: CPT | Performed by: PODIATRIST

## 2018-07-12 NOTE — PROGRESS NOTES
Tete Peraza is a 74year old male. Patient presents with:  Diabetic Foot Care: Pt is here for a follow up on a diabetic wound. Does go to the wound clinic at [facility]. Pt's blood sugar 128 this am.  Pt sees Dr. Babita Elaine for his diabetes April 17-18.  Pt se Daily(Beta Blocker). (Patient taking differently: Take 25 mg by mouth 2x Daily(Beta Blocker). ) Disp: 60 tablet Rfl: 0   AmLODIPine Besylate 10 MG Oral Tab Take 1 tablet (10 mg total) by mouth daily.  Disp: 30 tablet Rfl: 0   aspirin 81 MG Oral Tab Take 32 date: 3/1/2017    Alcohol use: No              Drug use:  No              Sexual activity: No                           REVIEW OF SYSTEMS:   Review of Systems  GENERAL HEALTH: feels well otherwise  SKIN: denies any unusual skin lesions or rashes  RESPIRATOR An aseptic dressing was reapplied and I advised him to keep his appointment at the wound care center and get a jennifer wound VAC system applied with topical wound treatment. Sounds understanding is that his wife. He was told to stay off his feet.     The melissa

## 2018-07-17 ENCOUNTER — NURSE ONLY (OUTPATIENT)
Dept: WOUND CARE | Facility: HOSPITAL | Age: 68
End: 2018-07-17
Attending: NURSE PRACTITIONER
Payer: COMMERCIAL

## 2018-07-17 DIAGNOSIS — L97.402 DIABETIC ULCER OF MIDFOOT ASSOCIATED WITH DIABETES MELLITUS DUE TO UNDERLYING CONDITION, WITH FAT LAYER EXPOSED, UNSPECIFIED LATERALITY (HCC): Primary | ICD-10-CM

## 2018-07-17 DIAGNOSIS — E08.621 DIABETIC ULCER OF MIDFOOT ASSOCIATED WITH DIABETES MELLITUS DUE TO UNDERLYING CONDITION, WITH FAT LAYER EXPOSED, UNSPECIFIED LATERALITY (HCC): Primary | ICD-10-CM

## 2018-07-17 PROCEDURE — 97607 NEG PRS WND THR NDME<=50SQCM: CPT

## 2018-07-17 PROCEDURE — 99212 OFFICE O/P EST SF 10 MIN: CPT | Performed by: NURSE PRACTITIONER

## 2018-07-17 NOTE — PROGRESS NOTES
Chief Complaint  This information was obtained from the patient  The patient is new to the [address] [facility],[address] here for an initial visit for the evaluation and management of non-healing wound(s). 07/3/18 no complains for today visit.     Allergies  sulfa antibiot CONCLUSION: Mild degenerative changes throughout the ankle and hind foot without osteomyelitis. Extensive subcutaneous edema/cellulitis along the dorsal lateral ankle without abscess. Mild plantar foot myositis without intramuscular abscess.  Severe chronic Integumentary (Hair, Skin)  right heel wound. no edema, no induration, reduced erythema compare to last visit. Wound #1     Left, Medial, Dorsal Foot is a Munoz Grade 2 Diabetic Ulcer and has received an outcome of Resolved.  Subsequent wound encounter Right, Lateral Heel is an Unstageable Pressure Injury Obscured full-thickness skin and tissue loss Pressure Ulcer and has received a status of Not Healed.  Subsequent wound encounter measurements are 2.2cm length x 2.5cm width x 0.2cm depth, with an area of I have ordered bone scan and labs to rule out osteomyelitis in this wound.  Patient did not follow with the recommendation discussed with patient that the reason behind ordering those diagnostic test to rule out bone infection which may compromise his wound

## 2018-07-24 ENCOUNTER — APPOINTMENT (OUTPATIENT)
Dept: WOUND CARE | Facility: HOSPITAL | Age: 68
End: 2018-07-24
Payer: COMMERCIAL

## 2018-07-25 ENCOUNTER — OFFICE VISIT (OUTPATIENT)
Dept: PODIATRY CLINIC | Facility: CLINIC | Age: 68
End: 2018-07-25
Payer: COMMERCIAL

## 2018-07-25 DIAGNOSIS — E08.621 DIABETIC ULCER OF RIGHT HEEL ASSOCIATED WITH DIABETES MELLITUS DUE TO UNDERLYING CONDITION, WITH FAT LAYER EXPOSED (HCC): ICD-10-CM

## 2018-07-25 DIAGNOSIS — S91.302D OPEN WOUND OF LEFT FOOT, SUBSEQUENT ENCOUNTER: ICD-10-CM

## 2018-07-25 DIAGNOSIS — E11.59 TYPE 2 DIABETES MELLITUS WITH OTHER CIRCULATORY COMPLICATION, UNSPECIFIED WHETHER LONG TERM INSULIN USE (HCC): Primary | ICD-10-CM

## 2018-07-25 DIAGNOSIS — L97.412 DIABETIC ULCER OF RIGHT HEEL ASSOCIATED WITH DIABETES MELLITUS DUE TO UNDERLYING CONDITION, WITH FAT LAYER EXPOSED (HCC): ICD-10-CM

## 2018-07-25 PROCEDURE — 99213 OFFICE O/P EST LOW 20 MIN: CPT | Performed by: PODIATRIST

## 2018-07-25 RX ORDER — UREA 20 %
CREAM (GRAM) TOPICAL
Refills: 0 | Status: ON HOLD | COMMUNITY
Start: 2018-06-21 | End: 2018-07-28

## 2018-07-26 ENCOUNTER — TELEPHONE (OUTPATIENT)
Dept: PODIATRY CLINIC | Facility: CLINIC | Age: 68
End: 2018-07-26

## 2018-07-26 ENCOUNTER — APPOINTMENT (OUTPATIENT)
Dept: WOUND CARE | Facility: HOSPITAL | Age: 68
End: 2018-07-26
Attending: NURSE PRACTITIONER
Payer: COMMERCIAL

## 2018-07-26 NOTE — TELEPHONE ENCOUNTER
appt 7-25-18, first available appt to see dr Jj Estrella is 8-8-18. Is it ok to wait? Pt's next appt with connor is 8-1-18.   Call to advise

## 2018-07-26 NOTE — PROGRESS NOTES
Chon Foote is a 74year old male. Patient presents with:  Wound: bilateral feet -- Rates pain 3/10 in right foot and 0/10 in left foot. States he has drainage from right foot. Denies drainage from left foot. Denies any fever or chills. BG was 143. disease) [facility])    • Coronary atherosclerosis    • Diabetes ([facility] [address]    • Esophageal reflux     pt. states gets \"heartburn from diabetic med and since starting metoprolol   • Essential hypertension    • Glaucoma     had 2 years ago \"cleared Up\"   • Hearing no apparent distress  EXTREMITIES:   1.  Integument: The skin on the right foot is thin shiny atrophic he has an ulceration of the heel which is now improved dramatically there is mild odor present and in addition to that there is some necrotic fibroadipose

## 2018-07-27 ENCOUNTER — APPOINTMENT (OUTPATIENT)
Dept: GENERAL RADIOLOGY | Facility: HOSPITAL | Age: 68
DRG: 580 | End: 2018-07-27
Attending: EMERGENCY MEDICINE
Payer: COMMERCIAL

## 2018-07-27 ENCOUNTER — HOSPITAL ENCOUNTER (INPATIENT)
Facility: HOSPITAL | Age: 68
LOS: 5 days | Discharge: HOME | DRG: 580 | End: 2018-08-02
Attending: EMERGENCY MEDICINE | Admitting: HOSPITALIST
Payer: COMMERCIAL

## 2018-07-27 ENCOUNTER — APPOINTMENT (OUTPATIENT)
Dept: CT IMAGING | Facility: HOSPITAL | Age: 68
DRG: 580 | End: 2018-07-27
Attending: EMERGENCY MEDICINE
Payer: COMMERCIAL

## 2018-07-27 DIAGNOSIS — L03.115 CELLULITIS OF RIGHT LOWER EXTREMITY: Primary | ICD-10-CM

## 2018-07-27 LAB
ANION GAP SERPL CALC-SCNC: 12 MMOL/L (ref 0–18)
BACTERIA UR QL AUTO: NEGATIVE /HPF
BASOPHILS # BLD: 0 K/UL (ref 0–0.2)
BASOPHILS NFR BLD: 0 %
BILIRUB UR QL: NEGATIVE
BUN SERPL-MCNC: 15 MG/DL (ref 8–20)
BUN/CREAT SERPL: 9.9 (ref 10–20)
CALCIUM SERPL-MCNC: 9.9 MG/DL (ref 8.5–10.5)
CHLORIDE SERPL-SCNC: 100 MMOL/L (ref 95–110)
CLARITY UR: CLEAR
CO2 SERPL-SCNC: 22 MMOL/L (ref 22–32)
COLOR UR: YELLOW
CREAT SERPL-MCNC: 1.51 MG/DL (ref 0.5–1.5)
CRP SERPL-MCNC: 18.4 MG/DL (ref 0–0.9)
EOSINOPHIL # BLD: 0 K/UL (ref 0–0.7)
EOSINOPHIL NFR BLD: 0 %
ERYTHROCYTE [DISTWIDTH] IN BLOOD BY AUTOMATED COUNT: 15.7 % (ref 11–15)
ERYTHROCYTE [SEDIMENTATION RATE] IN BLOOD: 87 MM/HR (ref 0–20)
GLUCOSE BLDC GLUCOMTR-MCNC: 281 MG/DL (ref 70–99)
GLUCOSE SERPL-MCNC: 386 MG/DL (ref 70–99)
GLUCOSE UR-MCNC: >=500 MG/DL
HCT VFR BLD AUTO: 37.5 % (ref 41–52)
HGB BLD-MCNC: 12.3 G/DL (ref 13.5–17.5)
KETONES UR-MCNC: 20 MG/DL
LACTATE SERPL-SCNC: 1.8 MMOL/L (ref 0.5–2.2)
LEUKOCYTE ESTERASE UR QL STRIP.AUTO: NEGATIVE
LYMPHOCYTES # BLD: 0.5 K/UL (ref 1–4)
LYMPHOCYTES NFR BLD: 3 %
MCH RBC QN AUTO: 27.5 PG (ref 27–32)
MCHC RBC AUTO-ENTMCNC: 32.8 G/DL (ref 32–37)
MCV RBC AUTO: 83.9 FL (ref 80–100)
MONOCYTES # BLD: 1 K/UL (ref 0–1)
MONOCYTES NFR BLD: 6 %
NEUTROPHILS # BLD AUTO: 17 K/UL (ref 1.8–7.7)
NEUTROPHILS NFR BLD: 92 %
NITRITE UR QL STRIP.AUTO: NEGATIVE
OSMOLALITY UR CALC.SUM OF ELEC: 295 MOSM/KG (ref 275–295)
PH UR: 5 [PH] (ref 5–8)
PLATELET # BLD AUTO: 331 K/UL (ref 140–400)
PMV BLD AUTO: 8.9 FL (ref 7.4–10.3)
POTASSIUM SERPL-SCNC: 3.9 MMOL/L (ref 3.3–5.1)
PROT UR-MCNC: >=500 MG/DL
RBC # BLD AUTO: 4.47 M/UL (ref 4.5–5.9)
RBC #/AREA URNS AUTO: 2 /HPF
SODIUM SERPL-SCNC: 134 MMOL/L (ref 136–144)
SP GR UR STRIP: 1.02 (ref 1–1.03)
UROBILINOGEN UR STRIP-ACNC: <2
VIT C UR-MCNC: NEGATIVE MG/DL
WBC # BLD AUTO: 18.5 K/UL (ref 4–11)
WBC #/AREA URNS AUTO: <1 /HPF

## 2018-07-27 PROCEDURE — 73701 CT LOWER EXTREMITY W/DYE: CPT | Performed by: EMERGENCY MEDICINE

## 2018-07-27 PROCEDURE — 73552 X-RAY EXAM OF FEMUR 2/>: CPT | Performed by: EMERGENCY MEDICINE

## 2018-07-27 PROCEDURE — 99223 1ST HOSP IP/OBS HIGH 75: CPT | Performed by: HOSPITALIST

## 2018-07-27 PROCEDURE — 73610 X-RAY EXAM OF ANKLE: CPT | Performed by: EMERGENCY MEDICINE

## 2018-07-27 PROCEDURE — 71045 X-RAY EXAM CHEST 1 VIEW: CPT | Performed by: EMERGENCY MEDICINE

## 2018-07-27 RX ORDER — ACETAMINOPHEN 325 MG/1
650 TABLET ORAL ONCE
Status: COMPLETED | OUTPATIENT
Start: 2018-07-27 | End: 2018-07-27

## 2018-07-27 RX ORDER — INSULIN ASPART 100 [IU]/ML
10 INJECTION, SOLUTION INTRAVENOUS; SUBCUTANEOUS ONCE
Status: COMPLETED | OUTPATIENT
Start: 2018-07-27 | End: 2018-07-27

## 2018-07-27 RX ORDER — ONDANSETRON 2 MG/ML
4 INJECTION INTRAMUSCULAR; INTRAVENOUS ONCE
Status: COMPLETED | OUTPATIENT
Start: 2018-07-27 | End: 2018-07-27

## 2018-07-27 NOTE — TELEPHONE ENCOUNTER
S/w kat at Dr. Viki Apley office and she states he is on vacation until 8/7 and that's why pt cannot be seen sooner. She can put him with his partner Dr. Darryle Roosevelt on 7/30/18 @ 130pm, at [address] [facility] [address]. She wants LOV/any labs etc faxed to [phone].  Norma

## 2018-07-27 NOTE — TELEPHONE ENCOUNTER
This patient has a heel ulcer which is not improving and has critical limb ischemia he must see  per week as soon as possible this must be made known to his staff.   If necessary just text me the phone number or call me with the phone number to call Jerica

## 2018-07-27 NOTE — ED INITIAL ASSESSMENT (HPI)
The patient reports weakness since last night with one episode of vomiting and continued nausea. The patient denies lightheadedness, shortness of breath, or chest pain.  The patient reports intermittent right thigh pain 5/10 that is tender upon palpation de

## 2018-07-28 ENCOUNTER — APPOINTMENT (OUTPATIENT)
Dept: CT IMAGING | Facility: HOSPITAL | Age: 68
DRG: 580 | End: 2018-07-28
Attending: HOSPITALIST
Payer: COMMERCIAL

## 2018-07-28 ENCOUNTER — APPOINTMENT (OUTPATIENT)
Dept: MRI IMAGING | Facility: HOSPITAL | Age: 68
DRG: 580 | End: 2018-07-28
Attending: PODIATRIST
Payer: COMMERCIAL

## 2018-07-28 ENCOUNTER — APPOINTMENT (OUTPATIENT)
Dept: ULTRASOUND IMAGING | Facility: HOSPITAL | Age: 68
DRG: 580 | End: 2018-07-28
Attending: HOSPITALIST
Payer: COMMERCIAL

## 2018-07-28 LAB
ANION GAP SERPL CALC-SCNC: 8 MMOL/L (ref 0–18)
BASOPHILS # BLD: 0 K/UL (ref 0–0.2)
BASOPHILS NFR BLD: 0 %
BUN SERPL-MCNC: 15 MG/DL (ref 8–20)
BUN/CREAT SERPL: 10.7 (ref 10–20)
CALCIUM SERPL-MCNC: 8.9 MG/DL (ref 8.5–10.5)
CHLORIDE SERPL-SCNC: 101 MMOL/L (ref 95–110)
CO2 SERPL-SCNC: 24 MMOL/L (ref 22–32)
CREAT SERPL-MCNC: 1.4 MG/DL (ref 0.5–1.5)
EOSINOPHIL # BLD: 0 K/UL (ref 0–0.7)
EOSINOPHIL NFR BLD: 0 %
ERYTHROCYTE [DISTWIDTH] IN BLOOD BY AUTOMATED COUNT: 15.8 % (ref 11–15)
GLUCOSE BLDC GLUCOMTR-MCNC: 208 MG/DL (ref 70–99)
GLUCOSE BLDC GLUCOMTR-MCNC: 214 MG/DL (ref 70–99)
GLUCOSE BLDC GLUCOMTR-MCNC: 272 MG/DL (ref 70–99)
GLUCOSE BLDC GLUCOMTR-MCNC: 304 MG/DL (ref 70–99)
GLUCOSE SERPL-MCNC: 262 MG/DL (ref 70–99)
HBA1C MFR BLD: 10.4 % (ref 4–6)
HCT VFR BLD AUTO: 31.7 % (ref 41–52)
HGB BLD-MCNC: 10.5 G/DL (ref 13.5–17.5)
LYMPHOCYTES # BLD: 0.4 K/UL (ref 1–4)
LYMPHOCYTES NFR BLD: 3 %
MCH RBC QN AUTO: 27.8 PG (ref 27–32)
MCHC RBC AUTO-ENTMCNC: 33 G/DL (ref 32–37)
MCV RBC AUTO: 84.1 FL (ref 80–100)
MONOCYTES # BLD: 0.9 K/UL (ref 0–1)
MONOCYTES NFR BLD: 6 %
NEUTROPHILS # BLD AUTO: 12.8 K/UL (ref 1.8–7.7)
NEUTROPHILS NFR BLD: 90 %
OSMOLALITY UR CALC.SUM OF ELEC: 286 MOSM/KG (ref 275–295)
PLATELET # BLD AUTO: 261 K/UL (ref 140–400)
PMV BLD AUTO: 8.7 FL (ref 7.4–10.3)
POTASSIUM SERPL-SCNC: 3.5 MMOL/L (ref 3.3–5.1)
RBC # BLD AUTO: 3.76 M/UL (ref 4.5–5.9)
SODIUM SERPL-SCNC: 133 MMOL/L (ref 136–144)
WBC # BLD AUTO: 14.1 K/UL (ref 4–11)

## 2018-07-28 PROCEDURE — 73723 MRI JOINT LWR EXTR W/O&W/DYE: CPT | Performed by: PODIATRIST

## 2018-07-28 PROCEDURE — 99232 SBSQ HOSP IP/OBS MODERATE 35: CPT | Performed by: HOSPITALIST

## 2018-07-28 PROCEDURE — 93971 EXTREMITY STUDY: CPT | Performed by: HOSPITALIST

## 2018-07-28 RX ORDER — AMLODIPINE BESYLATE 10 MG/1
10 TABLET ORAL DAILY
Status: DISCONTINUED | OUTPATIENT
Start: 2018-07-28 | End: 2018-08-02

## 2018-07-28 RX ORDER — ASPIRIN 81 MG/1
81 TABLET ORAL DAILY
Status: DISCONTINUED | OUTPATIENT
Start: 2018-07-28 | End: 2018-08-02

## 2018-07-28 RX ORDER — SODIUM CHLORIDE 9 MG/ML
INJECTION, SOLUTION INTRAVENOUS CONTINUOUS
Status: DISCONTINUED | OUTPATIENT
Start: 2018-07-28 | End: 2018-07-31

## 2018-07-28 RX ORDER — DEXTROSE MONOHYDRATE 25 G/50ML
50 INJECTION, SOLUTION INTRAVENOUS AS NEEDED
Status: DISCONTINUED | OUTPATIENT
Start: 2018-07-28 | End: 2018-07-28

## 2018-07-28 RX ORDER — ASCORBIC ACID 500 MG
500 TABLET ORAL 2 TIMES DAILY
Status: DISCONTINUED | OUTPATIENT
Start: 2018-07-28 | End: 2018-08-02

## 2018-07-28 RX ORDER — DEXTROSE MONOHYDRATE 25 G/50ML
50 INJECTION, SOLUTION INTRAVENOUS AS NEEDED
Status: DISCONTINUED | OUTPATIENT
Start: 2018-07-28 | End: 2018-08-02

## 2018-07-28 RX ORDER — HEPARIN SODIUM 5000 [USP'U]/ML
5000 INJECTION, SOLUTION INTRAVENOUS; SUBCUTANEOUS EVERY 12 HOURS SCHEDULED
Status: DISCONTINUED | OUTPATIENT
Start: 2018-07-28 | End: 2018-08-02

## 2018-07-28 RX ORDER — SODIUM CHLORIDE 0.9 % (FLUSH) 0.9 %
3 SYRINGE (ML) INJECTION AS NEEDED
Status: DISCONTINUED | OUTPATIENT
Start: 2018-07-28 | End: 2018-08-02

## 2018-07-28 RX ORDER — HYDROCODONE BITARTRATE AND ACETAMINOPHEN 5; 325 MG/1; MG/1
1 TABLET ORAL EVERY 6 HOURS PRN
Status: DISCONTINUED | OUTPATIENT
Start: 2018-07-28 | End: 2018-08-02

## 2018-07-28 RX ORDER — DOXEPIN HYDROCHLORIDE 50 MG/1
1 CAPSULE ORAL DAILY
Status: DISCONTINUED | OUTPATIENT
Start: 2018-07-28 | End: 2018-08-02

## 2018-07-28 RX ORDER — METOCLOPRAMIDE HYDROCHLORIDE 5 MG/ML
10 INJECTION INTRAMUSCULAR; INTRAVENOUS EVERY 8 HOURS PRN
Status: DISCONTINUED | OUTPATIENT
Start: 2018-07-28 | End: 2018-08-02

## 2018-07-28 RX ORDER — HYDROCODONE BITARTRATE AND ACETAMINOPHEN 7.5; 325 MG/1; MG/1
1 TABLET ORAL EVERY 6 HOURS PRN
Status: DISCONTINUED | OUTPATIENT
Start: 2018-07-28 | End: 2018-08-02

## 2018-07-28 RX ORDER — ATORVASTATIN CALCIUM 40 MG/1
40 TABLET, FILM COATED ORAL DAILY
Status: DISCONTINUED | OUTPATIENT
Start: 2018-07-28 | End: 2018-08-02

## 2018-07-28 RX ORDER — ONDANSETRON 2 MG/ML
4 INJECTION INTRAMUSCULAR; INTRAVENOUS EVERY 6 HOURS PRN
Status: DISCONTINUED | OUTPATIENT
Start: 2018-07-28 | End: 2018-08-02

## 2018-07-28 RX ORDER — CLOPIDOGREL BISULFATE 75 MG/1
75 TABLET ORAL DAILY
Status: DISCONTINUED | OUTPATIENT
Start: 2018-07-28 | End: 2018-08-02

## 2018-07-28 RX ORDER — ACETAMINOPHEN 500 MG
500 TABLET ORAL EVERY 6 HOURS PRN
Status: DISCONTINUED | OUTPATIENT
Start: 2018-07-28 | End: 2018-08-02

## 2018-07-28 NOTE — PROGRESS NOTES
[address] [facility] dosing service    Initial Pharmacokinetic Consult for Vancomycin Dosing     Thomas Laboy is a 74year old male who is being treated for cellulitis.   Pharmacy has been asked to dose Vancomycin by Dr. Fan Art    He is allergic to sulfa antibio renal function changes, toxicity and efficacy. Pharmacy will continue to follow him. We appreciate the opportunity to assist in his care.     Nisha Blum, [facility]  7/28/2018  [address] [place] [address] Extension: [phone]

## 2018-07-28 NOTE — CONSULTS
[facility] HOSP - [facility]    Report of Consultation    [place] Model Patient Status:  Inpatient    1950 MRN [id number]   Location [facility] 5SW/SE Attending Gareth Francis MD   Hosp Day # 0 PCP Moses Sharma MD     Date of Admission COLONOSCOPY  No date: COLONOSCOPY      Comment: 5 years \"was fine\"  7/6/2010: OTHER SURGICAL HISTORY      Comment: Polyps Dr.M Noemi Velez  03/05/2018: OTHER SURGICAL HISTORY      Comment: peripheral angioplasty  No date: TONSILLECTOMY  Family History   Pro glucose (DEX4) oral liquid 15 g, 15 g, Oral, Q15 Min PRN  •  potassium chloride 40 mEq in sodium chloride 0.9 % 250 mL IVPB, 40 mEq, Intravenous, Once  •  Insulin Aspart Pen (NOVOLOG) 100 UNIT/ML flexpen 1-5 Units, 1-5 Units, Subcutaneous, TID CC    Review 19: 48     Approved by (CST): Manju Neal MD on 7/27/2018 at 19:50          Us Venous Doppler Leg Right - Diag Img (cpt=93971)    Result Date: 7/28/2018  CONCLUSION:  1. Mild calf edema.  2. No evidence of right lower extremity deep venous thrombosis artery bypass graft     Stenosis of right carotid artery     Cellulitis of left foot     Type 2 diabetes mellitus with circulatory disorder (HCC)     Acute osteomyelitis of left foot (HCC)     Open wound of left foot     Diabetic foot ulcer ([facility] [address]     Cellu

## 2018-07-28 NOTE — PLAN OF CARE
Problem: Patient/Family Goals  Goal: Patient/Family Long Term Goal  Patient's Long Term Goal: To be discharged    Interventions:  - Administer meds as prescribed   - See additional Care Plan goals for specific interventions   Outcome: Progressing    Goal: Progressing      Problem: SAFETY ADULT - FALL  Goal: Free from fall injury  INTERVENTIONS:  - Assess pt frequently for physical needs  - Identify cognitive and physical deficits and behaviors that affect risk of falls.   - [place] fall precautions as sonya

## 2018-07-28 NOTE — PROGRESS NOTES
Reviewed Dr. Blayne Rodriguez notes and he was concerned about limb ischemia and the possibility that the angioplasty had failed. Patient had apt with Dr. En Murillo on 7/30 to evaluate and possibly do another angio.   However since patient spiked temp he came in for a

## 2018-07-28 NOTE — ED PROVIDER NOTES
Patient Seen in: [facility] Emergency Department    History   Patient presents with:  Fatigue (constitutional, neurologic)    Stated Complaint: weakness, nausea X1 day    HPI    31-year-old male with history of diabetes and lower extremity ulcers day  Other systems are as noted in HPI. Constitutional and vital signs reviewed. All other systems reviewed and negative except as noted above.     Physical Exam   ED Triage Vitals  BP: (!) 193/77 [07/27/18 1801]  Pulse: 117 [07/27/18 1800]  Resp: 18 and vitals reviewed. Differential diagnosis includes sepsis and bacteremia, right lower leg and wound infection, UTI, pneumonia.       ED Course     Labs Reviewed   BASIC METABOLIC PANEL (8) - Abnormal; Notable for the following:        Result Value    G RAINBOW DRAW GOLD   RAINBOW DRAW LAVENDER TALL (BNP)   BLOOD CULTURE   BLOOD CULTURE     EKG    Rate, intervals and axes as noted on EKG Report.   Rate: 118  Rhythm: Sinus Rhythm  Reading: Right bundle branch block which appears old, no obvious acute isch 7/27/2018 at 19:48     Approved by (CST): Christiane Pickering MD on 7/27/2018 at 19:50          Xr Chest Ap Portable  (cpt=71045)    Result Date: 7/27/2018  PROCEDURE: XR CHEST AP PORTABLE (CPT=71010) TIME: 1859.    COMPARISON: [facility] the medial left apex without radiographically evident acute intrathoracic process.     Dictated by (CST): Elizabeth Helms MD on 7/27/2018 at 19:27     Approved by (CST): Elizabeth Helms MD on 7/27/2018 at 19:29            CT scan of the right hip and lower pressure.     Medications Prescribed:  Current Discharge Medication List        Present on Admission  Date Reviewed: 7/25/2018          ICD-10-CM Noted POA    Cellulitis of right lower extremity L03.115 7/27/2018 Unknown

## 2018-07-28 NOTE — PROGRESS NOTES
ADMISSION NOTE    74year old male diabetic with chronic foot ucler and  New pain in the R thigh and redness of the lower leg. He has also been weak. . Available medical records partially reviewed. Dictation to follow.     Jakob Torres M.D.  7/27/20

## 2018-07-28 NOTE — ED NOTES
Patient is safe to transport to floor per MD. [place] RN, [place] [id number]. Transport via cart will be requested when room is ready.

## 2018-07-28 NOTE — H&P
[facility]    PATIENT'S NAME: Sukhedie Bebe   ATTENDING PHYSICIAN: Reji Lee MD   PATIENT ACCOUNT#:   [id number]    LOCATION:  [address] [place] [address] #:   [id number]       YOB: 1950  ADMISSION DATE:       07/2 formation noted along the anterior medial wall of the acetabulum, suggesting a healed nondisplaced fracture. The hip joint appeared intact. There were no acute fractures identified in the femur, knee joint, lower extremity ankle, or foot.   There was an e Paroxysmal atrial fibrillation, for the most part, maintained in sinus rhythm on amiodarone. ALLERGIES:  The patient has a questionable allergy to sulfa, which was initially reported in his childhood and he does not recall much about it.   States he has 140/70, O2 saturation of 97%. GENERAL:  The patient was awake and alert. He did not complain of pain in the right foot, but rather of pain in the medial aspect of his right thigh.   He stated that this had improved since he received pain medication in t osteomyelitis or fracture, mild osteoarthritis in the ankle, generalized atherosclerotic vascular calcifications. ASSESSMENT AND PLAN:    1. Cellulitis, with probable lymphangitic spread of the right lower extremity.   The patient has a chronic nonhe amiodarone previously. We will review the medical record to determine whether or not he is on it currently, as it is not listed as one of his home medications. The patient's current clinical status and proposed treatment plan was discussed with him.

## 2018-07-29 LAB
ANION GAP SERPL CALC-SCNC: 5 MMOL/L (ref 0–18)
BASOPHILS # BLD: 0 K/UL (ref 0–0.2)
BASOPHILS NFR BLD: 0 %
BUN SERPL-MCNC: 13 MG/DL (ref 8–20)
BUN/CREAT SERPL: 9.6 (ref 10–20)
CALCIUM SERPL-MCNC: 9.2 MG/DL (ref 8.5–10.5)
CHLORIDE SERPL-SCNC: 105 MMOL/L (ref 95–110)
CO2 SERPL-SCNC: 23 MMOL/L (ref 22–32)
CREAT SERPL-MCNC: 1.35 MG/DL (ref 0.5–1.5)
EOSINOPHIL # BLD: 0.1 K/UL (ref 0–0.7)
EOSINOPHIL NFR BLD: 1 %
ERYTHROCYTE [DISTWIDTH] IN BLOOD BY AUTOMATED COUNT: 15.5 % (ref 11–15)
GLUCOSE BLDC GLUCOMTR-MCNC: 213 MG/DL (ref 70–99)
GLUCOSE BLDC GLUCOMTR-MCNC: 243 MG/DL (ref 70–99)
GLUCOSE BLDC GLUCOMTR-MCNC: 306 MG/DL (ref 70–99)
GLUCOSE SERPL-MCNC: 200 MG/DL (ref 70–99)
HCT VFR BLD AUTO: 34.1 % (ref 41–52)
HGB BLD-MCNC: 11.4 G/DL (ref 13.5–17.5)
LYMPHOCYTES # BLD: 0.7 K/UL (ref 1–4)
LYMPHOCYTES NFR BLD: 6 %
MCH RBC QN AUTO: 28.2 PG (ref 27–32)
MCHC RBC AUTO-ENTMCNC: 33.5 G/DL (ref 32–37)
MCV RBC AUTO: 84.2 FL (ref 80–100)
MONOCYTES # BLD: 1.1 K/UL (ref 0–1)
MONOCYTES NFR BLD: 8 %
NEUTROPHILS # BLD AUTO: 11.3 K/UL (ref 1.8–7.7)
NEUTROPHILS NFR BLD: 85 %
OSMOLALITY UR CALC.SUM OF ELEC: 282 MOSM/KG (ref 275–295)
PLATELET # BLD AUTO: 254 K/UL (ref 140–400)
PMV BLD AUTO: 8.5 FL (ref 7.4–10.3)
POTASSIUM SERPL-SCNC: 3.8 MMOL/L (ref 3.3–5.1)
POTASSIUM SERPL-SCNC: 3.8 MMOL/L (ref 3.3–5.1)
RBC # BLD AUTO: 4.05 M/UL (ref 4.5–5.9)
SODIUM SERPL-SCNC: 133 MMOL/L (ref 136–144)
WBC # BLD AUTO: 13.3 K/UL (ref 4–11)

## 2018-07-29 PROCEDURE — 99232 SBSQ HOSP IP/OBS MODERATE 35: CPT | Performed by: HOSPITALIST

## 2018-07-29 RX ORDER — POTASSIUM CHLORIDE 20 MEQ/1
40 TABLET, EXTENDED RELEASE ORAL ONCE
Status: COMPLETED | OUTPATIENT
Start: 2018-07-29 | End: 2018-07-29

## 2018-07-29 NOTE — PROGRESS NOTES
[facility]    Progress Note    Fatoumataquinn CrumpOrtiz Patient Status:  Inpatient    1950 MRN [id number]   Location [facility] 5SW/SE Attending Samia Madison MD   Hosp Day # 1 PCP Lexus Gonzalez MD       Subjective:   Juana Dugan Go dextrose 5 % 100 mL ADD-vantage, 3.375 g, Intravenous, Q8H  •  Heparin Sodium (Porcine) 5000 UNIT/ML injection 5,000 Units, 5,000 Units, Subcutaneous, 2 times per day  •  AmLODIPine Besylate (NORVASC) tab 10 mg, 10 mg, Oral, Daily  •  aspirin EC tab 81 mg, 27.5  27.8  28.2   MCHC  32.8  33.0  33.5   RDW  15.7*  15.8*  15.5*   WBC  18.5*  14.1*  13.3*   PLT  331  261  254         Recent Labs   Lab  07/27/18   1817  07/28/18   0508  07/29/18   0516   GLU  386*  262*  200*   BUN  15  15  13   CREATSERUM  1.51* Dictated by (CST): Kathie Nixon MD on 7/28/2018 at 6:33     Approved by (CST): Kathie Nixon MD on 7/28/2018 at 6:35          Xr Chest Ap Portable  (cpt=71045)    Result Date: 7/27/2018  CONCLUSION:  Stable postthoracotomy chest demonstrating po

## 2018-07-29 NOTE — PROGRESS NOTES
[facility]    Progress Note    Huntington Model Patient Status:  Inpatient    1950 MRN [id number]   Location [facility] 5SW/SE Attending Gareth Francis MD   Hosp Day # 1 PCP Moses Sharma MD     History:  Past Medical His Continuous  •  ondansetron HCl (ZOFRAN) injection 4 mg, 4 mg, Intravenous, Q6H PRN  •  Metoclopramide HCl (REGLAN) injection 10 mg, 10 mg, Intravenous, Q8H PRN  •  Piperacillin Sod-Tazobactam So (ZOSYN) 3.375 g in dextrose 5 % 100 mL ADD-vantage, 3.375 g, l    Objective: Today reviewed the MRI findings negative for osteomyelitis of the right calcaneus and in addition to that there is a stress fracture of the medial malleolus.     Xr Ankle (min 3 Views), Right (cpt=73610)    Result Date: 7/27/2018  CONCLUSIO postoperative changes at the medial left apex without radiographically evident acute intrathoracic process.     Dictated by (CST): Giovany Brady MD on 7/27/2018 at 19:27     Approved by (CST): Giovany Brady MD on 7/27/2018 at 19:29          Ct Lower Ext right foot with 50% weight PT order put in will await Doppler results which is going to be done tomorrow. Reviewed all the above with the patient physical therapy was ordered.     Elizabeth Brown DPM   7/29/2018  10:45 AM

## 2018-07-29 NOTE — PLAN OF CARE
Problem: Patient Centered Care  Goal: Patient preferences are identified and integrated in the patient's plan of care  Interventions:  - What would you like us to know as we care for you?  Keep my wife involved in my care  - Provide timely, complete, and ac Consider cultural and social influences on pain and pain management  - Manage/alleviate anxiety  - Utilize distraction and/or relaxation techniques  - Monitor for opioid side effects  - Notify MD/LIP if interventions unsuccessful or patient reports new villa

## 2018-07-30 ENCOUNTER — APPOINTMENT (OUTPATIENT)
Dept: ULTRASOUND IMAGING | Facility: HOSPITAL | Age: 68
DRG: 580 | End: 2018-07-30
Attending: PODIATRIST
Payer: COMMERCIAL

## 2018-07-30 ENCOUNTER — APPOINTMENT (OUTPATIENT)
Dept: CT IMAGING | Facility: HOSPITAL | Age: 68
DRG: 580 | End: 2018-07-30
Attending: INTERNAL MEDICINE
Payer: COMMERCIAL

## 2018-07-30 ENCOUNTER — PRIOR ORIGINAL RECORDS (OUTPATIENT)
Dept: OTHER | Age: 68
End: 2018-07-30

## 2018-07-30 LAB
ANION GAP SERPL CALC-SCNC: 13 MMOL/L (ref 0–18)
BASOPHILS # BLD: 0 K/UL (ref 0–0.2)
BASOPHILS NFR BLD: 0 %
BUN SERPL-MCNC: 10 MG/DL (ref 8–20)
BUN/CREAT SERPL: 8.1 (ref 10–20)
CALCIUM SERPL-MCNC: 9.2 MG/DL (ref 8.5–10.5)
CHLORIDE SERPL-SCNC: 103 MMOL/L (ref 95–110)
CO2 SERPL-SCNC: 20 MMOL/L (ref 22–32)
CREAT SERPL-MCNC: 1.24 MG/DL (ref 0.5–1.5)
EOSINOPHIL # BLD: 0.1 K/UL (ref 0–0.7)
EOSINOPHIL NFR BLD: 1 %
ERYTHROCYTE [DISTWIDTH] IN BLOOD BY AUTOMATED COUNT: 15.4 % (ref 11–15)
GLUCOSE BLDC GLUCOMTR-MCNC: 209 MG/DL (ref 70–99)
GLUCOSE BLDC GLUCOMTR-MCNC: 209 MG/DL (ref 70–99)
GLUCOSE BLDC GLUCOMTR-MCNC: 242 MG/DL (ref 70–99)
GLUCOSE BLDC GLUCOMTR-MCNC: 303 MG/DL (ref 70–99)
GLUCOSE SERPL-MCNC: 217 MG/DL (ref 70–99)
HCT VFR BLD AUTO: 31.3 % (ref 41–52)
HGB BLD-MCNC: 10.3 G/DL (ref 13.5–17.5)
LYMPHOCYTES # BLD: 0.5 K/UL (ref 1–4)
LYMPHOCYTES NFR BLD: 5 %
MCH RBC QN AUTO: 27.9 PG (ref 27–32)
MCHC RBC AUTO-ENTMCNC: 32.9 G/DL (ref 32–37)
MCV RBC AUTO: 84.9 FL (ref 80–100)
MONOCYTES # BLD: 1.1 K/UL (ref 0–1)
MONOCYTES NFR BLD: 10 %
NEUTROPHILS # BLD AUTO: 8.4 K/UL (ref 1.8–7.7)
NEUTROPHILS NFR BLD: 83 %
OSMOLALITY UR CALC.SUM OF ELEC: 288 MOSM/KG (ref 275–295)
PLATELET # BLD AUTO: 262 K/UL (ref 140–400)
PMV BLD AUTO: 8.5 FL (ref 7.4–10.3)
POTASSIUM SERPL-SCNC: 3.7 MMOL/L (ref 3.3–5.1)
RBC # BLD AUTO: 3.69 M/UL (ref 4.5–5.9)
SODIUM SERPL-SCNC: 136 MMOL/L (ref 136–144)
VANCOMYCIN TROUGH SERPL-MCNC: 9.6 MCG/ML (ref 10–20)
WBC # BLD AUTO: 10.2 K/UL (ref 4–11)

## 2018-07-30 PROCEDURE — 73706 CT ANGIO LWR EXTR W/O&W/DYE: CPT | Performed by: INTERNAL MEDICINE

## 2018-07-30 PROCEDURE — 93926 LOWER EXTREMITY STUDY: CPT | Performed by: PODIATRIST

## 2018-07-30 PROCEDURE — 99233 SBSQ HOSP IP/OBS HIGH 50: CPT | Performed by: HOSPITALIST

## 2018-07-30 RX ORDER — LISINOPRIL 2.5 MG/1
2.5 TABLET ORAL DAILY
Status: DISCONTINUED | OUTPATIENT
Start: 2018-07-30 | End: 2018-08-02

## 2018-07-30 NOTE — CONSULTS
[facility]    PATIENT'S NAME: Bryan Man A   ATTENDING PHYSICIAN: Biju Hong MD   CONSULTING PHYSICIAN: Trupti Hutchison.  Pam Alfaro MD   PATIENT ACCOUNT#:   [id number]    LOCATION:  [address] [place] [address] #:   [id number]       DATE OF BIRTH:  [birth year] respirations 20 (breathing unlabored), afebrile. HEENT:  Unremarkable. NECK:  Supple. Jugular venous pressure normal.  Carotids are normal with bilateral bruits. No thyromegaly. LUNGS:  Clear. HEART:  S1, S2 normal.  No gallop, murmur, rub, or click.

## 2018-07-30 NOTE — HISTORICAL OFFICE NOTE
Christos Yuen  : 1950  ACCOUNT:  [id number]  [phone]  PCP: Dr. Raúl Hanley     TODAY'S DATE: 2018  DICTATED BY:  [Dr. Abraham Alcantar  She is here for follow-up appointment. His left wound has healed completely.   He has nonhealing ulcer on the

## 2018-07-30 NOTE — PLAN OF CARE
Problem: Patient Centered Care  Goal: Patient preferences are identified and integrated in the patient's plan of care  Interventions:  - What would you like us to know as we care for you?  Keep my wife involved in my care  - Provide timely, complete, and ac cultural and social influences on pain and pain management  - Manage/alleviate anxiety  - Utilize distraction and/or relaxation techniques  - Monitor for opioid side effects  - Notify MD/LIP if interventions unsuccessful or patient reports new pain  - Anti

## 2018-07-30 NOTE — DIABETES ED
[facility] HOSP - [facility]    Diabetes Education  Note    Debbie Landa Patient Status:  Inpatient   1950 MRN [id number]  Location [facility] 5SW/SE Attending Giulia Mclean MD  Hosp Day # 2 PCP Kalyan Slade MD    Reason for Visit: E

## 2018-07-30 NOTE — CONSULTS
Cardiology (consult dictated)    Assessment:  1. Nonhealing ulcer of right foot. 2.  Severe PVD, status post PTA of left leg March of this year and PTA of right leg May 25, 2018. 3.  Admitted with fever and weakness, found to have cellulitis.     4.

## 2018-07-30 NOTE — PAYOR COMM NOTE
--------------  ADMISSION REVIEW     Payor: Sharon Hyatt #:  [id number]  Authorization Number: N/A    Admit date: 7/28/18  Admit time: [address]       Admitting Physician: Ju Hernandez MD  Attending Physician:  Yesenia Cash 29-year-old male with history of diabetes and lower extremity ulcers with recurrent right heel ulcer being managed by wound care who has had trouble walking and felt weak since yesterday with a fever today at home. He reports some nausea and vomiting.   No BP: (!) 193/77 [07/27/18 1801]  Pulse: 117 [07/27/18 1800]  Resp: 18 [07/27/18 1801]  Temp: 100.4 °F (38 °C) [07/27/18 1801]  Temp src: Oral [07/27/18 1801]  SpO2: 93 % [07/27/18 1800]  O2 Device: None (Room air) [07/27/18 1801]    Current:/72   Puls BASIC METABOLIC PANEL (8) - Abnormal; Notable for the following:        Result Value    Glucose 386 (*)     Sodium 134 (*)     Creatinine 1.51 (*)     BUN/CREA Ratio 9.9 (*)     GFR, Non- 48 (*)     GFR, -American 55 (*)     All othe Rhythm: Sinus Rhythm  Reading: Right bundle branch block which appears old, no obvious acute ischemia           ED Course as of Jul 27 2240  ------------------------------------------------------------    Xr Ankle (min 3 Views), Right (cpt=73847)    Result PROCEDURE: XR CHEST AP PORTABLE (CPT=71010) TIME: 1859. COMPARISON: [facility], XR CHEST PA + LAT CHEST (HLR=39103), 3/21/2017, 11:22. [facility], CT CHEST (CPT=71250), 8/21/2017, 18:32.   [facility], XR MARTY CONCLUSION:  Stable postthoracotomy chest demonstrating postoperative changes at the medial left apex without radiographically evident acute intrathoracic process.     Dictated by (CST): Andree Saleh MD on 7/27/2018 at 19:27     Approved by (CST): [facility], We recommend that you schedule follow up care with a primary care provider within the next three months to obtain basic health screening including reassessment of your blood pressure.     Medications Prescribed:  Current Discharge Medication List        Pre HISTORY OF PRESENT ILLNESS:  This is a 79-year-old gentleman with a past medical history of multiple lower extremity ulcers, with peripheral artery disease, paroxysmal atrial fibrillation, and coronary artery disease, status post coronary artery bypass gra No abnormal fluid collection. There was mild subcutaneous edema along the anterior knee, posterior calf, ankle, and heel. No visualized fluid or air collection in the soft tissues of the right lower extremity ankle or heel.   The patient was started on IV MEDICATIONS:  Prior to admission, amlodipine 10 mg p.o. daily, aspirin 81 mg p.o. daily, atorvastatin 40 mg p.o. daily, Plavix 75 mg p.o. daily, metoprolol 25 mg twice a day, multivitamin once a day, vitamin C 500 mg 3 times a day, glipizide 10 mg 2 times HEENT:  Normocephalic, atraumatic. Pupils equal, reactive to light. Sclerae anicteric. There was no sinus tenderness. Mucous membranes were slightly dry. NECK:  Supple. LUNGS:  Decreased breath sounds bilaterally, with prolonged expiratory phase.   HE 1.   Cellulitis, with probable lymphangitic spread of the right lower extremity. The patient has a chronic nonhealing diabetic ulcer. We will place on IV Zosyn and vancomycin. Blood cultures were sent. 2.   Peripheral vascular disease.   The patient und 10.   History of paroxysmal atrial fibrillation, currently in sinus rhythm. We will monitor. The patient had been on amiodarone previously.   We will review the medical record to determine whether or not he is on it currently, as it is not listed as one o 7/29/2018 2158 Given 6 Units Subcutaneous (Right Lower Abdomen) Mya Lara RN    7/29/2018 1723 Given 4 Units Subcutaneous (Left Upper Arm) Basilio Abreu RN    7/29/2018 1330 Given 3 Units Subcutaneous (Right Upper Arm) Basilio Abreu RN Progress Notes signed by Yamilet Gibson MD at 7/28/2018  1:29 PM     Author: Yamilet Gbison MD Service: Hospitalist Author Type: Physician   Filed: 7/28/2018  1:29 PM Date of Service: 7/28/2018  9:42 AM Status: Signed   : Allyssa Frederick •  Normal Saline Flush 0.9 % injection 3 mL, 3 mL, Intravenous, PRN  •  0.9%  NaCl infusion, , Intravenous, Continuous  •  ondansetron HCl (ZOFRAN) injection 4 mg, 4 mg, Intravenous, Q6H PRN  •  Metoclopramide HCl (REGLAN) injection 10 mg, 10 mg, Intraveno HGB  12.3*  10.5*   HCT  37.5*  31.7*   MCV  83.9  84.1   MCH  27.5  27.8   MCHC  32.8  33.0   RDW  15.7*  15.8*   WBC  18.5*  14.1*   PLT  331  261                 Recent Labs   Lab  07/27/18   1817  07/28/18   0508   GLU  386*  262*   BUN  15  15   CREAT CONCLUSION:  1. No evidence for osteomyelitis or abscess. 2. No occult fracture. 3. A 2.6 cm ulcerations dorsal lateral aspect of calcaneus-extends within 5 mm from bone. 4. Edema and right lower leg, ankle and foot superficial soft tissues.  5. Varicose ve Intake/Output Summary (Last 24 hours) at 07/29/18 1446  Last data filed at 07/29/18 1400    Gross per 24 hour   Intake          3579.17 ml   Output             1975 ml   Net          1604.17 ml      Wt Readings from Last 3 Encounters:  07/28/18 : 179 lb 14 •  dextrose 50 % injection 50 mL, 50 mL, Intravenous, PRN  •  Glucose-Vitamin C (DEX-4) 4-0.006 g chewable tab 4 tablet, 4 tablet, Oral, Q15 Min PRN  •  glucose (DEX4) oral liquid 15 g, 15 g, Oral, Q15 Min PRN  •  Insulin Aspart Pen (NOVOLOG) 100 UNIT/ML f CONCLUSION:  1. No evidence for osteomyelitis or fracture. 2. Mild osteoarthritis of the ankle. 3. Generalized atherosclerotic vascular calcification.      Dictated by (CST): Maribel Rios MD on 7/27/2018 at 19:50     Approved by (CST): Maribel Rios MD o Ct Lower Ext Right (w Iv) Em (cpt=73701)     Result Date: 7/28/2018  CONCLUSION:  1. No evidence for osteomyelitis or abscess. 2. No occult fracture. 3. A 2.6 cm ulcerations dorsal lateral aspect of calcaneus-extends within 5 mm from bone.  4. Edema and rig

## 2018-07-30 NOTE — PHYSICAL THERAPY NOTE
PHYSICAL THERAPY EVALUATION - INPATIENT     Room Number: 525/525-A  Evaluation Date: 7/30/2018  Type of Evaluation: Initial   Physician Order: PT Eval and Treat    Presenting Problem: R heel ulcer wth wound care management, medial mallelolus stress fx, 50 conservation;Patient education; Family education;Gait training;Range of motion;Strengthening;Stoop training;Transfer training;Balance training  Rehab Potential : Good  Frequency (Obs): 5x/week       PHYSICAL THERAPY MEDICAL/SOCIAL HISTORY     History relate Past Surgical History  Past Surgical History:  10/12/2017: ANESTH,OPEN HEART SURGERY      Comment: triple bypass  03/2018: ANGIOPLASTY (CORONARY) Left  7/6/15: COLONOSCOPY  No date: COLONOSCOPY      Comment: 5 years \"was fine\"  7/6/2010: OTHER SURG lying on back to sitting on the side of the bed?: None   How much help from another person does the patient currently need. ..   -   Moving to and from a bed to a chair (including a wheelchair)?: None   -   Need to walk in hospital room?: A Little   -   Cli

## 2018-07-30 NOTE — PROGRESS NOTES
07/30/18  0826   BP: 147/68   Pulse: 72   Resp: 18   Temp: 99 °F (37.2 °C)   Patient was seen resting comfortably in a chair with his feet elevated temperature mildly elevated but no fever or chills.     Objective:  PROCEDURE:  US ARTERIAL DUPLEX LOWER EXT interventional cardiology for [facility]. Assessment: Heel ulcer right lower extremity with PAD    Plan: Today discussed the above with the patient will await further input from interventional cardiology.

## 2018-07-30 NOTE — PROGRESS NOTES
[facility]    Progress Note    Wicho Hirsch Patient Status:  Inpatient    1950 MRN [id number]   Location [facility] 5SW/SE Attending Keri Heaton MD   Hosp Day # 2 PCP Dain Dodge MD       Subjective:   Jessica Ca Go Heparin Sodium (Porcine) 5000 UNIT/ML injection 5,000 Units, 5,000 Units, Subcutaneous, 2 times per day  •  AmLODIPine Besylate (NORVASC) tab 10 mg, 10 mg, Oral, Daily  •  aspirin EC tab 81 mg, 81 mg, Oral, Daily  •  atorvastatin (LIPITOR) tab 40 mg, 40 mg over half time in face-to-face discussion of further evaluation and therapy. I also spoke with Cardiology and Podiatry.      Results:     Recent Labs   Lab  07/28/18   0508  07/29/18   0516  07/30/18   0535   RBC  3.76*  4.05*  3.69*   HGB  10.5*  11.4*  10

## 2018-07-31 ENCOUNTER — APPOINTMENT (OUTPATIENT)
Dept: WOUND CARE | Facility: HOSPITAL | Age: 68
End: 2018-07-31
Payer: COMMERCIAL

## 2018-07-31 LAB
ANION GAP SERPL CALC-SCNC: 8 MMOL/L (ref 0–18)
APTT PPP: 29.1 SECONDS (ref 23.2–35.3)
BASOPHILS # BLD: 0 K/UL (ref 0–0.2)
BASOPHILS NFR BLD: 0 %
BUN SERPL-MCNC: 9 MG/DL (ref 8–20)
BUN/CREAT SERPL: 8.4 (ref 10–20)
CALCIUM SERPL-MCNC: 9.3 MG/DL (ref 8.5–10.5)
CHLORIDE SERPL-SCNC: 106 MMOL/L (ref 95–110)
CO2 SERPL-SCNC: 22 MMOL/L (ref 22–32)
CREAT SERPL-MCNC: 1.07 MG/DL (ref 0.5–1.5)
EOSINOPHIL # BLD: 0.3 K/UL (ref 0–0.7)
EOSINOPHIL NFR BLD: 3 %
ERYTHROCYTE [DISTWIDTH] IN BLOOD BY AUTOMATED COUNT: 15.5 % (ref 11–15)
GLUCOSE BLDC GLUCOMTR-MCNC: 203 MG/DL (ref 70–99)
GLUCOSE BLDC GLUCOMTR-MCNC: 243 MG/DL (ref 70–99)
GLUCOSE BLDC GLUCOMTR-MCNC: 269 MG/DL (ref 70–99)
GLUCOSE BLDC GLUCOMTR-MCNC: 381 MG/DL (ref 70–99)
GLUCOSE SERPL-MCNC: 216 MG/DL (ref 70–99)
HCT VFR BLD AUTO: 32.2 % (ref 41–52)
HGB BLD-MCNC: 10.8 G/DL (ref 13.5–17.5)
INR BLD: 1.1 (ref 0.9–1.2)
LYMPHOCYTES # BLD: 0.7 K/UL (ref 1–4)
LYMPHOCYTES NFR BLD: 7 %
MCH RBC QN AUTO: 27.7 PG (ref 27–32)
MCHC RBC AUTO-ENTMCNC: 33.5 G/DL (ref 32–37)
MCV RBC AUTO: 82.9 FL (ref 80–100)
MONOCYTES # BLD: 1.3 K/UL (ref 0–1)
MONOCYTES NFR BLD: 12 %
NEUTROPHILS # BLD AUTO: 8.6 K/UL (ref 1.8–7.7)
NEUTROPHILS NFR BLD: 79 %
OSMOLALITY UR CALC.SUM OF ELEC: 287 MOSM/KG (ref 275–295)
PLATELET # BLD AUTO: 319 K/UL (ref 140–400)
PMV BLD AUTO: 8.3 FL (ref 7.4–10.3)
POTASSIUM SERPL-SCNC: 3.5 MMOL/L (ref 3.3–5.1)
POTASSIUM SERPL-SCNC: 4.2 MMOL/L (ref 3.3–5.1)
PROTHROMBIN TIME: 13.5 SECONDS (ref 11.8–14.5)
RBC # BLD AUTO: 3.89 M/UL (ref 4.5–5.9)
SODIUM SERPL-SCNC: 136 MMOL/L (ref 136–144)
WBC # BLD AUTO: 10.9 K/UL (ref 4–11)

## 2018-07-31 PROCEDURE — 99232 SBSQ HOSP IP/OBS MODERATE 35: CPT | Performed by: HOSPITALIST

## 2018-07-31 RX ORDER — 0.9 % SODIUM CHLORIDE 0.9 %
VIAL (ML) INJECTION
Status: COMPLETED
Start: 2018-07-31 | End: 2018-07-31

## 2018-07-31 RX ORDER — POTASSIUM CHLORIDE 20 MEQ/1
40 TABLET, EXTENDED RELEASE ORAL EVERY 4 HOURS
Status: COMPLETED | OUTPATIENT
Start: 2018-07-31 | End: 2018-07-31

## 2018-07-31 RX ORDER — ASPIRIN 81 MG/1
324 TABLET, CHEWABLE ORAL ONCE
Status: COMPLETED | OUTPATIENT
Start: 2018-08-01 | End: 2018-08-01

## 2018-07-31 RX ORDER — SODIUM CHLORIDE 9 MG/ML
INJECTION, SOLUTION INTRAVENOUS CONTINUOUS
Status: DISCONTINUED | OUTPATIENT
Start: 2018-08-01 | End: 2018-08-02

## 2018-07-31 NOTE — PROGRESS NOTES
[facility]    Progress Note    Lyndsay Green Patient Status:  Inpatient    1950 MRN [id number]   Location [facility] 5SW/SE Attending José Monaco MD   Hosp Day # 3 PCP Joanna Beach MD       Subjective:   Siri Maher Go mg, 4 mg, Intravenous, Q6H PRN  •  Metoclopramide HCl (REGLAN) injection 10 mg, 10 mg, Intravenous, Q8H PRN  •  Piperacillin Sod-Tazobactam So (ZOSYN) 3.375 g in dextrose 5 % 100 mL ADD-vantage, 3.375 g, Intravenous, Q8H  •  Heparin Sodium (Porcine) 5000 U DMII  COPD  Paroxysmal Afib    DVT PPx: Heparin Sq  Full Code    Results:     Recent Labs   Lab  07/29/18   0516  07/30/18   0535  07/31/18   0552   RBC  4.05*  3.69*  3.89*   HGB  11.4*  10.3*  10.8*   HCT  34.1*  31.3*  32.2*   MCV  84.2  84.9  82.9   MC extremity soft tissue swelling. Probable reactive right inguinal lymph nodes. Dictated by (CST): Elizabeth Ulloa MD on 7/30/2018 at 16:28     Approved by (CST): Elizabeth Ulloa MD on 7/30/2018 at 16:50                  Roman Osuna.  Gracie Gates MD  7/31/2018

## 2018-07-31 NOTE — PLAN OF CARE
Patient refused to sign surgical procedure consent. Patient reported that he needs the doctor to go over the procedure again for tomorrow. Patient does not want to sign consent until address by doctor.  Paged sent out to [facility] heart and awaiting for phone

## 2018-07-31 NOTE — PROGRESS NOTES
[facility] dosing service    Follow-up Pharmacokinetic Consult for Vancomycin Dosing     Deandra Porter is a 74year old male admitted on 7/27 who is being treated for cellulitis. Patient is on day 3 of Vancomycin 1.5 gm IV Q 24 hours.   Goal trough is 10 Goal trough level 10-15 ug/mL. 3.  Pharmacy will need BUN/Scr daily while on Vancomycin to assess renal function. 4.  Pharmacy will follow and monitor renal function changes, toxicity and efficacy.     Maggy Frederick, PharmD  7/30/2018  9:19 PM  [place]

## 2018-07-31 NOTE — PROGRESS NOTES
[facility]  [facility] Cardiology Progress Note    Debbie Landa Patient Status:  Inpatient    1950 MRN [id number]   Location [facility] 5SW/SE Attending Giulia Mclean MD   Hosp Day # 3 PCP Kalyan Slade MD     Assessment:  1.   Danni Alfredo • Diabetes Maternal Grandfather    • Cancer Mother       reports that he quit smoking about 10 months ago. His smoking use included Cigarettes. He smoked 0.00 packs per day. He quit smokeless tobacco use about 17 months ago.  He reports that he does not d

## 2018-07-31 NOTE — PLAN OF CARE
Problem: Patient Centered Care  Goal: Patient preferences are identified and integrated in the patient's plan of care  Interventions:  - What would you like us to know as we care for you?  Keep my wife involved in my care  - Provide timely, complete, and ac severity of pain and evaluate response  - Implement non-pharmacological measures as appropriate and evaluate response  - Consider cultural and social influences on pain and pain management  - Manage/alleviate anxiety  - Utilize distraction and/or relaxatio functional status, cognitive ability or social support system  Outcome: Progressing  When cleared medically.

## 2018-07-31 NOTE — CM/SW NOTE
SW spoke w/ pt regarding eventual discharge plans. Pt lives at home w/ his wife in [address] [place] [address]. Pt has a hx of [facility] and no hx of rehab. PT recommending HHPT. Pt declined [facility] and stated that his wife assist him at home.      SW/CM to remain available for support

## 2018-07-31 NOTE — PLAN OF CARE
Patient has orders per Dr. Patricia Nava to have heel protector boot on right heel while patient is in bed. Patient refused to have right heel protector boot applied to right heel per doctor orders. Patient stated, \"I don't want it on.  It irritates my leg to mu

## 2018-07-31 NOTE — PAYOR COMM NOTE
--------------  CONTINUED STAY REVIEW    Payor: [place] [facility] #:  [id number]  Authorization Number: [id number]    Admit date: 7/28/18  Admit time: [address]    Admitting Physician: Francisco Leija MD  Attending Physician Piperacillin Sod-Tazobactam So (ZOSYN) 3.375 g in dextrose 5 % 100 mL ADD-vantage     Date Action Dose Route User    7/31/2018 1106 New Bag 3.375 g Intravenous Pili Barber RN    7/31/2018 8792 New Bag 3.375 g Intravenous Carylon Kawasaki, RN    7/30/2018 /68 (BP Location: Right arm)   Pulse 72   Temp 99 °F (37.2 °C) (Oral)   Resp 18   Ht 5' 10\" (1.778 m)   Wt 179 lb 14.4 oz (81.6 kg)   SpO2 94%   BMI 25.81 kg/m²   Temp (24hrs), Av.7 °F (37.1 °C), Min:97.1 °F (36.2 °C), Max:100.4 °F (38 °C)       •  multivitamin (ADULT) tab 1 tablet, 1 tablet, Oral, Daily  •  Vitamin C tab 500 mg, 500 mg, Oral, BID  •  Vancomycin HCl (VANCOCIN) 1,500 mg in sodium chloride 0.9 % 500 mL IVPB, 1,500 mg, Intravenous, Q24H  •  dextrose 50 % injection 50 mL, 50 mL, Intra RDW  15.8*  15.5*  15.4*   WBC  14.1*  13.3*  10.2   PLT  261  254  262                  Recent Labs   Lab  07/28/18   0508  07/29/18   0516  07/30/18   0535   GLU  262*  200*  217*   BUN  15  13  10   CREATSERUM  1.40  1.35  1.24   GFRAA  >60  >60  >60 Location [facility] 5SW/SE Attending Ross Beaulieu., MD   [facility] Day # 3 PCP Vee Bland MD         Subjective:   Mihai Iqbal is a(n) 74year old male was seen and examined  Lying in bed, no acute resp distress  Pain and swelling improved in •  Piperacillin Sod-Tazobactam So (ZOSYN) 3.375 g in dextrose 5 % 100 mL ADD-vantage, 3.375 g, Intravenous, Q8H  •  Heparin Sodium (Porcine) 5000 UNIT/ML injection 5,000 Units, 5,000 Units, Subcutaneous, 2 times per day  •  AmLODIPine Besylate (NORVASC) ta 5460  07/30/18   0535  07/31/18   0552   RBC  4.05*  3.69*  3.89*   HGB  11.4*  10.3*  10.8*   HCT  34.1*  31.3*  32.2*   MCV  84.2  84.9  82.9   MCH  28.2  27.9  27.7   MCHC  33.5  32.9  33.5   RDW  15.5*  15.4*  15.5*   WBC  13.3*  10.2  10.9   PLT  254 CONCLUSION:   RIGHT LEG: -Common and external iliac artery mild stenosis. -Common femoral artery diffuse moderate stenosis.  Moderate to severe stenosis throughout the SFA without occlusion. -Popliteal artery severe stenosis without occlusion. -Weakly opaci

## 2018-08-01 ENCOUNTER — APPOINTMENT (OUTPATIENT)
Dept: INTERVENTIONAL RADIOLOGY/VASCULAR | Facility: HOSPITAL | Age: 68
DRG: 580 | End: 2018-08-01
Attending: INTERNAL MEDICINE
Payer: COMMERCIAL

## 2018-08-01 ENCOUNTER — PRIOR ORIGINAL RECORDS (OUTPATIENT)
Dept: OTHER | Age: 68
End: 2018-08-01

## 2018-08-01 LAB
ANION GAP SERPL CALC-SCNC: 11 MMOL/L (ref 0–18)
BASOPHILS # BLD: 0.1 K/UL (ref 0–0.2)
BASOPHILS NFR BLD: 1 %
BUN SERPL-MCNC: 7 MG/DL (ref 8–20)
BUN/CREAT SERPL: 6.4 (ref 10–20)
CALCIUM SERPL-MCNC: 9.5 MG/DL (ref 8.5–10.5)
CHLORIDE SERPL-SCNC: 105 MMOL/L (ref 95–110)
CO2 SERPL-SCNC: 21 MMOL/L (ref 22–32)
CREAT SERPL-MCNC: 1.1 MG/DL (ref 0.5–1.5)
EOSINOPHIL # BLD: 0.4 K/UL (ref 0–0.7)
EOSINOPHIL NFR BLD: 4 %
ERYTHROCYTE [DISTWIDTH] IN BLOOD BY AUTOMATED COUNT: 15.8 % (ref 11–15)
GLUCOSE BLDC GLUCOMTR-MCNC: 186 MG/DL (ref 70–99)
GLUCOSE BLDC GLUCOMTR-MCNC: 236 MG/DL (ref 70–99)
GLUCOSE BLDC GLUCOMTR-MCNC: 338 MG/DL (ref 70–99)
GLUCOSE BLDC GLUCOMTR-MCNC: 395 MG/DL (ref 70–99)
GLUCOSE SERPL-MCNC: 196 MG/DL (ref 70–99)
HCT VFR BLD AUTO: 32.4 % (ref 41–52)
HGB BLD-MCNC: 10.6 G/DL (ref 13.5–17.5)
LYMPHOCYTES # BLD: 1 K/UL (ref 1–4)
LYMPHOCYTES NFR BLD: 9 %
MCH RBC QN AUTO: 27.6 PG (ref 27–32)
MCHC RBC AUTO-ENTMCNC: 32.6 G/DL (ref 32–37)
MCV RBC AUTO: 84.5 FL (ref 80–100)
MONOCYTES # BLD: 1.2 K/UL (ref 0–1)
MONOCYTES NFR BLD: 11 %
NEUTROPHILS # BLD AUTO: 8.5 K/UL (ref 1.8–7.7)
NEUTROPHILS NFR BLD: 76 %
OSMOLALITY UR CALC.SUM OF ELEC: 287 MOSM/KG (ref 275–295)
PLATELET # BLD AUTO: 359 K/UL (ref 140–400)
PMV BLD AUTO: 8.2 FL (ref 7.4–10.3)
POTASSIUM SERPL-SCNC: 3.8 MMOL/L (ref 3.3–5.1)
RBC # BLD AUTO: 3.84 M/UL (ref 4.5–5.9)
SODIUM SERPL-SCNC: 137 MMOL/L (ref 136–144)
WBC # BLD AUTO: 11.1 K/UL (ref 4–11)

## 2018-08-01 PROCEDURE — 047M3Z1 DILATION OF RIGHT POPLITEAL ARTERY USING DRUG-COATED BALLOON, PERCUTANEOUS APPROACH: ICD-10-PCS | Performed by: INTERNAL MEDICINE

## 2018-08-01 PROCEDURE — 047P3Z1 DILATION OF RIGHT ANTERIOR TIBIAL ARTERY USING DRUG-COATED BALLOON, PERCUTANEOUS APPROACH: ICD-10-PCS | Performed by: INTERNAL MEDICINE

## 2018-08-01 PROCEDURE — 99233 SBSQ HOSP IP/OBS HIGH 50: CPT | Performed by: HOSPITALIST

## 2018-08-01 PROCEDURE — B41F1ZZ FLUOROSCOPY OF RIGHT LOWER EXTREMITY ARTERIES USING LOW OSMOLAR CONTRAST: ICD-10-PCS | Performed by: INTERNAL MEDICINE

## 2018-08-01 RX ORDER — MIDAZOLAM HYDROCHLORIDE 1 MG/ML
INJECTION INTRAMUSCULAR; INTRAVENOUS
Status: COMPLETED
Start: 2018-08-01 | End: 2018-08-01

## 2018-08-01 RX ORDER — SODIUM CHLORIDE 9 MG/ML
INJECTION, SOLUTION INTRAVENOUS
Status: COMPLETED
Start: 2018-08-01 | End: 2018-08-01

## 2018-08-01 RX ORDER — CLOPIDOGREL BISULFATE 75 MG/1
TABLET ORAL
Status: DISPENSED
Start: 2018-08-01 | End: 2018-08-01

## 2018-08-01 RX ORDER — SODIUM CHLORIDE 0.9 % (FLUSH) 0.9 %
10 SYRINGE (ML) INJECTION AS NEEDED
Status: DISCONTINUED | OUTPATIENT
Start: 2018-08-01 | End: 2018-08-02

## 2018-08-01 RX ORDER — POTASSIUM CHLORIDE 20 MEQ/1
40 TABLET, EXTENDED RELEASE ORAL ONCE
Status: COMPLETED | OUTPATIENT
Start: 2018-08-01 | End: 2018-08-01

## 2018-08-01 RX ORDER — LIDOCAINE HYDROCHLORIDE 20 MG/ML
INJECTION, SOLUTION EPIDURAL; INFILTRATION; INTRACAUDAL; PERINEURAL
Status: COMPLETED
Start: 2018-08-01 | End: 2018-08-01

## 2018-08-01 RX ORDER — HEPARIN SODIUM 1000 [USP'U]/ML
INJECTION, SOLUTION INTRAVENOUS; SUBCUTANEOUS
Status: COMPLETED
Start: 2018-08-01 | End: 2018-08-01

## 2018-08-01 NOTE — PROGRESS NOTES
[facility] - [facility]    Progress Note    Tesha Chaves Patient Status:  Inpatient    1950 MRN [id number]   Location [facility] 5SW/SE Attending Dennis Jamison MD   [facility] Day # 4 PCP Viky Coelho MD       Subjective:   Rashaun Metcalf [facility]) injection 4 mg, 4 mg, Intravenous, Q6H PRN  •  Metoclopramide HCl (REGLAN) injection 10 mg, 10 mg, Intravenous, Q8H PRN  •  Piperacillin Sod-Tazobactam So (ZOSYN) 3.375 g in dextrose 5 % 100 mL ADD-vantage, 3.375 g, Intravenous, Q8H  •  Heparin So DMII  Uncontrolled DMII  COPD  Paroxysmal Afib    DVT PPx: Heparin Sq  Full Code    Results:     Recent Labs   Lab  07/30/18   0535  07/31/18   0552  08/01/18   0514   RBC  3.69*  3.89*  3.84*   HGB  10.3*  10.8*  10.6*   HCT  31.3*  32.2*  32.4*   MCV  84

## 2018-08-01 NOTE — PLAN OF CARE
Patient received back in bed from procedure. Alert and oriented x 4. Patient vital signs taken and stable. Patient has gauze dressing present to left groin that is dry and intact. No bleeding or drainage noted. Patient instructed on bedrest per protocol.  C

## 2018-08-01 NOTE — PROCEDURES
[facility] HOSP - [facility]    [facility] Cardiac Cath Procedure Note  Tesha Chaves Patient Status:  Inpatient    1950 MRN [id number]   Location [facility] Attending Romel Vera MD   Hosp Day # 4 PCP Viky Coelho, given. Patient was assessed and monitoring of oxygen, heart rate and blood pressure by nurse and myself during the exam for 80 minutes.       Memo Pride MD  08/01/18

## 2018-08-01 NOTE — PROGRESS NOTES
Carlene Hawkins  [id number]  8/1/2018    Post procedure/ recovery hand-off report given to Trae Hill RN. Patient's vital signs stable, access site dry and intact, no signs and symptoms of bleeding/ hematoma.     Christine Berman RN

## 2018-08-01 NOTE — PLAN OF CARE
Problem: Patient Centered Care  Goal: Patient preferences are identified and integrated in the patient's plan of care  Interventions:  - What would you like us to know as we care for you?  Keep my wife involved in my care  - Provide timely, complete, and ac surrounding tissue  - Implement wound care per orders  - Initiate isolation precautions as appropriate  - Initiate Pressure Ulcer prevention bundle as indicated   Outcome: Progressing  Skin is assess for redness and skin breakdown.  Patient is encouraged to safety. Call light within reach. Patient uses call light appropriately. Bed alarm remains in use to ensure safety. Hourly rounds are done to ensure safety.      Problem: DISCHARGE PLANNING  Goal: Discharge to home or other facility with appropriate resource

## 2018-08-01 NOTE — PRE-SEDATION ASSESSMENT
Pre-Procedure Sedation Assessment    Chief Complaint/Indication for procedure: CLI    History of snoring or sleep or apnea?    No    History of previous problems with anesthesia or sedation  No    Physical Findings:  Neck: nl ROM  CV: RRR no GMR  PULM: norm

## 2018-08-01 NOTE — PLAN OF CARE
Problem: Patient Centered Care  Goal: Patient preferences are identified and integrated in the patient's plan of care  Interventions:  - What would you like us to know as we care for you?  Keep my wife involved in my care  - Provide timely, complete, and ac appropriate  - Initiate Pressure Ulcer prevention bundle as indicated   Outcome: Progressing  Skin is assess for redness or skin breakdown. Patient is encouraged to turn and reposition self while in bed every 2 hours for safety and comfort.  Patient receive within reach. Patient uses call light appropriately. Hourly rounds are done to ensure safety.     Problem: DISCHARGE PLANNING  Goal: Discharge to home or other facility with appropriate resources  INTERVENTIONS:  - Identify barriers to discharge w/pt and ca

## 2018-08-01 NOTE — PLAN OF CARE
Problem: Patient Centered Care  Goal: Patient preferences are identified and integrated in the patient's plan of care  Interventions:  - What would you like us to know as we care for you?  Keep my wife involved in my care  - Provide timely, complete, and ac appropriate pain scale  - Administer analgesics based on type and severity of pain and evaluate response  - Implement non-pharmacological measures as appropriate and evaluate response  - Consider cultural and social influences on pain and pain management services based on physician/LIP order or complex needs related to functional status, cognitive ability or social support system   Outcome: Progressing  Peripheral angiogram planned for today, DC when cleared medically.

## 2018-08-01 NOTE — PLAN OF CARE
Patient refused to wear post-op surgical shoe while ambulating out of bed to right foot. Patient instructed on the important benefits, but patient refused. Patient stated, \"I am not putting that shoe on my foot.  I will put it on when I am ready and not wh

## 2018-08-02 ENCOUNTER — APPOINTMENT (OUTPATIENT)
Dept: WOUND CARE | Facility: HOSPITAL | Age: 68
End: 2018-08-02
Attending: NURSE PRACTITIONER
Payer: COMMERCIAL

## 2018-08-02 VITALS
RESPIRATION RATE: 18 BRPM | TEMPERATURE: 98 F | OXYGEN SATURATION: 91 % | BODY MASS INDEX: 25.75 KG/M2 | SYSTOLIC BLOOD PRESSURE: 157 MMHG | HEIGHT: 70 IN | DIASTOLIC BLOOD PRESSURE: 61 MMHG | WEIGHT: 179.88 LBS | HEART RATE: 96 BPM

## 2018-08-02 LAB — GLUCOSE BLDC GLUCOMTR-MCNC: 213 MG/DL (ref 70–99)

## 2018-08-02 PROCEDURE — 99239 HOSP IP/OBS DSCHRG MGMT >30: CPT | Performed by: HOSPITALIST

## 2018-08-02 RX ORDER — AMOXICILLIN AND CLAVULANATE POTASSIUM 875; 125 MG/1; MG/1
1 TABLET, FILM COATED ORAL EVERY 12 HOURS SCHEDULED
Qty: 20 TABLET | Refills: 0 | Status: SHIPPED | OUTPATIENT
Start: 2018-08-02 | End: 2018-08-12

## 2018-08-02 RX ORDER — LISINOPRIL 2.5 MG/1
2.5 TABLET ORAL DAILY
Qty: 30 TABLET | Refills: 0 | Status: SHIPPED | OUTPATIENT
Start: 2018-08-03 | End: 2019-05-04

## 2018-08-02 RX ORDER — DOXYCYCLINE HYCLATE 50 MG/1
100 CAPSULE ORAL EVERY 12 HOURS SCHEDULED
Status: DISCONTINUED | OUTPATIENT
Start: 2018-08-02 | End: 2018-08-02

## 2018-08-02 RX ORDER — AMOXICILLIN AND CLAVULANATE POTASSIUM 875; 125 MG/1; MG/1
1 TABLET, FILM COATED ORAL EVERY 12 HOURS SCHEDULED
Status: DISCONTINUED | OUTPATIENT
Start: 2018-08-02 | End: 2018-08-02

## 2018-08-02 RX ORDER — DOXYCYCLINE HYCLATE 100 MG/1
100 CAPSULE ORAL EVERY 12 HOURS SCHEDULED
Qty: 20 CAPSULE | Refills: 0 | Status: SHIPPED | OUTPATIENT
Start: 2018-08-02 | End: 2018-08-12

## 2018-08-02 NOTE — PROGRESS NOTES
Patient in bed, awake, alert and orientated X4 and with no distress noted. Patient is VSS and denies pain. Spouse at bedside. RN reviewed with patient discharge instructions and patient verbalized understanding. IV and tele removed. Dressing changed.  PO an

## 2018-08-02 NOTE — PLAN OF CARE
Problem: Patient Centered Care  Goal: Patient preferences are identified and integrated in the patient's plan of care  Interventions:  - What would you like us to know as we care for you?  Keep my wife involved in my care  - Provide timely, complete, and ac using appropriate pain scale  - Administer analgesics based on type and severity of pain and evaluate response  - Implement non-pharmacological measures as appropriate and evaluate response  - Consider cultural and social influences on pain and pain manage patient needs post-hospital services based on physician/LIP order or complex needs related to functional status, cognitive ability or social support system   Outcome: Progressing  When cleared medically.

## 2018-08-02 NOTE — DISCHARGE SUMMARY
[facility] - [facility]    Discharge Summary    [place] Model Patient Status:  Inpatient    1950 MRN [id number]   Location [facility] 5SW/SE Attending No att. providers found   [facility] Day # 5 PCP Moses Sharma MD     Date of Admission: this year. He is diabetic and his glucose was 386. The patient had pretty exquisite pain in the inner right thigh.   A CT scan of the area was performed and there was callus formation noted along the anterior medial wall of the acetabulum, suggesting a he DMII  COPD  Paroxysmal Afib     DVT PPx: Heparin Sq  Full Code    CULTURE:     Hospital Encounter on 07/27/18  1.  BLOOD CULTURE     Status: None   Collection Time: 07/27/18  6:53 PM   Result Value Ref Range   Blood Culture Result No Growth 5 Days N/A Us Arterial Duplex Lower Extremity Right (cpt=93926)    Result Date: 7/30/2018  CONCLUSION:  1.  Diffuse calcific atherosclerotic irregularity in the femoral popliteal segment, with velocities indicative of moderate to severe focal narrowing in the clay or abscess. 2. No occult fracture. 3. A 2.6 cm ulcerations dorsal lateral aspect of calcaneus-extends within 5 mm from bone. 4. Edema and right lower leg, ankle and foot superficial soft tissues. 5. Varicose veins in the right lower leg.  6. Moderate osteoa Take 1 tablet (10 mg total) by mouth daily. Quantity:  30 tablet  Refills:  0     aspirin 81 MG Tabs      Take 81 mg by mouth daily. Refills:  0     atorvastatin 40 MG Tabs  Commonly known as:  LIPITOR      TAKE 1 TABLET (40 MG TOTAL) BY MOUTH DAILY. DPM  [address]  [phone]    In 1 week      [facility] [address] Lazaro Conte  [address]  [place], [place]  [phone]  Eliu Gomez PARKING  On 8/10/2018  Wound Check

## 2018-08-02 NOTE — PROGRESS NOTES
[facility]    Progress Note    Marta Jerez Patient Status:  Inpatient    1950 MRN [id number]   Location [facility] 5SW/SE Attending Mary Ewing MD   Hosp Day # 5 PCP Zhang Dahl MD     History:  Past Medical Hist Daily  •  Vancomycin HCl (VANCOCIN) 1,750 mg in sodium chloride 0.9 % 500 mL IVPB, 20 mg/kg, Intravenous, Q24H  •  Normal Saline Flush 0.9 % injection 3 mL, 3 mL, Intravenous, PRN  •  ondansetron HCl (ZOFRAN) injection 4 mg, 4 mg, Intravenous, Q6H PRN  • report from cardiology.   At this particular point in time I evaluated the heel there is actually more granulation tissue occurring MRI is negative for osteomyelitis but on the posterior aspect of his heel he has a slight bruise which should be considered a Dictated by (CST): Catie Youssef MD on 7/28/2018 at 6:33     Approved by (CST): Catie Youssef MD on 7/28/2018 at 6:35           Arterial Duplex Lower Extremity Right (cpt=93926)    Result Date: 7/30/2018  CONCLUSION:  1.  Diffuse calcific atheroscle 19:29          Ct Lower Ext Right (w Iv) Em (cpt=73701)    Result Date: 7/28/2018  CONCLUSION:  1. No evidence for osteomyelitis or abscess. 2. No occult fracture. 3. A 2.6 cm ulcerations dorsal lateral aspect of calcaneus-extends within 5 mm from bone.  4. protectors when in bed always make sure your heel is suspended and there is no pressure on it.   Follow wound care dressings as recommended with the Santyl ointment and follow-up with Dr. Storm Ballesteros within 1 week after discharge    Zach Bowser DPM   8/2

## 2018-08-02 NOTE — TRANSITION NOTE
Assessment and Plan:   1.  Fever and weakness 2/2 Nonhealing ulcer of right heal and Cellulitis of right lower extremity               -No OM per MRI               -Tx management per podiatry and primary                -Needs to remain Non-weight bearing o CHANGE how you take these medications      Instructions Prescription details   ascorbic acid 500 MG Tabs  Commonly known as:  VITAMIN C  What changed:  when to take this      Take 1 tablet (500 mg total) by mouth 3 (three) times daily.    Quantity:  90 ta

## 2018-08-02 NOTE — PROGRESS NOTES
[facility]    Progress Note    Daksha Garcia Patient Status:  Inpatient    1950 MRN [id number]   Location [facility] 5SW/SE Attending Indira Curtis MD   Hosp Day # 5 PCP Kyara Quinonez MD     Subjective:     Eloisa Mckeon -CTA of BLE s/p angioplasty of AT, popliteal, and SFA 8/1/2018   -cont aspirin, plavix, statin               -former smoker, quit 10 months ago- encouraged smoking cessation.     -Reviewed post-procedure instructions with patient     3.  CKD, stage II-III PT  · 100% AT occlusion fills via pop/SFA collaterals with minimal retrograde filling  · Peroneal diffusely diseased from proximal onward     Anterior tibial intervention  · Wired to TP trunk with glide advantage wire  · Wired into AT with commander 0.18 a

## 2018-08-03 ENCOUNTER — PATIENT OUTREACH (OUTPATIENT)
Dept: CASE MANAGEMENT | Age: 68
End: 2018-08-03

## 2018-08-03 ENCOUNTER — TELEPHONE (OUTPATIENT)
Dept: CARDIOLOGY UNIT | Facility: HOSPITAL | Age: 68
End: 2018-08-03

## 2018-08-03 LAB
BUN: 10 MG/DL
BUN: 13 MG/DL
BUN: 7 MG/DL
BUN: 9 MG/DL
CALCIUM: 9.2 MG/DL
CALCIUM: 9.2 MG/DL
CALCIUM: 9.3 MG/DL
CALCIUM: 9.5 MG/DL
CHLORIDE: 103 MEQ/L
CHLORIDE: 105 MEQ/L
CHLORIDE: 105 MEQ/L
CHLORIDE: 106 MEQ/L
CREATININE, SERUM: 1.07 MG/DL
CREATININE, SERUM: 1.1 MG/DL
CREATININE, SERUM: 1.24 MG/DL
CREATININE, SERUM: 1.35 MG/DL
GLUCOSE: 196 MG/DL
GLUCOSE: 200 MG/DL
GLUCOSE: 216 MG/DL
GLUCOSE: 217 MG/DL
POTASSIUM, SERUM: 3.5 MEQ/L
POTASSIUM, SERUM: 3.7 MEQ/L
POTASSIUM, SERUM: 3.8 MEQ/L
POTASSIUM, SERUM: 3.8 MEQ/L
SODIUM: 133 MEQ/L
SODIUM: 136 MEQ/L
SODIUM: 136 MEQ/L
SODIUM: 137 MEQ/L

## 2018-08-03 NOTE — PROGRESS NOTES
TCM OUTREACH    Book By Date 8/16/18    My chart message sent to patient requesting call back to [phone] review discharge instructions, medications and schedule TCM apt .

## 2018-08-06 NOTE — PROGRESS NOTES
TCM OUTREACH    Call made to attempt to reach patient for TCM follow up. No answer, Voicemail left requesting call back at [phone].     Book by date: 8/16/18    (Triage Team if pt returns call please transfer to ext [phone])

## 2018-08-06 NOTE — PAYOR COMM NOTE
--------------  DISCHARGE REVIEW    Payor: [address] #:  [id number]  Authorization Number: [id number]    Admit date: 7/28/18  Admit time:  0041  Discharge Date: 8/2/2018  2:54 PM     Admitting Physician: Huma Alfredo Recommendation:  LACE > 58: High Risk of readmission after discharge from the hospital.    HPI Per Admitting Physician:   This is a 57-year-old gentleman with a past medical history of multiple lower extremity ulcers, with peripheral artery disease, paroxys in the medial right upper thigh measuring 2.2 x 1.3 cm with mild surrounding fat stranding. No abnormal fluid collection. There was mild subcutaneous edema along the anterior knee, posterior calf, ankle, and heel.   No visualized fluid or air collection i osteoarthritis of the ankle. 3. Generalized atherosclerotic vascular calcification.      Dictated by (CST): Kota King MD on 7/27/2018 at 19:50     Approved by (CST): Kota King MD on 7/27/2018 at 19:54          Xr Femur Min 2 Views Right (cpt=73552 (cpt=73706)    Result Date: 7/30/2018  CONCLUSION:   RIGHT LEG: -Common and external iliac artery mild stenosis. -Common femoral artery diffuse moderate stenosis.  Moderate to severe stenosis throughout the SFA without occlusion. -Popliteal artery severe st 2.1 cm right femoral lymph node-likely reactive. 10. Mild-moderate prostate enlargement. 11. Small fat-containing right inguinal hernia.      Dictated by (CST): Andres Boxer, MD on 7/28/2018 at 7:23     Approved by (CST): Andres Boxer, MD on 7/28/2018 at glipiZIDE 10 MG Tabs  Commonly known as:  GLUCOTROL      TAKE 1 TABLET (10 MG TOTAL) BY MOUTH 2 (TWO) TIMES DAILY BEFORE MEALS.    Quantity:  180 tablet  Refills:  0     MetFORMIN HCl 500 MG Tabs  Commonly known as:  GLUCOPHAGE      Take 1 tablet (500 mg Physician  CARDIOLOGY    Aviva Mitchell DPM Consulting Physician  PODIATRIST            Other Discharge Instructions: None  >30 MIN SPENT ON [address]  [facility]  8/2/2018  3:33 PM                        Electronically signed by Kizzy Grier

## 2018-08-08 ENCOUNTER — OFFICE VISIT (OUTPATIENT)
Dept: PODIATRY CLINIC | Facility: CLINIC | Age: 68
End: 2018-08-08
Payer: COMMERCIAL

## 2018-08-08 DIAGNOSIS — S91.302D OPEN WOUND OF LEFT FOOT, SUBSEQUENT ENCOUNTER: ICD-10-CM

## 2018-08-08 DIAGNOSIS — E08.621 DIABETIC ULCER OF RIGHT HEEL ASSOCIATED WITH DIABETES MELLITUS DUE TO UNDERLYING CONDITION, WITH FAT LAYER EXPOSED (HCC): ICD-10-CM

## 2018-08-08 DIAGNOSIS — E11.59 TYPE 2 DIABETES MELLITUS WITH OTHER CIRCULATORY COMPLICATION, UNSPECIFIED WHETHER LONG TERM INSULIN USE (HCC): Primary | ICD-10-CM

## 2018-08-08 DIAGNOSIS — L97.412 DIABETIC ULCER OF RIGHT HEEL ASSOCIATED WITH DIABETES MELLITUS DUE TO UNDERLYING CONDITION, WITH FAT LAYER EXPOSED (HCC): ICD-10-CM

## 2018-08-08 PROCEDURE — 99213 OFFICE O/P EST LOW 20 MIN: CPT | Performed by: PODIATRIST

## 2018-08-09 ENCOUNTER — OFFICE VISIT (OUTPATIENT)
Dept: WOUND CARE | Facility: HOSPITAL | Age: 68
End: 2018-08-09
Attending: NURSE PRACTITIONER
Payer: COMMERCIAL

## 2018-08-09 DIAGNOSIS — E08.621 DIABETIC ULCER OF HEEL ASSOCIATED WITH DIABETES MELLITUS DUE TO UNDERLYING CONDITION, WITH BONE INVOLVEMENT WITHOUT EVIDENCE OF NECROSIS, UNSPECIFIED LATERALITY (HCC): Primary | ICD-10-CM

## 2018-08-09 DIAGNOSIS — L97.406 DIABETIC ULCER OF HEEL ASSOCIATED WITH DIABETES MELLITUS DUE TO UNDERLYING CONDITION, WITH BONE INVOLVEMENT WITHOUT EVIDENCE OF NECROSIS, UNSPECIFIED LATERALITY (HCC): Primary | ICD-10-CM

## 2018-08-09 DIAGNOSIS — S91.302D OPEN WOUND OF LEFT FOOT, SUBSEQUENT ENCOUNTER: ICD-10-CM

## 2018-08-09 PROCEDURE — 99213 OFFICE O/P EST LOW 20 MIN: CPT | Performed by: NURSE PRACTITIONER

## 2018-08-09 PROCEDURE — 97602 WOUND(S) CARE NON-SELECTIVE: CPT | Performed by: NURSE PRACTITIONER

## 2018-08-09 NOTE — PROGRESS NOTES
Chon Foote is a 74year old male. Patient presents with:  Cellulitis (integumentary, infectious): bilateral feet -- Drainage is yellow/brown from right foot. Rates pain 3/10. Denies any fever.         HPI:   Patient returns to clinic for follow-up on his GLUCOSE TEST) In Vitro Strip twice daily Disp:  Rfl:       Past Medical History:   Diagnosis Date   • Atherosclerosis of coronary artery    • Cataract     stage 1 cataracts   • COPD (chronic obstructive pulmonary disease) (HCC)    • Coronary atherosclerosi denies chest pain on exertion  GI: denies abdominal pain and denies heartburn  NEURO: denies headaches    EXAM:   There were no vitals taken for this visit. Physical Exam  GENERAL: well developed, well nourished, in no apparent distress  EXTREMITIES:   1.

## 2018-08-09 NOTE — PROGRESS NOTES
Subjective    Chief Complaint  This information was obtained from the patient  The patient is new to the [address] [facility] here for an initial visit for the evaluation and management of non-healing wound(s). 07/3/18 no complains for today visit.     Allergies CONCLUSION:   Mild degenerative changes throughout the ankle and hind foot without osteomyelitis. Extensive subcutaneous edema/cellulitis along the dorsal lateral ankle without abscess. Mild plantar foot myositis without intramuscular abscess.   Severe ch well developed, no acute distress. Height/Length: 70 in (177.8 cm), Weight: 169.9 lbs (77.23 kgs), BMI: 24.4, Temperature: 97.7 °F (36.5 °C), Pulse: 65 bpm, Respiratory Rate: 17 breaths/min, Blood Pressure: 158/69 mmHg, Pulse Oximetry: 96 %.    Vitals Signs Wound #4 Left, Posterior Heel is a Deep Tissue Pressure Injury Persistent non-blanchable deep red, maroon or purple discoloration Pressure Ulcer and has received a status of Not Healed.  Initial wound encounter measurements are 2cm length x 1cm width x 0.1c Is here after the recent hospitalization for the infection related to the diabetic foot ulcer. Patient had extensive testing done which indicated no evidence of osteomyelitis in the left heel.   Patient patient is having deep tissue injury to the posterior

## 2018-08-13 ENCOUNTER — PATIENT MESSAGE (OUTPATIENT)
Dept: ENDOCRINOLOGY CLINIC | Facility: CLINIC | Age: 68
End: 2018-08-13

## 2018-08-13 RX ORDER — GLIPIZIDE 10 MG/1
10 TABLET ORAL
Qty: 60 TABLET | Refills: 0 | Status: SHIPPED | OUTPATIENT
Start: 2018-08-13 | End: 2018-10-10 | Stop reason: ALTCHOICE

## 2018-08-13 NOTE — TELEPHONE ENCOUNTER
From: Lavon Rodrigues  To: Violeta Cerda MD  Sent: 8/13/2018 4:15 PM CDT  Subject: Prescription Question    Why was a renew sent to [facility] pharmacy for Glipizide? I have enough on hand for about 60 days. Was renewal request sent from pharmacy?      Cathy Sawyer

## 2018-08-13 NOTE — TELEPHONE ENCOUNTER
LOV 4/17/18. RTC 3 months. No F/U scheduled. [employer] message sent that he is due for an appt. 1 month refill pending.

## 2018-08-16 ENCOUNTER — NURSE ONLY (OUTPATIENT)
Dept: WOUND CARE | Facility: HOSPITAL | Age: 68
End: 2018-08-16
Attending: NURSE PRACTITIONER
Payer: COMMERCIAL

## 2018-08-16 ENCOUNTER — MYAURORA ACCOUNT LINK (OUTPATIENT)
Dept: OTHER | Age: 68
End: 2018-08-16

## 2018-08-16 ENCOUNTER — PRIOR ORIGINAL RECORDS (OUTPATIENT)
Dept: OTHER | Age: 68
End: 2018-08-16

## 2018-08-16 DIAGNOSIS — E08.621 DIABETIC ULCER OF RIGHT HEEL ASSOCIATED WITH DIABETES MELLITUS DUE TO UNDERLYING CONDITION, WITH OTHER ULCER SEVERITY (HCC): Primary | ICD-10-CM

## 2018-08-16 DIAGNOSIS — L97.418 DIABETIC ULCER OF RIGHT HEEL ASSOCIATED WITH DIABETES MELLITUS DUE TO UNDERLYING CONDITION, WITH OTHER ULCER SEVERITY (HCC): Primary | ICD-10-CM

## 2018-08-16 PROCEDURE — 97602 WOUND(S) CARE NON-SELECTIVE: CPT

## 2018-08-16 PROCEDURE — 99212 OFFICE O/P EST SF 10 MIN: CPT | Performed by: NURSE PRACTITIONER

## 2018-08-17 NOTE — PROGRESS NOTES
Subjective    Chief Complaint  This information was obtained from the patient  The patient was seen today for follow up and management of difficult to heal wound(s). 8/16/18 No complaints for today's visit.     Allergies  sulfa antibiotics    HPI  This i CONCLUSION:   Mild degenerative changes throughout the ankle and hind foot without osteomyelitis. Extensive subcutaneous edema/cellulitis along the dorsal lateral ankle without abscess. Mild plantar foot myositis without intramuscular abscess.   Severe ch well developed, no acute distress. Height/Length: 70 in (177.8 cm), Weight: 169.9 lbs (77.23 kgs), BMI: 24.4, Temperature: 98.3 °F (36.83 °C), Pulse: 61 bpm, Respiratory Rate: 17 breaths/min, Blood Pressure: 129/70 mmHg, Pulse Oximetry: 98 %.    Vitals Sign The periwound skin texture is normal. The periwound skin color is normal. The periwound skin exhibited: Moist. The temperature of the periwound skin is WNL. Periwound skin does not exhibit signs or symptoms of infection.     Psychiatric:  Appropriate judgem Clean wound with soap and water. Santyl    Offloading  No weight bearing. Knee roller   Other: - Darco offloading heel shoe    Misc / Additional orders  Supplement with a daily multivitamin. Increase dietary protein intake. Decrease salt intake.

## 2018-08-22 ENCOUNTER — OFFICE VISIT (OUTPATIENT)
Dept: PODIATRY CLINIC | Facility: CLINIC | Age: 68
End: 2018-08-22
Payer: COMMERCIAL

## 2018-08-22 DIAGNOSIS — I73.89 OTHER SPECIFIED PERIPHERAL VASCULAR DISEASES (HCC): ICD-10-CM

## 2018-08-22 DIAGNOSIS — S91.302D OPEN WOUND OF LEFT FOOT, SUBSEQUENT ENCOUNTER: ICD-10-CM

## 2018-08-22 DIAGNOSIS — E08.621 DIABETIC ULCER OF RIGHT HEEL ASSOCIATED WITH DIABETES MELLITUS DUE TO UNDERLYING CONDITION, WITH FAT LAYER EXPOSED (HCC): ICD-10-CM

## 2018-08-22 DIAGNOSIS — E11.59 TYPE 2 DIABETES MELLITUS WITH OTHER CIRCULATORY COMPLICATION, UNSPECIFIED WHETHER LONG TERM INSULIN USE (HCC): Primary | ICD-10-CM

## 2018-08-22 DIAGNOSIS — L97.412 DIABETIC ULCER OF RIGHT HEEL ASSOCIATED WITH DIABETES MELLITUS DUE TO UNDERLYING CONDITION, WITH FAT LAYER EXPOSED (HCC): ICD-10-CM

## 2018-08-22 PROCEDURE — 99213 OFFICE O/P EST LOW 20 MIN: CPT | Performed by: PODIATRIST

## 2018-08-23 ENCOUNTER — OFFICE VISIT (OUTPATIENT)
Dept: WOUND CARE | Facility: HOSPITAL | Age: 68
End: 2018-08-23
Attending: NURSE PRACTITIONER
Payer: COMMERCIAL

## 2018-08-23 DIAGNOSIS — L97.418 DIABETIC ULCER OF RIGHT HEEL ASSOCIATED WITH DIABETES MELLITUS DUE TO UNDERLYING CONDITION, WITH OTHER ULCER SEVERITY (HCC): Primary | ICD-10-CM

## 2018-08-23 DIAGNOSIS — E08.621 DIABETIC ULCER OF RIGHT HEEL ASSOCIATED WITH DIABETES MELLITUS DUE TO UNDERLYING CONDITION, WITH OTHER ULCER SEVERITY (HCC): Primary | ICD-10-CM

## 2018-08-23 PROCEDURE — 97597 DBRDMT OPN WND 1ST 20 CM/<: CPT | Performed by: NURSE PRACTITIONER

## 2018-08-23 NOTE — PROGRESS NOTES
Subjective    Chief Complaint  This information was obtained from the patient  The patient was seen today for follow up and management of difficult to heal wound(s).    8/23/18 No complaints for today's visit.     Allergies  sulfa antibiotics    HPI  This i CONCLUSION:   Mild degenerative changes throughout the ankle and hind foot without osteomyelitis. Extensive subcutaneous edema/cellulitis along the dorsal lateral ankle without abscess. Mild plantar foot myositis without intramuscular abscess.   Severe ch no clubbing, no cyanosis. Integumentary (Hair, Skin)  right lateral heel wound. no edema, no induration, no erythema.      Wound #3 Right, Lateral Heel is an Unstageable Pressure Injury Obscured full-thickness skin and tissue loss Pressure Ulcer and has Patient came in with his knee roller and postop shoe. Patient stated he was seen by Dr. Joie Galaviz yesterday which ordered to continue with Santyl daily. Assess the wound, wound bed is more granular with slough in the wound edges,  no palpable bone.  Franklin Mccarty Other: - Darco offloading heel shoe    Misc / Additional orders  Supplement with a daily multivitamin. Increase dietary protein intake. Decrease salt intake.    Other: - glycemic control, keep blood glucose           Electronic Signature(s)  Misa

## 2018-08-25 NOTE — PROGRESS NOTES
Lavon Rodrigues is a 74year old male. Patient presents with:  Diabetic Ulcer: Right ulcer -- States pt has light yellow on dressing. Rates pain 0/10. Denies any fever or chills.          HPI:   Patient returns to the clinic he is not experiencing any pain ind Coronary atherosclerosis    • Diabetes (HCC)    • Esophageal reflux     pt. states gets \"heartburn from diabetic med and since starting metoprolol   • Essential hypertension    • Glaucoma     had 2 years ago \"cleared Up\"   • Hearing impairment     tinit distress  EXTREMITIES:   1. Integument: The periwound skin shows slight erythema. There is granular tissue now forming in the base still some fibrinous tissue but mostly granulation at this time on the lateral plantar right heel ulcer   2.  Vascular: Cristal

## 2018-08-30 ENCOUNTER — NURSE ONLY (OUTPATIENT)
Dept: WOUND CARE | Facility: HOSPITAL | Age: 68
End: 2018-08-30
Attending: NURSE PRACTITIONER
Payer: COMMERCIAL

## 2018-08-30 DIAGNOSIS — L97.418 DIABETIC ULCER OF RIGHT HEEL ASSOCIATED WITH DIABETES MELLITUS DUE TO UNDERLYING CONDITION, WITH OTHER ULCER SEVERITY (HCC): Primary | ICD-10-CM

## 2018-08-30 DIAGNOSIS — E08.621 DIABETIC ULCER OF RIGHT HEEL ASSOCIATED WITH DIABETES MELLITUS DUE TO UNDERLYING CONDITION, WITH OTHER ULCER SEVERITY (HCC): Primary | ICD-10-CM

## 2018-08-30 PROCEDURE — 97597 DBRDMT OPN WND 1ST 20 CM/<: CPT | Performed by: NURSE PRACTITIONER

## 2018-08-30 PROCEDURE — 97597 DBRDMT OPN WND 1ST 20 CM/<: CPT

## 2018-08-30 NOTE — PROGRESS NOTES
Subjective    Chief Complaint  This information was obtained from the patient  The patient was seen today for follow up and management of difficult to heal wound(s). 8/28/18 No complaints for today's visit.     Allergies  sulfa antibiotics    HPI  This inf CONCLUSION:   Mild degenerative changes throughout the ankle and hind foot without osteomyelitis. Extensive subcutaneous edema/cellulitis along the dorsal lateral ankle without abscess. Mild plantar foot myositis without intramuscular abscess.   Severe ch Objective    Constitutional  elevated, PCP aware, patient stated it is [place] phenomena . well developed, no acute distress.  Height/Length: 70 in (177.8 cm), Weight: 169.9 lbs (77.23 kgs), BMI: 24.4, Temperature: 97.3 °F (36.28 °C), Pulse: 80 bpm, Resp (Encounter Diagnosis) L89.613 - Pressure ulcer of right heel, stage 3    Diagnoses    ICD-10  M86.172: Other acute osteomyelitis, left ankle and foot  L03.116: Cellulitis of left lower limb  E11.621: Type 2 diabetes mellitus with foot ulcer  S91.302A: Unsp Offloading  No weight bearing. Knee roller   Other: - Darco offloading heel shoe    Misc / Additional orders  Supplement with a daily multivitamin. Increase dietary protein intake. Decrease salt intake.    Other: - glycemic control, keep blood glucose

## 2018-09-06 ENCOUNTER — APPOINTMENT (OUTPATIENT)
Dept: WOUND CARE | Facility: HOSPITAL | Age: 68
End: 2018-09-06
Attending: NURSE PRACTITIONER
Payer: COMMERCIAL

## 2018-09-13 ENCOUNTER — APPOINTMENT (OUTPATIENT)
Dept: WOUND CARE | Facility: HOSPITAL | Age: 68
End: 2018-09-13
Attending: NURSE PRACTITIONER
Payer: COMMERCIAL

## 2018-09-19 ENCOUNTER — OFFICE VISIT (OUTPATIENT)
Dept: PODIATRY CLINIC | Facility: CLINIC | Age: 68
End: 2018-09-19
Payer: COMMERCIAL

## 2018-09-19 DIAGNOSIS — S91.302D OPEN WOUND OF LEFT FOOT, SUBSEQUENT ENCOUNTER: ICD-10-CM

## 2018-09-19 DIAGNOSIS — E11.59 TYPE 2 DIABETES MELLITUS WITH OTHER CIRCULATORY COMPLICATION, UNSPECIFIED WHETHER LONG TERM INSULIN USE (HCC): Primary | ICD-10-CM

## 2018-09-19 DIAGNOSIS — E08.621 DIABETIC ULCER OF RIGHT HEEL ASSOCIATED WITH DIABETES MELLITUS DUE TO UNDERLYING CONDITION, WITH FAT LAYER EXPOSED (HCC): ICD-10-CM

## 2018-09-19 DIAGNOSIS — L97.412 DIABETIC ULCER OF RIGHT HEEL ASSOCIATED WITH DIABETES MELLITUS DUE TO UNDERLYING CONDITION, WITH FAT LAYER EXPOSED (HCC): ICD-10-CM

## 2018-09-19 DIAGNOSIS — I73.89 OTHER SPECIFIED PERIPHERAL VASCULAR DISEASES (HCC): ICD-10-CM

## 2018-09-19 PROCEDURE — 99213 OFFICE O/P EST LOW 20 MIN: CPT | Performed by: PODIATRIST

## 2018-09-19 RX ORDER — ATORVASTATIN CALCIUM 40 MG/1
40 TABLET, FILM COATED ORAL DAILY
Qty: 90 TABLET | Refills: 0 | Status: SHIPPED | OUTPATIENT
Start: 2018-09-19 | End: 2019-04-25

## 2018-09-19 NOTE — TELEPHONE ENCOUNTER
Patient called and discussed that he is due for FU  States he will make it online.    Refilled statin  Thanks

## 2018-09-20 ENCOUNTER — APPOINTMENT (OUTPATIENT)
Dept: WOUND CARE | Facility: HOSPITAL | Age: 68
End: 2018-09-20
Attending: NURSE PRACTITIONER
Payer: COMMERCIAL

## 2018-09-21 NOTE — PROGRESS NOTES
Lety Yates is a 74year old male. Patient presents with:  Diabetic Ulcer: right heel -- Rates pain 2-3/10. Drainage is brownish on bandage and has some blood. Denies any fever. BG was 128 this AM.   Ankle Pain: Right -- Onset on Monday and denies injury. GLUCOSE TEST) In Vitro Strip twice daily Disp:  Rfl:       Past Medical History:  No date:  Atherosclerosis of coronary artery  No date: Cataract      Comment:  stage 1 cataracts  No date: COPD (chronic obstructive pulmonary disease) (HCC)  No date: Coronar file    Tobacco Use      Smoking status: Former Smoker        Packs/day: 0.00        Types: Cigarettes        Quit date: 2017        Years since quittin.0      Smokeless tobacco: Former User        Quit date: 3/1/2017    Substance and Sexual Activ understanding of these issues and agrees to the plan. No Follow-up on file.     Vipin Shah DPM  9/21/2018

## 2018-09-24 ENCOUNTER — HOSPITAL ENCOUNTER (OUTPATIENT)
Dept: ULTRASOUND IMAGING | Facility: HOSPITAL | Age: 68
Discharge: HOME | End: 2018-09-24
Attending: PHYSICIAN ASSISTANT
Payer: COMMERCIAL

## 2018-09-24 DIAGNOSIS — I65.23 BILATERAL CAROTID ARTERY STENOSIS: ICD-10-CM

## 2018-09-24 PROCEDURE — 93880 EXTRACRANIAL BILAT STUDY: CPT | Performed by: PHYSICIAN ASSISTANT

## 2018-09-27 ENCOUNTER — NURSE ONLY (OUTPATIENT)
Dept: WOUND CARE | Facility: HOSPITAL | Age: 68
End: 2018-09-27
Attending: NURSE PRACTITIONER
Payer: COMMERCIAL

## 2018-09-27 DIAGNOSIS — L97.406 DIABETIC ULCER OF HEEL ASSOCIATED WITH DIABETES MELLITUS DUE TO UNDERLYING CONDITION, WITH BONE INVOLVEMENT WITHOUT EVIDENCE OF NECROSIS, UNSPECIFIED LATERALITY (HCC): Primary | ICD-10-CM

## 2018-09-27 DIAGNOSIS — E08.621 DIABETIC ULCER OF HEEL ASSOCIATED WITH DIABETES MELLITUS DUE TO UNDERLYING CONDITION, WITH BONE INVOLVEMENT WITHOUT EVIDENCE OF NECROSIS, UNSPECIFIED LATERALITY (HCC): Primary | ICD-10-CM

## 2018-09-27 PROCEDURE — 99212 OFFICE O/P EST SF 10 MIN: CPT | Performed by: NURSE PRACTITIONER

## 2018-09-27 PROCEDURE — 99214 OFFICE O/P EST MOD 30 MIN: CPT

## 2018-09-27 NOTE — PROGRESS NOTES
Subjective    Chief Complaint  This information was obtained from the patient  The patient was seen today for follow up and management of difficult to heal wound(s). 09/27/18 No complaints for today's visit.     Allergies  sulfa antibiotics    HPI  This in CONCLUSION:   Mild degenerative changes throughout the ankle and hind foot without osteomyelitis. Extensive subcutaneous edema/cellulitis along the dorsal lateral ankle without abscess. Mild plantar foot myositis without intramuscular abscess.   Severe ch effortless breathing. Musculoskeletal:  no clubbing, no cyanosis. Integumentary (Hair, Skin)  right lateral heel wound. no edema, no induration.      Wound #3 Right, Lateral Heel is an Unstageable Pressure Injury Obscured full-thickness skin and tis Right lateral heel ulcer improved with reduction in area by 1.5 cm². This wound bed is 100% granular. There is no S&S of infection noted in the wound and tissue surrounding the wound. Palpable pulse with warm foot indicating patent bypass.   Modify treatme

## 2018-10-04 ENCOUNTER — APPOINTMENT (OUTPATIENT)
Dept: WOUND CARE | Facility: HOSPITAL | Age: 68
End: 2018-10-04
Attending: NURSE PRACTITIONER
Payer: COMMERCIAL

## 2018-10-09 ENCOUNTER — OFFICE VISIT (OUTPATIENT)
Dept: ENDOCRINOLOGY CLINIC | Facility: CLINIC | Age: 68
End: 2018-10-09
Payer: COMMERCIAL

## 2018-10-09 VITALS — SYSTOLIC BLOOD PRESSURE: 154 MMHG | HEART RATE: 79 BPM | DIASTOLIC BLOOD PRESSURE: 73 MMHG

## 2018-10-09 DIAGNOSIS — E11.65 UNCONTROLLED TYPE 2 DIABETES MELLITUS WITH HYPERGLYCEMIA (HCC): Primary | ICD-10-CM

## 2018-10-09 DIAGNOSIS — E78.5 DYSLIPIDEMIA: ICD-10-CM

## 2018-10-09 PROCEDURE — 36416 COLLJ CAPILLARY BLOOD SPEC: CPT | Performed by: INTERNAL MEDICINE

## 2018-10-09 PROCEDURE — 83036 HEMOGLOBIN GLYCOSYLATED A1C: CPT | Performed by: INTERNAL MEDICINE

## 2018-10-09 PROCEDURE — 99214 OFFICE O/P EST MOD 30 MIN: CPT | Performed by: INTERNAL MEDICINE

## 2018-10-09 PROCEDURE — 82962 GLUCOSE BLOOD TEST: CPT | Performed by: INTERNAL MEDICINE

## 2018-10-09 NOTE — PROGRESS NOTES
Return Office Visit     CHIEF COMPLAINT:    DM  Dyslipidemia     HISTORY OF PRESENT ILLNESS:  Robet Apgar is a 74year old male who presents for follow up for for DM. DM HISTORY  He states he had \" borderline DM\" as a teenager.   He was on diabinese tablet Rfl: 0   multivitamin Oral Tab Take 1 tablet by mouth daily. Disp: 30 tablet Rfl: 0   Vitamin C 500 MG Oral Tab Take 1 tablet (500 mg total) by mouth 3 (three) times daily.  (Patient taking differently: Take 500 mg by mouth 2 (two) times daily.  ) Tsering Shrestha ptosis, conjunctiva normal  ENT:  Normocephalic, atraumatic  NECK:  No adenopathy, thyroid symmetric, not enlarged and no tenderness  LUNGS: clear to auscultation bilaterally, no crackles or wheezing  CARDIOVASCULAR:  regular rate and rhythm, normal S1 and Monitor closely. Low salt diet. FU with PCP. 3. Dyslipidemia  A) Discussed lifestyle modifications including reductions in dietary total and saturated fat, weight loss, aerobic exercise, and eating a diet rich in fruits and vegetables.   B) c/w  atorvas

## 2018-10-10 ENCOUNTER — OFFICE VISIT (OUTPATIENT)
Dept: PODIATRY CLINIC | Facility: CLINIC | Age: 68
End: 2018-10-10
Payer: COMMERCIAL

## 2018-10-10 DIAGNOSIS — S91.302D OPEN WOUND OF LEFT FOOT, SUBSEQUENT ENCOUNTER: ICD-10-CM

## 2018-10-10 DIAGNOSIS — E11.59 TYPE 2 DIABETES MELLITUS WITH OTHER CIRCULATORY COMPLICATION, UNSPECIFIED WHETHER LONG TERM INSULIN USE (HCC): Primary | ICD-10-CM

## 2018-10-10 DIAGNOSIS — I73.89 OTHER SPECIFIED PERIPHERAL VASCULAR DISEASES (HCC): ICD-10-CM

## 2018-10-10 DIAGNOSIS — L97.412 DIABETIC ULCER OF RIGHT HEEL ASSOCIATED WITH DIABETES MELLITUS DUE TO UNDERLYING CONDITION, WITH FAT LAYER EXPOSED (HCC): ICD-10-CM

## 2018-10-10 DIAGNOSIS — E08.621 DIABETIC ULCER OF RIGHT HEEL ASSOCIATED WITH DIABETES MELLITUS DUE TO UNDERLYING CONDITION, WITH FAT LAYER EXPOSED (HCC): ICD-10-CM

## 2018-10-10 PROCEDURE — 99213 OFFICE O/P EST LOW 20 MIN: CPT | Performed by: PODIATRIST

## 2018-10-11 ENCOUNTER — NURSE ONLY (OUTPATIENT)
Dept: WOUND CARE | Facility: HOSPITAL | Age: 68
End: 2018-10-11
Attending: NURSE PRACTITIONER
Payer: COMMERCIAL

## 2018-10-11 DIAGNOSIS — L97.418 DIABETIC ULCER OF RIGHT HEEL ASSOCIATED WITH DIABETES MELLITUS DUE TO UNDERLYING CONDITION, WITH OTHER ULCER SEVERITY (HCC): Primary | ICD-10-CM

## 2018-10-11 DIAGNOSIS — E08.621 DIABETIC ULCER OF RIGHT HEEL ASSOCIATED WITH DIABETES MELLITUS DUE TO UNDERLYING CONDITION, WITH OTHER ULCER SEVERITY (HCC): Primary | ICD-10-CM

## 2018-10-11 PROCEDURE — 99212 OFFICE O/P EST SF 10 MIN: CPT

## 2018-10-11 PROCEDURE — 99212 OFFICE O/P EST SF 10 MIN: CPT | Performed by: NURSE PRACTITIONER

## 2018-10-11 NOTE — PROGRESS NOTES
Dhruv Ravi is a 74year old male. Patient presents with:  Diabetic Foot Care: Pt is here for a follow up on a diabetic ulcer right heel Pain of the heel 1 . Pain level of the ankle 7-8.   Diabetic Ulcer: Not bearing any wt and using the knee scooter as mu coronary artery    • Cataract     stage 1 cataracts   • COPD (chronic obstructive pulmonary disease) ([facility])    • Coronary atherosclerosis    • Diabetes ([facility])    • Esophageal reflux     pt. states gets \"heartburn from diabetic med and since starting metopro use: No        Alcohol/week: 0.0 oz      Drug use: No      Sexual activity: No    Other Topics      Concerns:        Not on file    Social History Narrative      Not on file          REVIEW OF SYSTEMS:   Review of Systems  GENERAL HEALTH: feels well otherw

## 2018-10-11 NOTE — PROGRESS NOTES
Progress Note Details  Patient Name: Vanna George Patient Number: [id number]  Patient YOB: 1950  Date: 10/11/2018  RN: José Antonio Martinez CNA/DIONTE/CMA:  Mikala Ryan  Physician / Extender: Lon Chong [address] [facility]  Subjective    Chief C CONCLUSION:   Early/focal osteomyelitis involving the distal shaft of the first metatarsal.  Large medial skin ulceration overlying the distal shaft of the first metatarsal.  Smaller skin ulceration overlying the fifth metatarsal shaft with reactive marrow This information was obtained from the patient  Patient complains of:   Integumentary (Hair/Skin/Nails): Lesions (right heel wound), Dryness, Calluses/Corns  Neurological: Abnormal Gait (uses knee roller)    Patient denies complaints or symptoms related to (Encounter Diagnosis) E11.621 - Type 2 diabetes mellitus with foot ulcer  (Encounter Diagnosis) L89.613 - Pressure ulcer of right heel, stage 3    Diagnoses    ICD-10  M86.172: Other acute osteomyelitis, left ankle and foot  L03.116: Cellulitis of left low

## 2018-10-12 ENCOUNTER — TELEPHONE (OUTPATIENT)
Dept: ENDOCRINOLOGY CLINIC | Facility: CLINIC | Age: 68
End: 2018-10-12

## 2018-10-12 NOTE — TELEPHONE ENCOUNTER
Pt calling with blood sugar readings before meals, any concerns pls call at:[phone],thanks.     DATE  AM/BK  PM/DINNER  10/10/18 268  198  10/11/18 206  172  10/12/18 237  182

## 2018-10-12 NOTE — TELEPHONE ENCOUNTER
Patient is on insulin 70/30 20 before Bf and 20 before dinner  Recommend:  20 with BF  Increase to 24 with dinner  Call in a week with BG/ sooner if under 70  Thanks

## 2018-10-12 NOTE — TELEPHONE ENCOUNTER
Spoke with patient and discussed note below from provider. Pt verbalized understanding to take 70/30 insulin 20 units BF and 24 BD. Call in 1 week with update and sooner if below 70. Pt verbalized understanding.

## 2018-10-18 ENCOUNTER — TELEPHONE (OUTPATIENT)
Dept: INTERNAL MEDICINE CLINIC | Facility: CLINIC | Age: 68
End: 2018-10-18

## 2018-10-18 ENCOUNTER — NURSE ONLY (OUTPATIENT)
Dept: WOUND CARE | Facility: HOSPITAL | Age: 68
End: 2018-10-18
Attending: NURSE PRACTITIONER
Payer: COMMERCIAL

## 2018-10-18 DIAGNOSIS — L97.406 DIABETIC ULCER OF HEEL ASSOCIATED WITH DIABETES MELLITUS DUE TO UNDERLYING CONDITION, WITH BONE INVOLVEMENT WITHOUT EVIDENCE OF NECROSIS, UNSPECIFIED LATERALITY (HCC): Primary | ICD-10-CM

## 2018-10-18 DIAGNOSIS — E08.621 DIABETIC ULCER OF HEEL ASSOCIATED WITH DIABETES MELLITUS DUE TO UNDERLYING CONDITION, WITH BONE INVOLVEMENT WITHOUT EVIDENCE OF NECROSIS, UNSPECIFIED LATERALITY (HCC): Primary | ICD-10-CM

## 2018-10-18 PROCEDURE — 99212 OFFICE O/P EST SF 10 MIN: CPT | Performed by: NURSE PRACTITIONER

## 2018-10-18 PROCEDURE — 99212 OFFICE O/P EST SF 10 MIN: CPT

## 2018-10-18 RX ORDER — CLOPIDOGREL BISULFATE 75 MG/1
75 TABLET ORAL DAILY
Qty: 90 TABLET | Refills: 1 | Status: SHIPPED | OUTPATIENT
Start: 2018-10-18 | End: 2019-05-18

## 2018-10-18 NOTE — PROGRESS NOTES
Subjective    Chief Complaint  This information was obtained from the patient  The patient was seen today for follow up and management of difficult to heal wound(s). 10/18/18 No complaints for today's visit.     Allergies  sulfa antibiotics    HPI  This in CONCLUSION:   Mild degenerative changes throughout the ankle and hind foot without osteomyelitis. Extensive subcutaneous edema/cellulitis along the dorsal lateral ankle without abscess. Mild plantar foot myositis without intramuscular abscess.   Severe ch effortless breathing. Musculoskeletal:  no clubbing, no cyanosis. Integumentary (Hair, Skin)  No rash, no lesions, no erythema, no open wounds. no edema, no induration.      Wound #3 Right, Lateral Heel is an Unstageable Pressure Injury Obscured ful Thank you for referring this patient to us and allowing us to be in his care team.    Electronic Signature(s)  Signed By: Date:  Aleja Garcia FNP-BC 10/18/2018 4:25:19 PM       Entered By: Aleja Garcia on 10/18/2018 4:24:43 PM

## 2018-10-18 NOTE — TELEPHONE ENCOUNTER
Re: clopidogrel LOV reviewed. No change in this medication. Meets refill protocol criteria. Refill sent.     Anticoagulant Medications  Protocol Criteria:  · Appointment scheduled with Cardiology in the past 12 months or in the next 3 months  · CBC in past 1

## 2018-10-18 NOTE — TELEPHONE ENCOUNTER
Pharmacy requesting 90 day supply     Current Outpatient Medications:   •  Clopidogrel Bisulfate 75 MG Oral Tab, Take 1 tablet (75 mg total) by mouth daily. , Disp: 30 tablet, Rfl: 5

## 2018-10-23 LAB
CHOLESTEROL, TOTAL: 90 MG/DL
HDL CHOLESTEROL: 39 MG/DL
LDL CHOLESTEROL: 38 MG/DL
NON-HDL CHOLESTEROL: 51 MG/DL
TRIGLYCERIDES: 65 MG/DL

## 2018-10-25 ENCOUNTER — APPOINTMENT (OUTPATIENT)
Dept: WOUND CARE | Facility: HOSPITAL | Age: 68
End: 2018-10-25
Attending: NURSE PRACTITIONER
Payer: COMMERCIAL

## 2018-10-26 ENCOUNTER — PRIOR ORIGINAL RECORDS (OUTPATIENT)
Dept: OTHER | Age: 68
End: 2018-10-26

## 2018-10-26 ENCOUNTER — MYAURORA ACCOUNT LINK (OUTPATIENT)
Dept: OTHER | Age: 68
End: 2018-10-26

## 2018-11-03 ENCOUNTER — OFFICE VISIT (OUTPATIENT)
Dept: PODIATRY CLINIC | Facility: CLINIC | Age: 68
End: 2018-11-03
Payer: COMMERCIAL

## 2018-11-03 DIAGNOSIS — I73.89 OTHER SPECIFIED PERIPHERAL VASCULAR DISEASES (HCC): ICD-10-CM

## 2018-11-03 DIAGNOSIS — B35.1 ONYCHOMYCOSIS: ICD-10-CM

## 2018-11-03 DIAGNOSIS — L85.3 XEROSIS OF SKIN: ICD-10-CM

## 2018-11-03 DIAGNOSIS — E11.59 TYPE 2 DIABETES MELLITUS WITH OTHER CIRCULATORY COMPLICATION, UNSPECIFIED WHETHER LONG TERM INSULIN USE (HCC): Primary | ICD-10-CM

## 2018-11-03 PROCEDURE — 99213 OFFICE O/P EST LOW 20 MIN: CPT | Performed by: PODIATRIST

## 2018-11-03 RX ORDER — AMMONIUM LACTATE 12 G/100G
LOTION TOPICAL
Qty: 500 G | Refills: 12 | Status: SHIPPED | OUTPATIENT
Start: 2018-11-03 | End: 2021-12-09

## 2018-11-03 NOTE — PROGRESS NOTES
Fatoumata Ortiz is a 79year old male. Patient presents with:  Diabetic Ulcer: right lateral heel, discharged from wound care, no drainage or pain noted        HPI:   This pleasant gentleman presents to the clinic with his wife present.   Allergies: Sulfa Anti Coronary atherosclerosis    • Diabetes (HCC)    • Esophageal reflux     pt. states gets \"heartburn from diabetic med and since starting metoprolol   • Essential hypertension    • Glaucoma     had 2 years ago \"cleared Up\"   • Hearing impairment     tinit Not on file    Social History Narrative      Not on file          REVIEW OF SYSTEMS:   Review of Systems  GENERAL HEALTH: feels well otherwise  SKIN: denies any unusual skin lesions or rashes  RESPIRATORY: denies shortness of breath with exertion  CARDI heel fissure and dry skin. Today I prescribed Lac-Hydrin lotion to be applied twice daily. They understood all instructions follow-up in 2 months for routine care. Note dispensed for him to return to work.     The patient indicates understanding of these

## 2018-11-05 ENCOUNTER — MYAURORA ACCOUNT LINK (OUTPATIENT)
Dept: OTHER | Age: 68
End: 2018-11-05

## 2018-11-05 ENCOUNTER — PRIOR ORIGINAL RECORDS (OUTPATIENT)
Dept: OTHER | Age: 68
End: 2018-11-05

## 2018-11-06 ENCOUNTER — OFFICE VISIT (OUTPATIENT)
Dept: ENDOCRINOLOGY CLINIC | Facility: CLINIC | Age: 68
End: 2018-11-06
Payer: COMMERCIAL

## 2018-11-06 VITALS
DIASTOLIC BLOOD PRESSURE: 69 MMHG | SYSTOLIC BLOOD PRESSURE: 159 MMHG | WEIGHT: 191.38 LBS | BODY MASS INDEX: 27 KG/M2 | HEART RATE: 66 BPM

## 2018-11-06 DIAGNOSIS — E78.5 DYSLIPIDEMIA: ICD-10-CM

## 2018-11-06 DIAGNOSIS — E11.65 UNCONTROLLED TYPE 2 DIABETES MELLITUS WITH HYPERGLYCEMIA (HCC): Primary | ICD-10-CM

## 2018-11-06 PROCEDURE — 82962 GLUCOSE BLOOD TEST: CPT | Performed by: INTERNAL MEDICINE

## 2018-11-06 PROCEDURE — 99213 OFFICE O/P EST LOW 20 MIN: CPT | Performed by: INTERNAL MEDICINE

## 2018-11-06 PROCEDURE — 36416 COLLJ CAPILLARY BLOOD SPEC: CPT | Performed by: INTERNAL MEDICINE

## 2018-11-07 NOTE — PROGRESS NOTES
Return Office Visit     CHIEF COMPLAINT:    DM  Dyslipidemia     HISTORY OF PRESENT ILLNESS:  Trevon Valiente is a 79year old male who presents for follow up for for DM. DM HISTORY  He states he had \" borderline DM\" as a teenager.   He was on diabinese Disp: 30 tablet Rfl: 0   metoprolol Tartrate 25 MG Oral Tab Take 1 tablet (25 mg total) by mouth 2 (two) times daily. Disp: 90 tablet Rfl: 3   multivitamin Oral Tab Take 1 tablet by mouth daily.  Disp: 30 tablet Rfl: 0   Vitamin C 500 MG Oral Tab Take 1 tab index is 27.46 kg/m².      CONSTITUTIONAL:  awake, alert, cooperative, no apparent distress, and appears stated age  PSYCH: normal affect  EYES:  No proptosis, no ptosis, conjunctiva normal  ENT:  Normocephalic, atraumatic  NECK:  No adenopathy, thyroid sym states he is nervous. Discussed low salt diet, compliance with medication and FU with PCP.    3. Dyslipidemia  A) Discussed lifestyle modifications including reductions in dietary total and saturated fat, weight loss, aerobic exercise, and eating a diet r

## 2018-11-15 RX ORDER — INSULIN HUMAN 100 [IU]/ML
INJECTION, SUSPENSION SUBCUTANEOUS
Qty: 15 ML | Refills: 2 | Status: SHIPPED | OUTPATIENT
Start: 2018-11-15 | End: 2019-03-19

## 2019-01-01 ENCOUNTER — EXTERNAL RECORD (OUTPATIENT)
Dept: HEALTH INFORMATION MANAGEMENT | Facility: OTHER | Age: 69
End: 2019-01-01

## 2019-01-05 ENCOUNTER — OFFICE VISIT (OUTPATIENT)
Dept: PODIATRY CLINIC | Facility: CLINIC | Age: 69
End: 2019-01-05
Payer: COMMERCIAL

## 2019-01-05 DIAGNOSIS — I73.89 OTHER SPECIFIED PERIPHERAL VASCULAR DISEASES (HCC): ICD-10-CM

## 2019-01-05 DIAGNOSIS — L85.3 XEROSIS OF SKIN: ICD-10-CM

## 2019-01-05 DIAGNOSIS — B35.1 ONYCHOMYCOSIS: ICD-10-CM

## 2019-01-05 DIAGNOSIS — E11.59 TYPE 2 DIABETES MELLITUS WITH OTHER CIRCULATORY COMPLICATION, UNSPECIFIED WHETHER LONG TERM INSULIN USE (HCC): Primary | ICD-10-CM

## 2019-01-05 PROCEDURE — 99213 OFFICE O/P EST LOW 20 MIN: CPT | Performed by: PODIATRIST

## 2019-01-07 NOTE — PROGRESS NOTES
Dina Castellanos is a 79year old male.  Patient presents with:  Diabetic Foot Care: 2 mo f/u - last XjX1X=63.3 from 10/9/18 and LOV with endo Dr Danisha Martinez was 11/6/18 - has no c/o regarding his feet - this AM his GR=573        HPI:   The patient returns to the twice daily Disp:  Rfl:       Past Medical History:   Diagnosis Date   • Atherosclerosis of coronary artery    • Cataract     stage 1 cataracts   • COPD (chronic obstructive pulmonary disease) (HCC)    • Coronary atherosclerosis    • [facility]    • Es lesions or rashes  RESPIRATORY: denies shortness of breath with exertion  CARDIOVASCULAR: denies chest pain on exertion  GI: denies abdominal pain and denies heartburn  NEURO: denies headaches    EXAM:   There were no vitals taken for this visit.   Physical

## 2019-02-18 NOTE — TELEPHONE ENCOUNTER
LOV 11/6/18 - No F/U (cancelled 1/19/19)    Sent pt mc reminding to book apt.     Pended pen needles for provider

## 2019-02-28 VITALS
RESPIRATION RATE: 18 BRPM | SYSTOLIC BLOOD PRESSURE: 138 MMHG | DIASTOLIC BLOOD PRESSURE: 68 MMHG | BODY MASS INDEX: 25.2 KG/M2 | HEART RATE: 56 BPM | HEIGHT: 70 IN | WEIGHT: 176 LBS

## 2019-02-28 VITALS
DIASTOLIC BLOOD PRESSURE: 54 MMHG | HEIGHT: 70 IN | SYSTOLIC BLOOD PRESSURE: 132 MMHG | OXYGEN SATURATION: 98 % | BODY MASS INDEX: 25.05 KG/M2 | WEIGHT: 175 LBS | HEART RATE: 49 BPM

## 2019-02-28 VITALS
SYSTOLIC BLOOD PRESSURE: 144 MMHG | HEART RATE: 64 BPM | BODY MASS INDEX: 26.92 KG/M2 | WEIGHT: 188 LBS | DIASTOLIC BLOOD PRESSURE: 66 MMHG | HEIGHT: 70 IN

## 2019-02-28 VITALS
HEIGHT: 70 IN | OXYGEN SATURATION: 92 % | DIASTOLIC BLOOD PRESSURE: 76 MMHG | SYSTOLIC BLOOD PRESSURE: 148 MMHG | HEART RATE: 82 BPM | BODY MASS INDEX: 25.91 KG/M2 | WEIGHT: 181 LBS

## 2019-02-28 VITALS
HEIGHT: 70 IN | WEIGHT: 175 LBS | HEART RATE: 54 BPM | DIASTOLIC BLOOD PRESSURE: 70 MMHG | SYSTOLIC BLOOD PRESSURE: 170 MMHG | BODY MASS INDEX: 25.05 KG/M2

## 2019-03-02 ENCOUNTER — OFFICE VISIT (OUTPATIENT)
Dept: PODIATRY CLINIC | Facility: CLINIC | Age: 69
End: 2019-03-02
Payer: COMMERCIAL

## 2019-03-02 DIAGNOSIS — I73.89 OTHER SPECIFIED PERIPHERAL VASCULAR DISEASES (HCC): Primary | ICD-10-CM

## 2019-03-02 DIAGNOSIS — L84 CALLUS OF FOOT: ICD-10-CM

## 2019-03-02 DIAGNOSIS — B35.1 ONYCHOMYCOSIS: ICD-10-CM

## 2019-03-02 DIAGNOSIS — L85.3 XEROSIS OF SKIN: ICD-10-CM

## 2019-03-02 DIAGNOSIS — E11.59 TYPE 2 DIABETES MELLITUS WITH OTHER CIRCULATORY COMPLICATION, UNSPECIFIED WHETHER LONG TERM INSULIN USE (HCC): ICD-10-CM

## 2019-03-02 PROCEDURE — 99213 OFFICE O/P EST LOW 20 MIN: CPT | Performed by: PODIATRIST

## 2019-03-04 NOTE — PROGRESS NOTES
Mihai Iqbal is a 79year old male.  Patient presents with:  Diabetic Foot Care: 2 mo f/u - last GuL5N=89.3 from 10/9/18 and LOV with endo Dr Heather Lu was 11/6/18  - has no c/o regarding his feet - did not checked his BS this AM         HPI:   The patient (RELION BLOOD GLUCOSE TEST) In Vitro Strip twice daily Disp:  Rfl:       Past Medical History:   Diagnosis Date   • Atherosclerosis of coronary artery    • Cataract     stage 1 cataracts   • COPD (chronic obstructive pulmonary disease) (HCC)    • Coronary otherwise  SKIN: denies any unusual skin lesions or rashes  RESPIRATORY: denies shortness of breath with exertion  CARDIOVASCULAR: denies chest pain on exertion  GI: denies abdominal pain and denies heartburn  NEURO: denies headaches    EXAM:   There were right heel uneventfully without hemorrhage. The patient indicates understanding of these issues and agrees to the plan. Return in about 2 months (around 5/2/2019).     Torsten Yost DPM  1/7/2019

## 2019-03-18 RX ORDER — ATORVASTATIN CALCIUM 40 MG/1
TABLET, FILM COATED ORAL
COMMUNITY
Start: 2018-03-02 | End: 2020-03-19 | Stop reason: SDUPTHER

## 2019-03-18 RX ORDER — AMLODIPINE BESYLATE 10 MG/1
TABLET ORAL
COMMUNITY
Start: 2018-03-09 | End: 2019-04-21 | Stop reason: SDUPTHER

## 2019-03-18 RX ORDER — CLOPIDOGREL BISULFATE 75 MG/1
TABLET ORAL
COMMUNITY
Start: 2018-08-16

## 2019-03-19 RX ORDER — INSULIN HUMAN 100 [IU]/ML
INJECTION, SUSPENSION SUBCUTANEOUS
Qty: 15 ML | Refills: 0 | Status: SHIPPED | OUTPATIENT
Start: 2019-03-19 | End: 2019-04-18

## 2019-03-19 NOTE — TELEPHONE ENCOUNTER
LOV 11/6/18 with RTC 2 months. Left detailed message asking pt to schedule follow up appointment. Pended 1 mo refill for provider.

## 2019-03-26 ENCOUNTER — HOSPITAL ENCOUNTER (OUTPATIENT)
Dept: ULTRASOUND IMAGING | Age: 69
Discharge: HOME | End: 2019-03-26
Attending: PHYSICIAN ASSISTANT
Payer: COMMERCIAL

## 2019-03-26 DIAGNOSIS — I65.23 OCCLUSION AND STENOSIS OF BILATERAL CAROTID ARTERIES: ICD-10-CM

## 2019-03-26 PROCEDURE — 93880 EXTRACRANIAL BILAT STUDY: CPT | Performed by: PHYSICIAN ASSISTANT

## 2019-03-29 NOTE — PROGRESS NOTES
There has been progression of velocities on right side so patient she come back to see me to discuss right carotid surgery.

## 2019-04-18 NOTE — TELEPHONE ENCOUNTER
LOV 11/6/18 with RTC 2 months    Sent [email or web address] message asking patient to schedule follow up appointment. Refilled x1 month per protocol.

## 2019-04-22 ENCOUNTER — OFFICE VISIT (OUTPATIENT)
Dept: CARDIOLOGY | Age: 69
End: 2019-04-22

## 2019-04-22 VITALS
BODY MASS INDEX: 28.35 KG/M2 | WEIGHT: 198 LBS | DIASTOLIC BLOOD PRESSURE: 80 MMHG | HEART RATE: 70 BPM | SYSTOLIC BLOOD PRESSURE: 140 MMHG | HEIGHT: 70 IN

## 2019-04-22 DIAGNOSIS — E78.00 PURE HYPERCHOLESTEROLEMIA: ICD-10-CM

## 2019-04-22 DIAGNOSIS — I25.10 CORONARY ARTERY DISEASE INVOLVING NATIVE CORONARY ARTERY OF NATIVE HEART WITHOUT ANGINA PECTORIS: ICD-10-CM

## 2019-04-22 DIAGNOSIS — I10 ESSENTIAL HYPERTENSION: Primary | ICD-10-CM

## 2019-04-22 PROBLEM — Z98.62 PERIPHERAL VASCULAR ANGIOPLASTY STATUS: Status: ACTIVE | Noted: 2018-08-09

## 2019-04-22 PROCEDURE — 99214 OFFICE O/P EST MOD 30 MIN: CPT | Performed by: INTERNAL MEDICINE

## 2019-04-22 PROCEDURE — 3079F DIAST BP 80-89 MM HG: CPT | Performed by: INTERNAL MEDICINE

## 2019-04-22 PROCEDURE — 3077F SYST BP >= 140 MM HG: CPT | Performed by: INTERNAL MEDICINE

## 2019-04-22 RX ORDER — AMLODIPINE BESYLATE 10 MG/1
TABLET ORAL
Qty: 90 TABLET | Refills: 0 | Status: SHIPPED | OUTPATIENT
Start: 2019-04-22 | End: 2019-07-27 | Stop reason: SDUPTHER

## 2019-04-22 SDOH — HEALTH STABILITY: MENTAL HEALTH: HOW OFTEN DO YOU HAVE A DRINK CONTAINING ALCOHOL?: NEVER

## 2019-04-25 ENCOUNTER — TELEPHONE (OUTPATIENT)
Dept: ENDOCRINOLOGY CLINIC | Facility: CLINIC | Age: 69
End: 2019-04-25

## 2019-04-25 RX ORDER — ATORVASTATIN CALCIUM 40 MG/1
40 TABLET, FILM COATED ORAL DAILY
Qty: 90 TABLET | Refills: 0 | Status: SHIPPED | OUTPATIENT
Start: 2019-04-25 | End: 2019-07-27

## 2019-04-25 NOTE — TELEPHONE ENCOUNTER
Current Outpatient Medications:                          ATORVASTATIN 40 MG Oral Tab TAKE 1 TABLET (40 MG TOTAL) BY MOUTH DAILY.  Disp: 90 tablet Rfl: 0

## 2019-05-04 ENCOUNTER — OFFICE VISIT (OUTPATIENT)
Dept: PODIATRY CLINIC | Facility: CLINIC | Age: 69
End: 2019-05-04
Payer: COMMERCIAL

## 2019-05-04 ENCOUNTER — LAB ENCOUNTER (OUTPATIENT)
Dept: LAB | Age: 69
End: 2019-05-04
Attending: INTERNAL MEDICINE
Payer: COMMERCIAL

## 2019-05-04 DIAGNOSIS — E11.59 TYPE 2 DIABETES MELLITUS WITH OTHER CIRCULATORY COMPLICATION, UNSPECIFIED WHETHER LONG TERM INSULIN USE (HCC): Primary | ICD-10-CM

## 2019-05-04 DIAGNOSIS — L84 CALLUS OF FOOT: ICD-10-CM

## 2019-05-04 DIAGNOSIS — B35.1 ONYCHOMYCOSIS: ICD-10-CM

## 2019-05-04 DIAGNOSIS — I25.10 CAD (CORONARY ARTERY DISEASE): Primary | ICD-10-CM

## 2019-05-04 DIAGNOSIS — I73.89 OTHER SPECIFIED PERIPHERAL VASCULAR DISEASES (HCC): ICD-10-CM

## 2019-05-04 DIAGNOSIS — Z86.31 HISTORY OF DIABETIC ULCER OF FOOT: ICD-10-CM

## 2019-05-04 LAB — AST SERPL-CCNC: 15 U/L

## 2019-05-04 PROCEDURE — 36415 COLL VENOUS BLD VENIPUNCTURE: CPT

## 2019-05-04 PROCEDURE — 99213 OFFICE O/P EST LOW 20 MIN: CPT | Performed by: PODIATRIST

## 2019-05-04 PROCEDURE — 84450 TRANSFERASE (AST) (SGOT): CPT

## 2019-05-06 ENCOUNTER — CLINICAL ABSTRACT (OUTPATIENT)
Dept: CARDIOLOGY | Age: 69
End: 2019-05-06

## 2019-05-06 NOTE — PROGRESS NOTES
Dina Castellanos is a 79year old male. Patient presents with:  Diabetic Foot Care: 2 month f/u- pt denies checking his BS this morning. LOV w/ endo 11/6/18.  Last HgbA1c was 11.3 on 10/9/18  Toenail Care        HPI:   The patient returns to the clinic with his Past Medical History:   Diagnosis Date   • Atherosclerosis of coronary artery    • Cataract     stage 1 cataracts   • COPD (chronic obstructive pulmonary disease) (HCC)    • Coronary atherosclerosis    • Diabetes ([facility]    • Esophageal reflux     pt.  s rashes  RESPIRATORY: denies shortness of breath with exertion  CARDIOVASCULAR: denies chest pain on exertion  GI: denies abdominal pain and denies heartburn  NEURO: denies headaches    EXAM:   There were no vitals taken for this visit.   Physical Exam    GE again reminded to use his moisturizing cream.  Using a #15 blade trimmed and debrided the hyperkeratotic lesion on his right heel uneventfully without hemorrhage. The patient indicates understanding of these issues and agrees to the plan.     Return in a

## 2019-05-08 ENCOUNTER — OFFICE VISIT (OUTPATIENT)
Dept: CARDIOLOGY | Age: 69
End: 2019-05-08

## 2019-05-08 VITALS
DIASTOLIC BLOOD PRESSURE: 72 MMHG | HEIGHT: 70 IN | HEART RATE: 58 BPM | WEIGHT: 204 LBS | SYSTOLIC BLOOD PRESSURE: 160 MMHG | OXYGEN SATURATION: 96 % | BODY MASS INDEX: 29.2 KG/M2

## 2019-05-08 DIAGNOSIS — I10 ESSENTIAL HYPERTENSION: Primary | ICD-10-CM

## 2019-05-08 DIAGNOSIS — R60.0 EDEMA OF BOTH LEGS: ICD-10-CM

## 2019-05-08 DIAGNOSIS — Z98.62 PERIPHERAL VASCULAR ANGIOPLASTY STATUS: ICD-10-CM

## 2019-05-08 PROCEDURE — 99214 OFFICE O/P EST MOD 30 MIN: CPT | Performed by: INTERNAL MEDICINE

## 2019-05-08 PROCEDURE — 3078F DIAST BP <80 MM HG: CPT | Performed by: INTERNAL MEDICINE

## 2019-05-08 PROCEDURE — 3077F SYST BP >= 140 MM HG: CPT | Performed by: INTERNAL MEDICINE

## 2019-05-08 RX ORDER — SIMVASTATIN 40 MG
40 TABLET ORAL AT BEDTIME
COMMUNITY
Start: 2016-03-25 | End: 2019-06-25

## 2019-05-08 RX ORDER — GLYBURIDE 5 MG/1
TABLET ORAL
COMMUNITY
Start: 2015-08-27 | End: 2019-06-25

## 2019-05-08 RX ORDER — ASCORBIC ACID 500 MG
500 TABLET ORAL
COMMUNITY
Start: 2017-10-16

## 2019-05-08 RX ORDER — AMMONIUM LACTATE 12 G/100G
LOTION TOPICAL
COMMUNITY
Start: 2018-11-03

## 2019-05-08 RX ORDER — FUROSEMIDE 20 MG/1
20 TABLET ORAL DAILY
Qty: 30 TABLET | Refills: 3 | Status: SHIPPED | OUTPATIENT
Start: 2019-05-08 | End: 2019-10-22 | Stop reason: SDUPTHER

## 2019-05-08 RX ORDER — VALSARTAN AND HYDROCHLOROTHIAZIDE 320; 25 MG/1; MG/1
1 TABLET, FILM COATED ORAL
COMMUNITY
Start: 2016-05-02 | End: 2019-06-25

## 2019-05-08 RX ORDER — DOXEPIN HYDROCHLORIDE 50 MG/1
1 CAPSULE ORAL
COMMUNITY
Start: 2017-10-17

## 2019-05-08 SDOH — HEALTH STABILITY: MENTAL HEALTH: HOW OFTEN DO YOU HAVE A DRINK CONTAINING ALCOHOL?: NEVER

## 2019-05-18 RX ORDER — CLOPIDOGREL BISULFATE 75 MG/1
TABLET ORAL
Qty: 90 TABLET | Refills: 1 | Status: SHIPPED | OUTPATIENT
Start: 2019-05-18 | End: 2019-12-16

## 2019-05-18 NOTE — TELEPHONE ENCOUNTER
LR 10-18-18 # 90 + 1 by Dr Ariadna Pretty. Review pended refill request as it does not fall under a protocol.   Requested Prescriptions     Pending Prescriptions Disp Refills   • CLOPIDOGREL BISULFATE 75 MG Oral Tab [Pharmacy Med Name: CLOPIDOGREL 75 MG TABLET

## 2019-05-24 ENCOUNTER — LAB ENCOUNTER (OUTPATIENT)
Dept: LAB | Age: 69
End: 2019-05-24
Attending: INTERNAL MEDICINE
Payer: COMMERCIAL

## 2019-05-24 DIAGNOSIS — R60.0 EDEMA OF BOTH LEGS: Primary | ICD-10-CM

## 2019-05-24 LAB
ANION GAP SERPL CALC-SCNC: 8 MMOL/L
BUN SERPL-MCNC: 28 MG/DL
BUN/CREAT SERPL: 16.9
CALCIUM SERPL-MCNC: 10.7 MG/DL
CHLORIDE SERPL-SCNC: 107 MMOL/L
CO2 SERPL-SCNC: NORMAL MMOL/L
CREAT SERPL-MCNC: 1.66 MG/DL
GLUCOSE SERPL-MCNC: 314 MG/DL
LENGTH OF FAST TIME PATIENT: NO H
POTASSIUM SERPL-SCNC: 4.3 MMOL/L
SODIUM SERPL-SCNC: 142 MMOL/L

## 2019-05-24 PROCEDURE — 80048 BASIC METABOLIC PNL TOTAL CA: CPT

## 2019-05-24 PROCEDURE — 36415 COLL VENOUS BLD VENIPUNCTURE: CPT

## 2019-05-30 ENCOUNTER — CLINICAL ABSTRACT (OUTPATIENT)
Dept: CARDIOLOGY | Age: 69
End: 2019-05-30

## 2019-05-31 ENCOUNTER — TELEPHONE (OUTPATIENT)
Dept: CARDIOLOGY | Age: 69
End: 2019-05-31

## 2019-05-31 DIAGNOSIS — I10 ESSENTIAL HYPERTENSION: Primary | ICD-10-CM

## 2019-06-01 ENCOUNTER — OFFICE VISIT (OUTPATIENT)
Dept: ENDOCRINOLOGY CLINIC | Facility: CLINIC | Age: 69
End: 2019-06-01
Payer: COMMERCIAL

## 2019-06-01 ENCOUNTER — PATIENT MESSAGE (OUTPATIENT)
Dept: ENDOCRINOLOGY CLINIC | Facility: CLINIC | Age: 69
End: 2019-06-01

## 2019-06-01 VITALS
DIASTOLIC BLOOD PRESSURE: 82 MMHG | HEIGHT: 70 IN | HEART RATE: 74 BPM | BODY MASS INDEX: 28.35 KG/M2 | WEIGHT: 198 LBS | SYSTOLIC BLOOD PRESSURE: 164 MMHG

## 2019-06-01 DIAGNOSIS — E78.5 DYSLIPIDEMIA: ICD-10-CM

## 2019-06-01 DIAGNOSIS — E11.65 UNCONTROLLED TYPE 2 DIABETES MELLITUS WITH HYPERGLYCEMIA (HCC): Primary | ICD-10-CM

## 2019-06-01 PROCEDURE — 82962 GLUCOSE BLOOD TEST: CPT | Performed by: INTERNAL MEDICINE

## 2019-06-01 PROCEDURE — 36416 COLLJ CAPILLARY BLOOD SPEC: CPT | Performed by: INTERNAL MEDICINE

## 2019-06-01 PROCEDURE — 99213 OFFICE O/P EST LOW 20 MIN: CPT | Performed by: INTERNAL MEDICINE

## 2019-06-01 PROCEDURE — 83036 HEMOGLOBIN GLYCOSYLATED A1C: CPT | Performed by: INTERNAL MEDICINE

## 2019-06-01 RX ORDER — FUROSEMIDE 20 MG/1
20 TABLET ORAL EVERY OTHER DAY
COMMUNITY

## 2019-06-01 NOTE — PROGRESS NOTES
Return Office Visit     CHIEF COMPLAINT:    DM  Dyslipidemia     HISTORY OF PRESENT ILLNESS:  Srinivasa Fisher is a 79year old male who presents for follow up for for DM. DM HISTORY  He states he had \" borderline DM\" as a teenager.   He was on diabinese lactate 12 % External Lotion Rub into the soles and heels of both feet twice a day until dry Disp: 500 g Rfl: 12   collagenase 250 UNIT/GM External Ointment Apply as directed daily Disp:  Rfl: 0   metoprolol Tartrate 25 MG Oral Tab Take 1 tablet (25 mg tot 06/01/19  0929   BP: (!) 164/82   Pulse: 74   Weight: 198 lb (89.8 kg)   Height: 5' 10\" (1.778 m)     BMI: Body mass index is 28.41 kg/m².      CONSTITUTIONAL:  awake, alert, cooperative, no apparent distress, and appears stated age  PSYCH: normal affect schedule an eye exam once his BG are better  d). Discussed daily foot exam   e). BG log maintainence explained in great detail, to get log and glucometer on next visit. f). Life style changes discussed  g).  Hypoglycemia recognition and management discusse

## 2019-06-03 NOTE — TELEPHONE ENCOUNTER
From: Shannon Lai  To: Lety Juan MD  Sent: 6/1/2019 12:52 PM CDT  Subject: Prescription Question    It appears that the wrong test strips were requested at [facility], They show One Touch Ultra Blue strips were requested.  I need contour Next strips    Th

## 2019-06-03 NOTE — TELEPHONE ENCOUNTER
Prescription sent for contour next strips as requested. Patient as given a contour next meter at LOV.

## 2019-06-08 ENCOUNTER — APPOINTMENT (OUTPATIENT)
Dept: LAB | Age: 69
End: 2019-06-08
Attending: THORACIC SURGERY (CARDIOTHORACIC VASCULAR SURGERY)
Payer: COMMERCIAL

## 2019-06-08 ENCOUNTER — HOSPITAL ENCOUNTER (OUTPATIENT)
Dept: GENERAL RADIOLOGY | Age: 69
Discharge: HOME | End: 2019-06-08
Attending: THORACIC SURGERY (CARDIOTHORACIC VASCULAR SURGERY)
Payer: COMMERCIAL

## 2019-06-08 DIAGNOSIS — I65.21 STENOSIS OF RIGHT CAROTID ARTERY: ICD-10-CM

## 2019-06-08 DIAGNOSIS — Z01.818 PREOP TESTING: ICD-10-CM

## 2019-06-08 PROCEDURE — 93010 ELECTROCARDIOGRAM REPORT: CPT | Performed by: THORACIC SURGERY (CARDIOTHORACIC VASCULAR SURGERY)

## 2019-06-08 PROCEDURE — 86901 BLOOD TYPING SEROLOGIC RH(D): CPT

## 2019-06-08 PROCEDURE — 85730 THROMBOPLASTIN TIME PARTIAL: CPT

## 2019-06-08 PROCEDURE — 85610 PROTHROMBIN TIME: CPT

## 2019-06-08 PROCEDURE — 87641 MR-STAPH DNA AMP PROBE: CPT

## 2019-06-08 PROCEDURE — 93005 ELECTROCARDIOGRAM TRACING: CPT

## 2019-06-08 PROCEDURE — 86900 BLOOD TYPING SEROLOGIC ABO: CPT

## 2019-06-08 PROCEDURE — 71046 X-RAY EXAM CHEST 2 VIEWS: CPT | Performed by: THORACIC SURGERY (CARDIOTHORACIC VASCULAR SURGERY)

## 2019-06-08 PROCEDURE — 85025 COMPLETE CBC W/AUTO DIFF WBC: CPT

## 2019-06-08 PROCEDURE — 80053 COMPREHEN METABOLIC PANEL: CPT

## 2019-06-08 PROCEDURE — 36415 COLL VENOUS BLD VENIPUNCTURE: CPT

## 2019-06-08 PROCEDURE — 81001 URINALYSIS AUTO W/SCOPE: CPT

## 2019-06-08 PROCEDURE — 86850 RBC ANTIBODY SCREEN: CPT

## 2019-06-12 ENCOUNTER — TELEPHONE (OUTPATIENT)
Dept: ENDOCRINOLOGY CLINIC | Facility: CLINIC | Age: 69
End: 2019-06-12

## 2019-06-12 NOTE — TELEPHONE ENCOUNTER
Current Outpatient Medications:  Glucose Blood (CONTOUR NEXT TEST) In Vitro Strip Use to check sugars twice daily Disp: 100 each Rfl: 2

## 2019-06-14 RX ORDER — BLOOD SUGAR DIAGNOSTIC
STRIP MISCELLANEOUS
Qty: 100 EACH | Refills: 2 | Status: SHIPPED | OUTPATIENT
Start: 2019-06-14 | End: 2019-07-02 | Stop reason: CLARIF

## 2019-06-17 ENCOUNTER — APPOINTMENT (OUTPATIENT)
Dept: CARDIOLOGY | Age: 69
End: 2019-06-17

## 2019-06-20 ENCOUNTER — APPOINTMENT (OUTPATIENT)
Dept: CARDIOLOGY | Age: 69
End: 2019-06-20

## 2019-06-20 RX ORDER — ACETAMINOPHEN 500 MG
1000 TABLET ORAL
COMMUNITY

## 2019-06-25 ENCOUNTER — OFFICE VISIT (OUTPATIENT)
Dept: CARDIOLOGY | Age: 69
End: 2019-06-25

## 2019-06-25 VITALS
BODY MASS INDEX: 28.06 KG/M2 | DIASTOLIC BLOOD PRESSURE: 74 MMHG | WEIGHT: 196 LBS | OXYGEN SATURATION: 97 % | HEIGHT: 70 IN | SYSTOLIC BLOOD PRESSURE: 164 MMHG | HEART RATE: 75 BPM

## 2019-06-25 DIAGNOSIS — I10 ESSENTIAL HYPERTENSION: Primary | ICD-10-CM

## 2019-06-25 DIAGNOSIS — R60.0 EDEMA OF BOTH LEGS: ICD-10-CM

## 2019-06-25 PROCEDURE — 3078F DIAST BP <80 MM HG: CPT | Performed by: NURSE PRACTITIONER

## 2019-06-25 PROCEDURE — 99213 OFFICE O/P EST LOW 20 MIN: CPT | Performed by: NURSE PRACTITIONER

## 2019-06-25 PROCEDURE — 3077F SYST BP >= 140 MM HG: CPT | Performed by: NURSE PRACTITIONER

## 2019-07-03 NOTE — PAT NURSING NOTE
Spoke to pt yesterday,and refuses to come for repeat blood test.concerned about June 1st A1C 13.5 and glucose on June 8 314,notified Yvette about these numbers said she'll notified Ely Lechuga.

## 2019-07-06 ENCOUNTER — APPOINTMENT (OUTPATIENT)
Dept: LAB | Age: 69
End: 2019-07-06
Attending: THORACIC SURGERY (CARDIOTHORACIC VASCULAR SURGERY)
Payer: COMMERCIAL

## 2019-07-06 ENCOUNTER — OFFICE VISIT (OUTPATIENT)
Dept: PODIATRY CLINIC | Facility: CLINIC | Age: 69
End: 2019-07-06
Payer: COMMERCIAL

## 2019-07-06 DIAGNOSIS — Z01.818 PREOP TESTING: ICD-10-CM

## 2019-07-06 DIAGNOSIS — E11.59 TYPE 2 DIABETES MELLITUS WITH OTHER CIRCULATORY COMPLICATION, UNSPECIFIED WHETHER LONG TERM INSULIN USE (HCC): Primary | ICD-10-CM

## 2019-07-06 DIAGNOSIS — B35.1 ONYCHOMYCOSIS: ICD-10-CM

## 2019-07-06 DIAGNOSIS — L84 CALLUS OF FOOT: ICD-10-CM

## 2019-07-06 DIAGNOSIS — I73.89 OTHER SPECIFIED PERIPHERAL VASCULAR DISEASES (HCC): ICD-10-CM

## 2019-07-06 DIAGNOSIS — L85.3 XEROSIS OF SKIN: ICD-10-CM

## 2019-07-06 DIAGNOSIS — Z86.31 HISTORY OF DIABETIC ULCER OF FOOT: ICD-10-CM

## 2019-07-06 LAB
ALBUMIN SERPL-MCNC: 3.6 G/DL (ref 3.4–5)
ALBUMIN/GLOB SERPL: 0.9 {RATIO} (ref 1–2)
ALP LIVER SERPL-CCNC: 139 U/L (ref 45–117)
ALT SERPL-CCNC: 13 U/L (ref 16–61)
ANION GAP SERPL CALC-SCNC: 6 MMOL/L (ref 0–18)
ANTIBODY SCREEN: NEGATIVE
AST SERPL-CCNC: 17 U/L (ref 15–37)
BACTERIA UR QL AUTO: NEGATIVE /HPF
BASOPHILS # BLD AUTO: 0.06 X10(3) UL (ref 0–0.2)
BASOPHILS NFR BLD AUTO: 0.7 %
BILIRUB SERPL-MCNC: 0.3 MG/DL (ref 0.1–2)
BILIRUB UR QL: NEGATIVE
BUN BLD-MCNC: 28 MG/DL (ref 7–18)
BUN/CREAT SERPL: 18.7 (ref 10–20)
CALCIUM BLD-MCNC: 10.8 MG/DL (ref 8.5–10.1)
CHLORIDE SERPL-SCNC: 109 MMOL/L (ref 98–112)
CLARITY UR: CLEAR
CO2 SERPL-SCNC: 27 MMOL/L (ref 21–32)
COLOR UR: YELLOW
CREAT BLD-MCNC: 1.5 MG/DL (ref 0.7–1.3)
DEPRECATED RDW RBC AUTO: 45 FL (ref 35.1–46.3)
EOSINOPHIL # BLD AUTO: 0.37 X10(3) UL (ref 0–0.7)
EOSINOPHIL NFR BLD AUTO: 4.4 %
ERYTHROCYTE [DISTWIDTH] IN BLOOD BY AUTOMATED COUNT: 13.9 % (ref 11–15)
GLOBULIN PLAS-MCNC: 4.2 G/DL (ref 2.8–4.4)
GLUCOSE BLD-MCNC: 123 MG/DL (ref 70–99)
GLUCOSE UR-MCNC: 50 MG/DL
HCT VFR BLD AUTO: 43.2 % (ref 39–53)
HGB BLD-MCNC: 14 G/DL (ref 13–17.5)
HGB UR QL STRIP.AUTO: NEGATIVE
IMM GRANULOCYTES # BLD AUTO: 0.02 X10(3) UL (ref 0–1)
IMM GRANULOCYTES NFR BLD: 0.2 %
KETONES UR-MCNC: NEGATIVE MG/DL
LEUKOCYTE ESTERASE UR QL STRIP.AUTO: NEGATIVE
LYMPHOCYTES # BLD AUTO: 1.26 X10(3) UL (ref 1–4)
LYMPHOCYTES NFR BLD AUTO: 14.9 %
M PROTEIN MFR SERPL ELPH: 7.8 G/DL (ref 6.4–8.2)
MCH RBC QN AUTO: 29 PG (ref 26–34)
MCHC RBC AUTO-ENTMCNC: 32.4 G/DL (ref 31–37)
MCV RBC AUTO: 89.4 FL (ref 80–100)
MONOCYTES # BLD AUTO: 0.81 X10(3) UL (ref 0.1–1)
MONOCYTES NFR BLD AUTO: 9.6 %
NEUTROPHILS # BLD AUTO: 5.94 X10 (3) UL (ref 1.5–7.7)
NEUTROPHILS # BLD AUTO: 5.94 X10(3) UL (ref 1.5–7.7)
NEUTROPHILS NFR BLD AUTO: 70.2 %
NITRITE UR QL STRIP.AUTO: NEGATIVE
OSMOLALITY SERPL CALC.SUM OF ELEC: 301 MOSM/KG (ref 275–295)
PATIENT FASTING: YES
PH UR: 5 [PH] (ref 5–8)
PLATELET # BLD AUTO: 377 10(3)UL (ref 150–450)
POTASSIUM SERPL-SCNC: 4.4 MMOL/L (ref 3.5–5.1)
PROT UR-MCNC: >=500 MG/DL
RBC # BLD AUTO: 4.83 X10(6)UL (ref 3.8–5.8)
RBC #/AREA URNS AUTO: 1 /HPF
RH BLOOD TYPE: POSITIVE
SODIUM SERPL-SCNC: 142 MMOL/L (ref 136–145)
SP GR UR STRIP: 1.02 (ref 1–1.03)
UROBILINOGEN UR STRIP-ACNC: <2
VIT C UR-MCNC: NEGATIVE MG/DL
WBC # BLD AUTO: 8.5 X10(3) UL (ref 4–11)
WBC #/AREA URNS AUTO: 1 /HPF

## 2019-07-06 PROCEDURE — 85025 COMPLETE CBC W/AUTO DIFF WBC: CPT

## 2019-07-06 PROCEDURE — 36415 COLL VENOUS BLD VENIPUNCTURE: CPT

## 2019-07-06 PROCEDURE — 86900 BLOOD TYPING SEROLOGIC ABO: CPT

## 2019-07-06 PROCEDURE — 86850 RBC ANTIBODY SCREEN: CPT

## 2019-07-06 PROCEDURE — 80053 COMPREHEN METABOLIC PANEL: CPT

## 2019-07-06 PROCEDURE — 86901 BLOOD TYPING SEROLOGIC RH(D): CPT

## 2019-07-06 PROCEDURE — 81001 URINALYSIS AUTO W/SCOPE: CPT

## 2019-07-06 PROCEDURE — 99213 OFFICE O/P EST LOW 20 MIN: CPT | Performed by: PODIATRIST

## 2019-07-06 RX ORDER — ACETAMINOPHEN 500 MG
1000 TABLET ORAL
COMMUNITY

## 2019-07-08 LAB
EST. AVERAGE GLUCOSE BLD GHB EST-MCNC: 260 MG/DL (ref 68–126)
HBA1C MFR BLD HPLC: 10.7 % (ref ?–5.7)

## 2019-07-08 NOTE — PROGRESS NOTES
Stephanie Potter is a 79year old male. Patient presents with:  Diabetic Foot Care: Saw Dr. Junito Abel on 06/02/19. Last A1c was 13.5 on 06/01/19. BG was 193 this AM. Denies any foot pain. HPI:   The patient returns to the clinic with his wife present. Rfl:       Past Medical History:   Diagnosis Date   • Atherosclerosis of coronary artery    • Cataract     stage 1 cataracts   • COPD (chronic obstructive pulmonary disease) (HCC)    • Coronary atherosclerosis    • Diabetes (HCC)    • Esophageal reflux Systems    GENERAL HEALTH: feels well otherwise  SKIN: denies any unusual skin lesions or rashes  RESPIRATORY: denies shortness of breath with exertion  CARDIOVASCULAR: denies chest pain on exertion  GI: denies abdominal pain and denies heartburn  NEURO: d curette. Patient again reminded to use his moisturizing cream.      The patient indicates understanding of these issues and agrees to the plan. Return in about 2 months (around 9/6/2019).     Radhika Chatman DPM  1/7/2019

## 2019-07-10 ENCOUNTER — ANESTHESIA (OUTPATIENT)
Dept: SURGERY | Facility: HOSPITAL | Age: 69
DRG: 039 | End: 2019-07-10
Payer: COMMERCIAL

## 2019-07-10 ENCOUNTER — HOSPITAL ENCOUNTER (INPATIENT)
Facility: HOSPITAL | Age: 69
LOS: 1 days | Discharge: HOME | DRG: 039 | End: 2019-07-11
Attending: THORACIC SURGERY (CARDIOTHORACIC VASCULAR SURGERY) | Admitting: THORACIC SURGERY (CARDIOTHORACIC VASCULAR SURGERY)
Payer: COMMERCIAL

## 2019-07-10 ENCOUNTER — ANESTHESIA EVENT (OUTPATIENT)
Dept: SURGERY | Facility: HOSPITAL | Age: 69
DRG: 039 | End: 2019-07-10
Payer: COMMERCIAL

## 2019-07-10 DIAGNOSIS — Z01.818 PREOP TESTING: Primary | ICD-10-CM

## 2019-07-10 DIAGNOSIS — I65.21 STENOSIS OF RIGHT CAROTID ARTERY: ICD-10-CM

## 2019-07-10 LAB
GLUCOSE BLDC GLUCOMTR-MCNC: 158 MG/DL (ref 70–99)
GLUCOSE BLDC GLUCOMTR-MCNC: 172 MG/DL (ref 70–99)
GLUCOSE BLDC GLUCOMTR-MCNC: 181 MG/DL (ref 70–99)
GLUCOSE BLDC GLUCOMTR-MCNC: 182 MG/DL (ref 70–99)
GLUCOSE BLDC GLUCOMTR-MCNC: 248 MG/DL (ref 70–99)
MRSA DNA SPEC QL NAA+PROBE: NEGATIVE

## 2019-07-10 PROCEDURE — 03UH0KZ SUPPLEMENT RIGHT COMMON CAROTID ARTERY WITH NONAUTOLOGOUS TISSUE SUBSTITUTE, OPEN APPROACH: ICD-10-PCS | Performed by: THORACIC SURGERY (CARDIOTHORACIC VASCULAR SURGERY)

## 2019-07-10 PROCEDURE — 99233 SBSQ HOSP IP/OBS HIGH 50: CPT | Performed by: HOSPITALIST

## 2019-07-10 PROCEDURE — 03CH0ZZ EXTIRPATION OF MATTER FROM RIGHT COMMON CAROTID ARTERY, OPEN APPROACH: ICD-10-PCS | Performed by: THORACIC SURGERY (CARDIOTHORACIC VASCULAR SURGERY)

## 2019-07-10 DEVICE — PATCH CV 8X.8CM VSGRD GLBL BVN: Type: IMPLANTABLE DEVICE | Site: NECK | Status: FUNCTIONAL

## 2019-07-10 RX ORDER — LIDOCAINE HYDROCHLORIDE 10 MG/ML
INJECTION, SOLUTION EPIDURAL; INFILTRATION; INTRACAUDAL; PERINEURAL AS NEEDED
Status: DISCONTINUED | OUTPATIENT
Start: 2019-07-10 | End: 2019-07-10 | Stop reason: SURG

## 2019-07-10 RX ORDER — DEXAMETHASONE SODIUM PHOSPHATE 4 MG/ML
VIAL (ML) INJECTION AS NEEDED
Status: DISCONTINUED | OUTPATIENT
Start: 2019-07-10 | End: 2019-07-10 | Stop reason: SURG

## 2019-07-10 RX ORDER — ATORVASTATIN CALCIUM 40 MG/1
40 TABLET, FILM COATED ORAL NIGHTLY
Status: DISCONTINUED | OUTPATIENT
Start: 2019-07-11 | End: 2019-07-11

## 2019-07-10 RX ORDER — MORPHINE SULFATE 10 MG/ML
6 INJECTION, SOLUTION INTRAMUSCULAR; INTRAVENOUS EVERY 10 MIN PRN
Status: DISCONTINUED | OUTPATIENT
Start: 2019-07-10 | End: 2019-07-10 | Stop reason: HOSPADM

## 2019-07-10 RX ORDER — AMLODIPINE BESYLATE 10 MG/1
10 TABLET ORAL DAILY
Status: DISCONTINUED | OUTPATIENT
Start: 2019-07-10 | End: 2019-07-11

## 2019-07-10 RX ORDER — FAMOTIDINE 20 MG/1
20 TABLET ORAL ONCE
Status: DISCONTINUED | OUTPATIENT
Start: 2019-07-10 | End: 2019-07-10 | Stop reason: HOSPADM

## 2019-07-10 RX ORDER — HALOPERIDOL 5 MG/ML
0.25 INJECTION INTRAMUSCULAR ONCE AS NEEDED
Status: DISCONTINUED | OUTPATIENT
Start: 2019-07-10 | End: 2019-07-10 | Stop reason: HOSPADM

## 2019-07-10 RX ORDER — SODIUM CHLORIDE, SODIUM LACTATE, POTASSIUM CHLORIDE, CALCIUM CHLORIDE 600; 310; 30; 20 MG/100ML; MG/100ML; MG/100ML; MG/100ML
INJECTION, SOLUTION INTRAVENOUS CONTINUOUS
Status: DISCONTINUED | OUTPATIENT
Start: 2019-07-10 | End: 2019-07-11

## 2019-07-10 RX ORDER — HEPARIN SODIUM 1000 [USP'U]/ML
INJECTION, SOLUTION INTRAVENOUS; SUBCUTANEOUS AS NEEDED
Status: DISCONTINUED | OUTPATIENT
Start: 2019-07-10 | End: 2019-07-10 | Stop reason: SURG

## 2019-07-10 RX ORDER — MORPHINE SULFATE 2 MG/ML
1 INJECTION, SOLUTION INTRAMUSCULAR; INTRAVENOUS EVERY 2 HOUR PRN
Status: DISCONTINUED | OUTPATIENT
Start: 2019-07-10 | End: 2019-07-11

## 2019-07-10 RX ORDER — HYDROMORPHONE HYDROCHLORIDE 1 MG/ML
0.6 INJECTION, SOLUTION INTRAMUSCULAR; INTRAVENOUS; SUBCUTANEOUS EVERY 5 MIN PRN
Status: DISCONTINUED | OUTPATIENT
Start: 2019-07-10 | End: 2019-07-10 | Stop reason: HOSPADM

## 2019-07-10 RX ORDER — ASPIRIN 300 MG
SUPPOSITORY, RECTAL RECTAL AS NEEDED
Status: DISCONTINUED | OUTPATIENT
Start: 2019-07-10 | End: 2019-07-10

## 2019-07-10 RX ORDER — ONDANSETRON 2 MG/ML
4 INJECTION INTRAMUSCULAR; INTRAVENOUS EVERY 6 HOURS PRN
Status: DISCONTINUED | OUTPATIENT
Start: 2019-07-10 | End: 2019-07-11

## 2019-07-10 RX ORDER — ESMOLOL HYDROCHLORIDE 10 MG/ML
INJECTION INTRAVENOUS AS NEEDED
Status: DISCONTINUED | OUTPATIENT
Start: 2019-07-10 | End: 2019-07-10 | Stop reason: SURG

## 2019-07-10 RX ORDER — SODIUM PHOSPHATE, DIBASIC AND SODIUM PHOSPHATE, MONOBASIC 7; 19 G/133ML; G/133ML
1 ENEMA RECTAL ONCE AS NEEDED
Status: DISCONTINUED | OUTPATIENT
Start: 2019-07-10 | End: 2019-07-11

## 2019-07-10 RX ORDER — HYDROCODONE BITARTRATE AND ACETAMINOPHEN 5; 325 MG/1; MG/1
1 TABLET ORAL AS NEEDED
Status: DISCONTINUED | OUTPATIENT
Start: 2019-07-10 | End: 2019-07-10 | Stop reason: HOSPADM

## 2019-07-10 RX ORDER — NITROGLYCERIN 20 MG/100ML
INJECTION INTRAVENOUS CONTINUOUS PRN
Status: DISCONTINUED | OUTPATIENT
Start: 2019-07-10 | End: 2019-07-11

## 2019-07-10 RX ORDER — METOPROLOL TARTRATE 5 MG/5ML
2.5 INJECTION INTRAVENOUS
Status: DISCONTINUED | OUTPATIENT
Start: 2019-07-10 | End: 2019-07-11

## 2019-07-10 RX ORDER — PHENYLEPHRINE HCL 10 MG/ML
VIAL (ML) INJECTION AS NEEDED
Status: DISCONTINUED | OUTPATIENT
Start: 2019-07-10 | End: 2019-07-10 | Stop reason: SURG

## 2019-07-10 RX ORDER — ROCURONIUM BROMIDE 10 MG/ML
INJECTION, SOLUTION INTRAVENOUS AS NEEDED
Status: DISCONTINUED | OUTPATIENT
Start: 2019-07-10 | End: 2019-07-10 | Stop reason: SURG

## 2019-07-10 RX ORDER — HYDROMORPHONE HYDROCHLORIDE 1 MG/ML
0.2 INJECTION, SOLUTION INTRAMUSCULAR; INTRAVENOUS; SUBCUTANEOUS EVERY 5 MIN PRN
Status: DISCONTINUED | OUTPATIENT
Start: 2019-07-10 | End: 2019-07-10 | Stop reason: HOSPADM

## 2019-07-10 RX ORDER — ONDANSETRON 2 MG/ML
4 INJECTION INTRAMUSCULAR; INTRAVENOUS ONCE AS NEEDED
Status: DISCONTINUED | OUTPATIENT
Start: 2019-07-10 | End: 2019-07-10 | Stop reason: HOSPADM

## 2019-07-10 RX ORDER — HYDROCODONE BITARTRATE AND ACETAMINOPHEN 5; 325 MG/1; MG/1
1 TABLET ORAL EVERY 6 HOURS PRN
Status: DISCONTINUED | OUTPATIENT
Start: 2019-07-10 | End: 2019-07-11

## 2019-07-10 RX ORDER — CEFAZOLIN SODIUM/WATER 2 G/20 ML
2 SYRINGE (ML) INTRAVENOUS EVERY 8 HOURS
Status: DISPENSED | OUTPATIENT
Start: 2019-07-10 | End: 2019-07-11

## 2019-07-10 RX ORDER — LABETALOL HYDROCHLORIDE 5 MG/ML
10 INJECTION, SOLUTION INTRAVENOUS ONCE
Status: COMPLETED | OUTPATIENT
Start: 2019-07-10 | End: 2019-07-10

## 2019-07-10 RX ORDER — SODIUM CHLORIDE 0.9 % (FLUSH) 0.9 %
10 SYRINGE (ML) INJECTION AS NEEDED
Status: DISCONTINUED | OUTPATIENT
Start: 2019-07-10 | End: 2019-07-11

## 2019-07-10 RX ORDER — ONDANSETRON 2 MG/ML
INJECTION INTRAMUSCULAR; INTRAVENOUS AS NEEDED
Status: DISCONTINUED | OUTPATIENT
Start: 2019-07-10 | End: 2019-07-10 | Stop reason: SURG

## 2019-07-10 RX ORDER — NALOXONE HYDROCHLORIDE 0.4 MG/ML
80 INJECTION, SOLUTION INTRAMUSCULAR; INTRAVENOUS; SUBCUTANEOUS AS NEEDED
Status: DISCONTINUED | OUTPATIENT
Start: 2019-07-10 | End: 2019-07-10 | Stop reason: HOSPADM

## 2019-07-10 RX ORDER — ACETAMINOPHEN 500 MG
1000 TABLET ORAL ONCE
Status: COMPLETED | OUTPATIENT
Start: 2019-07-10 | End: 2019-07-10

## 2019-07-10 RX ORDER — PROCHLORPERAZINE EDISYLATE 5 MG/ML
5 INJECTION INTRAMUSCULAR; INTRAVENOUS ONCE AS NEEDED
Status: DISCONTINUED | OUTPATIENT
Start: 2019-07-10 | End: 2019-07-10 | Stop reason: HOSPADM

## 2019-07-10 RX ORDER — BUPIVACAINE HYDROCHLORIDE AND EPINEPHRINE 2.5; 5 MG/ML; UG/ML
INJECTION, SOLUTION INFILTRATION; PERINEURAL AS NEEDED
Status: DISCONTINUED | OUTPATIENT
Start: 2019-07-10 | End: 2019-07-10 | Stop reason: HOSPADM

## 2019-07-10 RX ORDER — HYDROCODONE BITARTRATE AND ACETAMINOPHEN 5; 325 MG/1; MG/1
2 TABLET ORAL AS NEEDED
Status: DISCONTINUED | OUTPATIENT
Start: 2019-07-10 | End: 2019-07-10 | Stop reason: HOSPADM

## 2019-07-10 RX ORDER — HYDROMORPHONE HYDROCHLORIDE 1 MG/ML
0.4 INJECTION, SOLUTION INTRAMUSCULAR; INTRAVENOUS; SUBCUTANEOUS EVERY 5 MIN PRN
Status: DISCONTINUED | OUTPATIENT
Start: 2019-07-10 | End: 2019-07-10 | Stop reason: HOSPADM

## 2019-07-10 RX ORDER — MORPHINE SULFATE 4 MG/ML
4 INJECTION, SOLUTION INTRAMUSCULAR; INTRAVENOUS EVERY 2 HOUR PRN
Status: DISCONTINUED | OUTPATIENT
Start: 2019-07-10 | End: 2019-07-11

## 2019-07-10 RX ORDER — ATORVASTATIN CALCIUM 40 MG/1
40 TABLET, FILM COATED ORAL DAILY
Status: DISCONTINUED | OUTPATIENT
Start: 2019-07-10 | End: 2019-07-10

## 2019-07-10 RX ORDER — METOPROLOL TARTRATE 5 MG/5ML
2.5 INJECTION INTRAVENOUS ONCE
Status: DISCONTINUED | OUTPATIENT
Start: 2019-07-10 | End: 2019-07-10 | Stop reason: HOSPADM

## 2019-07-10 RX ORDER — MORPHINE SULFATE 4 MG/ML
4 INJECTION, SOLUTION INTRAMUSCULAR; INTRAVENOUS EVERY 10 MIN PRN
Status: DISCONTINUED | OUTPATIENT
Start: 2019-07-10 | End: 2019-07-10 | Stop reason: HOSPADM

## 2019-07-10 RX ORDER — BISACODYL 10 MG
10 SUPPOSITORY, RECTAL RECTAL
Status: DISCONTINUED | OUTPATIENT
Start: 2019-07-10 | End: 2019-07-11

## 2019-07-10 RX ORDER — POLYETHYLENE GLYCOL 3350 17 G/17G
17 POWDER, FOR SOLUTION ORAL DAILY PRN
Status: DISCONTINUED | OUTPATIENT
Start: 2019-07-10 | End: 2019-07-11

## 2019-07-10 RX ORDER — MORPHINE SULFATE 2 MG/ML
2 INJECTION, SOLUTION INTRAMUSCULAR; INTRAVENOUS EVERY 10 MIN PRN
Status: DISCONTINUED | OUTPATIENT
Start: 2019-07-10 | End: 2019-07-10 | Stop reason: HOSPADM

## 2019-07-10 RX ORDER — DOCUSATE SODIUM 100 MG/1
100 CAPSULE, LIQUID FILLED ORAL 2 TIMES DAILY
Status: DISCONTINUED | OUTPATIENT
Start: 2019-07-10 | End: 2019-07-11

## 2019-07-10 RX ORDER — LIDOCAINE HYDROCHLORIDE 40 MG/ML
SOLUTION TOPICAL AS NEEDED
Status: DISCONTINUED | OUTPATIENT
Start: 2019-07-10 | End: 2019-07-10 | Stop reason: SURG

## 2019-07-10 RX ORDER — CEFAZOLIN SODIUM/WATER 2 G/20 ML
2 SYRINGE (ML) INTRAVENOUS ONCE
Status: COMPLETED | OUTPATIENT
Start: 2019-07-10 | End: 2019-07-10

## 2019-07-10 RX ORDER — METOCLOPRAMIDE 10 MG/1
10 TABLET ORAL ONCE
Status: DISCONTINUED | OUTPATIENT
Start: 2019-07-10 | End: 2019-07-10 | Stop reason: HOSPADM

## 2019-07-10 RX ORDER — MORPHINE SULFATE 2 MG/ML
2 INJECTION, SOLUTION INTRAMUSCULAR; INTRAVENOUS EVERY 2 HOUR PRN
Status: DISCONTINUED | OUTPATIENT
Start: 2019-07-10 | End: 2019-07-11

## 2019-07-10 RX ORDER — ENOXAPARIN SODIUM 100 MG/ML
40 INJECTION SUBCUTANEOUS DAILY
Status: DISCONTINUED | OUTPATIENT
Start: 2019-07-11 | End: 2019-07-11

## 2019-07-10 RX ORDER — EPHEDRINE SULFATE 50 MG/ML
INJECTION, SOLUTION INTRAVENOUS AS NEEDED
Status: DISCONTINUED | OUTPATIENT
Start: 2019-07-10 | End: 2019-07-10 | Stop reason: SURG

## 2019-07-10 RX ORDER — DEXTROSE MONOHYDRATE 25 G/50ML
50 INJECTION, SOLUTION INTRAVENOUS
Status: DISCONTINUED | OUTPATIENT
Start: 2019-07-10 | End: 2019-07-10 | Stop reason: HOSPADM

## 2019-07-10 RX ORDER — SODIUM CHLORIDE, SODIUM LACTATE, POTASSIUM CHLORIDE, CALCIUM CHLORIDE 600; 310; 30; 20 MG/100ML; MG/100ML; MG/100ML; MG/100ML
INJECTION, SOLUTION INTRAVENOUS CONTINUOUS
Status: DISCONTINUED | OUTPATIENT
Start: 2019-07-10 | End: 2019-07-10 | Stop reason: HOSPADM

## 2019-07-10 RX ORDER — ACETAMINOPHEN 325 MG/1
650 TABLET ORAL EVERY 6 HOURS PRN
Status: DISCONTINUED | OUTPATIENT
Start: 2019-07-10 | End: 2019-07-11

## 2019-07-10 RX ORDER — ASPIRIN 325 MG
325 TABLET, DELAYED RELEASE (ENTERIC COATED) ORAL DAILY
Status: DISCONTINUED | OUTPATIENT
Start: 2019-07-11 | End: 2019-07-11

## 2019-07-10 RX ORDER — HYDROCODONE BITARTRATE AND ACETAMINOPHEN 5; 325 MG/1; MG/1
2 TABLET ORAL EVERY 6 HOURS PRN
Status: DISCONTINUED | OUTPATIENT
Start: 2019-07-10 | End: 2019-07-11

## 2019-07-10 RX ADMIN — EPHEDRINE SULFATE 5 MG: 50 INJECTION, SOLUTION INTRAVENOUS at 08:50:00

## 2019-07-10 RX ADMIN — LIDOCAINE HYDROCHLORIDE 50 MG: 10 INJECTION, SOLUTION EPIDURAL; INFILTRATION; INTRACAUDAL; PERINEURAL at 07:15:00

## 2019-07-10 RX ADMIN — PHENYLEPHRINE HCL 50 MCG: 10 MG/ML VIAL (ML) INJECTION at 08:21:00

## 2019-07-10 RX ADMIN — PHENYLEPHRINE HCL 50 MCG: 10 MG/ML VIAL (ML) INJECTION at 08:59:00

## 2019-07-10 RX ADMIN — EPHEDRINE SULFATE 5 MG: 50 INJECTION, SOLUTION INTRAVENOUS at 08:59:00

## 2019-07-10 RX ADMIN — PHENYLEPHRINE HCL 50 MCG: 10 MG/ML VIAL (ML) INJECTION at 08:29:00

## 2019-07-10 RX ADMIN — ROCURONIUM BROMIDE 10 MG: 10 INJECTION, SOLUTION INTRAVENOUS at 08:12:00

## 2019-07-10 RX ADMIN — HEPARIN SODIUM 9000 UNITS: 1000 INJECTION, SOLUTION INTRAVENOUS; SUBCUTANEOUS at 08:15:00

## 2019-07-10 RX ADMIN — DEXAMETHASONE SODIUM PHOSPHATE 4 MG: 4 MG/ML VIAL (ML) INJECTION at 07:18:00

## 2019-07-10 RX ADMIN — ROCURONIUM BROMIDE 10 MG: 10 INJECTION, SOLUTION INTRAVENOUS at 07:55:00

## 2019-07-10 RX ADMIN — EPHEDRINE SULFATE 5 MG: 50 INJECTION, SOLUTION INTRAVENOUS at 08:38:00

## 2019-07-10 RX ADMIN — PHENYLEPHRINE HCL 50 MCG: 10 MG/ML VIAL (ML) INJECTION at 08:38:00

## 2019-07-10 RX ADMIN — EPHEDRINE SULFATE 5 MG: 50 INJECTION, SOLUTION INTRAVENOUS at 07:50:00

## 2019-07-10 RX ADMIN — ESMOLOL HYDROCHLORIDE 10 MG: 10 INJECTION INTRAVENOUS at 09:30:00

## 2019-07-10 RX ADMIN — EPHEDRINE SULFATE 5 MG: 50 INJECTION, SOLUTION INTRAVENOUS at 07:29:00

## 2019-07-10 RX ADMIN — CEFAZOLIN SODIUM/WATER 2 G: 2 G/20 ML SYRINGE (ML) INTRAVENOUS at 07:30:00

## 2019-07-10 RX ADMIN — PHENYLEPHRINE HCL 50 MCG: 10 MG/ML VIAL (ML) INJECTION at 08:50:00

## 2019-07-10 RX ADMIN — SODIUM CHLORIDE, SODIUM LACTATE, POTASSIUM CHLORIDE, CALCIUM CHLORIDE: 600; 310; 30; 20 INJECTION, SOLUTION INTRAVENOUS at 08:52:00

## 2019-07-10 RX ADMIN — ROCURONIUM BROMIDE 10 MG: 10 INJECTION, SOLUTION INTRAVENOUS at 08:46:00

## 2019-07-10 RX ADMIN — SODIUM CHLORIDE, SODIUM LACTATE, POTASSIUM CHLORIDE, CALCIUM CHLORIDE: 600; 310; 30; 20 INJECTION, SOLUTION INTRAVENOUS at 09:30:00

## 2019-07-10 RX ADMIN — ESMOLOL HYDROCHLORIDE 10 MG: 10 INJECTION INTRAVENOUS at 09:26:00

## 2019-07-10 RX ADMIN — EPHEDRINE SULFATE 5 MG: 50 INJECTION, SOLUTION INTRAVENOUS at 07:34:00

## 2019-07-10 RX ADMIN — ROCURONIUM BROMIDE 40 MG: 10 INJECTION, SOLUTION INTRAVENOUS at 07:15:00

## 2019-07-10 RX ADMIN — LIDOCAINE HYDROCHLORIDE 4 ML: 40 SOLUTION TOPICAL at 07:18:00

## 2019-07-10 RX ADMIN — EPHEDRINE SULFATE 5 MG: 50 INJECTION, SOLUTION INTRAVENOUS at 08:29:00

## 2019-07-10 RX ADMIN — ONDANSETRON 4 MG: 2 INJECTION INTRAMUSCULAR; INTRAVENOUS at 09:13:00

## 2019-07-10 RX ADMIN — EPHEDRINE SULFATE 10 MG: 50 INJECTION, SOLUTION INTRAVENOUS at 08:21:00

## 2019-07-10 NOTE — PLAN OF CARE
Problem: Diabetes/Glucose Control  Goal: Glucose maintained within prescribed range  Description  INTERVENTIONS:  - Monitor Blood Glucose as ordered  - Assess for signs and symptoms of hyperglycemia and hypoglycemia  - Administer ordered medications to m difficulty  - Respiratory Therapy support as indicated  - Manage/alleviate anxiety  - Monitor for signs/symptoms of CO2 retention  Outcome: Progressing    O2 via NC @ 3lpm, maintaining O2 sats above 95%. Denies resp distress.      Problem: GENITOURINARY - A

## 2019-07-10 NOTE — ANESTHESIA PROCEDURE NOTES
Airway  Urgency: elective    Airway not difficult    General Information and Staff    Patient location during procedure: OR  Anesthesiologist: Alisha Granados MD  Resident/CRNA: Shama Galaviz CRNA  Performed: anesthesiologist and CRNA     I

## 2019-07-10 NOTE — ANESTHESIA PREPROCEDURE EVALUATION
Anesthesia PreOp Note    HPI:     Neida Pollack is a 79year old male who presents for preoperative consultation requested by: Charisse Garcia MD    Date of Surgery: 7/10/2019    Procedure(s):  CAROTID ENDARTERECTOMY  Indication: Stenosis of right carot organism    • Visual impairment     wears glasses       Past Surgical History:   Procedure Laterality Date   • [address] SURGERY  10/12/2017    triple bypass   • ANGIOPLASTY (CORONARY) Left 03/2018   • CABG  10/2017    CABGX3   • COLONOSCOPY  7/6/1 MG Oral Tab Take 81 mg by mouth daily.    Disp:  Rfl:  7/9/2019 at 0500   Insulin Pen Needle (BD PEN NEEDLE BETHEL U/F) 32G X 4 MM Does not apply Misc INJECT TWICE A DAY Disp: 180 each Rfl: 1 Taking       Current Facility-Administered Medications Ordered in E activity:        Days per week: Not on file        Minutes per session: Not on file      Stress: Not on file    Relationships      Social connections:        Talks on phone: Not on file        Gets together: Not on file        Attends Congregation service: No Dental - normal exam     Pulmonary - normal exam   (+) COPD moderate,   Cardiovascular - normal exam  Exercise tolerance: good  (+) hypertension well controlled, CAD, CABG/stent,     Neuro/Psych - negative ROS     GI/Hepatic/Renal    (+) GERD well controll

## 2019-07-10 NOTE — OPERATIVE REPORT
CV SURGERY OPERATIVE NOTE    DATE 7/10/2019    Pre op Diagnosis: Right carotid stenosis  Post op Diagnosis: SAME    Procedure: Right carotid Endarterectomy with Pericardial Patch Reconstruction  Cerebral Oximetry Monitoring  Completion Doppler Exam    Surg internal under direct vision. Visualizing with microscopic loops all particulate debris was removed until we had a smooth, glistening endarterectomized surface with no distal flap elevation.   The artery was then reconstructed with a bovine pericardial patc

## 2019-07-10 NOTE — ANESTHESIA PROCEDURE NOTES
Arterial Line  Performed by: Merry Johnson CRNA  Authorized by: Laura Duran MD     Procedure Start:  7/10/2019 7:15 AM  Procedure End:  7/10/2019 7:20 AM  Site Identification: surface landmarks    Patient Location:  OR  Indication: co

## 2019-07-10 NOTE — PROGRESS NOTES
[facility]    Progress Note    Lo Stewart Patient Status:  Inpatient    1950 MRN [id number]   Location [facility] 2W/SW Attending Gavin Villanueva MD   Hosp Day # 0 PCP 4 Carlos Alberto Oakes MD       SUBJECTIVE:    Seen in morphINE sulfate (PF) 2 MG/ML injection 2 mg 2 mg Intravenous Q2H PRN   Or      morphINE sulfate (PF) 4 MG/ML injection 4 mg 4 mg Intravenous Q2H PRN   nitroGLYCERIN infusion 50mg in D5W 250ml 5-400 mcg/min Intravenous Continuous PRN   Nitroprusside Sodi op     Prophylaxis:   DVT with SCDs    Dispo: pending    Greater than 35 minutes spent, >50% spent counseling re: treatment plan and workup      Donita Ye MD  7/10/2019  57:89 AM    **Certification      PHYSICIAN Certification of Need for Inpatient H

## 2019-07-10 NOTE — H&P
Date of Consult:  7/10/2019  Reason for Consultation:   Carotid Stenosis     History of Present Illness:   Patient is a 79year old male with a PMH of CAD s/p CABG, DM, GERD, HTN, COPD, and lower extremity wounds who presents to our office today for evalua Procedure Laterality Date   • ANESTH,OPEN HEART SURGERY   10/12/2017     triple bypass   • ANGIOPLASTY (CORONARY) Left 03/2018   • COLONOSCOPY   7/6/15   • COLONOSCOPY         5 years \"was fine\"   • HEART CORONARY ARTERY BYPASS GRAFT N/A 10/12/2017     Oral Tab Take 1 tablet by mouth daily. Disp: 30 tablet Rfl: 0   Vitamin C 500 MG Oral Tab Take 1 tablet (500 mg total) by mouth 3 (three) times daily.  (Patient taking differently: Take 500 mg by mouth 2 (two) times daily.  ) Disp: 90 tablet Rfl: 0   AmLODI 10/09/2017     AST 21 10/09/2017     ALT 16 (L) 10/09/2017     PTT 29.1 07/31/2018     INR 1.1 07/31/2018     PTP 13.5 07/31/2018     ESRML 87 (H) 07/27/2018     CRP 18.4 (H) 07/27/2018     MG 1.9 03/06/2018     PHOS 3.0 04/21/2017     TROP 2.60 (HH) 10/13 Velocity:       105.90 cm/s              ICA (Mid-longitudinal) End Diastolic WJOSYHSV:        42.30 cm/s           Internal Carotid Artery(Proximal)               ICA (Proximal) Peak Systolic VXVCSOUP:      49.99 cm/s              ICA (Proximal) End Diast origin with the brachiocephalic trunk. Minimal calcified atherosclerotic plaque is appreciated at the origin.  No hemodynamically significant stenosis or dissection.    LEFT INTERNAL CAROTID:       Atherosclerotic plaque is present at the carotid bifurcatio wires are partially visualized.  Postoperative changes of CABG are partially seen.               Dental amalgam is noted on the  view.        =====  CONCLUSION:   1. There is approximately 70% stenosis of the right internal carotid artery.     2. Appro stroke while waiting. I was also very clear them that once they are ready to schedule they should call my office at least 2-3 weeks before to save a date and get them on the schedule.   I was adamantly clear that they need to call my office and we would no

## 2019-07-10 NOTE — ANESTHESIA POSTPROCEDURE EVALUATION
Patient: Chon Foote    Procedure Summary     Date:  07/10/19 Room / Location:  [address] [place] [address] MAIN OR 18 / [address] [place] [address] MAIN OR    Anesthesia Start:  0710 Anesthesia Stop:      Procedure:  CAROTID ENDARTERECTOMY (Right ) Diagnosis:       Stenosis of right carotid artery

## 2019-07-11 VITALS
SYSTOLIC BLOOD PRESSURE: 158 MMHG | RESPIRATION RATE: 22 BRPM | TEMPERATURE: 98 F | WEIGHT: 198.19 LBS | BODY MASS INDEX: 28.37 KG/M2 | OXYGEN SATURATION: 95 % | HEIGHT: 70 IN | DIASTOLIC BLOOD PRESSURE: 75 MMHG | HEART RATE: 81 BPM

## 2019-07-11 LAB — GLUCOSE BLDC GLUCOMTR-MCNC: 226 MG/DL (ref 70–99)

## 2019-07-11 PROCEDURE — 99222 1ST HOSP IP/OBS MODERATE 55: CPT | Performed by: INTERNAL MEDICINE

## 2019-07-11 PROCEDURE — 99239 HOSP IP/OBS DSCHRG MGMT >30: CPT | Performed by: HOSPITALIST

## 2019-07-11 RX ORDER — METOPROLOL TARTRATE 50 MG/1
50 TABLET, FILM COATED ORAL
Status: DISCONTINUED | OUTPATIENT
Start: 2019-07-11 | End: 2019-07-11

## 2019-07-11 RX ORDER — METOPROLOL TARTRATE 50 MG/1
50 TABLET, FILM COATED ORAL EVERY 6 HOURS
Status: DISCONTINUED | OUTPATIENT
Start: 2019-07-11 | End: 2019-07-11

## 2019-07-11 RX ORDER — ASPIRIN 81 MG/1
81 TABLET ORAL DAILY
Status: DISCONTINUED | OUTPATIENT
Start: 2019-07-12 | End: 2019-07-11

## 2019-07-11 RX ORDER — CLOPIDOGREL BISULFATE 75 MG/1
75 TABLET ORAL DAILY
Status: DISCONTINUED | OUTPATIENT
Start: 2019-07-11 | End: 2019-07-11

## 2019-07-11 RX ORDER — METOPROLOL TARTRATE 50 MG/1
50 TABLET, FILM COATED ORAL 2 TIMES DAILY
Qty: 60 TABLET | Refills: 3 | Status: SHIPPED | OUTPATIENT
Start: 2019-07-11 | End: 2020-01-06

## 2019-07-11 NOTE — DISCHARGE SUMMARY
[facility]    Discharge Summary    Shawn Treadwell Patient Status:  Inpatient    1950 MRN [id number]   Location [facility] 2W/SW Attending Karson Song MD   [facility] Day # 1 PCP Miri Cardona MD     Date of Admission: 7/10/ accuchecks  - ISS  - adjust insulin as needed  - resume home meds on discharge     HTN  - elevated so started nitro drip, cards consulted and oral antihypertensives adjusted     COPD  - stable, IS post op              Discharge Condition: Stable    Dischar Quantity:  30 tablet  Refills:  0        ASK your doctor about these medications      Instructions Prescription details   furosemide 20 MG Tabs  Commonly known as:  LASIX      Take 20 mg by mouth every other day.    Refills:  0     metoprolol Tartrate 25 MG

## 2019-07-11 NOTE — PROGRESS NOTES
AVS reviewed in detail. New prescription: metoprolol given. Patient has all other medications at home. VSS, denies any SOB or chest pain. Betadine given. Discharged.

## 2019-07-11 NOTE — PROGRESS NOTES
Patient does not want this team to make an appt with cardiology and prefers to go home then make appointment.

## 2019-07-11 NOTE — PLAN OF CARE
Problem: Patient Centered Care  Goal: Patient preferences are identified and integrated in the patient's plan of care  Description  Interventions:  - What would you like us to know as we care for you?  Go home  - Provide timely, complete, and accurate infor Consider cultural and social influences on pain and pain management  - Manage/alleviate anxiety  - Utilize distraction and/or relaxation techniques  - Monitor for opioid side effects  - Notify MD/LIP if interventions unsuccessful or patient reports new villa

## 2019-07-11 NOTE — PROGRESS NOTES
[facility]OSP - [facility]    Progress Note    Thaddeus Doyle Patient Status:  Inpatient    1950 MRN [id number]   Location [facility] 2W/SW Attending Gareth Lindquist MD   Hosp Day # 1 PCP Wing Cutler MD       Subjective:   Thaddeus Doyle i #1    Encourage pulmonary toilet; IS  DVT prophylaxis; scds;  Lovenox subcut  Pain management patient just taking tylenol for pain as needed  Increase activity up and ambulate  HX HTN post-op HTN on nitro drip 20mcgs; BB increased to 50mg every 6 hours; on

## 2019-07-11 NOTE — CONSULTS
[facility]  Cardiology Consultation    Sutton Model Patient Status:  Inpatient    1950 MRN [id number]   Location [facility] 2W/SW Attending Josie Sidhu MD   [facility] Day # 1 PCP Moses Sharma MD     Reason for Consultation:  Renée Arthur Grandfather    • Cancer Mother       reports that he quit smoking about 18 months ago. His smoking use included cigarettes. He has a 40.00 pack-year smoking history. He quit smokeless tobacco use about 2 years ago.  He reports that he does not drink alcohol Glucose-Vitamin C (DEX-4) 4-6 GM-MG chewable tab 4 tablet, 4 tablet, Oral, Q15 Min PRN  •  glucose (DEX4) oral liquid 15 g, 15 g, Oral, Q15 Min PRN  •  Insulin Aspart Pen (NOVOLOG) 100 UNIT/ML flexpen 1-7 Units, 1-7 Units, Subcutaneous, TID CC  •  amLODIPi nitroglycerin drip as tolerated  Continue Norvasc  Blood pressure not well controlled till afternoon, add ACE inhibitors/ARB    Thank you for allowing me to participate in the care of your patient.     Pam Myrick MD  7/11/2019  9:25 AM

## 2019-07-11 NOTE — PLAN OF CARE
S/P R CEA POD #1 . Neuros remain intact. R neck incision with cdi dressing. Drain with min serosang drainage. SBP maintained less than 160. Nitro gtt @ 20mcg/min. Kept on O2 overnight after O2Sat down to 88%. R radial Art line, dc'ed as ordered.  Voiding wi

## 2019-07-12 ENCOUNTER — PATIENT OUTREACH (OUTPATIENT)
Dept: CASE MANAGEMENT | Age: 69
End: 2019-07-12

## 2019-07-12 ENCOUNTER — TELEPHONE (OUTPATIENT)
Dept: FAMILY MEDICINE CLINIC | Facility: CLINIC | Age: 69
End: 2019-07-12

## 2019-07-12 DIAGNOSIS — I65.21 STENOSIS OF RIGHT CAROTID ARTERY: ICD-10-CM

## 2019-07-12 DIAGNOSIS — Z02.9 ENCOUNTERS FOR ADMINISTRATIVE PURPOSE: ICD-10-CM

## 2019-07-12 PROCEDURE — 1111F DSCHRG MED/CURRENT MED MERGE: CPT

## 2019-07-12 NOTE — PAYOR COMM NOTE
--------------  DISCHARGE REVIEW    Payor: [address] #:  [id number]  Authorization Number: [id number]    Admit date: 7/10/19  Admit time:  2841  Discharge Date: 7/11/2019  2:03 PM     Admitting Physician: Jovana Otero Lungs clear, normal respiratory effort  CV: Heart with regular rate and rhythm  Abd: Abdomen soft, nontender, nondistended, bowel sounds present  Neuro: No acute focal deficits  MSK: Full range of motion in extremities  Skin: Warm and dry  Psych: Normal af g  Refills:  12     aspirin 81 MG Tabs      Take 81 mg by mouth daily.    Refills:  0     Clopidogrel Bisulfate 75 MG Tabs  Commonly known as:  PLAVIX      TAKE 1 TABLET BY MOUTH EVERY DAY   Quantity:  90 tablet  Refills:  1     Insulin NPH & Regular (70-30

## 2019-07-12 NOTE — PROGRESS NOTES
Initial Post Discharge Follow Up   Discharge Date: 7/11/19  Contact Date: 7/12/2019    Consent Verification:  Assessment Completed With: Patient  HIPAA Verified?   Yes    Discharge Dx:   Right carotid stenosis      General:   • How have you been since you taking differently: Take 40 mg by mouth nightly.  ) Disp: 90 tablet Rfl: 0   ammonium lactate 12 % External Lotion Rub into the soles and heels of both feet twice a day until dry Disp: 500 g Rfl: 12   multivitamin Oral Tab Take 1 tablet by mouth daily.  Dis TCM/HFU appointment: scheduled at D/C within 7-14 days  no     NCM Reviewed/scheduled/rescheduled PCP TCM/HFU appointment with pt:  Patient declined to schedule at this time. Have you made all of your follow up appointments?  yes    Is there any reaso

## 2019-07-12 NOTE — TELEPHONE ENCOUNTER
Spoke to pt for TCM today. Patient dc'd 7/11/2019, right carotid stenosis. Pt does not have HFU appt scheduled at this time. TCM/HFU appt recommended by 7- as pt is a high risk for readmission. Please advise.     BOOK BY DATE (last date for TCM): 7

## 2019-07-13 ENCOUNTER — TELEPHONE (OUTPATIENT)
Dept: CARDIOLOGY UNIT | Facility: HOSPITAL | Age: 69
End: 2019-07-13

## 2019-07-13 NOTE — TELEPHONE ENCOUNTER
Eduardo Head for TCM if patient willing to come in. I believe I saw another communication that he did not want an appointment.

## 2019-07-24 ENCOUNTER — OFFICE VISIT (OUTPATIENT)
Dept: FAMILY MEDICINE CLINIC | Facility: CLINIC | Age: 69
End: 2019-07-24
Payer: COMMERCIAL

## 2019-07-24 VITALS
DIASTOLIC BLOOD PRESSURE: 83 MMHG | BODY MASS INDEX: 28.2 KG/M2 | HEIGHT: 70 IN | SYSTOLIC BLOOD PRESSURE: 162 MMHG | WEIGHT: 197 LBS | HEART RATE: 72 BPM

## 2019-07-24 DIAGNOSIS — Z98.890 S/P CAROTID ENDARTERECTOMY: ICD-10-CM

## 2019-07-24 DIAGNOSIS — Z95.1 S/P CABG X 3: Primary | ICD-10-CM

## 2019-07-24 PROCEDURE — 99214 OFFICE O/P EST MOD 30 MIN: CPT | Performed by: FAMILY MEDICINE

## 2019-07-24 PROCEDURE — 99212 OFFICE O/P EST SF 10 MIN: CPT | Performed by: FAMILY MEDICINE

## 2019-07-24 NOTE — PROGRESS NOTES
Trevon Valiente is a 79year old male. Patient presents with:  Surgical Followup: Carotid endarterectomy    HPI:   Pt here follow up from hospitalization - right carotid enterectomy. Reports feeling good. Did not BP medications yet today.  Usually takes it whe Diagnosis Date   • Atherosclerosis of coronary artery    • Cataract     stage 1 cataracts   • COPD (chronic obstructive pulmonary disease) (HCC)    • Coronary atherosclerosis    • Diabetes (HCC)    • Esophageal reflux     pt. states gets \"heartburn from the plan.       Charlotte Kelley MD  7/24/2019  5:01 PM

## 2019-07-27 ENCOUNTER — TELEPHONE (OUTPATIENT)
Dept: ENDOCRINOLOGY CLINIC | Facility: CLINIC | Age: 69
End: 2019-07-27

## 2019-07-27 RX ORDER — ATORVASTATIN CALCIUM 40 MG/1
40 TABLET, FILM COATED ORAL NIGHTLY
Qty: 90 TABLET | Refills: 0 | Status: SHIPPED | OUTPATIENT
Start: 2019-07-27 | End: 2019-11-02

## 2019-07-28 NOTE — TELEPHONE ENCOUNTER
Patient needs FU , please book in the next one month  Uncontrolled DM and has not been scheduled FU as discussed on LOV  Thanks

## 2019-07-30 RX ORDER — AMLODIPINE BESYLATE 10 MG/1
TABLET ORAL
Qty: 90 TABLET | Refills: 0 | Status: SHIPPED | OUTPATIENT
Start: 2019-07-30 | End: 2019-07-31 | Stop reason: SDUPTHER

## 2019-07-31 ENCOUNTER — OFFICE VISIT (OUTPATIENT)
Dept: CARDIOLOGY | Age: 69
End: 2019-07-31

## 2019-07-31 VITALS
BODY MASS INDEX: 28.06 KG/M2 | SYSTOLIC BLOOD PRESSURE: 150 MMHG | WEIGHT: 196 LBS | OXYGEN SATURATION: 96 % | HEART RATE: 64 BPM | DIASTOLIC BLOOD PRESSURE: 64 MMHG | HEIGHT: 70 IN

## 2019-07-31 DIAGNOSIS — R60.0 EDEMA OF BOTH LEGS: ICD-10-CM

## 2019-07-31 DIAGNOSIS — I10 ESSENTIAL HYPERTENSION: Primary | ICD-10-CM

## 2019-07-31 PROCEDURE — 99214 OFFICE O/P EST MOD 30 MIN: CPT | Performed by: INTERNAL MEDICINE

## 2019-07-31 PROCEDURE — 3078F DIAST BP <80 MM HG: CPT | Performed by: INTERNAL MEDICINE

## 2019-07-31 PROCEDURE — 3077F SYST BP >= 140 MM HG: CPT | Performed by: INTERNAL MEDICINE

## 2019-07-31 RX ORDER — AMLODIPINE BESYLATE 5 MG/1
5 TABLET ORAL DAILY
Qty: 30 TABLET | Refills: 5 | Status: SHIPPED | OUTPATIENT
Start: 2019-07-31 | End: 2020-02-13 | Stop reason: SDUPTHER

## 2019-07-31 RX ORDER — VALSARTAN 80 MG/1
80 TABLET ORAL DAILY
Qty: 30 TABLET | Refills: 5 | Status: SHIPPED | OUTPATIENT
Start: 2019-07-31 | End: 2020-02-13 | Stop reason: SDUPTHER

## 2019-07-31 SDOH — HEALTH STABILITY: MENTAL HEALTH: HOW OFTEN DO YOU HAVE A DRINK CONTAINING ALCOHOL?: NEVER

## 2019-07-31 ASSESSMENT — PATIENT HEALTH QUESTIONNAIRE - PHQ9
SUM OF ALL RESPONSES TO PHQ9 QUESTIONS 1 AND 2: 0
1. LITTLE INTEREST OR PLEASURE IN DOING THINGS: NOT AT ALL
2. FEELING DOWN, DEPRESSED OR HOPELESS: NOT AT ALL
SUM OF ALL RESPONSES TO PHQ9 QUESTIONS 1 AND 2: 0

## 2019-08-28 ENCOUNTER — HOSPITAL ENCOUNTER (EMERGENCY)
Facility: HOSPITAL | Age: 69
Discharge: HOME | End: 2019-08-28
Attending: EMERGENCY MEDICINE
Payer: COMMERCIAL

## 2019-08-28 ENCOUNTER — APPOINTMENT (OUTPATIENT)
Dept: CT IMAGING | Facility: HOSPITAL | Age: 69
End: 2019-08-28
Attending: EMERGENCY MEDICINE
Payer: COMMERCIAL

## 2019-08-28 ENCOUNTER — NURSE TRIAGE (OUTPATIENT)
Dept: OTHER | Age: 69
End: 2019-08-28

## 2019-08-28 VITALS
WEIGHT: 196 LBS | HEART RATE: 61 BPM | TEMPERATURE: 98 F | RESPIRATION RATE: 18 BRPM | OXYGEN SATURATION: 96 % | DIASTOLIC BLOOD PRESSURE: 76 MMHG | SYSTOLIC BLOOD PRESSURE: 115 MMHG | BODY MASS INDEX: 29.03 KG/M2 | HEIGHT: 69 IN

## 2019-08-28 DIAGNOSIS — R19.7 BLOODY DIARRHEA: ICD-10-CM

## 2019-08-28 DIAGNOSIS — K52.9 COLITIS: ICD-10-CM

## 2019-08-28 DIAGNOSIS — R73.9 HYPERGLYCEMIA: ICD-10-CM

## 2019-08-28 DIAGNOSIS — N17.9 ACUTE KIDNEY INJURY (HCC): Primary | ICD-10-CM

## 2019-08-28 LAB
ANION GAP SERPL CALC-SCNC: 7 MMOL/L (ref 0–18)
ANTIBODY SCREEN: NEGATIVE
BASOPHILS # BLD AUTO: 0.07 X10(3) UL (ref 0–0.2)
BASOPHILS NFR BLD AUTO: 0.4 %
BUN BLD-MCNC: 19 MG/DL (ref 7–18)
BUN/CREAT SERPL: 9.9 (ref 10–20)
CALCIUM BLD-MCNC: 10.1 MG/DL (ref 8.5–10.1)
CHLORIDE SERPL-SCNC: 102 MMOL/L (ref 98–112)
CO2 SERPL-SCNC: 28 MMOL/L (ref 21–32)
CREAT BLD-MCNC: 1.92 MG/DL (ref 0.7–1.3)
DEPRECATED RDW RBC AUTO: 43.8 FL (ref 35.1–46.3)
EOSINOPHIL # BLD AUTO: 0.35 X10(3) UL (ref 0–0.7)
EOSINOPHIL NFR BLD AUTO: 2.1 %
ERYTHROCYTE [DISTWIDTH] IN BLOOD BY AUTOMATED COUNT: 13.6 % (ref 11–15)
GLUCOSE BLD-MCNC: 289 MG/DL (ref 70–99)
HCT VFR BLD AUTO: 41.3 % (ref 39–53)
HGB BLD-MCNC: 13.8 G/DL (ref 13–17.5)
IMM GRANULOCYTES # BLD AUTO: 0.05 X10(3) UL (ref 0–1)
IMM GRANULOCYTES NFR BLD: 0.3 %
LYMPHOCYTES # BLD AUTO: 1.15 X10(3) UL (ref 1–4)
LYMPHOCYTES NFR BLD AUTO: 7 %
MCH RBC QN AUTO: 29.6 PG (ref 26–34)
MCHC RBC AUTO-ENTMCNC: 33.4 G/DL (ref 31–37)
MCV RBC AUTO: 88.6 FL (ref 80–100)
MONOCYTES # BLD AUTO: 1.01 X10(3) UL (ref 0.1–1)
MONOCYTES NFR BLD AUTO: 6.2 %
NEUTROPHILS # BLD AUTO: 13.7 X10 (3) UL (ref 1.5–7.7)
NEUTROPHILS # BLD AUTO: 13.7 X10(3) UL (ref 1.5–7.7)
NEUTROPHILS NFR BLD AUTO: 84 %
OSMOLALITY SERPL CALC.SUM OF ELEC: 297 MOSM/KG (ref 275–295)
PLATELET # BLD AUTO: 373 10(3)UL (ref 150–450)
POTASSIUM SERPL-SCNC: 4.3 MMOL/L (ref 3.5–5.1)
RBC # BLD AUTO: 4.66 X10(6)UL (ref 3.8–5.8)
RH BLOOD TYPE: POSITIVE
SODIUM SERPL-SCNC: 137 MMOL/L (ref 136–145)
WBC # BLD AUTO: 16.3 X10(3) UL (ref 4–11)

## 2019-08-28 PROCEDURE — 99284 EMERGENCY DEPT VISIT MOD MDM: CPT

## 2019-08-28 PROCEDURE — 96360 HYDRATION IV INFUSION INIT: CPT

## 2019-08-28 PROCEDURE — 86901 BLOOD TYPING SEROLOGIC RH(D): CPT

## 2019-08-28 PROCEDURE — 86900 BLOOD TYPING SEROLOGIC ABO: CPT | Performed by: EMERGENCY MEDICINE

## 2019-08-28 PROCEDURE — 80048 BASIC METABOLIC PNL TOTAL CA: CPT | Performed by: EMERGENCY MEDICINE

## 2019-08-28 PROCEDURE — 86850 RBC ANTIBODY SCREEN: CPT | Performed by: EMERGENCY MEDICINE

## 2019-08-28 PROCEDURE — 85025 COMPLETE CBC W/AUTO DIFF WBC: CPT | Performed by: EMERGENCY MEDICINE

## 2019-08-28 PROCEDURE — 86901 BLOOD TYPING SEROLOGIC RH(D): CPT | Performed by: EMERGENCY MEDICINE

## 2019-08-28 PROCEDURE — 85025 COMPLETE CBC W/AUTO DIFF WBC: CPT

## 2019-08-28 PROCEDURE — 74177 CT ABD & PELVIS W/CONTRAST: CPT | Performed by: EMERGENCY MEDICINE

## 2019-08-28 PROCEDURE — 80048 BASIC METABOLIC PNL TOTAL CA: CPT

## 2019-08-28 PROCEDURE — 86850 RBC ANTIBODY SCREEN: CPT

## 2019-08-28 PROCEDURE — 86900 BLOOD TYPING SEROLOGIC ABO: CPT

## 2019-08-28 PROCEDURE — 96376 TX/PRO/DX INJ SAME DRUG ADON: CPT

## 2019-08-28 RX ORDER — METRONIDAZOLE 500 MG/1
500 TABLET ORAL 3 TIMES DAILY
Qty: 30 TABLET | Refills: 0 | Status: SHIPPED | OUTPATIENT
Start: 2019-08-28 | End: 2019-09-07

## 2019-08-28 RX ORDER — CIPROFLOXACIN 500 MG/1
500 TABLET, FILM COATED ORAL 2 TIMES DAILY
Qty: 20 TABLET | Refills: 0 | Status: SHIPPED | OUTPATIENT
Start: 2019-08-28 | End: 2019-09-07

## 2019-08-28 NOTE — TELEPHONE ENCOUNTER
Action Requested: Summary for Provider     []  Critical Lab, Recommendations Needed  [] Need Additional Advice  []   FYI    []   Need Orders  [] Need Medications Sent to Pharmacy  []  Other     SUMMARY:   The wife does not want to take him to the ED.     Be

## 2019-08-28 NOTE — TELEPHONE ENCOUNTER
Spoke with patient and his  wife, explained physician office does not have the diagnosrtic tools as the hospitia does. Patient verbalizes understanding and agrees   They will go to [facility] today .

## 2019-08-28 NOTE — ED PROVIDER NOTES
Patient Seen in: [facility] Emergency Department    History   Patient presents with:  GI Bleeding (gastrointestinal)    Stated Complaint:     HPI    Patient is a 72-year-old male who is currently on Plavix and presents with rectal bleeding x3 days peripheral angioplasty   • TONSILLECTOMY                      Social History    Tobacco Use      Smoking status: Former Smoker        Packs/day: 1.00        Years: 40.00        Pack years: 40        Types: Cigarettes        Quit date: 1/1/2018        Years 13.70 (*)     Neutrophil Absolute 13.70 (*)     Monocyte Absolute 1.01 (*)     All other components within normal limits   CBC WITH DIFFERENTIAL WITH PLATELET    Narrative:      The following orders were created for panel order CBC WITH DIFFERENTIAL WITH PL Leila Palomares MD on 8/28/2019 at 13:31     Approved by (CST): Aramis Reynolds MD on 8/28/2019 at 13:39          Pt notified of all results. Offered to stay in hospital but pt would like to try and manage as outpatient as he feels well.  He will f/u with GI and r

## 2019-08-28 NOTE — TELEPHONE ENCOUNTER
Yes ER. It could be just bacterial diarrhea and needs antibiotics. They can test the stool. But if having large amounts of blood from rectum - needs ER.

## 2019-09-07 ENCOUNTER — OFFICE VISIT (OUTPATIENT)
Dept: PODIATRY CLINIC | Facility: CLINIC | Age: 69
End: 2019-09-07
Payer: COMMERCIAL

## 2019-09-07 DIAGNOSIS — B35.1 ONYCHOMYCOSIS: ICD-10-CM

## 2019-09-07 DIAGNOSIS — I73.89 OTHER SPECIFIED PERIPHERAL VASCULAR DISEASES (HCC): ICD-10-CM

## 2019-09-07 DIAGNOSIS — L85.3 XEROSIS OF SKIN: ICD-10-CM

## 2019-09-07 DIAGNOSIS — L84 CALLUS OF FOOT: ICD-10-CM

## 2019-09-07 DIAGNOSIS — E11.59 TYPE 2 DIABETES MELLITUS WITH OTHER CIRCULATORY COMPLICATION, UNSPECIFIED WHETHER LONG TERM INSULIN USE (HCC): Primary | ICD-10-CM

## 2019-09-07 DIAGNOSIS — Z86.31 HISTORY OF DIABETIC ULCER OF FOOT: ICD-10-CM

## 2019-09-07 PROCEDURE — 11055 PARING/CUTG B9 HYPRKER LES 1: CPT | Performed by: PODIATRIST

## 2019-09-07 PROCEDURE — 11721 DEBRIDE NAIL 6 OR MORE: CPT | Performed by: PODIATRIST

## 2019-09-07 RX ORDER — VALSARTAN 80 MG/1
80 TABLET ORAL
Refills: 5 | COMMUNITY
Start: 2019-07-31 | End: 2020-08-25

## 2019-09-10 NOTE — PROGRESS NOTES
Shawn Treadwell is a 79year old male. Patient presents with:  Toenail Care  Diabetic Foot Care: pt denies checking his fasting BG today. last A1c was 10.7 on 7/6/2019.  last appt w/ endo was 6/1/2019        HPI:   Patient presents to the clinic for his routin daily.   Disp:  Rfl:       Past Medical History:   Diagnosis Date   • Atherosclerosis of coronary artery    • Cataract     stage 1 cataracts   • COPD (chronic obstructive pulmonary disease) (HCC)    • Coronary atherosclerosis    • [facility]    • Esoph Activity      Alcohol use: No        Alcohol/week: 0.0 standard drinks      Drug use: No      Sexual activity: Never          REVIEW OF SYSTEMS:   Review of Systems  Today reviewed systens as documented below  GENERAL HEALTH: feels well otherwise  SKIN: de technique were removed and they would not get ingrown. Using a #15 blade trimmed and debrided the hyperkeratotic lesion of the lateral lateral plantar right heel    The patient indicates understanding of these issues and agrees to the plan.     Return in a

## 2019-09-30 ENCOUNTER — TELEPHONE (OUTPATIENT)
Dept: CARDIOLOGY | Age: 69
End: 2019-09-30

## 2019-09-30 DIAGNOSIS — I10 ESSENTIAL HYPERTENSION: Primary | ICD-10-CM

## 2019-09-30 DIAGNOSIS — R60.0 EDEMA OF BOTH LEGS: ICD-10-CM

## 2019-10-22 RX ORDER — FUROSEMIDE 20 MG/1
20 TABLET ORAL EVERY OTHER DAY
Qty: 90 TABLET | Refills: 1 | Status: SHIPPED | OUTPATIENT
Start: 2019-10-22 | End: 2020-07-02 | Stop reason: SDUPTHER

## 2019-11-02 ENCOUNTER — OFFICE VISIT (OUTPATIENT)
Dept: PODIATRY CLINIC | Facility: CLINIC | Age: 69
End: 2019-11-02
Payer: COMMERCIAL

## 2019-11-02 ENCOUNTER — TELEPHONE (OUTPATIENT)
Dept: ENDOCRINOLOGY CLINIC | Facility: CLINIC | Age: 69
End: 2019-11-02

## 2019-11-02 DIAGNOSIS — L84 CALLUS OF FOOT: ICD-10-CM

## 2019-11-02 DIAGNOSIS — L85.3 XEROSIS OF SKIN: ICD-10-CM

## 2019-11-02 DIAGNOSIS — I73.89 OTHER SPECIFIED PERIPHERAL VASCULAR DISEASES (HCC): ICD-10-CM

## 2019-11-02 DIAGNOSIS — B35.1 ONYCHOMYCOSIS: ICD-10-CM

## 2019-11-02 DIAGNOSIS — Z86.31 HISTORY OF DIABETIC ULCER OF FOOT: ICD-10-CM

## 2019-11-02 DIAGNOSIS — E11.59 TYPE 2 DIABETES MELLITUS WITH OTHER CIRCULATORY COMPLICATION, UNSPECIFIED WHETHER LONG TERM INSULIN USE (HCC): Primary | ICD-10-CM

## 2019-11-02 PROCEDURE — 11055 PARING/CUTG B9 HYPRKER LES 1: CPT | Performed by: PODIATRIST

## 2019-11-02 PROCEDURE — 11721 DEBRIDE NAIL 6 OR MORE: CPT | Performed by: PODIATRIST

## 2019-11-02 NOTE — PROGRESS NOTES
James Bess is a 81year old male.  Patient presents with:  Diabetic Foot Care: 2 mo f/u - last HgA1C=10.7 from 7/6/19 and LOV with endo Dr Jesusita Abad was 6/1/19 - has no c/o regardin his feet - this AM his XY=253        HPI:   Patient presents to the clini mouth daily.   , Disp: , Rfl:        Past Medical History:   Diagnosis Date   • Atherosclerosis of coronary artery    • Cataract     stage 1 cataracts   • COPD (chronic obstructive pulmonary disease) (HCC)    • Coronary atherosclerosis    • Diabetes ([facility] and Sexual Activity      Alcohol use: No        Alcohol/week: 0.0 standard drinks      Drug use: No      Sexual activity: Never          REVIEW OF SYSTEMS:   Review of Systems    Today reviewed systens as documented below  GENERAL HEALTH: feels well otherw slant back technique were removed and they would not get ingrown. Using a #15 blade trimmed and debrided the hyperkeratotic lesion of the lateral lateral plantar right heel    The patient indicates understanding of these issues and agrees to the plan.

## 2019-11-04 RX ORDER — ATORVASTATIN CALCIUM 40 MG/1
TABLET, FILM COATED ORAL
Qty: 90 TABLET | Refills: 0 | Status: SHIPPED | OUTPATIENT
Start: 2019-11-04 | End: 2020-02-10

## 2019-11-04 NOTE — PROGRESS NOTES
Pt returned call  Tentatively scheduled appt w/ Dr Miguel Angel [place] on Dec 12 @ 8 am [facility]  Hospitalist Progress  Note     Deandra Porter Patient Status:  Inpatient      74year old [facility] [id number]   Location 541/541-A Attending Destinee Jama MD   Hosp Day # 2 PCP Alesia Hart MD     ASSESSMENT/PLAN    Nance Rides slightly better than yesterday. There is 2 large clean-based ulcers, the largest is about 3 cm in diameter. It is malodorous. Nontender, no fluctuance or induration. The foot is warm but definite pulse cannot be palpated. Skin: Skin is warm and dry.  Homero Reardon

## 2019-11-08 ENCOUNTER — TELEPHONE (OUTPATIENT)
Dept: ENDOCRINOLOGY CLINIC | Facility: CLINIC | Age: 69
End: 2019-11-08

## 2019-11-08 RX ORDER — INSULIN HUMAN 100 [IU]/ML
INJECTION, SUSPENSION SUBCUTANEOUS
Qty: 60 ML | Refills: 5 | Status: SHIPPED | OUTPATIENT
Start: 2019-11-08 | End: 2019-11-11

## 2019-11-09 NOTE — TELEPHONE ENCOUNTER
Sent 90 day supply  Along with 5 refills ( was pre set). Please CANCEL REFILLS. Only 90 day supply with no refills.    Thanks

## 2019-11-11 RX ORDER — INSULIN HUMAN 100 [IU]/ML
INJECTION, SUSPENSION SUBCUTANEOUS
Qty: 60 ML | Refills: 0 | Status: SHIPPED | OUTPATIENT
Start: 2019-11-11 | End: 2020-02-17

## 2019-11-27 RX ORDER — METOPROLOL TARTRATE 50 MG/1
TABLET, FILM COATED ORAL
Qty: 180 TABLET | Refills: 1 | Status: SHIPPED | OUTPATIENT
Start: 2019-11-27 | End: 2020-07-02 | Stop reason: SDUPTHER

## 2019-12-16 RX ORDER — CLOPIDOGREL BISULFATE 75 MG/1
TABLET ORAL
Qty: 90 TABLET | Refills: 1 | Status: SHIPPED | OUTPATIENT
Start: 2019-12-16 | End: 2020-03-19

## 2019-12-16 NOTE — TELEPHONE ENCOUNTER
Review pended refill request as it does not fall under a protocol.     Last Rx: 05/18/19  LOV: 07/24/19

## 2020-01-04 RX ORDER — CLOPIDOGREL BISULFATE 75 MG/1
TABLET ORAL
Qty: 90 TABLET | Refills: 1 | OUTPATIENT
Start: 2020-01-04

## 2020-01-06 ENCOUNTER — OFFICE VISIT (OUTPATIENT)
Dept: PODIATRY CLINIC | Facility: CLINIC | Age: 70
End: 2020-01-06
Payer: COMMERCIAL

## 2020-01-06 DIAGNOSIS — B35.1 ONYCHOMYCOSIS: ICD-10-CM

## 2020-01-06 DIAGNOSIS — E11.59 TYPE 2 DIABETES MELLITUS WITH OTHER CIRCULATORY COMPLICATION, UNSPECIFIED WHETHER LONG TERM INSULIN USE (HCC): Primary | ICD-10-CM

## 2020-01-06 DIAGNOSIS — Z86.31 HISTORY OF DIABETIC ULCER OF FOOT: ICD-10-CM

## 2020-01-06 DIAGNOSIS — L84 CALLUS OF FOOT: ICD-10-CM

## 2020-01-06 DIAGNOSIS — L85.3 XEROSIS OF SKIN: ICD-10-CM

## 2020-01-06 DIAGNOSIS — I73.89 OTHER SPECIFIED PERIPHERAL VASCULAR DISEASES (HCC): ICD-10-CM

## 2020-01-06 PROCEDURE — 11721 DEBRIDE NAIL 6 OR MORE: CPT | Performed by: PODIATRIST

## 2020-01-06 PROCEDURE — 11055 PARING/CUTG B9 HYPRKER LES 1: CPT | Performed by: PODIATRIST

## 2020-01-09 NOTE — PROGRESS NOTES
Christin Rios is a 81year old male. Patient presents with:  Diabetic Foot Care: Saw Dr. Solo Mckeon on 06/01/19 and seeing her next Tuesday. Last A1c was 10.7 on 07/06/19. BG was 182 this AM. Denies any foot pain or concerns.           HPI:   Patient present Date   • Atherosclerosis of coronary artery    • Cataract     stage 1 cataracts   • COPD (chronic obstructive pulmonary disease) ([facility])    • Coronary atherosclerosis    • Diabetes (HCC)    • Esophageal reflux     pt. states gets \"heartburn from diabetic me standard drinks      Drug use: No      Sexual activity: Never          REVIEW OF SYSTEMS:   Review of Systems  Today reviewed systens as documented below  GENERAL HEALTH: feels well otherwise  SKIN: denies any unusual skin lesions or rashes  RESPIRATORY: d Using a #15 blade trimmed and debrided the hyperkeratotic lesion of the lateral lateral plantar right heel    The patient indicates understanding of these issues and agrees to the plan.     True Lesch, DPM

## 2020-01-14 ENCOUNTER — OFFICE VISIT (OUTPATIENT)
Dept: ENDOCRINOLOGY CLINIC | Facility: CLINIC | Age: 70
End: 2020-01-14
Payer: COMMERCIAL

## 2020-01-14 VITALS
HEART RATE: 61 BPM | WEIGHT: 204.19 LBS | DIASTOLIC BLOOD PRESSURE: 85 MMHG | SYSTOLIC BLOOD PRESSURE: 176 MMHG | BODY MASS INDEX: 30 KG/M2

## 2020-01-14 DIAGNOSIS — E11.65 UNCONTROLLED TYPE 2 DIABETES MELLITUS WITH HYPERGLYCEMIA (HCC): Primary | ICD-10-CM

## 2020-01-14 LAB
CARTRIDGE LOT#: ABNORMAL NUMERIC
GLUCOSE BLOOD: 139
HEMOGLOBIN A1C: 11.9 % (ref 4.3–5.6)
TEST STRIP LOT #: NORMAL NUMERIC

## 2020-01-14 PROCEDURE — 99214 OFFICE O/P EST MOD 30 MIN: CPT | Performed by: INTERNAL MEDICINE

## 2020-01-14 PROCEDURE — 83036 HEMOGLOBIN GLYCOSYLATED A1C: CPT | Performed by: INTERNAL MEDICINE

## 2020-01-14 PROCEDURE — 82962 GLUCOSE BLOOD TEST: CPT | Performed by: INTERNAL MEDICINE

## 2020-01-14 PROCEDURE — 36416 COLLJ CAPILLARY BLOOD SPEC: CPT | Performed by: INTERNAL MEDICINE

## 2020-01-15 ENCOUNTER — TELEPHONE (OUTPATIENT)
Dept: ENDOCRINOLOGY CLINIC | Facility: CLINIC | Age: 70
End: 2020-01-15

## 2020-01-15 NOTE — PROGRESS NOTES
Return Office Visit     CHIEF COMPLAINT:    DM  Dyslipidemia     HISTORY OF PRESENT ILLNESS:  Robet Apgar is a 81year old male who presents for follow up for DM. DM HISTORY  He states he had \" borderline DM\" as a teenager.   He was on diabinese for ammonium lactate 12 % External Lotion Rub into the soles and heels of both feet twice a day until dry 500 g 12   • multivitamin Oral Tab Take 1 tablet by mouth daily.  30 tablet 0   • Vitamin C 500 MG Oral Tab Take 1 tablet (500 mg total) by mouth 3 (three) to auscultation bilaterally, no crackles or wheezing  CARDIOVASCULAR:  regular rate and rhythm, normal S1 and S2  ABDOMEN: soft  SKIN:  no bruising or bleeding, no rashes and no lesions  EXTREMITIES: Normal pulses, + fungal infection toes, decreased monofi the doctor's office  Discussed importance of BP control to prevent complications associated with HTN. Discussed low salt diet, compliance with medication and FU with PCP.    3. Dyslipidemia  A) Discussed lifestyle modifications including reductions in [facility]

## 2020-01-15 NOTE — TELEPHONE ENCOUNTER
Current Outpatient Medications   Medication Sig Dispense Refill   • Liraglutide (VICTOZA) 18 MG/3ML Subcutaneous Solution Pen-injector Inject 1.8 mg into the skin daily.  27 mL 0     Per pharmacy requires trial of 1.2mg pls clarify

## 2020-01-21 ENCOUNTER — TELEPHONE (OUTPATIENT)
Dept: ENDOCRINOLOGY CLINIC | Facility: CLINIC | Age: 70
End: 2020-01-21

## 2020-01-21 NOTE — TELEPHONE ENCOUNTER
Current Outpatient Medications   Medication Sig Dispense Refill   • Liraglutide (VICTOZA) 18 MG/3ML Subcutaneous Solution Pen-injector Inject 1.2 mg into the skin daily.  18 mL 0     Per pharmacy send complete directions for 90 day supply Insurance wants in

## 2020-01-22 RX ORDER — LIRAGLUTIDE 6 MG/ML
1.2 INJECTION SUBCUTANEOUS DAILY
Qty: 18 ML | Refills: 0 | Status: SHIPPED | OUTPATIENT
Start: 2020-01-22 | End: 2020-02-17

## 2020-01-23 ENCOUNTER — TELEPHONE (OUTPATIENT)
Dept: ENDOCRINOLOGY CLINIC | Facility: CLINIC | Age: 70
End: 2020-01-23

## 2020-01-23 NOTE — TELEPHONE ENCOUNTER
MIREILLE - RN calling to verify pt is tolerating 1.2 mg daily dose of Victoza before we send over new script for higher dose to 1.8 mg daily. Pt just started 1/14/20?  per LOV note dtd 1/14/20 \"Start victoza with gradual titration as discussed\"    Rec'd fax

## 2020-02-10 RX ORDER — ATORVASTATIN CALCIUM 40 MG/1
TABLET, FILM COATED ORAL
Qty: 90 TABLET | Refills: 0 | Status: SHIPPED | OUTPATIENT
Start: 2020-02-10 | End: 2021-11-12

## 2020-02-10 NOTE — TELEPHONE ENCOUNTER
LOV: 1-14-20  RTC in 3 months    Future Appointments   Date Time Provider Vernell Myers   3/9/2020  5:30 PM Jocy Bren [facility] [place]   4/14/2020  5:45 PM Lidya Robertson MD [address] [place] [address]       Please advise.

## 2020-02-13 RX ORDER — AMLODIPINE BESYLATE 5 MG/1
5 TABLET ORAL DAILY
Qty: 90 TABLET | Refills: 0 | Status: SHIPPED | OUTPATIENT
Start: 2020-02-13 | End: 2020-03-19 | Stop reason: SDUPTHER

## 2020-02-13 RX ORDER — VALSARTAN 80 MG/1
80 TABLET ORAL DAILY
Qty: 90 TABLET | Refills: 0 | Status: SHIPPED | OUTPATIENT
Start: 2020-02-13 | End: 2020-03-19 | Stop reason: SDUPTHER

## 2020-02-17 ENCOUNTER — PATIENT MESSAGE (OUTPATIENT)
Dept: CARDIOLOGY CLINIC | Facility: CLINIC | Age: 70
End: 2020-02-17

## 2020-02-17 ENCOUNTER — PATIENT MESSAGE (OUTPATIENT)
Dept: ENDOCRINOLOGY CLINIC | Facility: CLINIC | Age: 70
End: 2020-02-17

## 2020-02-17 RX ORDER — INSULIN HUMAN 100 [IU]/ML
INJECTION, SUSPENSION SUBCUTANEOUS
Qty: 21 ML | Refills: 0 | Status: SHIPPED | OUTPATIENT
Start: 2020-02-17 | End: 2020-02-19

## 2020-02-17 RX ORDER — LIRAGLUTIDE 6 MG/ML
1.2 INJECTION SUBCUTANEOUS DAILY
Qty: 6 ML | Refills: 2 | Status: SHIPPED | OUTPATIENT
Start: 2020-02-17 | End: 2020-03-09

## 2020-02-17 NOTE — TELEPHONE ENCOUNTER
Patient sees cardiology at [address] [facility]. Printed message and faxed to [facility]. Nothing further for [facility].

## 2020-02-17 NOTE — TELEPHONE ENCOUNTER
From: Daksha Garcia  To: Martha Phipps MD  Sent: 2/17/2020 2:34 PM CST  Subject: Prescription Question    I am changing pharmacies. Please send all future prescriptions to: [address]  Phone: [phone]  FAX: [phone]

## 2020-02-17 NOTE — TELEPHONE ENCOUNTER
Patient requests that we do not use [facility]. He has not yet started Victoza but verbalizes understanding of plan to start at 1.2 mg SQ daily and increase to 1.8 mg SQ daily as tolerated.

## 2020-02-17 NOTE — TELEPHONE ENCOUNTER
From: Mayelin Chaparro  To: Medhat Díaz MD  Sent: 2/17/2020 2:36 PM CST  Subject: Other    I am changing pharmacies. Please send all future prescriptions to: [address]  Phone: [phone]  FAX: [phone]    Should y

## 2020-02-19 RX ORDER — INSULIN HUMAN 100 [IU]/ML
INJECTION, SUSPENSION SUBCUTANEOUS
Qty: 21 ML | Refills: 0 | Status: SHIPPED | OUTPATIENT
Start: 2020-02-19 | End: 2020-04-03

## 2020-03-09 ENCOUNTER — OFFICE VISIT (OUTPATIENT)
Dept: PODIATRY CLINIC | Facility: CLINIC | Age: 70
End: 2020-03-09
Payer: COMMERCIAL

## 2020-03-09 DIAGNOSIS — L84 CALLUS OF FOOT: ICD-10-CM

## 2020-03-09 DIAGNOSIS — B35.1 ONYCHOMYCOSIS: ICD-10-CM

## 2020-03-09 DIAGNOSIS — L97.412 DIABETIC ULCER OF RIGHT HEEL ASSOCIATED WITH DIABETES MELLITUS DUE TO UNDERLYING CONDITION, WITH FAT LAYER EXPOSED (HCC): ICD-10-CM

## 2020-03-09 DIAGNOSIS — L85.3 XEROSIS OF SKIN: ICD-10-CM

## 2020-03-09 DIAGNOSIS — E11.59 TYPE 2 DIABETES MELLITUS WITH OTHER CIRCULATORY COMPLICATION, UNSPECIFIED WHETHER LONG TERM INSULIN USE (HCC): Primary | ICD-10-CM

## 2020-03-09 DIAGNOSIS — S91.302D OPEN WOUND OF LEFT FOOT, SUBSEQUENT ENCOUNTER: ICD-10-CM

## 2020-03-09 DIAGNOSIS — I73.89 OTHER SPECIFIED PERIPHERAL VASCULAR DISEASES (HCC): ICD-10-CM

## 2020-03-09 DIAGNOSIS — E08.621 DIABETIC ULCER OF RIGHT HEEL ASSOCIATED WITH DIABETES MELLITUS DUE TO UNDERLYING CONDITION, WITH FAT LAYER EXPOSED (HCC): ICD-10-CM

## 2020-03-09 DIAGNOSIS — Z86.31 HISTORY OF DIABETIC ULCER OF FOOT: ICD-10-CM

## 2020-03-09 PROCEDURE — 11721 DEBRIDE NAIL 6 OR MORE: CPT | Performed by: PODIATRIST

## 2020-03-09 PROCEDURE — 11055 PARING/CUTG B9 HYPRKER LES 1: CPT | Performed by: PODIATRIST

## 2020-03-12 NOTE — PROGRESS NOTES
Thomas Laboy is a 81year old male. Patient presents with:  Diabetic Foot Care: BS this afternoon was 189, A1C was done on 1/14/2020 with the result of 11.9, LOV with Dr Carey Cody was on 1/14/2020. Patient denies any pain, swelling, fever or chills. Date   • Atherosclerosis of coronary artery    • Cataract     stage 1 cataracts   • COPD (chronic obstructive pulmonary disease) ([facility])    • Coronary atherosclerosis    • Diabetes (HCC)    • Esophageal reflux     pt. states gets \"heartburn from diabetic me standard drinks      Drug use: No      Sexual activity: Never          REVIEW OF SYSTEMS:   Review of Systems  Today reviewed systens as documented below  GENERAL HEALTH: feels well otherwise  SKIN: denies any unusual skin lesions or rashes  RESPIRATORY: d hemorrhage. There is mild incurvation of the medial and lateral nail borders of the hallux which using a slant back technique were removed and they would not get ingrown.   Using a #15 blade trimmed and debrided the hyperkeratotic lesion of the lateral lat

## 2020-03-18 RX ORDER — LIRAGLUTIDE 6 MG/ML
1.2 INJECTION SUBCUTANEOUS DAILY
Qty: 6 ML | Refills: 1 | OUTPATIENT
Start: 2020-03-18

## 2020-03-18 RX ORDER — INSULIN HUMAN 100 [IU]/ML
INJECTION, SUSPENSION SUBCUTANEOUS
Qty: 21 ML | Refills: 0 | OUTPATIENT
Start: 2020-03-18

## 2020-03-19 ENCOUNTER — TELEPHONE (OUTPATIENT)
Dept: ENDOCRINOLOGY CLINIC | Facility: CLINIC | Age: 70
End: 2020-03-19

## 2020-03-19 ENCOUNTER — TELEPHONE (OUTPATIENT)
Dept: FAMILY MEDICINE CLINIC | Facility: CLINIC | Age: 70
End: 2020-03-19

## 2020-03-19 RX ORDER — ATORVASTATIN CALCIUM 40 MG/1
40 TABLET, FILM COATED ORAL DAILY
Qty: 90 TABLET | Refills: 0 | Status: SHIPPED | OUTPATIENT
Start: 2020-03-19 | End: 2020-07-02 | Stop reason: SDUPTHER

## 2020-03-19 RX ORDER — AMLODIPINE BESYLATE 5 MG/1
5 TABLET ORAL DAILY
Qty: 90 TABLET | Refills: 0 | Status: SHIPPED | OUTPATIENT
Start: 2020-03-19 | End: 2020-05-12

## 2020-03-19 RX ORDER — CLOPIDOGREL BISULFATE 75 MG/1
75 TABLET ORAL
Qty: 90 TABLET | Refills: 1 | Status: SHIPPED | OUTPATIENT
Start: 2020-03-19 | End: 2020-04-15

## 2020-03-19 RX ORDER — VALSARTAN 80 MG/1
80 TABLET ORAL DAILY
Qty: 90 TABLET | Refills: 0 | Status: SHIPPED | OUTPATIENT
Start: 2020-03-19 | End: 2020-05-12

## 2020-03-19 NOTE — TELEPHONE ENCOUNTER
Pharmacy called about refills. Pt also needs refill on Contour Next Test Strips (not on med list) .         Current Outpatient Medications:     •  Insulin Pen Needle (BD PEN NEEDLE BETHEL U/F) 32G X 4 MM Does not apply Misc, INJECT TWICE A DAY, Disp: 180 eac

## 2020-04-01 ENCOUNTER — E-ADVICE (OUTPATIENT)
Dept: CARDIOLOGY | Age: 70
End: 2020-04-01

## 2020-04-03 ENCOUNTER — TELEPHONE (OUTPATIENT)
Dept: ENDOCRINOLOGY CLINIC | Facility: CLINIC | Age: 70
End: 2020-04-03

## 2020-04-03 RX ORDER — INSULIN HUMAN 100 [IU]/ML
INJECTION, SUSPENSION SUBCUTANEOUS
Qty: 60 ML | Refills: 0 | Status: SHIPPED | OUTPATIENT
Start: 2020-04-03 | End: 2020-04-27

## 2020-04-03 NOTE — TELEPHONE ENCOUNTER
Patient was contacted and notified as below. He was agreeable into doing a phone visit next Friday but wants a time after 3 pm.  RN offered 3:30 pm and accepted it.       Future Appointments   Date Time Provider Vernell Myers   4/10/2020  3:30 PM Pool

## 2020-04-03 NOTE — TELEPHONE ENCOUNTER
lov : 1/20/20    Called pt to sched fu states he will call back to schedule.   Reminded of lab work ordered per md  Inquired about sugars states not checking frequently:  3/2 254  3/10  207  3/30 191    Refill pended for provider

## 2020-04-03 NOTE — TELEPHONE ENCOUNTER
Rusty/Pharmacy calling for mutual patient indicates patient is out of medication, needs script refill for rx:Humulin 70/30, please call at:[phone],thanks.     Current Outpatient Medications:     •  HUMULIN 70/30 KWIKPEN (70-30) 100 UNIT/ML Subcutaneo

## 2020-04-10 ENCOUNTER — VIRTUAL PHONE E/M (OUTPATIENT)
Dept: ENDOCRINOLOGY CLINIC | Facility: CLINIC | Age: 70
End: 2020-04-10
Payer: COMMERCIAL

## 2020-04-10 ENCOUNTER — TELEPHONE (OUTPATIENT)
Dept: ENDOCRINOLOGY CLINIC | Facility: CLINIC | Age: 70
End: 2020-04-10

## 2020-04-10 DIAGNOSIS — Z79.4 TYPE 2 DIABETES MELLITUS WITH HYPERGLYCEMIA, WITH LONG-TERM CURRENT USE OF INSULIN (HCC): Primary | ICD-10-CM

## 2020-04-10 DIAGNOSIS — E11.65 TYPE 2 DIABETES MELLITUS WITH HYPERGLYCEMIA, WITH LONG-TERM CURRENT USE OF INSULIN (HCC): Primary | ICD-10-CM

## 2020-04-10 DIAGNOSIS — E78.5 DYSLIPIDEMIA: ICD-10-CM

## 2020-04-10 PROCEDURE — 99213 OFFICE O/P EST LOW 20 MIN: CPT | Performed by: INTERNAL MEDICINE

## 2020-04-10 NOTE — PROGRESS NOTES
Virtual Telephone Check-In    Dina Castellanos verbally consents  a Virtual/Telephone Check-In visit on 04/10/20. Patient understands and accepts financial responsibility for any deductible, co-insurance and/or co-pays associated with this service.     Carlee uncontrolled diabetes including Diabetic ketoacidosis and various micro vascular and macrovascular complications. a).  Medications:    Insulin 70/30 30 --> 36  units with breakfast and  35--> 40 units with dinner.      Check sugars before BF and befo

## 2020-04-15 ENCOUNTER — TELEPHONE (OUTPATIENT)
Dept: FAMILY MEDICINE CLINIC | Facility: CLINIC | Age: 70
End: 2020-04-15

## 2020-04-15 RX ORDER — CLOPIDOGREL BISULFATE 75 MG/1
75 TABLET ORAL
Qty: 90 TABLET | Refills: 1 | Status: SHIPPED | OUTPATIENT
Start: 2020-04-15 | End: 2020-07-01

## 2020-04-15 NOTE — TELEPHONE ENCOUNTER
Medication was refilled on 03/19/2020 but sent to [facility].     Script was renewed and sent to correct pharmacy.

## 2020-04-15 NOTE — TELEPHONE ENCOUNTER
----- Message from Miriam aWtson sent at 4/15/2020 12:54 PM CDT -----  Regarding: Prescription Question  Contact: [phone]  Dr. Yennifer Lange,  I have changed pharmacies. New pharmacy is:  [address] [facility]  [address] [place] [address], [address] [place] [address]  Phone Number: [phone]

## 2020-04-23 ENCOUNTER — PATIENT MESSAGE (OUTPATIENT)
Dept: ENDOCRINOLOGY CLINIC | Facility: CLINIC | Age: 70
End: 2020-04-23

## 2020-04-23 NOTE — TELEPHONE ENCOUNTER
From: Davidson Graf  To: Latricia Sena MD  Sent: 4/23/2020 3:15 PM CDT  Subject: Prescription Question    Dr Dianne Zhou,    I am down to 2 pens.  Need renewal. FYI- showing a 7 day average of 230 and a 14 day average of 240 Taking 35 AM and 40 PM     Deysi Schwartz

## 2020-04-24 RX ORDER — METOPROLOL TARTRATE 50 MG/1
TABLET, FILM COATED ORAL
Qty: 180 TABLET | Refills: 0 | OUTPATIENT
Start: 2020-04-24

## 2020-04-24 NOTE — TELEPHONE ENCOUNTER
Lov : 4/10/20    awating patient response to dr Lance Choi message to see if he needs adjustment on insulin

## 2020-04-24 NOTE — TELEPHONE ENCOUNTER
S/w Aaliyah and he is being seen at the [facility] practice.      Patient not seen at this office, please send to [facility] office   fax #[phone]

## 2020-04-27 RX ORDER — INSULIN HUMAN 100 [IU]/ML
INJECTION, SUSPENSION SUBCUTANEOUS
Qty: 60 ML | Refills: 0 | OUTPATIENT
Start: 2020-04-27

## 2020-04-27 NOTE — TELEPHONE ENCOUNTER
Insulin 70/30  40   units with breakfast and   44 units with dinner.    Please send three month supply for insulin  Thanks

## 2020-05-04 ENCOUNTER — TELEPHONE (OUTPATIENT)
Dept: CARDIOLOGY CLINIC | Facility: CLINIC | Age: 70
End: 2020-05-04

## 2020-05-04 NOTE — TELEPHONE ENCOUNTER
Pharmacy states Pt requesting new Rx:  Pt paying $60 got Valsa 80mg, Pt requests lower-cost alternative: Losar 25mg $28; Irbes 75mg $43.

## 2020-05-06 ENCOUNTER — TELEPHONE (OUTPATIENT)
Dept: CARDIOLOGY | Age: 70
End: 2020-05-06

## 2020-05-06 DIAGNOSIS — I25.10 CORONARY ARTERY DISEASE INVOLVING NATIVE CORONARY ARTERY OF NATIVE HEART WITHOUT ANGINA PECTORIS: ICD-10-CM

## 2020-05-06 DIAGNOSIS — I10 ESSENTIAL HYPERTENSION: Primary | ICD-10-CM

## 2020-05-11 RX ORDER — LOSARTAN POTASSIUM 50 MG/1
50 TABLET ORAL DAILY
Qty: 90 TABLET | Refills: 0 | Status: SHIPPED | OUTPATIENT
Start: 2020-05-11 | End: 2020-08-10 | Stop reason: SDUPTHER

## 2020-05-12 RX ORDER — VALSARTAN 80 MG/1
TABLET ORAL
Qty: 90 TABLET | Refills: 0 | Status: SHIPPED | OUTPATIENT
Start: 2020-05-12 | End: 2021-03-29

## 2020-05-12 RX ORDER — AMLODIPINE BESYLATE 5 MG/1
TABLET ORAL
Qty: 90 TABLET | Refills: 0 | Status: SHIPPED | OUTPATIENT
Start: 2020-05-12 | End: 2020-07-02 | Stop reason: SDUPTHER

## 2020-05-13 NOTE — TELEPHONE ENCOUNTER
Glucose Blood Vitro Strip and Humilin 70/30. Please review and sign off if appropriate. Thank you.     Last office visit: 4/10/2020 Virtual phone visit  Last refill: 4/27/2020

## 2020-05-28 ENCOUNTER — PATIENT MESSAGE (OUTPATIENT)
Dept: ENDOCRINOLOGY CLINIC | Facility: CLINIC | Age: 70
End: 2020-05-28

## 2020-05-28 NOTE — TELEPHONE ENCOUNTER
From: Neida Pollack  To: Anila Eason MD  Sent: 5/28/2020 4:27 PM CDT  Subject: Non-Urgent Medical Question    To continue with previous message. It's very tough to hold carbs down to 40g per meal. But if this is what I need to do so be it.     FYI-

## 2020-06-08 ENCOUNTER — OFFICE VISIT (OUTPATIENT)
Dept: PODIATRY CLINIC | Facility: CLINIC | Age: 70
End: 2020-06-08
Payer: COMMERCIAL

## 2020-06-08 DIAGNOSIS — Z86.31 HISTORY OF DIABETIC ULCER OF FOOT: ICD-10-CM

## 2020-06-08 DIAGNOSIS — B35.1 ONYCHOMYCOSIS: ICD-10-CM

## 2020-06-08 DIAGNOSIS — L84 CALLUS OF FOOT: ICD-10-CM

## 2020-06-08 DIAGNOSIS — L85.3 XEROSIS OF SKIN: ICD-10-CM

## 2020-06-08 DIAGNOSIS — I73.89 OTHER SPECIFIED PERIPHERAL VASCULAR DISEASES (HCC): ICD-10-CM

## 2020-06-08 DIAGNOSIS — E11.59 TYPE 2 DIABETES MELLITUS WITH OTHER CIRCULATORY COMPLICATION, UNSPECIFIED WHETHER LONG TERM INSULIN USE (HCC): Primary | ICD-10-CM

## 2020-06-08 PROCEDURE — 11055 PARING/CUTG B9 HYPRKER LES 1: CPT | Performed by: PODIATRIST

## 2020-06-08 PROCEDURE — 11721 DEBRIDE NAIL 6 OR MORE: CPT | Performed by: PODIATRIST

## 2020-06-08 NOTE — PROGRESS NOTES
Thaddeus Doyle is a 81year old male.  Patient presents with:  Diabetic Foot Care: 3 mo f/u - last WjL7M=69.9 from 1/14/2020 and LOV with endo Dr Jeni Ozuna was 4/10/2020 - has no c/o regarding his feet - this AM his VN=932        HPI:   Patient presents to th Tab Take 81 mg by mouth daily.           Past Medical History:   Diagnosis Date   • Atherosclerosis of coronary artery    • Cataract     stage 1 cataracts   • COPD (chronic obstructive pulmonary disease) (HCC)    • Coronary atherosclerosis    • Diabetes ([facility] Substance and Sexual Activity      Alcohol use: No        Alcohol/week: 0.0 standard drinks      Drug use: No      Sexual activity: Never          REVIEW OF SYSTEMS:   Review of Systems  Today reviewed systens as documented below  GENERAL HEALTH: feels wel using a slant back technique were removed and they would not get ingrown. Using a #15 blade trimmed and debrided the hyperkeratotic lesion of the lateral lateral plantar right heel.   While here the patient was also complaining of pains which seem to come

## 2020-06-18 ENCOUNTER — TELEPHONE (OUTPATIENT)
Dept: CARDIOLOGY | Age: 70
End: 2020-06-18

## 2020-07-01 RX ORDER — CLOPIDOGREL BISULFATE 75 MG/1
75 TABLET ORAL
Qty: 90 TABLET | Refills: 1 | Status: SHIPPED | OUTPATIENT
Start: 2020-07-01 | End: 2021-06-10

## 2020-07-02 RX ORDER — AMLODIPINE BESYLATE 5 MG/1
TABLET ORAL
Qty: 90 TABLET | Refills: 0 | Status: SHIPPED | OUTPATIENT
Start: 2020-07-02 | End: 2020-09-11

## 2020-07-02 RX ORDER — FUROSEMIDE 20 MG/1
20 TABLET ORAL EVERY OTHER DAY
Qty: 90 TABLET | Refills: 0 | Status: SHIPPED | OUTPATIENT
Start: 2020-07-02

## 2020-07-02 RX ORDER — METOPROLOL TARTRATE 50 MG/1
TABLET, FILM COATED ORAL
Qty: 180 TABLET | Refills: 1 | Status: SHIPPED | OUTPATIENT
Start: 2020-07-02 | End: 2021-02-09 | Stop reason: SDUPTHER

## 2020-07-02 RX ORDER — ATORVASTATIN CALCIUM 40 MG/1
40 TABLET, FILM COATED ORAL DAILY
Qty: 90 TABLET | Refills: 1 | Status: SHIPPED | OUTPATIENT
Start: 2020-07-02 | End: 2021-03-29 | Stop reason: SDUPTHER

## 2020-07-02 RX ORDER — LOSARTAN POTASSIUM 50 MG/1
TABLET ORAL
Qty: 90 TABLET | Refills: 0 | OUTPATIENT
Start: 2020-07-02

## 2020-08-10 RX ORDER — LOSARTAN POTASSIUM 50 MG/1
50 TABLET ORAL DAILY
Qty: 30 TABLET | Refills: 0 | Status: SHIPPED | OUTPATIENT
Start: 2020-08-10 | End: 2020-10-19 | Stop reason: SDUPTHER

## 2020-08-10 RX ORDER — LOSARTAN POTASSIUM 50 MG/1
TABLET ORAL
Qty: 90 TABLET | Refills: 1 | OUTPATIENT
Start: 2020-08-10

## 2020-08-17 ENCOUNTER — OFFICE VISIT (OUTPATIENT)
Dept: PODIATRY CLINIC | Facility: CLINIC | Age: 70
End: 2020-08-17
Payer: COMMERCIAL

## 2020-08-17 DIAGNOSIS — E11.59 TYPE 2 DIABETES MELLITUS WITH OTHER CIRCULATORY COMPLICATION, UNSPECIFIED WHETHER LONG TERM INSULIN USE (HCC): Primary | ICD-10-CM

## 2020-08-17 DIAGNOSIS — L84 CALLUS OF FOOT: ICD-10-CM

## 2020-08-17 DIAGNOSIS — B35.1 ONYCHOMYCOSIS: ICD-10-CM

## 2020-08-17 DIAGNOSIS — I73.89 OTHER SPECIFIED PERIPHERAL VASCULAR DISEASES (HCC): ICD-10-CM

## 2020-08-17 DIAGNOSIS — L85.3 XEROSIS OF SKIN: ICD-10-CM

## 2020-08-18 NOTE — PROGRESS NOTES
Deandra Porter is a 81year old male. Patient presents with:  Diabetic Foot Care: AM blood sugar reading at 158. A1c at 11.9 on 1/14/2020. LOV with Dr. Jumana Cantrell on 4/10/20. Denies any pain in the feet. Notices tingling and swelling in both feet. aspirin 81 MG Oral Tab Take 81 mg by mouth daily.           Past Medical History:   Diagnosis Date   • Atherosclerosis of coronary artery    • Cataract     stage 1 cataracts   • COPD (chronic obstructive pulmonary disease) (HCC)    • Coronary atherosclerosi 3/1/2017    Substance and Sexual Activity      Alcohol use: No        Alcohol/week: 0.0 standard drinks      Drug use: No      Sexual activity: Never          REVIEW OF SYSTEMS:   Review of Systems  Today reviewed systens as documented below  GENERAL HEALT technique were removed and they would not get ingrown. Using a #15 blade trimmed and debrided the hyperkeratotic lesion of the lateral lateral plantar right heel. The patient indicates understanding of these issues and agrees to the plan.     David Arreola

## 2020-08-25 ENCOUNTER — OFFICE VISIT (OUTPATIENT)
Dept: ENDOCRINOLOGY CLINIC | Facility: CLINIC | Age: 70
End: 2020-08-25
Payer: COMMERCIAL

## 2020-08-25 VITALS
BODY MASS INDEX: 30 KG/M2 | SYSTOLIC BLOOD PRESSURE: 171 MMHG | DIASTOLIC BLOOD PRESSURE: 80 MMHG | WEIGHT: 206 LBS | HEART RATE: 78 BPM

## 2020-08-25 DIAGNOSIS — E78.5 DYSLIPIDEMIA: ICD-10-CM

## 2020-08-25 DIAGNOSIS — E11.65 TYPE 2 DIABETES MELLITUS WITH HYPERGLYCEMIA, WITH LONG-TERM CURRENT USE OF INSULIN (HCC): Primary | ICD-10-CM

## 2020-08-25 DIAGNOSIS — Z79.4 TYPE 2 DIABETES MELLITUS WITH HYPERGLYCEMIA, WITH LONG-TERM CURRENT USE OF INSULIN (HCC): Primary | ICD-10-CM

## 2020-08-25 LAB
CARTRIDGE LOT#: ABNORMAL NUMERIC
GLUCOSE BLOOD: 179
HEMOGLOBIN A1C: 8.9 % (ref 4.3–5.6)
TEST STRIP LOT #: NORMAL NUMERIC

## 2020-08-25 PROCEDURE — 3077F SYST BP >= 140 MM HG: CPT | Performed by: INTERNAL MEDICINE

## 2020-08-25 PROCEDURE — 3079F DIAST BP 80-89 MM HG: CPT | Performed by: INTERNAL MEDICINE

## 2020-08-25 PROCEDURE — 36416 COLLJ CAPILLARY BLOOD SPEC: CPT | Performed by: INTERNAL MEDICINE

## 2020-08-25 PROCEDURE — 82962 GLUCOSE BLOOD TEST: CPT | Performed by: INTERNAL MEDICINE

## 2020-08-25 PROCEDURE — 99214 OFFICE O/P EST MOD 30 MIN: CPT | Performed by: INTERNAL MEDICINE

## 2020-08-25 PROCEDURE — 83036 HEMOGLOBIN GLYCOSYLATED A1C: CPT | Performed by: INTERNAL MEDICINE

## 2020-08-25 RX ORDER — LOSARTAN POTASSIUM 50 MG/1
50 TABLET ORAL DAILY
COMMUNITY
Start: 2020-05-13 | End: 2020-09-11

## 2020-08-25 NOTE — PROGRESS NOTES
Return Office Visit     CHIEF COMPLAINT:    DM  Dyslipidemia     HISTORY OF PRESENT ILLNESS:  Trinh Gonzalez is a 81year old male who presents for follow up for DM. DM HISTORY  He states he had \" borderline DM\" as a teenager.   He was on diabinese for tablet by mouth daily. 30 tablet 0   • Vitamin C 500 MG Oral Tab Take 1 tablet (500 mg total) by mouth 3 (three) times daily.  (Patient taking differently: Take 500 mg by mouth nightly.  ) 90 tablet 0   • AmLODIPine Besylate 10 MG Oral Tab Take 1 tablet (10 soft  SKIN:  no bruising or bleeding, no rashes and no lesions  EXTREMITIES: Normal pulses, + fungal infection toes, decreased monofilament sensation b/l        DATA:       A1c is 8.9 % ( 8/2020)    ASSESSMENT AND PLAN:    1.  Type 2 DM: improved, but a1c i

## 2020-09-10 RX ORDER — LOSARTAN POTASSIUM 50 MG/1
TABLET ORAL
Qty: 30 TABLET | Refills: 0 | OUTPATIENT
Start: 2020-09-10

## 2020-09-10 NOTE — TELEPHONE ENCOUNTER
[facility] Sushant Gtz. Refill refused per protocol with instructions to contact [facility].

## 2020-09-11 DIAGNOSIS — I10 ESSENTIAL HYPERTENSION: Primary | ICD-10-CM

## 2020-09-11 RX ORDER — AMLODIPINE BESYLATE 5 MG/1
TABLET ORAL
Qty: 90 TABLET | Refills: 0 | Status: SHIPPED | OUTPATIENT
Start: 2020-09-11 | End: 2021-03-03

## 2020-09-11 RX ORDER — LOSARTAN POTASSIUM 50 MG/1
TABLET ORAL
Qty: 30 TABLET | Refills: 0 | Status: SHIPPED | OUTPATIENT
Start: 2020-09-11 | End: 2021-07-22

## 2020-09-12 DIAGNOSIS — E11.65 TYPE 2 DIABETES MELLITUS WITH HYPERGLYCEMIA, WITH LONG-TERM CURRENT USE OF INSULIN (HCC): Primary | ICD-10-CM

## 2020-09-12 DIAGNOSIS — Z79.4 TYPE 2 DIABETES MELLITUS WITH HYPERGLYCEMIA, WITH LONG-TERM CURRENT USE OF INSULIN (HCC): Primary | ICD-10-CM

## 2020-09-12 NOTE — TELEPHONE ENCOUNTER
Current Outpatient Medications   Medication Sig Dispense Refill   • Insulin NPH & Regular (HUMULIN 70/30 KWIKPEN) (70-30) 100 UNIT/ML Subcutaneous Suspension Pen-injector INJECT 40 UNITS WITH BREAKFAST AND INJECT 44 UNITS WITH DINNER 80 mL 0

## 2020-09-14 ENCOUNTER — TELEPHONE (OUTPATIENT)
Dept: ENDOCRINOLOGY CLINIC | Facility: CLINIC | Age: 70
End: 2020-09-14

## 2020-09-14 NOTE — TELEPHONE ENCOUNTER
Current Outpatient Medications:   •  Insulin NPH & Regular (HUMULIN 70/30 KWIKPEN) (70-30) 100 UNIT/ML Subcutaneous Suspension Pen-injector, INJECT 44 UNITS WITH BREAKFAST AND INJECT 50 UNITS WITH DINNER, Disp: 85 mL, Rfl: 0

## 2020-09-15 NOTE — TELEPHONE ENCOUNTER
Medication was just sent to the pharmacy on 9/14/2020  85mL R-0    Medication Detail     Medication Quantity Refills Start End   Insulin NPH & Regular (HUMULIN 70/30 KWIKPEN) (70-30) 100 UNIT/ML Subcutaneous Suspension Pen-injector 85 mL 0 9/14/2020    Sig

## 2020-09-28 ENCOUNTER — OFFICE VISIT (OUTPATIENT)
Dept: FAMILY MEDICINE CLINIC | Facility: CLINIC | Age: 70
End: 2020-09-28
Payer: COMMERCIAL

## 2020-09-28 VITALS
TEMPERATURE: 98 F | BODY MASS INDEX: 29.92 KG/M2 | HEART RATE: 92 BPM | HEIGHT: 69 IN | SYSTOLIC BLOOD PRESSURE: 183 MMHG | DIASTOLIC BLOOD PRESSURE: 88 MMHG | WEIGHT: 202 LBS

## 2020-09-28 DIAGNOSIS — Z87.891 HISTORY OF TOBACCO USE: ICD-10-CM

## 2020-09-28 DIAGNOSIS — I25.708 CORONARY ARTERY DISEASE INVOLVING CORONARY BYPASS GRAFT OF NATIVE HEART WITH OTHER FORMS OF ANGINA PECTORIS (HCC): ICD-10-CM

## 2020-09-28 DIAGNOSIS — E11.59 TYPE 2 DIABETES MELLITUS WITH OTHER CIRCULATORY COMPLICATION, UNSPECIFIED WHETHER LONG TERM INSULIN USE (HCC): Primary | ICD-10-CM

## 2020-09-28 DIAGNOSIS — Z00.00 ROUTINE MEDICAL EXAM: ICD-10-CM

## 2020-09-28 DIAGNOSIS — E11.29 DIABETES MELLITUS WITH PROTEINURIA (HCC): ICD-10-CM

## 2020-09-28 DIAGNOSIS — I10 ESSENTIAL HYPERTENSION: ICD-10-CM

## 2020-09-28 DIAGNOSIS — I65.21 STENOSIS OF RIGHT CAROTID ARTERY: ICD-10-CM

## 2020-09-28 DIAGNOSIS — E11.65 TYPE 2 DIABETES MELLITUS WITH HYPERGLYCEMIA, WITH LONG-TERM CURRENT USE OF INSULIN (HCC): ICD-10-CM

## 2020-09-28 DIAGNOSIS — Z79.4 TYPE 2 DIABETES MELLITUS WITH HYPERGLYCEMIA, WITH LONG-TERM CURRENT USE OF INSULIN (HCC): ICD-10-CM

## 2020-09-28 DIAGNOSIS — Z98.890 S/P CAROTID ENDARTERECTOMY: ICD-10-CM

## 2020-09-28 DIAGNOSIS — E78.5 HYPERLIPIDEMIA, UNSPECIFIED HYPERLIPIDEMIA TYPE: ICD-10-CM

## 2020-09-28 DIAGNOSIS — R80.9 DIABETES MELLITUS WITH PROTEINURIA (HCC): ICD-10-CM

## 2020-09-28 DIAGNOSIS — Z95.1 S/P CABG X 3: ICD-10-CM

## 2020-09-28 DIAGNOSIS — Z00.00 ENCOUNTER FOR ANNUAL HEALTH EXAMINATION: ICD-10-CM

## 2020-09-28 DIAGNOSIS — Z12.5 PROSTATE CANCER SCREENING: ICD-10-CM

## 2020-09-28 PROBLEM — L03.116 CELLULITIS OF LEFT FOOT: Status: RESOLVED | Noted: 2018-03-02 | Resolved: 2020-09-28

## 2020-09-28 PROBLEM — E11.621 DIABETIC FOOT ULCER (HCC): Status: RESOLVED | Noted: 2018-06-20 | Resolved: 2020-09-28

## 2020-09-28 PROBLEM — L97.509 DIABETIC FOOT ULCER (HCC): Status: RESOLVED | Noted: 2018-06-20 | Resolved: 2020-09-28

## 2020-09-28 PROBLEM — L03.115 CELLULITIS OF RIGHT LOWER EXTREMITY: Status: RESOLVED | Noted: 2018-07-27 | Resolved: 2020-09-28

## 2020-09-28 PROBLEM — S91.302A OPEN WOUND OF LEFT FOOT: Status: RESOLVED | Noted: 2018-03-21 | Resolved: 2020-09-28

## 2020-09-28 PROBLEM — R94.39 ABNORMAL STRESS TEST: Status: RESOLVED | Noted: 2017-09-16 | Resolved: 2020-09-28

## 2020-09-28 PROCEDURE — 3008F BODY MASS INDEX DOCD: CPT | Performed by: FAMILY MEDICINE

## 2020-09-28 PROCEDURE — 90670 PCV13 VACCINE IM: CPT | Performed by: FAMILY MEDICINE

## 2020-09-28 PROCEDURE — 90472 IMMUNIZATION ADMIN EACH ADD: CPT | Performed by: FAMILY MEDICINE

## 2020-09-28 PROCEDURE — 90662 IIV NO PRSV INCREASED AG IM: CPT | Performed by: FAMILY MEDICINE

## 2020-09-28 PROCEDURE — 99214 OFFICE O/P EST MOD 30 MIN: CPT | Performed by: FAMILY MEDICINE

## 2020-09-28 PROCEDURE — 3077F SYST BP >= 140 MM HG: CPT | Performed by: FAMILY MEDICINE

## 2020-09-28 PROCEDURE — 99397 PER PM REEVAL EST PAT 65+ YR: CPT | Performed by: FAMILY MEDICINE

## 2020-09-28 PROCEDURE — 90471 IMMUNIZATION ADMIN: CPT | Performed by: FAMILY MEDICINE

## 2020-09-28 PROCEDURE — 3079F DIAST BP 80-89 MM HG: CPT | Performed by: FAMILY MEDICINE

## 2020-09-28 RX ORDER — AMLODIPINE BESYLATE 5 MG/1
TABLET ORAL
COMMUNITY
Start: 2020-09-11 | End: 2021-08-13 | Stop reason: DRUGHIGH

## 2020-09-28 NOTE — PROGRESS NOTES
HPI:   Ellen Milian is a 81year old male who presents for a physical   Reports walking in stores but not much. Wife just knee replaced so helping her with things right now. Trying to eat healthy. Doing well.  Forgot his BP meds for few days - ran out s patient in [facility]     He does NOT have a Power of  for [employer] on file in [facility]    Advance care planning including the explanation and discussion of advance directives standard forms performed Face to Face with patient and Family/surrogate (if pres •  amLODIPine Besylate 5 MG Oral Tab, TAKE 1 TABLET BY MOUTH DAILY.     •  Insulin NPH & Regular (HUMULIN 70/30 KWIKPEN) (70-30) 100 UNIT/ML Subcutaneous Suspension Pen-injector, INJECT 44 UNITS WITH BREAKFAST AND INJECT 50 UNITS WITH DINNER    •  MIGUEL history includes Cancer in his mother; Diabetes in his father and maternal grandfather; Heart Disorder in his father; Lipids in his father. SOCIAL HISTORY:   He  reports that he quit smoking about 2 years ago. His smoking use included cigarettes.  He has ROM normal, no CVA tenderness   Lungs:   Clear to auscultation bilaterally, respirations unlabored   Chest Wall:  No tenderness or deformity   Heart:  Regular rate and rhythm, S1, S2 normal, no murmur, rub or gallop   Abdomen:   Soft, non-tender, bowel adrian right carotid artery  Due for repeat [address] (CPT=[address]3880); Future    7. Diabetes mellitus with proteinuria (HCC)      8. History of tobacco use    - CT LUNG LD SCREENING(CPT=); Future    9.  Prostate cancer screening

## 2020-09-28 NOTE — PATIENT INSTRUCTIONS
Devante Mosher's SCREENING SCHEDULE   Tests on this list are recommended by your physician but may not be covered, or covered at this frequency, by your insurer. Please check with your insurance carrier before scheduling to verify coverage.     PREVENTATIVE Screening Covered up to Age 76     Colonoscopy Screen   Covered every 10 years- more often if abnormal Colonoscopy due on 04/04/2022 Update Health Maintenance if applicable    Flex Sigmoidoscopy Screen  Covered every 5 years No results found for this or an injectable drug abusers     Tetanus Toxoid- Only covered with a cut with metal- TD and TDaP Not covered by Medicare Part B) No orders found for this or any previous visit.  This may be covered with your prescription benefits, but Medicare does not cover unl

## 2020-10-16 ENCOUNTER — TELEPHONE (OUTPATIENT)
Dept: CARDIOLOGY CLINIC | Facility: CLINIC | Age: 70
End: 2020-10-16

## 2020-10-16 RX ORDER — LOSARTAN POTASSIUM 50 MG/1
50 TABLET ORAL DAILY
Qty: 30 TABLET | Refills: 0 | OUTPATIENT
Start: 2020-10-16

## 2020-10-16 NOTE — TELEPHONE ENCOUNTER
Keara from [address] [facility] is calling for a refill on LOSARTAN POTASSIUM 50 MG Oral Tab. Please call he is almost out of medication.

## 2020-10-19 DIAGNOSIS — I25.10 CORONARY ARTERY DISEASE INVOLVING NATIVE CORONARY ARTERY OF NATIVE HEART WITHOUT ANGINA PECTORIS: Primary | ICD-10-CM

## 2020-10-19 RX ORDER — LOSARTAN POTASSIUM 50 MG/1
50 TABLET ORAL DAILY
Qty: 30 TABLET | Refills: 0 | Status: SHIPPED | OUTPATIENT
Start: 2020-10-19 | End: 2021-04-30 | Stop reason: SDUPTHER

## 2020-10-20 RX ORDER — LOSARTAN POTASSIUM 50 MG/1
TABLET ORAL
Qty: 30 TABLET | Refills: 0 | OUTPATIENT
Start: 2020-10-20

## 2020-10-20 NOTE — TELEPHONE ENCOUNTER
Patient not seen at this office, please send to [facility] medical group office   fax #[phone]

## 2020-11-09 ENCOUNTER — APPOINTMENT (OUTPATIENT)
Dept: CARDIOLOGY | Age: 70
End: 2020-11-09

## 2020-11-09 ENCOUNTER — OFFICE VISIT (OUTPATIENT)
Dept: PODIATRY CLINIC | Facility: CLINIC | Age: 70
End: 2020-11-09
Payer: COMMERCIAL

## 2020-11-09 DIAGNOSIS — B35.1 ONYCHOMYCOSIS: ICD-10-CM

## 2020-11-09 DIAGNOSIS — I73.89 OTHER SPECIFIED PERIPHERAL VASCULAR DISEASES (HCC): ICD-10-CM

## 2020-11-09 DIAGNOSIS — L85.3 XEROSIS OF SKIN: ICD-10-CM

## 2020-11-09 DIAGNOSIS — E11.59 TYPE 2 DIABETES MELLITUS WITH OTHER CIRCULATORY COMPLICATION, UNSPECIFIED WHETHER LONG TERM INSULIN USE (HCC): Primary | ICD-10-CM

## 2020-11-09 DIAGNOSIS — L84 CALLUS OF FOOT: ICD-10-CM

## 2020-11-09 PROCEDURE — 11055 PARING/CUTG B9 HYPRKER LES 1: CPT | Performed by: PODIATRIST

## 2020-11-09 PROCEDURE — 11721 DEBRIDE NAIL 6 OR MORE: CPT | Performed by: PODIATRIST

## 2020-11-13 NOTE — PROGRESS NOTES
Mihai Iqbal is a 78year old male. Patient presents with:  Diabetic Foot Care: Saw Dr Avery Jama on 09/28/20. Last a1c was 8.9 on 08/25/20. BG not checked today. Denies any foot pain.          HPI:   Patient returns to the clinic with a known history of PAD jimi (Patient taking differently: Take 500 mg by mouth nightly.  ) 90 tablet 0   • aspirin 81 MG Oral Tab Take 81 mg by mouth daily.           Past Medical History:   Diagnosis Date   • Atherosclerosis of coronary artery    • Cataract     stage 1 cataracts   • C Years since quittin.8      Smokeless tobacco: Former User        Quit date: 3/1/2017    Substance and Sexual Activity      Alcohol use: No        Alcohol/week: 0.0 standard drinks      Drug use: No      Sexual activity: Never          REVIEW OF SYSTEMS 15 blade trimmed down and debrided the hyperkeratosis at the lateral plantar right heel uneventfully all of these things were done without hemorrhage.   Follow-up every 2 months for care but sooner if required the patient has not been moisturizing I again e

## 2020-12-08 ENCOUNTER — TELEPHONE (OUTPATIENT)
Dept: CARDIOLOGY | Age: 70
End: 2020-12-08

## 2020-12-08 RX ORDER — LOSARTAN POTASSIUM 50 MG/1
50 TABLET ORAL DAILY
Qty: 30 TABLET | Refills: 0 | OUTPATIENT
Start: 2020-12-08

## 2020-12-08 RX ORDER — LOSARTAN POTASSIUM 50 MG/1
50 TABLET ORAL DAILY
Qty: 90 TABLET | Refills: 0 | Status: SHIPPED | OUTPATIENT
Start: 2020-12-08 | End: 2021-03-03

## 2021-01-25 ENCOUNTER — APPOINTMENT (OUTPATIENT)
Dept: CARDIOLOGY | Age: 71
End: 2021-01-25

## 2021-01-27 DIAGNOSIS — Z23 NEED FOR VACCINATION: ICD-10-CM

## 2021-02-03 RX ORDER — METOPROLOL TARTRATE 50 MG/1
TABLET, FILM COATED ORAL
Qty: 180 TABLET | Refills: 4 | OUTPATIENT
Start: 2021-02-03

## 2021-02-05 ENCOUNTER — IMMUNIZATION (OUTPATIENT)
Dept: LAB | Age: 71
End: 2021-02-05
Attending: HOSPITALIST
Payer: COMMERCIAL

## 2021-02-05 DIAGNOSIS — Z23 NEED FOR VACCINATION: Primary | ICD-10-CM

## 2021-02-05 PROCEDURE — 0001A SARSCOV2 VAC 30MCG/0.3ML IM: CPT

## 2021-02-09 RX ORDER — METOPROLOL TARTRATE 50 MG/1
50 TABLET, FILM COATED ORAL 2 TIMES DAILY
Qty: 120 TABLET | Refills: 0 | Status: SHIPPED | OUTPATIENT
Start: 2021-02-09 | End: 2021-03-29

## 2021-02-12 ENCOUNTER — TELEPHONE (OUTPATIENT)
Dept: ENDOCRINOLOGY CLINIC | Facility: CLINIC | Age: 71
End: 2021-02-12

## 2021-02-12 DIAGNOSIS — Z79.4 TYPE 2 DIABETES MELLITUS WITH HYPERGLYCEMIA, WITH LONG-TERM CURRENT USE OF INSULIN (HCC): ICD-10-CM

## 2021-02-12 DIAGNOSIS — E11.65 TYPE 2 DIABETES MELLITUS WITH HYPERGLYCEMIA, WITH LONG-TERM CURRENT USE OF INSULIN (HCC): ICD-10-CM

## 2021-02-13 RX ORDER — INSULIN HUMAN 100 [IU]/ML
INJECTION, SUSPENSION SUBCUTANEOUS
Qty: 30 ML | Refills: 0 | Status: SHIPPED | OUTPATIENT
Start: 2021-02-13 | End: 2021-03-03

## 2021-02-16 ENCOUNTER — TELEPHONE (OUTPATIENT)
Dept: ENDOCRINOLOGY CLINIC | Facility: CLINIC | Age: 71
End: 2021-02-16

## 2021-02-16 NOTE — TELEPHONE ENCOUNTER
•  Insulin NPH & Regular (HUMULIN 70/30 KWIKPEN) (70-30) 100 UNIT/ML Subcutaneous Suspension Pen-injector, INJECT 44 UNITS WITH BREAKFAST AND INJECT 50 UNITS WITH DINNER, Disp: 30 mL, Rfl: 0

## 2021-02-18 NOTE — TELEPHONE ENCOUNTER
Called and offered patient an appointment. Patient refused to make when offered and says he will schedule one later. LOV 8/25/20. 8/25/20, RTC 3 months. Cancelled 12/29/20 appointment. Rx filled on 2/13/21. Routed as GURPREET.

## 2021-02-20 ENCOUNTER — LAB ENCOUNTER (OUTPATIENT)
Dept: LAB | Age: 71
End: 2021-02-20
Attending: FAMILY MEDICINE
Payer: COMMERCIAL

## 2021-02-20 DIAGNOSIS — Z12.5 PROSTATE CANCER SCREENING: ICD-10-CM

## 2021-02-20 LAB
ALBUMIN SERPL-MCNC: 3.6 G/DL
ALBUMIN SERPL-MCNC: 3.6 G/DL (ref 3.4–5)
ALBUMIN/GLOB SERPL: 0.9 {RATIO}
ALBUMIN/GLOB SERPL: 0.9 {RATIO} (ref 1–2)
ALP LIVER SERPL-CCNC: 128 U/L
ALP SERPL-CCNC: 128 U/L
ALT SERPL-CCNC: 15 UNITS/L
ALT SERPL-CCNC: 16 U/L
ANION GAP SERPL CALC-SCNC: 7 MMOL/L
ANION GAP SERPL CALC-SCNC: 7 MMOL/L (ref 0–18)
AST SERPL-CCNC: 15 U/L (ref 15–37)
AST SERPL-CCNC: 16 UNITS/L
BASOPHILS # BLD AUTO: 0.07 X10(3) UL (ref 0–0.2)
BASOPHILS NFR BLD AUTO: 0.8 %
BILIRUB SERPL-MCNC: 0.3 MG/DL
BILIRUB SERPL-MCNC: 0.3 MG/DL (ref 0.1–2)
BUN BLD-MCNC: 34 MG/DL (ref 7–18)
BUN SERPL-MCNC: 34 MG/DL
BUN/CREAT SERPL: 16.9
BUN/CREAT SERPL: 16.9 (ref 10–20)
CALCIUM BLD-MCNC: 10.8 MG/DL (ref 8.5–10.1)
CALCIUM SERPL-MCNC: 10.8 MG/DL
CHLORIDE SERPL-SCNC: 106 MMOL/L
CHLORIDE SERPL-SCNC: 106 MMOL/L (ref 98–112)
CHOLEST SERPL-MCNC: 209 MG/DL
CHOLEST SMN-MCNC: 209 MG/DL (ref ?–200)
CO2 SERPL-SCNC: 25 MMOL/L
CO2 SERPL-SCNC: 25 MMOL/L (ref 21–32)
CREAT BLD-MCNC: 2.01 MG/DL
CREAT SERPL-MCNC: 2.01 MG/DL
CREAT UR-SCNC: 90.9 MG/DL
DEPRECATED RDW RBC AUTO: 42.4 FL (ref 35.1–46.3)
EOSINOPHIL # BLD AUTO: 0.84 X10(3) UL (ref 0–0.7)
EOSINOPHIL NFR BLD AUTO: 10.1 %
ERYTHROCYTE [DISTWIDTH] IN BLOOD BY AUTOMATED COUNT: 12.9 % (ref 11–15)
ERYTHROCYTE [DISTWIDTH] IN BLOOD BY AUTOMATED COUNT: 42.4 %
ERYTHROCYTE [DISTWIDTH] IN BLOOD: 12.9 %
GLOBULIN PLAS-MCNC: 3.9 G/DL (ref 2.8–4.4)
GLOBULIN SER-MCNC: 3.9 G/DL
GLUCOSE BLD-MCNC: 263 MG/DL (ref 70–99)
GLUCOSE SERPL-MCNC: 263 MG/DL
HCT VFR BLD AUTO: 42.2 %
HCT VFR BLD CALC: 4.2 %
HDLC SERPL-MCNC: 47 MG/DL
HDLC SERPL-MCNC: 47 MG/DL (ref 40–59)
HGB BLD-MCNC: 13.9 G/DL
HGB BLD-MCNC: 13.9 G/DL
IMM GRANULOCYTES # BLD AUTO: 0.05 X10(3) UL (ref 0–1)
IMM GRANULOCYTES NFR BLD: 0.6 %
LDLC SERPL CALC-MCNC: 97 MG/DL
LDLC SERPL CALC-MCNC: 97 MG/DL (ref ?–100)
LENGTH OF FAST TIME PATIENT: YES H
LYMPHOCYTES # BLD AUTO: 1.08 X10(3) UL (ref 1–4)
LYMPHOCYTES NFR BLD AUTO: 13 %
M PROTEIN MFR SERPL ELPH: 7.5 G/DL (ref 6.4–8.2)
MCH RBC QN AUTO: 30.1 PG
MCH RBC QN AUTO: 30.1 PG (ref 26–34)
MCHC RBC AUTO-ENTMCNC: 32.9 G/DL
MCHC RBC AUTO-ENTMCNC: 32.9 G/DL (ref 31–37)
MCV RBC AUTO: 91.3 FL
MCV RBC AUTO: 91.3 FL
MICROALBUMIN UR-MCNC: 289 MG/DL
MICROALBUMIN/CREAT 24H UR-RTO: 3179.3 UG/MG (ref ?–30)
MONOCYTES # BLD AUTO: 0.69 X10(3) UL (ref 0.1–1)
MONOCYTES NFR BLD AUTO: 8.3 %
NEUTROPHILS # BLD AUTO: 5.55 X10 (3) UL (ref 1.5–7.7)
NEUTROPHILS # BLD AUTO: 5.55 X10(3) UL (ref 1.5–7.7)
NEUTROPHILS NFR BLD AUTO: 67.2 %
NONHDLC SERPL-MCNC: 162 MG/DL
NONHDLC SERPL-MCNC: 162 MG/DL (ref ?–130)
OSMOLALITY SERPL CALC.SUM OF ELEC: 303 MOSM/KG (ref 275–295)
PATIENT FASTING Y/N/NP: YES
PATIENT FASTING Y/N/NP: YES
PLATELET # BLD AUTO: 323 10(3)UL (ref 150–450)
PLATELET # BLD: 323 K/MCL
POTASSIUM SERPL-SCNC: 4.5 MMOL/L
POTASSIUM SERPL-SCNC: 4.5 MMOL/L (ref 3.5–5.1)
PROT SERPL-MCNC: 7.5 G/DL
PSA SERPL-MCNC: 2.55 NG/ML (ref ?–4)
RBC # BLD AUTO: 4.62 X10(6)UL
RBC # BLD: 4.62 10*6/UL
SODIUM SERPL-SCNC: 138 MMOL/L
SODIUM SERPL-SCNC: 138 MMOL/L (ref 136–145)
TRIGL SERPL-MCNC: 325 MG/DL
TRIGL SERPL-MCNC: 325 MG/DL (ref 30–149)
TSI SER-ACNC: 3.62 MIU/ML (ref 0.36–3.74)
VLDLC SERPL CALC-MCNC: 65 MG/DL
VLDLC SERPL CALC-MCNC: 65 MG/DL (ref 0–30)
WBC # BLD AUTO: 8.3 X10(3) UL (ref 4–11)
WBC # BLD: 8.3 K/MCL

## 2021-02-20 PROCEDURE — 85025 COMPLETE CBC W/AUTO DIFF WBC: CPT | Performed by: FAMILY MEDICINE

## 2021-02-20 PROCEDURE — 82570 ASSAY OF URINE CREATININE: CPT | Performed by: FAMILY MEDICINE

## 2021-02-20 PROCEDURE — 80053 COMPREHEN METABOLIC PANEL: CPT | Performed by: FAMILY MEDICINE

## 2021-02-20 PROCEDURE — 84443 ASSAY THYROID STIM HORMONE: CPT | Performed by: FAMILY MEDICINE

## 2021-02-20 PROCEDURE — 82306 VITAMIN D 25 HYDROXY: CPT | Performed by: FAMILY MEDICINE

## 2021-02-20 PROCEDURE — 82043 UR ALBUMIN QUANTITATIVE: CPT | Performed by: FAMILY MEDICINE

## 2021-02-20 PROCEDURE — 36415 COLL VENOUS BLD VENIPUNCTURE: CPT | Performed by: FAMILY MEDICINE

## 2021-02-20 PROCEDURE — 80061 LIPID PANEL: CPT | Performed by: FAMILY MEDICINE

## 2021-02-20 PROCEDURE — 84153 ASSAY OF PSA TOTAL: CPT | Performed by: FAMILY MEDICINE

## 2021-02-22 LAB — 25(OH)D3 SERPL-MCNC: 18.7 NG/ML (ref 30–100)

## 2021-02-24 ENCOUNTER — PATIENT MESSAGE (OUTPATIENT)
Dept: OTHER | Age: 71
End: 2021-02-24

## 2021-02-24 DIAGNOSIS — N28.9 ABNORMAL KIDNEY FUNCTION: Primary | ICD-10-CM

## 2021-02-24 DIAGNOSIS — E11.59 TYPE 2 DIABETES MELLITUS WITH OTHER CIRCULATORY COMPLICATION, UNSPECIFIED WHETHER LONG TERM INSULIN USE (HCC): ICD-10-CM

## 2021-02-24 RX ORDER — ERGOCALCIFEROL 1.25 MG/1
50000 CAPSULE ORAL WEEKLY
Qty: 12 CAPSULE | Refills: 4 | Status: SHIPPED | OUTPATIENT
Start: 2021-02-24 | End: 2021-03-26

## 2021-02-24 NOTE — TELEPHONE ENCOUNTER
From: Carlene Hawkins  Sent: 2/24/2021 2:48 PM CST  To: Em Rn Triage  Subject: RE:result information/ referral    Am I to continue taking the 2000 iu D supplement I take daily?

## 2021-02-26 ENCOUNTER — IMMUNIZATION (OUTPATIENT)
Dept: LAB | Age: 71
End: 2021-02-26
Attending: HOSPITALIST
Payer: COMMERCIAL

## 2021-02-26 DIAGNOSIS — Z23 NEED FOR VACCINATION: Primary | ICD-10-CM

## 2021-02-26 PROCEDURE — 0002A SARSCOV2 VAC 30MCG/0.3ML IM: CPT

## 2021-03-01 ENCOUNTER — OFFICE VISIT (OUTPATIENT)
Dept: PODIATRY CLINIC | Facility: CLINIC | Age: 71
End: 2021-03-01
Payer: COMMERCIAL

## 2021-03-01 DIAGNOSIS — B35.1 ONYCHOMYCOSIS: ICD-10-CM

## 2021-03-01 DIAGNOSIS — L85.3 XEROSIS OF SKIN: ICD-10-CM

## 2021-03-01 DIAGNOSIS — L84 CALLUS OF FOOT: ICD-10-CM

## 2021-03-01 DIAGNOSIS — I73.89 OTHER SPECIFIED PERIPHERAL VASCULAR DISEASES (HCC): ICD-10-CM

## 2021-03-01 DIAGNOSIS — Z86.31 HISTORY OF DIABETIC ULCER OF FOOT: ICD-10-CM

## 2021-03-01 DIAGNOSIS — E11.59 TYPE 2 DIABETES MELLITUS WITH OTHER CIRCULATORY COMPLICATION, UNSPECIFIED WHETHER LONG TERM INSULIN USE (HCC): Primary | ICD-10-CM

## 2021-03-01 PROCEDURE — 11721 DEBRIDE NAIL 6 OR MORE: CPT | Performed by: PODIATRIST

## 2021-03-01 NOTE — PROGRESS NOTES
Daksha Garcia is a 78year old male. Patient presents with:  Diabetic Foot Care: pt states his fasting BS was 186 today. LOV w/ pcp was 9/28/20.  Last A1c was 8.9 on 8/25/20        HPI:   Patient returns to the clinic with a known history of PAD revasculariz by mouth 3 (three) times daily. (Patient taking differently: Take 500 mg by mouth nightly.  ) 90 tablet 0   • aspirin 81 MG Oral Tab Take 81 mg by mouth daily.           Past Medical History:   Diagnosis Date   • Atherosclerosis of coronary artery    • Alesia Quit date: 1/1/2018        Years since quitting: 3.1      Smokeless tobacco: Former User        Quit date: 3/1/2017    Substance and Sexual Activity      Alcohol use: No        Alcohol/week: 0.0 standard drinks      Drug use: No      Sexual activity: Antonio Farley back technique were removed and they would not get ingrown. Using a 15 blade trimmed down and debrided the hyperkeratosis at the lateral plantar right heel uneventfully all of these things were done without hemorrhage.   Follow-up every 2 months for care b

## 2021-03-03 DIAGNOSIS — E11.65 TYPE 2 DIABETES MELLITUS WITH HYPERGLYCEMIA, WITH LONG-TERM CURRENT USE OF INSULIN (HCC): ICD-10-CM

## 2021-03-03 DIAGNOSIS — Z79.4 TYPE 2 DIABETES MELLITUS WITH HYPERGLYCEMIA, WITH LONG-TERM CURRENT USE OF INSULIN (HCC): ICD-10-CM

## 2021-03-03 RX ORDER — INSULIN HUMAN 100 [IU]/ML
INJECTION, SUSPENSION SUBCUTANEOUS
Qty: 30 ML | Refills: 0 | Status: SHIPPED | OUTPATIENT
Start: 2021-03-03 | End: 2021-05-02

## 2021-03-03 RX ORDER — AMLODIPINE BESYLATE 5 MG/1
TABLET ORAL
Qty: 30 TABLET | Refills: 0 | Status: SHIPPED | OUTPATIENT
Start: 2021-03-03

## 2021-03-03 RX ORDER — LOSARTAN POTASSIUM 50 MG/1
50 TABLET ORAL DAILY
Qty: 30 TABLET | Refills: 0 | Status: SHIPPED | OUTPATIENT
Start: 2021-03-03 | End: 2021-03-29 | Stop reason: SDUPTHER

## 2021-03-09 ENCOUNTER — HOSPITAL ENCOUNTER (OUTPATIENT)
Dept: ULTRASOUND IMAGING | Age: 71
Discharge: HOME | End: 2021-03-09
Attending: FAMILY MEDICINE
Payer: COMMERCIAL

## 2021-03-09 DIAGNOSIS — I65.21 STENOSIS OF RIGHT CAROTID ARTERY: ICD-10-CM

## 2021-03-09 PROCEDURE — 93880 EXTRACRANIAL BILAT STUDY: CPT | Performed by: FAMILY MEDICINE

## 2021-03-12 NOTE — PROGRESS NOTES
Ruddy Angel - There is significant carotid disease on left side. Please make an appointment with your surgeon - I believe Dr. Jaylen Wolf for follow up.  And schedule 6 months follow up appointment with me for your diabetes - Dr. Emil Julian

## 2021-03-16 RX ORDER — ERGOCALCIFEROL 1.25 MG/1
1 CAPSULE ORAL DAILY
COMMUNITY
Start: 2021-02-24

## 2021-03-29 ENCOUNTER — OFFICE VISIT (OUTPATIENT)
Dept: CARDIOLOGY | Age: 71
End: 2021-03-29

## 2021-03-29 VITALS
WEIGHT: 208 LBS | HEIGHT: 69 IN | BODY MASS INDEX: 30.81 KG/M2 | OXYGEN SATURATION: 99 % | HEART RATE: 114 BPM | DIASTOLIC BLOOD PRESSURE: 88 MMHG | SYSTOLIC BLOOD PRESSURE: 166 MMHG

## 2021-03-29 DIAGNOSIS — I10 ESSENTIAL HYPERTENSION: ICD-10-CM

## 2021-03-29 DIAGNOSIS — E78.00 PURE HYPERCHOLESTEROLEMIA: ICD-10-CM

## 2021-03-29 DIAGNOSIS — I25.10 CORONARY ARTERY DISEASE INVOLVING NATIVE CORONARY ARTERY OF NATIVE HEART WITHOUT ANGINA PECTORIS: Primary | ICD-10-CM

## 2021-03-29 DIAGNOSIS — E78.49 OTHER HYPERLIPIDEMIA: ICD-10-CM

## 2021-03-29 PROCEDURE — 3077F SYST BP >= 140 MM HG: CPT | Performed by: INTERNAL MEDICINE

## 2021-03-29 PROCEDURE — 99214 OFFICE O/P EST MOD 30 MIN: CPT | Performed by: INTERNAL MEDICINE

## 2021-03-29 PROCEDURE — 3079F DIAST BP 80-89 MM HG: CPT | Performed by: INTERNAL MEDICINE

## 2021-03-29 RX ORDER — ATORVASTATIN CALCIUM 80 MG/1
80 TABLET, FILM COATED ORAL DAILY
Qty: 90 TABLET | Refills: 3 | Status: SHIPPED | OUTPATIENT
Start: 2021-03-29

## 2021-03-29 RX ORDER — METOPROLOL SUCCINATE 50 MG/1
50 TABLET, EXTENDED RELEASE ORAL DAILY
Qty: 90 TABLET | Refills: 3 | Status: SHIPPED | OUTPATIENT
Start: 2021-03-29

## 2021-03-29 ASSESSMENT — PATIENT HEALTH QUESTIONNAIRE - PHQ9
2. FEELING DOWN, DEPRESSED OR HOPELESS: NOT AT ALL
SUM OF ALL RESPONSES TO PHQ9 QUESTIONS 1 AND 2: 0
SUM OF ALL RESPONSES TO PHQ9 QUESTIONS 1 AND 2: 0
CLINICAL INTERPRETATION OF PHQ9 SCORE: NO FURTHER SCREENING NEEDED
CLINICAL INTERPRETATION OF PHQ2 SCORE: NO FURTHER SCREENING NEEDED
1. LITTLE INTEREST OR PLEASURE IN DOING THINGS: NOT AT ALL

## 2021-03-31 ENCOUNTER — LAB ENCOUNTER (OUTPATIENT)
Dept: LAB | Age: 71
End: 2021-03-31
Attending: FAMILY MEDICINE
Payer: COMMERCIAL

## 2021-03-31 ENCOUNTER — OFFICE VISIT (OUTPATIENT)
Dept: FAMILY MEDICINE CLINIC | Facility: CLINIC | Age: 71
End: 2021-03-31
Payer: COMMERCIAL

## 2021-03-31 VITALS
DIASTOLIC BLOOD PRESSURE: 88 MMHG | HEIGHT: 69 IN | HEART RATE: 122 BPM | BODY MASS INDEX: 30.61 KG/M2 | TEMPERATURE: 98 F | SYSTOLIC BLOOD PRESSURE: 152 MMHG | WEIGHT: 206.63 LBS

## 2021-03-31 DIAGNOSIS — E11.59 TYPE 2 DIABETES MELLITUS WITH OTHER CIRCULATORY COMPLICATION, UNSPECIFIED WHETHER LONG TERM INSULIN USE (HCC): ICD-10-CM

## 2021-03-31 DIAGNOSIS — E83.52 HYPERCALCEMIA: ICD-10-CM

## 2021-03-31 DIAGNOSIS — R00.0 TACHYCARDIA: ICD-10-CM

## 2021-03-31 DIAGNOSIS — Z95.1 S/P CABG X 3: ICD-10-CM

## 2021-03-31 DIAGNOSIS — I10 ESSENTIAL HYPERTENSION: Primary | ICD-10-CM

## 2021-03-31 LAB
CALCIUM BLD-MCNC: 10.6 MG/DL (ref 8.5–10.1)
CARTRIDGE LOT#: ABNORMAL NUMERIC
HEMOGLOBIN A1C: 10.6 % (ref 4.3–5.6)
PTH-INTACT SERPL-MCNC: 155.8 PG/ML (ref 18.5–88)

## 2021-03-31 PROCEDURE — 36416 COLLJ CAPILLARY BLOOD SPEC: CPT | Performed by: FAMILY MEDICINE

## 2021-03-31 PROCEDURE — 36415 COLL VENOUS BLD VENIPUNCTURE: CPT | Performed by: FAMILY MEDICINE

## 2021-03-31 PROCEDURE — 3077F SYST BP >= 140 MM HG: CPT | Performed by: FAMILY MEDICINE

## 2021-03-31 PROCEDURE — 3008F BODY MASS INDEX DOCD: CPT | Performed by: FAMILY MEDICINE

## 2021-03-31 PROCEDURE — 99214 OFFICE O/P EST MOD 30 MIN: CPT | Performed by: FAMILY MEDICINE

## 2021-03-31 PROCEDURE — 82310 ASSAY OF CALCIUM: CPT | Performed by: FAMILY MEDICINE

## 2021-03-31 PROCEDURE — 83970 ASSAY OF PARATHORMONE: CPT | Performed by: FAMILY MEDICINE

## 2021-03-31 PROCEDURE — 3079F DIAST BP 80-89 MM HG: CPT | Performed by: FAMILY MEDICINE

## 2021-03-31 PROCEDURE — 83036 HEMOGLOBIN GLYCOSYLATED A1C: CPT | Performed by: FAMILY MEDICINE

## 2021-03-31 RX ORDER — METOPROLOL SUCCINATE 50 MG/1
50 TABLET, EXTENDED RELEASE ORAL DAILY
COMMUNITY
Start: 2021-03-29 | End: 2021-05-11

## 2021-03-31 RX ORDER — ERGOCALCIFEROL (VITAMIN D2) 1250 MCG
1 CAPSULE ORAL WEEKLY
COMMUNITY
Start: 2021-02-24

## 2021-03-31 NOTE — PROGRESS NOTES
Davidson Graf is a 78year old adult. Patient presents with:  Diabetes: f/u    HPI:   Pt reports his metoprolol script ran out a month ago and will restart it tomorrow. He saw Dr. Yazmin Milton 2 days ago and he reordered it. Pt reports he feels fine.  He does mouth nightly.  ), Disp: 90 tablet, Rfl: 0  aspirin 81 MG Oral Tab, Take 81 mg by mouth daily. , Disp: , Rfl:     No current facility-administered medications on file prior to visit.      Past Medical History:   Diagnosis Date   • Atherosclerosis of corona edema      ASSESSMENT AND PLAN:   1. Essential hypertension  Restarting metoprolol this week. 2. S/P CABG x 3  Stable. Per cards    3.  Type 2 diabetes mellitus with other circulatory complication, unspecified whether long term insulin use (HCC)  Sees po

## 2021-04-01 NOTE — PROGRESS NOTES
Please contact pt. Important that pt sees endo sooner that end of May. He has appt for his diabetes but now also has elevated parathyroid and calcium and will need assessment. Also referral to see ENT Dr. Enriqueta Adamson as may just need surgery.

## 2021-04-11 ENCOUNTER — PATIENT MESSAGE (OUTPATIENT)
Dept: CARDIOLOGY CLINIC | Facility: CLINIC | Age: 71
End: 2021-04-11

## 2021-04-15 ENCOUNTER — TELEPHONE (OUTPATIENT)
Dept: CARDIOLOGY | Age: 71
End: 2021-04-15

## 2021-04-20 ENCOUNTER — TELEPHONE (OUTPATIENT)
Dept: CARDIOLOGY | Age: 71
End: 2021-04-20

## 2021-04-20 RX ORDER — AMLODIPINE BESYLATE 5 MG/1
TABLET ORAL
Qty: 30 TABLET | Refills: 0 | OUTPATIENT
Start: 2021-04-20

## 2021-04-20 RX ORDER — AMLODIPINE BESYLATE 5 MG/1
5 TABLET ORAL DAILY
Qty: 30 TABLET | Refills: 11 | Status: SHIPPED | OUTPATIENT
Start: 2021-04-20

## 2021-04-30 RX ORDER — LOSARTAN POTASSIUM 50 MG/1
50 TABLET ORAL DAILY
Qty: 30 TABLET | Refills: 0 | Status: SHIPPED | OUTPATIENT
Start: 2021-04-30 | End: 2021-06-08

## 2021-05-01 DIAGNOSIS — Z79.4 TYPE 2 DIABETES MELLITUS WITH HYPERGLYCEMIA, WITH LONG-TERM CURRENT USE OF INSULIN (HCC): ICD-10-CM

## 2021-05-01 DIAGNOSIS — E11.65 TYPE 2 DIABETES MELLITUS WITH HYPERGLYCEMIA, WITH LONG-TERM CURRENT USE OF INSULIN (HCC): ICD-10-CM

## 2021-05-02 RX ORDER — INSULIN HUMAN 100 [IU]/ML
INJECTION, SUSPENSION SUBCUTANEOUS
Qty: 30 ML | Refills: 0 | Status: SHIPPED | OUTPATIENT
Start: 2021-05-02 | End: 2021-06-08

## 2021-05-06 ENCOUNTER — OFFICE VISIT (OUTPATIENT)
Dept: AUDIOLOGY | Facility: CLINIC | Age: 71
End: 2021-05-06
Payer: COMMERCIAL

## 2021-05-06 ENCOUNTER — OFFICE VISIT (OUTPATIENT)
Dept: OTOLARYNGOLOGY | Facility: CLINIC | Age: 71
End: 2021-05-06
Payer: COMMERCIAL

## 2021-05-06 VITALS
HEIGHT: 69 IN | DIASTOLIC BLOOD PRESSURE: 82 MMHG | BODY MASS INDEX: 30.51 KG/M2 | SYSTOLIC BLOOD PRESSURE: 147 MMHG | WEIGHT: 206 LBS | TEMPERATURE: 98 F | HEART RATE: 96 BPM

## 2021-05-06 DIAGNOSIS — E21.3 HYPERPARATHYROIDISM (HCC): ICD-10-CM

## 2021-05-06 DIAGNOSIS — H93.13 TINNITUS OF BOTH EARS: Primary | ICD-10-CM

## 2021-05-06 PROCEDURE — 3077F SYST BP >= 140 MM HG: CPT | Performed by: OTOLARYNGOLOGY

## 2021-05-06 PROCEDURE — 3079F DIAST BP 80-89 MM HG: CPT | Performed by: OTOLARYNGOLOGY

## 2021-05-06 PROCEDURE — 92567 TYMPANOMETRY: CPT | Performed by: AUDIOLOGIST

## 2021-05-06 PROCEDURE — 92557 COMPREHENSIVE HEARING TEST: CPT | Performed by: AUDIOLOGIST

## 2021-05-06 PROCEDURE — 3008F BODY MASS INDEX DOCD: CPT | Performed by: OTOLARYNGOLOGY

## 2021-05-06 PROCEDURE — 99203 OFFICE O/P NEW LOW 30 MIN: CPT | Performed by: OTOLARYNGOLOGY

## 2021-05-06 NOTE — PROGRESS NOTES
AUDIOLOGY REPORT      Davidson Graf is a 78year old adult     Referring Provider: Oren Dalh   YOB: 1950  Medical Record: [id number]      Patient Hearing History:  Patient seen today for hearing testing due to bilateral tinnitus.        Ot

## 2021-05-07 ENCOUNTER — OFFICE VISIT (OUTPATIENT)
Dept: PODIATRY CLINIC | Facility: CLINIC | Age: 71
End: 2021-05-07
Payer: COMMERCIAL

## 2021-05-07 DIAGNOSIS — L84 CALLUS OF FOOT: ICD-10-CM

## 2021-05-07 DIAGNOSIS — B35.1 ONYCHOMYCOSIS: ICD-10-CM

## 2021-05-07 DIAGNOSIS — L85.3 XEROSIS OF SKIN: ICD-10-CM

## 2021-05-07 DIAGNOSIS — E11.59 TYPE 2 DIABETES MELLITUS WITH OTHER CIRCULATORY COMPLICATION, UNSPECIFIED WHETHER LONG TERM INSULIN USE (HCC): Primary | ICD-10-CM

## 2021-05-07 DIAGNOSIS — I73.89 OTHER SPECIFIED PERIPHERAL VASCULAR DISEASES (HCC): ICD-10-CM

## 2021-05-07 PROCEDURE — 11055 PARING/CUTG B9 HYPRKER LES 1: CPT | Performed by: PODIATRIST

## 2021-05-07 PROCEDURE — 11721 DEBRIDE NAIL 6 OR MORE: CPT | Performed by: PODIATRIST

## 2021-05-07 NOTE — PROGRESS NOTES
Robet Apgar is a 78year old adult. Patient presents with:  Hyperparathyroidism: referred by PCP, elevated PTH and Calcium   Ear Problem: ringing in ears for past 10 years   Snoring      HISTORY OF PRESENT ILLNESS  He presents with 2 concerns.   Ringing i Nephropathy    • Neuropathy     bilateral lower extremities   • Osteoarthritis    • Pneumonia due to organism    • Visual impairment     wears glasses       Past Surgical History:   Procedure Laterality Date   • [address] SURGERY  10/12/2017    tri Nasopharynx Normal External nose - Normal. Lips/teeth/gums - Normal. Tonsils - Normal. Oropharynx - Normal.   Ears Normal Inspection - Right: Normal, Left: Normal. Canal - Right: Normal, Left: Normal. TM - Right: Normal, Left: Normal.   Skin Normal Inspe 12  •  multivitamin Oral Tab, Take 1 tablet by mouth daily. , Disp: 30 tablet, Rfl: 0  •  Vitamin C 500 MG Oral Tab, Take 1 tablet (500 mg total) by mouth 3 (three) times daily.  (Patient taking differently: Take 500 mg by mouth nightly.  ), Disp: 90 tablet,

## 2021-05-11 ENCOUNTER — OFFICE VISIT (OUTPATIENT)
Dept: NEPHROLOGY | Facility: CLINIC | Age: 71
End: 2021-05-11
Payer: COMMERCIAL

## 2021-05-11 VITALS
SYSTOLIC BLOOD PRESSURE: 151 MMHG | HEIGHT: 70 IN | DIASTOLIC BLOOD PRESSURE: 73 MMHG | BODY MASS INDEX: 29.63 KG/M2 | HEART RATE: 88 BPM | WEIGHT: 207 LBS

## 2021-05-11 DIAGNOSIS — N18.32 STAGE 3B CHRONIC KIDNEY DISEASE (HCC): Primary | ICD-10-CM

## 2021-05-11 PROCEDURE — 3078F DIAST BP <80 MM HG: CPT | Performed by: INTERNAL MEDICINE

## 2021-05-11 PROCEDURE — 3077F SYST BP >= 140 MM HG: CPT | Performed by: INTERNAL MEDICINE

## 2021-05-11 PROCEDURE — 3008F BODY MASS INDEX DOCD: CPT | Performed by: INTERNAL MEDICINE

## 2021-05-11 PROCEDURE — 99205 OFFICE O/P NEW HI 60 MIN: CPT | Performed by: INTERNAL MEDICINE

## 2021-05-11 RX ORDER — METOPROLOL SUCCINATE 50 MG/1
75 TABLET, EXTENDED RELEASE ORAL DAILY
Qty: 45 TABLET | Refills: 3 | Status: SHIPPED | OUTPATIENT
Start: 2021-05-11 | End: 2021-09-15

## 2021-05-11 NOTE — PROGRESS NOTES
05/11/21        Patient: Christin Rios   YOB: 1950   Date of Visit: 5/11/2021       Dear  Dr. Victorino Rodriguez MD,      Thank you for referring Christin Rios to my practice. Please find my assessment and plan below.       As you know he is a 30-year-old symptoms. 10 point review systems is otherwise unremarkable. On physical exam his blood pressure is 151/73 with a pulse of 88 and he weighed 207 pounds. His neck was supple without JVD or lymphadenopathy. Carotids 2+. Lungs are clear.   Heart reveale

## 2021-05-11 NOTE — PATIENT INSTRUCTIONS
Please do laboratory studies and kidney ultrasound as ordered. Increase metoprolol to 75 mg daily. Check your blood pressures and heart rates daily and call me in 1 week.

## 2021-05-12 NOTE — PROGRESS NOTES
Tesha Chaves is a 78year old male. Patient presents with:  Diabetic Foot Care: FBS was not taken this am, A1C was done on  3/31 with the result of 10.6, LOV with PCP was on 3/31/21. Patient denies any pain in the office today.         HPI:   Patient Laura Álvarez times daily. (Patient taking differently: Take 500 mg by mouth nightly.  ) 90 tablet 0   • aspirin 81 MG Oral Tab Take 81 mg by mouth daily. • Metoprolol Succinate ER 50 MG Oral Tablet 24 Hr Take 1.5 tablets (75 mg total) by mouth daily.  45 tablet 3 Vaping Use: Never used    Substance and Sexual Activity      Alcohol use: No        Alcohol/week: 0.0 standard drinks      Drug use: No      Sexual activity: Never          REVIEW OF SYSTEMS:   Today reviewed systens as documented below  GENERAL HEALTH: fe lateral plantar right heel uneventfully all of these things were done without hemorrhage. Follow-up every 2 months for care but sooner if required the patient has not been moisturizing I again encouraged him to moisturize on a regular basis.     The patien

## 2021-05-18 ENCOUNTER — TELEPHONE (OUTPATIENT)
Dept: OTOLARYNGOLOGY | Facility: CLINIC | Age: 71
End: 2021-05-18

## 2021-05-18 NOTE — TELEPHONE ENCOUNTER
Per spouse was told by ins that the NM parathyroid (cpt code 32756) was denied. Per wife she is very confused and asking for an explanation.  Please advise

## 2021-05-18 NOTE — TELEPHONE ENCOUNTER
Insurance denied the auth request for NM Parathyroid with [employer]. Please see denial below and contact patient with the next plan of treatment.

## 2021-05-18 NOTE — TELEPHONE ENCOUNTER
Losi Perez please see note below, NM of parathyroid denied, please see note below, they would like pt to have US as initial imaging.

## 2021-05-18 NOTE — TELEPHONE ENCOUNTER
Have patient undergo ultrasound first there will discuss sestamibi. Ultrasound was already ordered this is essentially his insurance not wanting to cover a sestamibi at this time. We will see what the results of the ultrasound show.

## 2021-05-19 NOTE — TELEPHONE ENCOUNTER
GURPREET Morales, pt has US thyroid scheduled on 06/19, pt states he cannot do sooner as he wants US of kidney and US of thyroid together.

## 2021-05-25 ENCOUNTER — OFFICE VISIT (OUTPATIENT)
Dept: ENDOCRINOLOGY CLINIC | Facility: CLINIC | Age: 71
End: 2021-05-25
Payer: COMMERCIAL

## 2021-05-25 VITALS
SYSTOLIC BLOOD PRESSURE: 185 MMHG | HEART RATE: 80 BPM | BODY MASS INDEX: 29 KG/M2 | WEIGHT: 205 LBS | DIASTOLIC BLOOD PRESSURE: 81 MMHG

## 2021-05-25 DIAGNOSIS — E11.65 TYPE 2 DIABETES MELLITUS WITH HYPERGLYCEMIA, WITH LONG-TERM CURRENT USE OF INSULIN (HCC): Primary | ICD-10-CM

## 2021-05-25 DIAGNOSIS — E78.5 DYSLIPIDEMIA: ICD-10-CM

## 2021-05-25 DIAGNOSIS — Z79.4 TYPE 2 DIABETES MELLITUS WITH HYPERGLYCEMIA, WITH LONG-TERM CURRENT USE OF INSULIN (HCC): Primary | ICD-10-CM

## 2021-05-25 DIAGNOSIS — Z13.820 OSTEOPOROSIS SCREENING: ICD-10-CM

## 2021-05-25 PROCEDURE — 3077F SYST BP >= 140 MM HG: CPT | Performed by: INTERNAL MEDICINE

## 2021-05-25 PROCEDURE — 36416 COLLJ CAPILLARY BLOOD SPEC: CPT | Performed by: INTERNAL MEDICINE

## 2021-05-25 PROCEDURE — 83036 HEMOGLOBIN GLYCOSYLATED A1C: CPT | Performed by: INTERNAL MEDICINE

## 2021-05-25 PROCEDURE — 99214 OFFICE O/P EST MOD 30 MIN: CPT | Performed by: INTERNAL MEDICINE

## 2021-05-25 PROCEDURE — 3079F DIAST BP 80-89 MM HG: CPT | Performed by: INTERNAL MEDICINE

## 2021-05-25 PROCEDURE — 3046F HEMOGLOBIN A1C LEVEL >9.0%: CPT | Performed by: INTERNAL MEDICINE

## 2021-05-25 PROCEDURE — 82947 ASSAY GLUCOSE BLOOD QUANT: CPT | Performed by: INTERNAL MEDICINE

## 2021-05-25 NOTE — PATIENT INSTRUCTIONS
Insulin 70/30 44 --> 50 units with breakfast and  50 units with dinner.   Take insulin 10-15 minutes before meals not after     Check sugars before Breakfast and before dinner  Call with sugars in one week  Call sooner if under 70

## 2021-05-25 NOTE — PROGRESS NOTES
Return Office Visit     CHIEF COMPLAINT:    DM  Dyslipidemia     HISTORY OF PRESENT ILLNESS:  Trevon Valiente is a 78year old male who presents for follow up for DM. DM HISTORY  He states he had \" borderline DM\" as a teenager.   He was on diabinese for differently: 80 mg nightly.  ) 90 tablet 0   • acetaminophen 500 MG Oral Tab Take 1,000 mg by mouth. • furosemide 20 MG Oral Tab Take 20 mg by mouth every other day.      • ammonium lactate 12 % External Lotion Rub into the soles and heels of both feet to voice. Normal hearing, normal speech  Respiratory:  Speaking in full sentences, non-labored.  no increased work of breathing, no audible wheezing    Skin:  normal moisture and skin texture, no visible lesions  Hair and nails: normal scalp hair  Hematolog fat, weight loss, aerobic exercise, and eating a diet rich in fruits and vegetables. B) c/w  atorvastatin 40 mg daily.     He has high PTH that is being managed by Dr. Rosa Marlow   Will get a DXA scan given that hyperparathyroidism can weaken bones    RTC in t

## 2021-06-08 ENCOUNTER — TELEPHONE (OUTPATIENT)
Dept: ENDOCRINOLOGY CLINIC | Facility: CLINIC | Age: 71
End: 2021-06-08

## 2021-06-08 DIAGNOSIS — Z79.4 TYPE 2 DIABETES MELLITUS WITH HYPERGLYCEMIA, WITH LONG-TERM CURRENT USE OF INSULIN (HCC): ICD-10-CM

## 2021-06-08 DIAGNOSIS — E11.65 TYPE 2 DIABETES MELLITUS WITH HYPERGLYCEMIA, WITH LONG-TERM CURRENT USE OF INSULIN (HCC): ICD-10-CM

## 2021-06-08 RX ORDER — LOSARTAN POTASSIUM 50 MG/1
50 TABLET ORAL DAILY
Qty: 30 TABLET | Refills: 0 | Status: SHIPPED | OUTPATIENT
Start: 2021-06-08

## 2021-06-08 RX ORDER — INSULIN HUMAN 100 [IU]/ML
50 INJECTION, SUSPENSION SUBCUTANEOUS 2 TIMES DAILY
Qty: 90 ML | Refills: 0 | Status: SHIPPED | OUTPATIENT
Start: 2021-06-08 | End: 2021-06-30

## 2021-06-08 NOTE — TELEPHONE ENCOUNTER
Refilled  Please call for update on BG  Also, please book apt in August 2021  We are booking in to august and it is very important he FU on time since a1c is high  Thanks

## 2021-06-10 RX ORDER — CLOPIDOGREL BISULFATE 75 MG/1
75 TABLET ORAL
Qty: 90 TABLET | Refills: 4 | Status: SHIPPED | OUTPATIENT
Start: 2021-06-10 | End: 2021-12-13

## 2021-06-10 NOTE — TELEPHONE ENCOUNTER
Called and spoke to pt, asked for recent sugars and pt stated that he doesn't have them and will give them to us. Also advised pt on scheduling a follow-up appt but patient stated that he needs to check his schedule.  Informed patient to call back at earliest convenience to schedule appt with Dr. Kimberly Galeano, as well as BG update since she has been booking out to mid-late August.

## 2021-06-17 NOTE — TELEPHONE ENCOUNTER
Called for BG readings. Spoke with wife who states patient is not available. Patients wife advised she will inform patient to Send a MyChart with BG logs. No further questions.

## 2021-06-19 ENCOUNTER — LAB ENCOUNTER (OUTPATIENT)
Dept: LAB | Age: 71
End: 2021-06-19
Attending: INTERNAL MEDICINE
Payer: COMMERCIAL

## 2021-06-19 ENCOUNTER — HOSPITAL ENCOUNTER (OUTPATIENT)
Dept: ULTRASOUND IMAGING | Age: 71
Discharge: HOME | End: 2021-06-19
Attending: INTERNAL MEDICINE
Payer: COMMERCIAL

## 2021-06-19 ENCOUNTER — HOSPITAL ENCOUNTER (OUTPATIENT)
Dept: ULTRASOUND IMAGING | Age: 71
Discharge: HOME | End: 2021-06-19
Attending: OTOLARYNGOLOGY
Payer: COMMERCIAL

## 2021-06-19 ENCOUNTER — HOSPITAL ENCOUNTER (OUTPATIENT)
Dept: BONE DENSITY | Age: 71
Discharge: HOME | End: 2021-06-19
Attending: INTERNAL MEDICINE
Payer: COMMERCIAL

## 2021-06-19 DIAGNOSIS — N18.32 STAGE 3B CHRONIC KIDNEY DISEASE (HCC): ICD-10-CM

## 2021-06-19 DIAGNOSIS — N18.32 CHRONIC KIDNEY DISEASE (CKD) STAGE G3B/A1, MODERATELY DECREASED GLOMERULAR FILTRATION RATE (GFR) BETWEEN 30-44 ML/MIN/1.73 SQUARE METER AND ALBUMINURIA CREATININE RATIO LESS THAN 30 MG/G (HCC): Primary | ICD-10-CM

## 2021-06-19 DIAGNOSIS — Z13.820 OSTEOPOROSIS SCREENING: ICD-10-CM

## 2021-06-19 DIAGNOSIS — E21.3 HYPERPARATHYROIDISM (HCC): ICD-10-CM

## 2021-06-19 PROCEDURE — 85652 RBC SED RATE AUTOMATED: CPT | Performed by: INTERNAL MEDICINE

## 2021-06-19 PROCEDURE — 86140 C-REACTIVE PROTEIN: CPT | Performed by: INTERNAL MEDICINE

## 2021-06-19 PROCEDURE — 86160 COMPLEMENT ANTIGEN: CPT | Performed by: INTERNAL MEDICINE

## 2021-06-19 PROCEDURE — 76536 US EXAM OF HEAD AND NECK: CPT | Performed by: OTOLARYNGOLOGY

## 2021-06-19 PROCEDURE — 86335 IMMUNFIX E-PHORSIS/URINE/CSF: CPT

## 2021-06-19 PROCEDURE — 84165 PROTEIN E-PHORESIS SERUM: CPT

## 2021-06-19 PROCEDURE — 36415 COLL VENOUS BLD VENIPUNCTURE: CPT

## 2021-06-19 PROCEDURE — 82570 ASSAY OF URINE CREATININE: CPT | Performed by: INTERNAL MEDICINE

## 2021-06-19 PROCEDURE — 3060F POS MICROALBUMINURIA REV: CPT | Performed by: INTERNAL MEDICINE

## 2021-06-19 PROCEDURE — 80069 RENAL FUNCTION PANEL: CPT

## 2021-06-19 PROCEDURE — 76770 US EXAM ABDO BACK WALL COMP: CPT | Performed by: INTERNAL MEDICINE

## 2021-06-19 PROCEDURE — 77080 DXA BONE DENSITY AXIAL: CPT | Performed by: INTERNAL MEDICINE

## 2021-06-19 PROCEDURE — 82043 UR ALBUMIN QUANTITATIVE: CPT | Performed by: INTERNAL MEDICINE

## 2021-06-19 PROCEDURE — 85025 COMPLETE CBC W/AUTO DIFF WBC: CPT | Performed by: INTERNAL MEDICINE

## 2021-06-19 PROCEDURE — 86038 ANTINUCLEAR ANTIBODIES: CPT

## 2021-06-19 PROCEDURE — 82652 VIT D 1 25-DIHYDROXY: CPT

## 2021-06-19 PROCEDURE — 81001 URINALYSIS AUTO W/SCOPE: CPT | Performed by: INTERNAL MEDICINE

## 2021-06-19 PROCEDURE — 84166 PROTEIN E-PHORESIS/URINE/CSF: CPT

## 2021-06-19 PROCEDURE — 86431 RHEUMATOID FACTOR QUANT: CPT | Performed by: INTERNAL MEDICINE

## 2021-06-21 NOTE — TELEPHONE ENCOUNTER
Patient had US thyroid on 6/19/21 please resubmit prior auth for NM Parathyroid ,please advise thanks.

## 2021-06-23 ENCOUNTER — TELEPHONE (OUTPATIENT)
Dept: NEPHROLOGY | Facility: CLINIC | Age: 71
End: 2021-06-23

## 2021-06-23 NOTE — TELEPHONE ENCOUNTER
GURPREET     Spoke with patient, discussed below message. Verbalizes understanding. Soonest F/U I could schedule was 7/9/21. Please let me know if you want to see pt sooner, I can double book. Thanks Dr. Arturo Cordero!

## 2021-06-24 ENCOUNTER — TELEPHONE (OUTPATIENT)
Dept: AUDIOLOGY | Facility: CLINIC | Age: 71
End: 2021-06-24

## 2021-06-24 NOTE — TELEPHONE ENCOUNTER
Pt is not available - RN spoke with wife who states they will call back to book 4pm apt.  Will wait for call back

## 2021-06-24 NOTE — TELEPHONE ENCOUNTER
Spoke with patient. He would like to come in to  the copy of his hearing test.  It will be left up at  for .       Colonel James, AUD

## 2021-06-25 ENCOUNTER — OFFICE VISIT (OUTPATIENT)
Dept: NEPHROLOGY | Facility: CLINIC | Age: 71
End: 2021-06-25
Payer: COMMERCIAL

## 2021-06-25 ENCOUNTER — TELEPHONE (OUTPATIENT)
Dept: NEPHROLOGY | Facility: CLINIC | Age: 71
End: 2021-06-25

## 2021-06-25 VITALS
HEIGHT: 70 IN | DIASTOLIC BLOOD PRESSURE: 75 MMHG | BODY MASS INDEX: 29.78 KG/M2 | SYSTOLIC BLOOD PRESSURE: 163 MMHG | WEIGHT: 208 LBS | HEART RATE: 80 BPM

## 2021-06-25 DIAGNOSIS — N18.32 STAGE 3B CHRONIC KIDNEY DISEASE (HCC): Primary | ICD-10-CM

## 2021-06-25 DIAGNOSIS — N18.30 STAGE 3 CHRONIC KIDNEY DISEASE, UNSPECIFIED WHETHER STAGE 3A OR 3B CKD (HCC): Primary | ICD-10-CM

## 2021-06-25 PROCEDURE — 3077F SYST BP >= 140 MM HG: CPT | Performed by: INTERNAL MEDICINE

## 2021-06-25 PROCEDURE — 99213 OFFICE O/P EST LOW 20 MIN: CPT | Performed by: INTERNAL MEDICINE

## 2021-06-25 PROCEDURE — 3008F BODY MASS INDEX DOCD: CPT | Performed by: INTERNAL MEDICINE

## 2021-06-25 PROCEDURE — 3078F DIAST BP <80 MM HG: CPT | Performed by: INTERNAL MEDICINE

## 2021-06-25 NOTE — PATIENT INSTRUCTIONS
Check your blood pressures daily and call me in 1 week. Repeat your kidney blood test in 3 months. Orders are in the computer.

## 2021-06-25 NOTE — TELEPHONE ENCOUNTER
Verify where he needs to do his labs. The chart says [facility].  He last did labs here at [facility].  If he can do them at [facility] set up a CBC, renal panel, urine for microalbumin standing order.

## 2021-06-25 NOTE — TELEPHONE ENCOUNTER
Appeal letter generated and faxed, per pt insurance and oral request cannot be submitted.  Per insurance expedited fax [phone] , clinicals , letter and US report faxed

## 2021-06-25 NOTE — PROGRESS NOTES
06/25/21        Patient: Stephanie Potter   YOB: 1950   Date of Visit: 6/25/2021       Dear  Dr. Ramon Owens MD,      Thank you for referring Stephanie Potter to my practice. Please find my assessment and plan below.       As you know he is a 68-year-old Will adjust medications if needed. Finally recommend to do CBC and renal panels quarterly. I will see him again in 6 months for follow-up or sooner if clinically indicated. Thank you again for allowing me to participate in the care of your patient.   I

## 2021-06-28 ENCOUNTER — TELEPHONE (OUTPATIENT)
Dept: OTOLARYNGOLOGY | Facility: CLINIC | Age: 71
End: 2021-06-28

## 2021-06-28 NOTE — TELEPHONE ENCOUNTER
Patient contacted. He states he doesn't go to [facility] [employer] [facility]. He uses [facility]. Demographics changed in the chart. Orders entered in system. Patient is aware of new standing orders. Patient understands instructions and had no questions.

## 2021-06-28 NOTE — TELEPHONE ENCOUNTER
Wes Smallwood from [facility] states should have approval for imaging by 7/9 maybe that is worst case please advise     [id number]

## 2021-06-29 NOTE — TELEPHONE ENCOUNTER
Kim please see note below,please advise thanks. Tried calling #[phone] was advised to call pt health plan directly but it goes back to the third party which is [employer]. GURPREET letter of submitted on 6/25/21.

## 2021-06-30 ENCOUNTER — TELEPHONE (OUTPATIENT)
Dept: CARDIOLOGY | Age: 71
End: 2021-06-30

## 2021-06-30 DIAGNOSIS — E11.65 TYPE 2 DIABETES MELLITUS WITH HYPERGLYCEMIA, WITH LONG-TERM CURRENT USE OF INSULIN (HCC): ICD-10-CM

## 2021-06-30 DIAGNOSIS — Z79.4 TYPE 2 DIABETES MELLITUS WITH HYPERGLYCEMIA, WITH LONG-TERM CURRENT USE OF INSULIN (HCC): ICD-10-CM

## 2021-06-30 RX ORDER — LOSARTAN POTASSIUM 50 MG/1
50 TABLET ORAL DAILY
Qty: 90 TABLET | Refills: 0 | Status: SHIPPED | OUTPATIENT
Start: 2021-06-30

## 2021-06-30 RX ORDER — LOSARTAN POTASSIUM 50 MG/1
50 TABLET ORAL DAILY
Qty: 30 TABLET | Refills: 0 | OUTPATIENT
Start: 2021-06-30

## 2021-06-30 RX ORDER — INSULIN HUMAN 100 [IU]/ML
50 INJECTION, SUSPENSION SUBCUTANEOUS 2 TIMES DAILY
Qty: 90 ML | Refills: 0 | Status: SHIPPED | OUTPATIENT
Start: 2021-06-30 | End: 2021-10-27

## 2021-07-12 ENCOUNTER — PATIENT MESSAGE (OUTPATIENT)
Dept: NEPHROLOGY | Facility: CLINIC | Age: 71
End: 2021-07-12

## 2021-07-12 NOTE — TELEPHONE ENCOUNTER
Blood pressures are too high. Verify that he is taking amlodipine 5 mg daily. If so increase to 10 mg daily. Call in 2 weeks of blood pressures and heart rates.

## 2021-07-12 NOTE — TELEPHONE ENCOUNTER
From: Thaddeus Doyle  To: Tony Jimenez MD  Sent: 7/12/2021 11:32 AM CDT  Subject: Other    Dr. Lisa Vann,  You requested I monitor BP and send results.  Here they are for period of 6/26-7/8  Date  Time  Sys  Mehnaz Pulse  7/8/2021 10:40   72 54  7/6/2021 1

## 2021-07-21 RX ORDER — AMLODIPINE BESYLATE 10 MG/1
10 TABLET ORAL DAILY
Qty: 30 TABLET | Refills: 0 | Status: SHIPPED | OUTPATIENT
Start: 2021-07-21 | End: 2021-08-12

## 2021-07-21 NOTE — TELEPHONE ENCOUNTER
Systolic blood pressure still too high. Okay for refill for amlodipine 10 mg, 1 daily, increase losartan to 75 mg daily. Call in a week with blood pressure and heart rate readings.

## 2021-07-22 NOTE — TELEPHONE ENCOUNTER
Tiffany still showing denied. Called [facility] and spoke to [employer]. Was advised the denial was overturned but there was never an update sent to [employer]. Appeal case # [id number]. Erik Postin [id number] is valid until 10/31/21.  A copy of the approval letter has been re

## 2021-08-12 RX ORDER — AMLODIPINE BESYLATE 10 MG/1
10 TABLET ORAL DAILY
Qty: 30 TABLET | Refills: 3 | Status: SHIPPED | OUTPATIENT
Start: 2021-08-12

## 2021-08-13 ENCOUNTER — OFFICE VISIT (OUTPATIENT)
Dept: PODIATRY CLINIC | Facility: CLINIC | Age: 71
End: 2021-08-13
Payer: COMMERCIAL

## 2021-08-13 ENCOUNTER — HOSPITAL ENCOUNTER (OUTPATIENT)
Dept: NUCLEAR MEDICINE | Facility: HOSPITAL | Age: 71
Discharge: HOME | End: 2021-08-13
Attending: OTOLARYNGOLOGY
Payer: COMMERCIAL

## 2021-08-13 DIAGNOSIS — I73.89 OTHER SPECIFIED PERIPHERAL VASCULAR DISEASES (HCC): ICD-10-CM

## 2021-08-13 DIAGNOSIS — L84 CALLUS OF FOOT: ICD-10-CM

## 2021-08-13 DIAGNOSIS — E21.3 HYPERPARATHYROIDISM (HCC): ICD-10-CM

## 2021-08-13 DIAGNOSIS — L85.3 XEROSIS OF SKIN: ICD-10-CM

## 2021-08-13 DIAGNOSIS — B35.1 ONYCHOMYCOSIS: ICD-10-CM

## 2021-08-13 DIAGNOSIS — E11.59 TYPE 2 DIABETES MELLITUS WITH OTHER CIRCULATORY COMPLICATION, UNSPECIFIED WHETHER LONG TERM INSULIN USE (HCC): Primary | ICD-10-CM

## 2021-08-13 PROCEDURE — 78071 PARATHYRD PLANAR W/WO SUBTRJ: CPT | Performed by: OTOLARYNGOLOGY

## 2021-08-13 PROCEDURE — 11055 PARING/CUTG B9 HYPRKER LES 1: CPT | Performed by: PODIATRIST

## 2021-08-13 PROCEDURE — 11721 DEBRIDE NAIL 6 OR MORE: CPT | Performed by: PODIATRIST

## 2021-08-16 NOTE — PROGRESS NOTES
Stephanie Potter is a 78year old male.  Patient presents with:  Diabetic Foot Care: Pt here for routine nail care, no concerns, not checking BS        HPI:   Patient returns to the clinic with a known history of PAD revascularization poor circulation diabetes • Insulin Pen Needle (BD PEN NEEDLE BETHEL U/F) 32G X 4 MM Does not apply Misc INJECT TWICE A  Box 0   • Glucose Blood In Vitro Strip Use to check sugar twice daily 200 each 2      Past Medical History:   Diagnosis Date   • Atherosclerosis of corona Activity      Alcohol use: No        Alcohol/week: 0.0 standard drinks      Drug use: No      Sexual activity: Never          REVIEW OF SYSTEMS:   Today reviewed systens as documented below  GENERAL HEALTH: feels well otherwise  SKIN: Refer to exam below these things were done without hemorrhage. Follow-up every 2 months for care but sooner if required the patient has not been moisturizing I again encouraged him to moisturize on a regular basis.     The patient indicates understanding of these issues and a

## 2021-08-20 ENCOUNTER — OFFICE VISIT (OUTPATIENT)
Dept: OTOLARYNGOLOGY | Facility: CLINIC | Age: 71
End: 2021-08-20
Payer: COMMERCIAL

## 2021-08-20 ENCOUNTER — TELEPHONE (OUTPATIENT)
Dept: OTOLARYNGOLOGY | Facility: CLINIC | Age: 71
End: 2021-08-20

## 2021-08-20 VITALS — BODY MASS INDEX: 30.51 KG/M2 | WEIGHT: 206 LBS | TEMPERATURE: 97 F | HEIGHT: 69 IN

## 2021-08-20 DIAGNOSIS — E04.1 THYROID NODULE: Primary | ICD-10-CM

## 2021-08-20 DIAGNOSIS — E21.3 HYPERPARATHYROIDISM (HCC): ICD-10-CM

## 2021-08-20 PROCEDURE — 99214 OFFICE O/P EST MOD 30 MIN: CPT | Performed by: OTOLARYNGOLOGY

## 2021-08-20 PROCEDURE — 3008F BODY MASS INDEX DOCD: CPT | Performed by: OTOLARYNGOLOGY

## 2021-08-20 NOTE — PROGRESS NOTES
James Bess is a 78year old adult. Patient presents with:  Ear Problem: Pt states he did obtain hearing aids for tinnitus about one month ago. States that it has not helped at all with ringing in the ears.    Hyperparathyroidism: Pt also here to discuss • Lipids Father    • Diabetes Maternal Grandfather    • Cancer Mother        Past Medical History:   Diagnosis Date   • Atherosclerosis of coronary artery    • Cataract     stage 1 cataracts   • COPD (chronic obstructive pulmonary disease) ([facility] [address]    • Co Psychiatric Normal Orientation - Oriented to time, place, person & situation. Appropriate mood and affect.    Neck Exam Normal Inspection - Normal. Palpation - Normal. Parotid gland - Normal. Thyroid gland -thyroid nodule   Eyes Normal Conjunctiva - Right 32G X 4 MM Does not apply Misc, INJECT TWICE A DAY, Disp: 180 Box, Rfl: 0  •  Glucose Blood In Vitro Strip, Use to check sugar twice daily, Disp: 200 each, Rfl: 2  •  ATORVASTATIN 40 MG Oral Tab, TAKE 1 TABLET BY MOUTH EVERY DAY AT NIGHT (Patient taking di using [employer] voice recognition dictation software. As a result errors may occur. When identified these errors have been corrected. While every attempt is made to correct errors during dictation discrepancies may still exist    Charles Wiggins MD

## 2021-08-20 NOTE — TELEPHONE ENCOUNTER
Per wife pt has to do a biopsy and have surgery and is asking if he needs to have a covid test prior to both and will it get billed to their insurance or is it free. Per wife also asking how soon after the biopsy will he have the surgery.  please advise

## 2021-08-23 ENCOUNTER — TELEPHONE (OUTPATIENT)
Dept: ENDOCRINOLOGY CLINIC | Facility: CLINIC | Age: 71
End: 2021-08-23

## 2021-08-23 RX ORDER — PEN NEEDLE, DIABETIC 32GX 5/32"
NEEDLE, DISPOSABLE MISCELLANEOUS
Qty: 200 EACH | Refills: 0 | Status: SHIPPED | OUTPATIENT
Start: 2021-08-23 | End: 2021-12-09 | Stop reason: CLARIF

## 2021-08-23 NOTE — TELEPHONE ENCOUNTER
Patient unfortunately canceled apt again today  We have talked to him multiple times about compliance  Just wanted to let you know  Thanks

## 2021-08-26 NOTE — TELEPHONE ENCOUNTER
Advised pt of providers information below. Pt verbalized understanding and no further questions or concerns.

## 2021-08-26 NOTE — TELEPHONE ENCOUNTER
Please call pt about importance of diabetes care. A1C was really high and not controlled.  He needs to have follow with endo ASAP

## 2021-08-30 ENCOUNTER — TELEPHONE (OUTPATIENT)
Dept: FAMILY MEDICINE CLINIC | Facility: CLINIC | Age: 71
End: 2021-08-30

## 2021-08-30 ENCOUNTER — TELEPHONE (OUTPATIENT)
Dept: OTOLARYNGOLOGY | Facility: CLINIC | Age: 71
End: 2021-08-30

## 2021-08-30 NOTE — TELEPHONE ENCOUNTER
Dr. Hussain Christian- please advise on message below. Thank you.     Please reply to lizette: KELLI Chinchilla

## 2021-08-30 NOTE — TELEPHONE ENCOUNTER
Pt spouse calling because they were told by pts surgeon that for his upcomming surgery he will need to hold his CLOPIDOGREL BISULFATE 75 MG Oral Tab medication for 5 days prior. Spouse calling requesting call back with confirmation from Dr if it is ok for pt to not take medication for 5 days. Please advise.

## 2021-08-30 NOTE — TELEPHONE ENCOUNTER
Spoke with patient's wife. Patient is on Plavix prescribed by PCP. She will contact PCP to see if it's ok to hold it for 5 days.

## 2021-08-30 NOTE — TELEPHONE ENCOUNTER
Pt's wife called. Pt is scheduled for a biopsy on 9-14-21. When scheduling appointment recommended stopping the blood thinner 5 days prior. Please confirm.    Call pt

## 2021-08-31 NOTE — TELEPHONE ENCOUNTER
Informed patient's wife of advice per Dr. Payton Watson. Wife states  has not seen cardiology since March. States there have been too many doctor's prescribing different medications and that is the reason why they had requested Dr. Payton Watson to fill the Plavix.

## 2021-08-31 NOTE — TELEPHONE ENCOUNTER
[id number] Brigitte Subramanian by me but needs to verify with his cardiologist. He needs their approval also

## 2021-09-01 NOTE — TELEPHONE ENCOUNTER
Pt spouse calling requesting if Dr can send a note over to pts Oral surgeon Dr. Rios Bring that it is ok for pt to stop his medication for 2days before his surgery and 2 days after. Asking it to be faxed to his office at  Fax#: [phone]  States that it needs to be done today    She also States she was informed that this has to go through Dr Fuad Aviles regarding pts clopidogrel.

## 2021-09-11 ENCOUNTER — LAB ENCOUNTER (OUTPATIENT)
Dept: LAB | Age: 71
End: 2021-09-11
Attending: OTOLARYNGOLOGY
Payer: COMMERCIAL

## 2021-09-11 DIAGNOSIS — E04.1 THYROID NODULE: ICD-10-CM

## 2021-09-13 LAB — SARS-COV-2 RNA RESP QL NAA+PROBE: NOT DETECTED

## 2021-09-14 ENCOUNTER — HOSPITAL ENCOUNTER (OUTPATIENT)
Dept: ULTRASOUND IMAGING | Facility: HOSPITAL | Age: 71
Discharge: HOME | End: 2021-09-14
Attending: OTOLARYNGOLOGY
Payer: COMMERCIAL

## 2021-09-14 VITALS — HEIGHT: 70 IN | BODY MASS INDEX: 29.35 KG/M2 | WEIGHT: 205 LBS

## 2021-09-14 DIAGNOSIS — E04.1 THYROID NODULE: ICD-10-CM

## 2021-09-14 NOTE — IMAGING NOTE
1402 Pt arrived to ultrasound room #3 /87     1415  History taken and as follows: Abnormal lab levels. F/U US confirmed nodule. Procedure explained questions answered. Consent verified and obtained. /88    1416 /80     1420 /79.

## 2021-09-15 RX ORDER — METOPROLOL SUCCINATE 50 MG/1
75 TABLET, EXTENDED RELEASE ORAL DAILY
Qty: 45 TABLET | Refills: 3 | Status: SHIPPED | OUTPATIENT
Start: 2021-09-15 | End: 2021-11-17

## 2021-09-15 NOTE — TELEPHONE ENCOUNTER
Last seen 6/25/21. Return to clinic in 6 months (12/21) Refill(s) pended and routed to Dr. Melyssa Martinez.

## 2021-10-01 ENCOUNTER — ORDER TRANSCRIPTION (OUTPATIENT)
Dept: ADMINISTRATIVE | Facility: HOSPITAL | Age: 71
End: 2021-10-01

## 2021-10-01 DIAGNOSIS — Z01.818 PREOP EXAMINATION: Primary | ICD-10-CM

## 2021-10-19 ENCOUNTER — LAB ENCOUNTER (OUTPATIENT)
Dept: LAB | Age: 71
End: 2021-10-19
Attending: OTOLARYNGOLOGY
Payer: COMMERCIAL

## 2021-10-19 DIAGNOSIS — Z01.818 PREOP EXAMINATION: ICD-10-CM

## 2021-10-22 ENCOUNTER — HOSPITAL ENCOUNTER (OUTPATIENT)
Dept: ULTRASOUND IMAGING | Facility: HOSPITAL | Age: 71
Discharge: HOME | End: 2021-10-22
Attending: OTOLARYNGOLOGY
Payer: COMMERCIAL

## 2021-10-22 VITALS — BODY MASS INDEX: 29.35 KG/M2 | WEIGHT: 205 LBS | HEIGHT: 70 IN

## 2021-10-22 PROCEDURE — 88172 CYTP DX EVAL FNA 1ST EA SITE: CPT | Performed by: OTOLARYNGOLOGY

## 2021-10-22 PROCEDURE — 88177 CYTP FNA EVAL EA ADDL: CPT | Performed by: OTOLARYNGOLOGY

## 2021-10-22 PROCEDURE — 88173 CYTOPATH EVAL FNA REPORT: CPT | Performed by: OTOLARYNGOLOGY

## 2021-10-22 PROCEDURE — 10005 FNA BX W/US GDN 1ST LES: CPT | Performed by: OTOLARYNGOLOGY

## 2021-10-22 NOTE — IMAGING NOTE
1320 Pt arrived to ultrasound room #3    1340 Scans taken by Dee Christian ultrasound  sonographer     0799 History taken and as follows: Nodule confirmed by prior imaging. [facility] scans completed by Dr. Melody Patino.  Site marked Left Upper Pole Nodule Pro

## 2021-10-27 DIAGNOSIS — E11.65 TYPE 2 DIABETES MELLITUS WITH HYPERGLYCEMIA, WITH LONG-TERM CURRENT USE OF INSULIN (HCC): ICD-10-CM

## 2021-10-27 DIAGNOSIS — Z79.4 TYPE 2 DIABETES MELLITUS WITH HYPERGLYCEMIA, WITH LONG-TERM CURRENT USE OF INSULIN (HCC): ICD-10-CM

## 2021-10-27 RX ORDER — INSULIN HUMAN 100 [IU]/ML
50 INJECTION, SUSPENSION SUBCUTANEOUS 2 TIMES DAILY
Qty: 90 ML | Refills: 0 | Status: SHIPPED | OUTPATIENT
Start: 2021-10-27 | End: 2022-02-14

## 2021-10-29 RX ORDER — LOSARTAN POTASSIUM 50 MG/1
TABLET ORAL
Qty: 45 TABLET | Refills: 1 | Status: SHIPPED | OUTPATIENT
Start: 2021-10-29 | End: 2022-01-10

## 2021-11-03 NOTE — TELEPHONE ENCOUNTER
Spoke with patient regarding message below. Patient states that he his blood pressure has been a little better but he has not taken them in over a week. Patient advised to start taking his BP for the next week and give us a call with the readings.  Patient

## 2021-11-09 ENCOUNTER — OFFICE VISIT (OUTPATIENT)
Dept: ENDOCRINOLOGY CLINIC | Facility: CLINIC | Age: 71
End: 2021-11-09
Payer: COMMERCIAL

## 2021-11-09 VITALS
BODY MASS INDEX: 29 KG/M2 | SYSTOLIC BLOOD PRESSURE: 163 MMHG | DIASTOLIC BLOOD PRESSURE: 86 MMHG | WEIGHT: 205 LBS | HEART RATE: 84 BPM

## 2021-11-09 DIAGNOSIS — E78.5 DYSLIPIDEMIA: ICD-10-CM

## 2021-11-09 DIAGNOSIS — Z79.4 TYPE 2 DIABETES MELLITUS WITH HYPERGLYCEMIA, WITH LONG-TERM CURRENT USE OF INSULIN (HCC): Primary | ICD-10-CM

## 2021-11-09 DIAGNOSIS — E11.65 TYPE 2 DIABETES MELLITUS WITH HYPERGLYCEMIA, WITH LONG-TERM CURRENT USE OF INSULIN (HCC): Primary | ICD-10-CM

## 2021-11-09 PROCEDURE — 99214 OFFICE O/P EST MOD 30 MIN: CPT | Performed by: INTERNAL MEDICINE

## 2021-11-09 PROCEDURE — 3079F DIAST BP 80-89 MM HG: CPT | Performed by: INTERNAL MEDICINE

## 2021-11-09 PROCEDURE — 3046F HEMOGLOBIN A1C LEVEL >9.0%: CPT | Performed by: FAMILY MEDICINE

## 2021-11-09 PROCEDURE — 36416 COLLJ CAPILLARY BLOOD SPEC: CPT | Performed by: INTERNAL MEDICINE

## 2021-11-09 PROCEDURE — 82947 ASSAY GLUCOSE BLOOD QUANT: CPT | Performed by: INTERNAL MEDICINE

## 2021-11-09 PROCEDURE — 3077F SYST BP >= 140 MM HG: CPT | Performed by: INTERNAL MEDICINE

## 2021-11-09 PROCEDURE — 83036 HEMOGLOBIN GLYCOSYLATED A1C: CPT | Performed by: INTERNAL MEDICINE

## 2021-11-09 RX ORDER — LIRAGLUTIDE 6 MG/ML
INJECTION SUBCUTANEOUS
Qty: 18 ML | Refills: 0 | Status: SHIPPED | OUTPATIENT
Start: 2021-11-09 | End: 2022-02-07

## 2021-11-09 NOTE — PROGRESS NOTES
Return Office Visit     CHIEF COMPLAINT:    DM  Dyslipidemia     HISTORY OF PRESENT ILLNESS:  Robet Apgar is a 73year old male who presents for follow up for DM. DM HISTORY  He states he had \" borderline DM\" as a teenager.   He was on diabinese for by mouth every other day. • ammonium lactate 12 % External Lotion Rub into the soles and heels of both feet twice a day until dry 500 g 12   • multivitamin Oral Tab Take 1 tablet by mouth daily.  30 tablet 0   • Vitamin C 500 MG Oral Tab Take 1 tablet ( Skin:  normal moisture and skin texture, no visible lesions  Hair and nails: normal scalp hair  Hematologic:  no excessive bruising  Neuro: motor grossly intact, moving all extremities without difficulty  Psychiatric:  oriented to time, self, and place when initiating or escalating doses of Paulene Noemy in patients  with renal impairment    Will repeat BMP in ine month    Check sugars before BF and before dinner  Call with BG in one week  Call sooner if under 70        b). Has Nephropathy.  Discussed importanc

## 2021-11-10 ENCOUNTER — TELEPHONE (OUTPATIENT)
Dept: ENDOCRINOLOGY CLINIC | Facility: CLINIC | Age: 71
End: 2021-11-10

## 2021-11-10 NOTE — TELEPHONE ENCOUNTER
Medication needs PA. PA initiated. Medication PA Requested: Liraglutide (VICTOZA) 18 MG/3ML Subcutaneous Solution Pen-injector                                                         CoverMyMeds Used:  Key:  Quantity: 18 mL  Day Supply: 90  Sig Inject 0.6 mg into the skin daily for 14 days, THEN 1.2 mg daily.   CPT code (if applicable):   Case Number/Pending Ref#:

## 2021-11-11 ENCOUNTER — OFFICE VISIT (OUTPATIENT)
Dept: OTOLARYNGOLOGY | Facility: CLINIC | Age: 71
End: 2021-11-11
Payer: COMMERCIAL

## 2021-11-11 VITALS — HEIGHT: 70 IN | BODY MASS INDEX: 29.35 KG/M2 | WEIGHT: 205 LBS

## 2021-11-11 DIAGNOSIS — E04.1 THYROID NODULE: Primary | ICD-10-CM

## 2021-11-11 DIAGNOSIS — E21.3 HYPERPARATHYROIDISM (HCC): ICD-10-CM

## 2021-11-11 PROCEDURE — 99214 OFFICE O/P EST MOD 30 MIN: CPT | Performed by: OTOLARYNGOLOGY

## 2021-11-11 PROCEDURE — 3008F BODY MASS INDEX DOCD: CPT | Performed by: OTOLARYNGOLOGY

## 2021-11-12 ENCOUNTER — OFFICE VISIT (OUTPATIENT)
Dept: PODIATRY CLINIC | Facility: CLINIC | Age: 71
End: 2021-11-12
Payer: COMMERCIAL

## 2021-11-12 ENCOUNTER — TELEPHONE (OUTPATIENT)
Dept: FAMILY MEDICINE CLINIC | Facility: CLINIC | Age: 71
End: 2021-11-12

## 2021-11-12 ENCOUNTER — TELEPHONE (OUTPATIENT)
Dept: OTOLARYNGOLOGY | Facility: CLINIC | Age: 71
End: 2021-11-12

## 2021-11-12 ENCOUNTER — TELEPHONE (OUTPATIENT)
Dept: ENDOCRINOLOGY CLINIC | Facility: CLINIC | Age: 71
End: 2021-11-12

## 2021-11-12 VITALS — BODY MASS INDEX: 29.35 KG/M2 | WEIGHT: 205 LBS | HEIGHT: 70 IN

## 2021-11-12 DIAGNOSIS — I73.89 OTHER SPECIFIED PERIPHERAL VASCULAR DISEASES (HCC): ICD-10-CM

## 2021-11-12 DIAGNOSIS — B35.1 ONYCHOMYCOSIS: ICD-10-CM

## 2021-11-12 DIAGNOSIS — E11.59 TYPE 2 DIABETES MELLITUS WITH OTHER CIRCULATORY COMPLICATION, UNSPECIFIED WHETHER LONG TERM INSULIN USE (HCC): Primary | ICD-10-CM

## 2021-11-12 DIAGNOSIS — L85.3 XEROSIS OF SKIN: ICD-10-CM

## 2021-11-12 DIAGNOSIS — L84 CALLUS OF FOOT: ICD-10-CM

## 2021-11-12 PROCEDURE — 11721 DEBRIDE NAIL 6 OR MORE: CPT | Performed by: PODIATRIST

## 2021-11-12 PROCEDURE — 3008F BODY MASS INDEX DOCD: CPT | Performed by: PODIATRIST

## 2021-11-12 PROCEDURE — 11055 PARING/CUTG B9 HYPRKER LES 1: CPT | Performed by: PODIATRIST

## 2021-11-12 RX ORDER — AMLODIPINE BESYLATE 5 MG/1
TABLET ORAL
COMMUNITY
Start: 2021-10-27 | End: 2021-11-12 | Stop reason: DRUGHIGH

## 2021-11-12 RX ORDER — ATORVASTATIN CALCIUM 80 MG/1
80 TABLET, FILM COATED ORAL DAILY
COMMUNITY
Start: 2021-10-27

## 2021-11-12 NOTE — TELEPHONE ENCOUNTER
Current Outpatient Medications   Medication Sig Dispense Refill   • Liraglutide (VICTOZA) 18 MG/3ML Subcutaneous Solution Pen-injector Inject 0.6 mg into the skin daily for 14 days, THEN 1.2 mg daily.  18 mL 0      FDJ:[id number]

## 2021-11-12 NOTE — TELEPHONE ENCOUNTER
Medication PA Requested: Liraglutide (VICTOZA) 18 MG/3ML Subcutaneous Solution Pen-injector                                                         CoverMyMeds Used:  Key:  Quantity: 18 mL  Day Supply: 90  Sig Inject 0.6 mg into the skin daily for 14 days, THEN 1.2 mg daily. Faxed [employer] PA form with office note and a1c of 11/9/2021. Navi Luna

## 2021-11-12 NOTE — TELEPHONE ENCOUNTER
[facility] [employer], spoke with [facility]. States Victoza approved from 11/12/2021-11/12/2022. Auth # [id number]    [facility], spoke with Hernandez Ryan. States went through.      My chart message sent to patient of approval.

## 2021-11-12 NOTE — PROGRESS NOTES
Neida Pollack is a 73year old male.  Patient presents with:  Diabetic Foot Care: FBS this  ,last A1C on  11/9/21  11.3        HPI:   Patient returns to the clinic with a known history of PAD revascularization poor circulation diabetes with some degree Pen-injector Inject 0.6 mg into the skin daily for 14 days, THEN 1.2 mg daily.  (Patient not taking: Reported on 11/12/2021) 18 mL 0   • Glucose Blood In Vitro Strip Use to check sugar twice daily 200 each 2   • acetaminophen 500 MG Oral Tab Take 1,000 mg b standard drinks        Comment: rarely       Drug use: No      Sexual activity: Never          REVIEW OF SYSTEMS:   Today reviewed systens as documented below  GENERAL HEALTH: feels well otherwise  SKIN: Refer to exam below  RESPIRATORY: denies shortness o done without hemorrhage. Follow-up every 2 months for care but sooner if required the patient has not been moisturizing I again encouraged him to moisturize on a regular basis. The patient indicates understanding of these issues and agrees to the plan.

## 2021-11-12 NOTE — PROGRESS NOTES
Carlene Hawkins is a 73year old adult. Patient presents with:   Follow - Up: Pt is here to go over the results of his biospy of thyroid      HISTORY OF PRESENT ILLNESS  He presents with 2 concerns.  Ringing in the ears bilaterally for the past 10 years.  Aud Packs/day: 1.00        Years: 40.00        Pack years: 40        Types: Cigarettes        Quit date: 1/1/2018        Years since quitting: 3.8      Smokeless tobacco: Never Used    Vaping Use      Vaping Use: Never used    Substance and Sexual Activity diarrhea. Endocrine Negative Cold intolerance and heat intolerance. Neuro Negative Tremors. Psych Negative Anxiety and depression. Integumentary Negative Frequent skin infections, pigment change and rash.    Hema/Lymph Negative Easy bleeding and eas (HUMULIN 70/30 KWIKPEN) (70-30) 100 UNIT/ML Subcutaneous Suspension Pen-injector, Inject 50 Units into the skin 2 (two) times a day., Disp: 90 mL, Rfl: 0  •  METOPROLOL SUCCINATE 50 MG Oral Tablet 24 Hr, Take 1.5 tablets (75 mg total) by mouth daily. , Disp bilaterally suggestive of parathyroid glands. Sestamibi demonstrates positive uptake inferiorly on the left suggestive of a parathyroid adenoma. Unclear if this corresponds to the nodules noted behind the thyroid on the ultrasound.   I did have a long det

## 2021-11-13 NOTE — TELEPHONE ENCOUNTER
Spoke with wife yuni on ASHLEY she has verified patient's name and . She schedule apt for spouse on 21 for sx on 21. Wife said Dr. Tunde Armenta will send letter so Dr. Lisa Sands as to what is required as far as labs but wife says per Dr. Tunde Armenta it is up to Dr. Lisa Sands.

## 2021-11-15 NOTE — TELEPHONE ENCOUNTER
I believe the general rules for the [place] and the hospital is 72 hours from the time of surgery. He can contact the individual site and request a change but that is not something that our office controls. [facility] and [address] [place] [address].

## 2021-11-17 ENCOUNTER — TELEPHONE (OUTPATIENT)
Dept: OTOLARYNGOLOGY | Facility: CLINIC | Age: 71
End: 2021-11-17

## 2021-11-17 ENCOUNTER — OFFICE VISIT (OUTPATIENT)
Dept: FAMILY MEDICINE CLINIC | Facility: CLINIC | Age: 71
End: 2021-11-17
Payer: COMMERCIAL

## 2021-11-17 VITALS
HEART RATE: 78 BPM | WEIGHT: 204.19 LBS | BODY MASS INDEX: 29.23 KG/M2 | SYSTOLIC BLOOD PRESSURE: 157 MMHG | DIASTOLIC BLOOD PRESSURE: 76 MMHG | TEMPERATURE: 97 F | HEIGHT: 70 IN

## 2021-11-17 DIAGNOSIS — E11.29 DIABETES MELLITUS WITH PROTEINURIA (HCC): ICD-10-CM

## 2021-11-17 DIAGNOSIS — I10 PRIMARY HYPERTENSION: ICD-10-CM

## 2021-11-17 DIAGNOSIS — Z87.891 HISTORY OF SMOKING: ICD-10-CM

## 2021-11-17 DIAGNOSIS — R29.898 COMPLAINTS OF LEG WEAKNESS: ICD-10-CM

## 2021-11-17 DIAGNOSIS — R80.9 DIABETES MELLITUS WITH PROTEINURIA (HCC): ICD-10-CM

## 2021-11-17 DIAGNOSIS — Z95.1 S/P CABG X 3: Primary | ICD-10-CM

## 2021-11-17 DIAGNOSIS — E11.59 TYPE 2 DIABETES MELLITUS WITH OTHER CIRCULATORY COMPLICATION, UNSPECIFIED WHETHER LONG TERM INSULIN USE (HCC): ICD-10-CM

## 2021-11-17 DIAGNOSIS — I25.810 CORONARY ARTERY DISEASE INVOLVING CORONARY BYPASS GRAFT OF NATIVE HEART WITHOUT ANGINA PECTORIS: ICD-10-CM

## 2021-11-17 DIAGNOSIS — E78.5 HYPERLIPIDEMIA, UNSPECIFIED HYPERLIPIDEMIA TYPE: ICD-10-CM

## 2021-11-17 DIAGNOSIS — Z01.818 PREOPERATIVE CLEARANCE: ICD-10-CM

## 2021-11-17 PROCEDURE — 3077F SYST BP >= 140 MM HG: CPT | Performed by: FAMILY MEDICINE

## 2021-11-17 PROCEDURE — 3078F DIAST BP <80 MM HG: CPT | Performed by: FAMILY MEDICINE

## 2021-11-17 PROCEDURE — 99214 OFFICE O/P EST MOD 30 MIN: CPT | Performed by: FAMILY MEDICINE

## 2021-11-17 PROCEDURE — 3008F BODY MASS INDEX DOCD: CPT | Performed by: FAMILY MEDICINE

## 2021-11-17 RX ORDER — METOPROLOL SUCCINATE 100 MG/1
100 TABLET, EXTENDED RELEASE ORAL DAILY
Qty: 90 TABLET | Refills: 2 | Status: SHIPPED | OUTPATIENT
Start: 2021-11-17

## 2021-11-17 NOTE — TELEPHONE ENCOUNTER
Patients spouse states patient is scheduled for 12/13/21 and they were looking to schedule COVID test on 12/09/21. Requesting order to be entered.  Please advise

## 2021-11-17 NOTE — PROGRESS NOTES
Stephanie Potter is a 73year old adult. Patient presents with:  Pre-Op Exam: Thyroid, Dr. Clive Gallegos, 12/13/21    HPI:   Here for preop examination for parathyroidectomy. Pt is working with endocrine - Dr. Junito Abel to improve his diabetes.  Has not been controlle Rfl: 0  aspirin 81 MG Oral Tab, Take 81 mg by mouth daily. , Disp: , Rfl:     No current facility-administered medications on file prior to visit.      Past Medical History:   Diagnosis Date   • Atherosclerosis of coronary artery    • Cataract     stage 1 ca BS's,no masses, HSM or tenderness  EXTREMITIES: no cyanosis, clubbing or edema      ASSESSMENT AND PLAN:   1. S/P CABG x 3    - CARDIO - INTERNAL    2. Diabetes mellitus with proteinuria ([facility] [address]      3.  Type 2 diabetes mellitus with other circulatory complic

## 2021-11-17 NOTE — TELEPHONE ENCOUNTER
Informed patient wife of note below ,verbalized understanding and provided # [phone] or [phone].

## 2021-11-18 ENCOUNTER — TELEPHONE (OUTPATIENT)
Dept: OTOLARYNGOLOGY | Facility: CLINIC | Age: 71
End: 2021-11-18

## 2021-11-18 DIAGNOSIS — E04.1 THYROID NODULE: Primary | ICD-10-CM

## 2021-11-18 DIAGNOSIS — D35.1 PARATHYROID ADENOMA: ICD-10-CM

## 2021-11-18 NOTE — TELEPHONE ENCOUNTER
Patient is scheduled for surgery with Dr. Cherylene Scotts on 12/13/21 at [address] [place] [address].

## 2021-11-26 ENCOUNTER — LAB ENCOUNTER (OUTPATIENT)
Dept: LAB | Age: 71
End: 2021-11-26
Attending: FAMILY MEDICINE
Payer: COMMERCIAL

## 2021-11-26 DIAGNOSIS — Z01.818 PREOPERATIVE CLEARANCE: ICD-10-CM

## 2021-11-26 PROCEDURE — 93005 ELECTROCARDIOGRAM TRACING: CPT

## 2021-11-26 PROCEDURE — 93010 ELECTROCARDIOGRAM REPORT: CPT | Performed by: FAMILY MEDICINE

## 2021-11-29 ENCOUNTER — TELEPHONE (OUTPATIENT)
Dept: OTOLARYNGOLOGY | Facility: CLINIC | Age: 71
End: 2021-11-29

## 2021-12-10 ENCOUNTER — LAB ENCOUNTER (OUTPATIENT)
Dept: LAB | Age: 71
End: 2021-12-10
Attending: OTOLARYNGOLOGY
Payer: COMMERCIAL

## 2021-12-10 DIAGNOSIS — Z01.818 PREOPERATIVE TESTING: ICD-10-CM

## 2021-12-10 NOTE — PAT NURSING NOTE
Per PCP Dr Avery Jama (see notes 11/18/21) that pt needs Cardiac clearance. S/w Dr Raymon Holder at Dr Leesa Patton [facility] at [phone] and pt was seen on 12/2/21. She will fax MD's notes to ESTHELA

## 2021-12-13 ENCOUNTER — ANESTHESIA EVENT (OUTPATIENT)
Dept: SURGERY | Facility: HOSPITAL | Age: 71
End: 2021-12-13
Payer: COMMERCIAL

## 2021-12-13 ENCOUNTER — HOSPITAL ENCOUNTER (OUTPATIENT)
Facility: HOSPITAL | Age: 71
Discharge: HOME | End: 2021-12-13
Attending: OTOLARYNGOLOGY | Admitting: OTOLARYNGOLOGY
Payer: COMMERCIAL

## 2021-12-13 ENCOUNTER — ANESTHESIA (OUTPATIENT)
Dept: SURGERY | Facility: HOSPITAL | Age: 71
End: 2021-12-13
Payer: COMMERCIAL

## 2021-12-13 VITALS
OXYGEN SATURATION: 94 % | HEART RATE: 81 BPM | DIASTOLIC BLOOD PRESSURE: 75 MMHG | SYSTOLIC BLOOD PRESSURE: 152 MMHG | BODY MASS INDEX: 29.77 KG/M2 | RESPIRATION RATE: 18 BRPM | WEIGHT: 201 LBS | TEMPERATURE: 98 F | HEIGHT: 69 IN

## 2021-12-13 DIAGNOSIS — E04.1 THYROID NODULE: ICD-10-CM

## 2021-12-13 DIAGNOSIS — D35.1 PARATHYROID ADENOMA: ICD-10-CM

## 2021-12-13 DIAGNOSIS — Z01.818 PREOPERATIVE TESTING: Primary | ICD-10-CM

## 2021-12-13 PROBLEM — E21.3 HYPERPARATHYROIDISM (HCC): Status: RESOLVED | Noted: 2021-12-13 | Resolved: 2021-12-13

## 2021-12-13 PROBLEM — Z79.4 INSULIN DEPENDENT TYPE 2 DIABETES MELLITUS (HCC): Status: ACTIVE | Noted: 2018-03-02

## 2021-12-13 PROBLEM — E11.9 INSULIN DEPENDENT TYPE 2 DIABETES MELLITUS (HCC): Status: ACTIVE | Noted: 2018-03-02

## 2021-12-13 PROBLEM — E21.3 HYPERPARATHYROIDISM (HCC): Status: ACTIVE | Noted: 2021-12-13

## 2021-12-13 PROBLEM — I25.10 CAD (CORONARY ARTERY DISEASE): Status: ACTIVE | Noted: 2017-10-12

## 2021-12-13 PROCEDURE — 0GTG0ZZ RESECTION OF LEFT THYROID GLAND LOBE, OPEN APPROACH: ICD-10-PCS | Performed by: OTOLARYNGOLOGY

## 2021-12-13 PROCEDURE — 60220 PARTIAL REMOVAL OF THYROID: CPT | Performed by: OTOLARYNGOLOGY

## 2021-12-13 PROCEDURE — 60500 EXPLORE PARATHYROID GLANDS: CPT | Performed by: OTOLARYNGOLOGY

## 2021-12-13 PROCEDURE — 0GTP0ZZ RESECTION OF LEFT INFERIOR PARATHYROID GLAND, OPEN APPROACH: ICD-10-PCS | Performed by: OTOLARYNGOLOGY

## 2021-12-13 PROCEDURE — 99212 OFFICE O/P EST SF 10 MIN: CPT | Performed by: HOSPITALIST

## 2021-12-13 RX ORDER — PROCHLORPERAZINE EDISYLATE 5 MG/ML
5 INJECTION INTRAMUSCULAR; INTRAVENOUS ONCE AS NEEDED
Status: DISCONTINUED | OUTPATIENT
Start: 2021-12-13 | End: 2021-12-13 | Stop reason: HOSPADM

## 2021-12-13 RX ORDER — ONDANSETRON 2 MG/ML
4 INJECTION INTRAMUSCULAR; INTRAVENOUS EVERY 6 HOURS PRN
Status: DISCONTINUED | OUTPATIENT
Start: 2021-12-13 | End: 2021-12-13

## 2021-12-13 RX ORDER — ROCURONIUM BROMIDE 10 MG/ML
INJECTION, SOLUTION INTRAVENOUS AS NEEDED
Status: DISCONTINUED | OUTPATIENT
Start: 2021-12-13 | End: 2021-12-13 | Stop reason: SURG

## 2021-12-13 RX ORDER — LIDOCAINE HYDROCHLORIDE 10 MG/ML
INJECTION, SOLUTION EPIDURAL; INFILTRATION; INTRACAUDAL; PERINEURAL AS NEEDED
Status: DISCONTINUED | OUTPATIENT
Start: 2021-12-13 | End: 2021-12-13 | Stop reason: SURG

## 2021-12-13 RX ORDER — HYDROMORPHONE HYDROCHLORIDE 1 MG/ML
0.2 INJECTION, SOLUTION INTRAMUSCULAR; INTRAVENOUS; SUBCUTANEOUS EVERY 2 HOUR PRN
Status: DISCONTINUED | OUTPATIENT
Start: 2021-12-13 | End: 2021-12-13

## 2021-12-13 RX ORDER — ACETAMINOPHEN 325 MG/1
650 TABLET ORAL
Status: DISCONTINUED | OUTPATIENT
Start: 2021-12-13 | End: 2021-12-13

## 2021-12-13 RX ORDER — OXYCODONE HYDROCHLORIDE 5 MG/1
5 TABLET ORAL EVERY 4 HOURS PRN
Status: DISCONTINUED | OUTPATIENT
Start: 2021-12-13 | End: 2021-12-13

## 2021-12-13 RX ORDER — ATORVASTATIN CALCIUM 40 MG/1
80 TABLET, FILM COATED ORAL DAILY
Status: DISCONTINUED | OUTPATIENT
Start: 2021-12-14 | End: 2021-12-13

## 2021-12-13 RX ORDER — INSULIN ASPART 100 [IU]/ML
8 INJECTION, SOLUTION INTRAVENOUS; SUBCUTANEOUS ONCE
Status: COMPLETED | OUTPATIENT
Start: 2021-12-13 | End: 2021-12-13

## 2021-12-13 RX ORDER — AMLODIPINE BESYLATE 5 MG/1
5 TABLET ORAL EVERY MORNING
Status: DISCONTINUED | OUTPATIENT
Start: 2021-12-14 | End: 2021-12-13

## 2021-12-13 RX ORDER — LABETALOL HYDROCHLORIDE 5 MG/ML
15 INJECTION, SOLUTION INTRAVENOUS ONCE
Status: COMPLETED | OUTPATIENT
Start: 2021-12-13 | End: 2021-12-13

## 2021-12-13 RX ORDER — METOPROLOL SUCCINATE 50 MG/1
100 TABLET, EXTENDED RELEASE ORAL EVERY MORNING
Status: DISCONTINUED | OUTPATIENT
Start: 2021-12-14 | End: 2021-12-13

## 2021-12-13 RX ORDER — METOPROLOL SUCCINATE 50 MG/1
100 TABLET, EXTENDED RELEASE ORAL EVERY MORNING
Status: DISCONTINUED | OUTPATIENT
Start: 2021-12-13 | End: 2021-12-13

## 2021-12-13 RX ORDER — ACETAMINOPHEN 500 MG
1000 TABLET ORAL ONCE
Status: COMPLETED | OUTPATIENT
Start: 2021-12-13 | End: 2021-12-13

## 2021-12-13 RX ORDER — GLYCOPYRROLATE 0.2 MG/ML
INJECTION, SOLUTION INTRAMUSCULAR; INTRAVENOUS AS NEEDED
Status: DISCONTINUED | OUTPATIENT
Start: 2021-12-13 | End: 2021-12-13 | Stop reason: SURG

## 2021-12-13 RX ORDER — METOPROLOL TARTRATE 5 MG/5ML
2.5 INJECTION INTRAVENOUS ONCE
Status: DISCONTINUED | OUTPATIENT
Start: 2021-12-13 | End: 2021-12-13 | Stop reason: HOSPADM

## 2021-12-13 RX ORDER — HYDROCODONE BITARTRATE AND ACETAMINOPHEN 5; 325 MG/1; MG/1
1 TABLET ORAL AS NEEDED
Status: DISCONTINUED | OUTPATIENT
Start: 2021-12-13 | End: 2021-12-13 | Stop reason: HOSPADM

## 2021-12-13 RX ORDER — MORPHINE SULFATE 4 MG/ML
2 INJECTION, SOLUTION INTRAMUSCULAR; INTRAVENOUS EVERY 10 MIN PRN
Status: DISCONTINUED | OUTPATIENT
Start: 2021-12-13 | End: 2021-12-13 | Stop reason: HOSPADM

## 2021-12-13 RX ORDER — HYDROMORPHONE HYDROCHLORIDE 1 MG/ML
0.4 INJECTION, SOLUTION INTRAMUSCULAR; INTRAVENOUS; SUBCUTANEOUS EVERY 5 MIN PRN
Status: DISCONTINUED | OUTPATIENT
Start: 2021-12-13 | End: 2021-12-13 | Stop reason: HOSPADM

## 2021-12-13 RX ORDER — CEFAZOLIN SODIUM/WATER 2 G/20 ML
SYRINGE (ML) INTRAVENOUS AS NEEDED
Status: DISCONTINUED | OUTPATIENT
Start: 2021-12-13 | End: 2021-12-13 | Stop reason: SURG

## 2021-12-13 RX ORDER — DEXTROSE MONOHYDRATE 25 G/50ML
50 INJECTION, SOLUTION INTRAVENOUS
Status: DISCONTINUED | OUTPATIENT
Start: 2021-12-13 | End: 2021-12-13 | Stop reason: HOSPADM

## 2021-12-13 RX ORDER — ONDANSETRON 2 MG/ML
4 INJECTION INTRAMUSCULAR; INTRAVENOUS ONCE AS NEEDED
Status: DISCONTINUED | OUTPATIENT
Start: 2021-12-13 | End: 2021-12-13 | Stop reason: HOSPADM

## 2021-12-13 RX ORDER — SODIUM CHLORIDE, SODIUM LACTATE, POTASSIUM CHLORIDE, CALCIUM CHLORIDE 600; 310; 30; 20 MG/100ML; MG/100ML; MG/100ML; MG/100ML
INJECTION, SOLUTION INTRAVENOUS CONTINUOUS
Status: DISCONTINUED | OUTPATIENT
Start: 2021-12-13 | End: 2021-12-13 | Stop reason: ALTCHOICE

## 2021-12-13 RX ORDER — ASPIRIN 81 MG/1
81 TABLET ORAL DAILY
Status: DISCONTINUED | OUTPATIENT
Start: 2021-12-14 | End: 2021-12-13

## 2021-12-13 RX ORDER — HYDROMORPHONE HYDROCHLORIDE 1 MG/ML
0.4 INJECTION, SOLUTION INTRAMUSCULAR; INTRAVENOUS; SUBCUTANEOUS EVERY 2 HOUR PRN
Status: DISCONTINUED | OUTPATIENT
Start: 2021-12-13 | End: 2021-12-13

## 2021-12-13 RX ORDER — CEPHALEXIN 500 MG/1
500 CAPSULE ORAL EVERY 8 HOURS SCHEDULED
Qty: 21 CAPSULE | Refills: 0 | Status: SHIPPED | OUTPATIENT
Start: 2021-12-13

## 2021-12-13 RX ORDER — SODIUM CHLORIDE, SODIUM LACTATE, POTASSIUM CHLORIDE, CALCIUM CHLORIDE 600; 310; 30; 20 MG/100ML; MG/100ML; MG/100ML; MG/100ML
INJECTION, SOLUTION INTRAVENOUS CONTINUOUS
Status: DISCONTINUED | OUTPATIENT
Start: 2021-12-13 | End: 2021-12-13 | Stop reason: HOSPADM

## 2021-12-13 RX ORDER — ONDANSETRON 2 MG/ML
INJECTION INTRAMUSCULAR; INTRAVENOUS AS NEEDED
Status: DISCONTINUED | OUTPATIENT
Start: 2021-12-13 | End: 2021-12-13 | Stop reason: SURG

## 2021-12-13 RX ORDER — LIDOCAINE HYDROCHLORIDE AND EPINEPHRINE 10; 10 MG/ML; UG/ML
INJECTION, SOLUTION INFILTRATION; PERINEURAL AS NEEDED
Status: DISCONTINUED | OUTPATIENT
Start: 2021-12-13 | End: 2021-12-13 | Stop reason: HOSPADM

## 2021-12-13 RX ORDER — SODIUM CHLORIDE 0.9 % (FLUSH) 0.9 %
10 SYRINGE (ML) INJECTION AS NEEDED
Status: DISCONTINUED | OUTPATIENT
Start: 2021-12-13 | End: 2021-12-13

## 2021-12-13 RX ORDER — HYDROCODONE BITARTRATE AND ACETAMINOPHEN 5; 325 MG/1; MG/1
2 TABLET ORAL AS NEEDED
Status: DISCONTINUED | OUTPATIENT
Start: 2021-12-13 | End: 2021-12-13 | Stop reason: HOSPADM

## 2021-12-13 RX ORDER — HYDROMORPHONE HYDROCHLORIDE 1 MG/ML
0.2 INJECTION, SOLUTION INTRAMUSCULAR; INTRAVENOUS; SUBCUTANEOUS EVERY 5 MIN PRN
Status: DISCONTINUED | OUTPATIENT
Start: 2021-12-13 | End: 2021-12-13 | Stop reason: HOSPADM

## 2021-12-13 RX ORDER — CALCIUM CARBONATE 200(500)MG
1500 TABLET,CHEWABLE ORAL
Status: DISCONTINUED | OUTPATIENT
Start: 2021-12-13 | End: 2021-12-13

## 2021-12-13 RX ORDER — CLOPIDOGREL BISULFATE 75 MG/1
75 TABLET ORAL DAILY
Status: DISCONTINUED | OUTPATIENT
Start: 2021-12-13 | End: 2021-12-13

## 2021-12-13 RX ORDER — HYDROCODONE BITARTRATE AND ACETAMINOPHEN 5; 325 MG/1; MG/1
1 TABLET ORAL EVERY 6 HOURS PRN
Qty: 30 TABLET | Refills: 0 | Status: SHIPPED | OUTPATIENT
Start: 2021-12-13

## 2021-12-13 RX ORDER — HALOPERIDOL 5 MG/ML
0.25 INJECTION INTRAMUSCULAR ONCE AS NEEDED
Status: DISCONTINUED | OUTPATIENT
Start: 2021-12-13 | End: 2021-12-13 | Stop reason: HOSPADM

## 2021-12-13 RX ORDER — LABETALOL HYDROCHLORIDE 5 MG/ML
INJECTION, SOLUTION INTRAVENOUS AS NEEDED
Status: DISCONTINUED | OUTPATIENT
Start: 2021-12-13 | End: 2021-12-13 | Stop reason: SURG

## 2021-12-13 RX ORDER — MORPHINE SULFATE 4 MG/ML
4 INJECTION, SOLUTION INTRAMUSCULAR; INTRAVENOUS EVERY 10 MIN PRN
Status: DISCONTINUED | OUTPATIENT
Start: 2021-12-13 | End: 2021-12-13 | Stop reason: HOSPADM

## 2021-12-13 RX ORDER — INSULIN ASPART 100 [IU]/ML
6 INJECTION, SOLUTION INTRAVENOUS; SUBCUTANEOUS ONCE
Status: COMPLETED | OUTPATIENT
Start: 2021-12-13 | End: 2021-12-13

## 2021-12-13 RX ORDER — METOCLOPRAMIDE 10 MG/1
10 TABLET ORAL ONCE
Status: COMPLETED | OUTPATIENT
Start: 2021-12-13 | End: 2021-12-13

## 2021-12-13 RX ORDER — SODIUM CHLORIDE, SODIUM LACTATE, POTASSIUM CHLORIDE, CALCIUM CHLORIDE 600; 310; 30; 20 MG/100ML; MG/100ML; MG/100ML; MG/100ML
INJECTION, SOLUTION INTRAVENOUS CONTINUOUS
Status: DISCONTINUED | OUTPATIENT
Start: 2021-12-13 | End: 2021-12-13

## 2021-12-13 RX ORDER — OXYCODONE HYDROCHLORIDE 5 MG/1
2.5 TABLET ORAL EVERY 4 HOURS PRN
Status: DISCONTINUED | OUTPATIENT
Start: 2021-12-13 | End: 2021-12-13

## 2021-12-13 RX ORDER — EPHEDRINE SULFATE 50 MG/ML
INJECTION, SOLUTION INTRAVENOUS AS NEEDED
Status: DISCONTINUED | OUTPATIENT
Start: 2021-12-13 | End: 2021-12-13 | Stop reason: SURG

## 2021-12-13 RX ORDER — CEPHALEXIN 250 MG/1
500 CAPSULE ORAL EVERY 8 HOURS SCHEDULED
Status: DISCONTINUED | OUTPATIENT
Start: 2021-12-13 | End: 2021-12-13

## 2021-12-13 RX ORDER — CALCITRIOL 0.25 UG/1
0.25 CAPSULE, LIQUID FILLED ORAL DAILY
Status: DISCONTINUED | OUTPATIENT
Start: 2021-12-13 | End: 2021-12-13

## 2021-12-13 RX ORDER — MORPHINE SULFATE 10 MG/ML
6 INJECTION, SOLUTION INTRAMUSCULAR; INTRAVENOUS EVERY 10 MIN PRN
Status: DISCONTINUED | OUTPATIENT
Start: 2021-12-13 | End: 2021-12-13 | Stop reason: HOSPADM

## 2021-12-13 RX ORDER — CALCIUM CARBONATE 200(500)MG
2 TABLET,CHEWABLE ORAL 3 TIMES DAILY
Qty: 90 TABLET | Refills: 0 | Status: SHIPPED | OUTPATIENT
Start: 2021-12-13

## 2021-12-13 RX ORDER — HYDRALAZINE HYDROCHLORIDE 20 MG/ML
10 INJECTION INTRAMUSCULAR; INTRAVENOUS EVERY 4 HOURS PRN
Status: DISCONTINUED | OUTPATIENT
Start: 2021-12-13 | End: 2021-12-13

## 2021-12-13 RX ORDER — AMLODIPINE BESYLATE 5 MG/1
5 TABLET ORAL EVERY MORNING
Status: DISCONTINUED | OUTPATIENT
Start: 2021-12-13 | End: 2021-12-13

## 2021-12-13 RX ORDER — HYDROMORPHONE HYDROCHLORIDE 1 MG/ML
0.6 INJECTION, SOLUTION INTRAMUSCULAR; INTRAVENOUS; SUBCUTANEOUS EVERY 5 MIN PRN
Status: DISCONTINUED | OUTPATIENT
Start: 2021-12-13 | End: 2021-12-13 | Stop reason: HOSPADM

## 2021-12-13 RX ORDER — NALOXONE HYDROCHLORIDE 0.4 MG/ML
80 INJECTION, SOLUTION INTRAMUSCULAR; INTRAVENOUS; SUBCUTANEOUS AS NEEDED
Status: DISCONTINUED | OUTPATIENT
Start: 2021-12-13 | End: 2021-12-13 | Stop reason: HOSPADM

## 2021-12-13 RX ORDER — FAMOTIDINE 20 MG/1
20 TABLET ORAL ONCE
Status: COMPLETED | OUTPATIENT
Start: 2021-12-13 | End: 2021-12-13

## 2021-12-13 RX ADMIN — LABETALOL HYDROCHLORIDE 15 MG: 5 INJECTION, SOLUTION INTRAVENOUS at 11:39:00

## 2021-12-13 RX ADMIN — SODIUM CHLORIDE, SODIUM LACTATE, POTASSIUM CHLORIDE, CALCIUM CHLORIDE: 600; 310; 30; 20 INJECTION, SOLUTION INTRAVENOUS at 11:46:00

## 2021-12-13 RX ADMIN — ONDANSETRON 4 MG: 2 INJECTION INTRAMUSCULAR; INTRAVENOUS at 09:20:00

## 2021-12-13 RX ADMIN — LIDOCAINE HYDROCHLORIDE 50 MG: 10 INJECTION, SOLUTION EPIDURAL; INFILTRATION; INTRACAUDAL; PERINEURAL at 09:20:00

## 2021-12-13 RX ADMIN — EPHEDRINE SULFATE 10 MG: 50 INJECTION, SOLUTION INTRAVENOUS at 10:43:00

## 2021-12-13 RX ADMIN — ROCURONIUM BROMIDE 20 MG: 10 INJECTION, SOLUTION INTRAVENOUS at 09:20:00

## 2021-12-13 RX ADMIN — GLYCOPYRROLATE 0.2 MG: 0.2 INJECTION, SOLUTION INTRAMUSCULAR; INTRAVENOUS at 10:43:00

## 2021-12-13 RX ADMIN — LABETALOL HYDROCHLORIDE 15 MG: 5 INJECTION, SOLUTION INTRAVENOUS at 11:28:00

## 2021-12-13 RX ADMIN — CEFAZOLIN SODIUM/WATER 2 G: 2 G/20 ML SYRINGE (ML) INTRAVENOUS at 09:40:00

## 2021-12-13 NOTE — BRIEF OP NOTE
Pre-Operative Diagnosis: Thyroid nodule [E04.1]  Parathyroid adenoma [D35.1]     Post-Operative Diagnosis: Thyroid nodule [E04. 1]Parathyroid adenoma [D35.1]      Procedure Performed:   PARATHYROIDECTOMY AND LEFT JACY-THYROIDECTOMY    Surgeon(s) and Role:

## 2021-12-13 NOTE — ANESTHESIA PREPROCEDURE EVALUATION
Anesthesia PreOp Note    HPI:     Thaddeus Doyle is a 73year old adult who presents for preoperative consultation requested by: Shira Pedro MD    Date of Surgery: 12/13/2021    Procedure(s):  PARATHYROIDECTOMY AND LEFT JACY-THYROIDECTOMY  Indication:  Bhumi Sepulveda • Hyperlipidemia    • Nephropathy    • Neuropathy     bilateral lower extremities   • Osteoarthritis    • Pneumonia due to organism    • Visual impairment     wears glasses       Past Surgical History:   Procedure Laterality Date   • ANESTH,OPEN HEART ELAINE Solution Pen-injector, Inject 0.6 mg into the skin daily for 14 days, THEN 1.2 mg daily. , Disp: 18 mL, Rfl: 0, 12/10/2021  ergocalciferol 1.25 MG (65792 UT) Oral Cap, Take 1 capsule by mouth once a week., Disp: , Rfl: , 12/10/2021  acetaminophen 500 MG Ora Needs: Not on file  Physical Activity: Not on file  Stress: Not on file  Social Connections: Not on file  Intimate Partner Violence: Not on file  Housing Stability: Not on file    Available pre-op labs reviewed.      Lab Results   Component Value Date    PG AM

## 2021-12-13 NOTE — INTERVAL H&P NOTE
Pre-op Diagnosis: Thyroid nodule [E04.1]  Parathyroid adenoma [D35.1]    The above referenced H&P was reviewed by Natasha Sánchez.  Elayne Mariscal MD on 12/13/2021, the patient was examined and no significant changes have occurred in the patient's condition since the H&P w

## 2021-12-13 NOTE — H&P
HISTORY OF PRESENT ILLNESS  He presents with 2 concerns.  Ringing in the ears bilaterally for the past 10 years.  Audiogram performed today demonstrates mild downsloping to severe sensorineural hearing loss bilaterally.  Normal tympanograms and speech disc surgery as previously discussed on several occasions.  He accepts these risks and wishes to proceed.      Social History    Socioeconomic History      Marital status:     Tobacco Use      Smoking status: Former Smoker        Packs/day: 1.00        Ga [place] Neg/Pos Details   Constitutional Negative Fatigue, fever and weight loss. ENMT Negative Drooling. Eyes Negative Blurred vision and vision changes. Respiratory Negative Dyspnea and wheezing.    Cardio Negative Chest pain, irregular heartbeat/palpitatio Normal.         Current Outpatient Medications:   •  Liraglutide (VICTOZA) 18 MG/3ML Subcutaneous Solution Pen-injector, Inject 0.6 mg into the skin daily for 14 days, THEN 1.2 mg daily. , Disp: 18 mL, Rfl: 0  •  losartan 50 MG Oral Tab, Take 1.5 tablets (7 PLAN     1. Thyroid nodule     2. Hyperparathyroidism [facility])  We reviewed all of his scan results and his sestamibi study. Ultrasound demonstrates 2.6 cm nodule within the thyroid on the left with biopsy demonstrating a follicular nodule.   Ultrasound also corrected. While every attempt is made to correct errors during dictation discrepancies may still exist     Charles Olsen MD

## 2021-12-13 NOTE — ANESTHESIA PROCEDURE NOTES
Airway  Date/Time: 12/13/2021 9:31 AM  Urgency: Elective    Difficult airway    General Information and Staff    Patient location during procedure: OR  Anesthesiologist: Eva Fontana MD  Performed: anesthesiologist     Indications and Patient Condition  In

## 2021-12-13 NOTE — ANESTHESIA POSTPROCEDURE EVALUATION
Patient: James Bess    Procedure Summary     Date: 12/13/21 Room / Location: [address] [place] [address] MAIN OR [address] [place] [address] MAIN OR    Anesthesia Start: 0920 Anesthesia Stop: 6751    Procedure: PARATHYROIDECTOMY AND LEFT JACY-THYROIDECTOMY (N/A Neck) Diagnosis:       Thyroid nodu

## 2021-12-13 NOTE — PROGRESS NOTES
[facility] HOSP - [facility]    Progress Note    Chon Foote Patient Status:  Hospital Outpatient Surgery    1950 MRN [id number]   Location [address] UNIT Attending Georgette Westbrook MD   Hosp Day # 0 PCP Matthew Kimbrough 06/19/2021    BUN 29 (H) 06/19/2021     06/19/2021    K 4.2 06/19/2021     06/19/2021    CO2 25.0 06/19/2021     (H) 06/19/2021    CA 11.5 (H) 06/19/2021    ALB 3.5 06/19/2021    ALB 4.11 06/19/2021    ALKPHO 128 02/20/2021    BILT 0.3 0

## 2021-12-14 ENCOUNTER — TELEPHONE (OUTPATIENT)
Dept: OTOLARYNGOLOGY | Facility: CLINIC | Age: 71
End: 2021-12-14

## 2021-12-14 NOTE — TELEPHONE ENCOUNTER
Post op day 1 PARATHYROIDECTOMY AND LEFT JACY-THYROIDECTOMY,per pt wife pt is doing fine no bleeding ,no fever,no tingling sensation ,dressing dry and intact,reviewed post op medications,advised pt wife that big dressing can be remove in 3 days but leave t

## 2021-12-14 NOTE — OPERATIVE REPORT
[facility]    PATIENT'S NAME: Bryan Man A   ATTENDING PHYSICIAN: Charles Lauren MD   OPERATING PHYSICIAN: Alissa Lauren MD   PATIENT ACCOUNT#:   [id number]    LOCATION:   ROOM 8 A [facility]  MEDICAL RECORD #:   [id number]       DATE OF BIRTH:  [birth year]0 difficulty the inferior pole of the thyroid was noted and found to be beneath the clavicle, making the dissection quite difficult.   With care and taking our time, the superior pole was dissected free from surrounding fibrofatty tissues and its vascular ped finding, we waited for blood work, which at 5 minutes demonstrated a drop in PTH to 67.9, at 10 minutes to 35.9, and at 20 minutes to 35.8. At this point, it was deemed that the surgery was successful and that we had removed the parathyroid adenoma.   Any

## 2021-12-14 NOTE — PROGRESS NOTES
Patient and his wife stated that they want to go home tonight despite elevated blood sugar. Per pt's wife Wander Perez needs to take his home insulin for the sugar to go down\" Patient stated that if he would not be discharged home tonight he will sign AMA.

## 2021-12-15 ENCOUNTER — TELEPHONE (OUTPATIENT)
Dept: ENDOCRINOLOGY CLINIC | Facility: CLINIC | Age: 71
End: 2021-12-15

## 2021-12-15 RX ORDER — BLOOD SUGAR DIAGNOSTIC
STRIP MISCELLANEOUS
Qty: 200 EACH | Refills: 0 | Status: SHIPPED | OUTPATIENT
Start: 2021-12-15 | End: 2021-12-21

## 2021-12-15 NOTE — TELEPHONE ENCOUNTER
Rusty/pharm calling for mutual patient regarding contour next refill.  Please call at [phone] EDUARDO  *e-script sent yesterday

## 2021-12-21 ENCOUNTER — OFFICE VISIT (OUTPATIENT)
Dept: OTOLARYNGOLOGY | Facility: CLINIC | Age: 71
End: 2021-12-21
Payer: COMMERCIAL

## 2021-12-21 VITALS — TEMPERATURE: 98 F

## 2021-12-21 DIAGNOSIS — E21.3 HYPERPARATHYROIDISM (HCC): ICD-10-CM

## 2021-12-21 DIAGNOSIS — E04.1 THYROID NODULE: Primary | ICD-10-CM

## 2021-12-21 PROCEDURE — 99024 POSTOP FOLLOW-UP VISIT: CPT | Performed by: OTOLARYNGOLOGY

## 2021-12-21 NOTE — PROGRESS NOTES
Tete Peraza is a 73year old adult.   Patient presents with:  Post-Op: post yvonne-thyroidectomy and parathyroidectomy      HISTORY OF PRESENT ILLNESS  He presents with 2 concerns.  Ringing in the ears bilaterally for the past 10 years.  Audiogram performed explore both sides of the neck to find parathyroid glands that are abnormal. He understands the risks associated with this surgery as previously discussed on several occasions.  He accepts these risks and wishes to proceed.      12/21/21 he presents 8 days Osteoarthritis    • Pneumonia due to organism    • Visual impairment     wears glasses       Past Surgical History:   Procedure Laterality Date   • ANESTH,OPEN HEART SURGERY  10/12/2017    triple bypass   • ANGIOPLASTY (CORONARY) Left 03/2018   • CABG  10/ Canal - Right: Normal, Left: Normal. TM - Right: Normal, Left: Normal.   Skin Normal Inspection - Normal.        Lymph Detail Normal Submental. Submandibular. Anterior cervical. Posterior cervical. Supraclavicular.    Incision   clean dry and intact   Nose/ tablet, Rfl: 3  •  ergocalciferol 1.25 MG (36824 UT) Oral Cap, Take 1 capsule by mouth once a week., Disp: , Rfl:   •  acetaminophen 500 MG Oral Tab, Take 1,000 mg by mouth., Disp: , Rfl:   •  furosemide 20 MG Oral Tab, Take 20 mg by mouth every other day.

## 2022-01-10 RX ORDER — LOSARTAN POTASSIUM 50 MG/1
TABLET ORAL
Qty: 45 TABLET | Refills: 0 | Status: SHIPPED | OUTPATIENT
Start: 2022-01-10

## 2022-01-25 RX ORDER — PEN NEEDLE, DIABETIC 32GX 5/32"
NEEDLE, DISPOSABLE MISCELLANEOUS
Qty: 200 EACH | Refills: 0 | Status: SHIPPED | OUTPATIENT
Start: 2022-01-25

## 2022-02-11 ENCOUNTER — TELEPHONE (OUTPATIENT)
Dept: OPHTHALMOLOGY | Facility: CLINIC | Age: 72
End: 2022-02-11

## 2022-02-11 NOTE — TELEPHONE ENCOUNTER
Great! Thank you for the call back.  Please call our office if you need any appointments with us for follow up,

## 2022-02-11 NOTE — TELEPHONE ENCOUNTER
Per spouse new pt's optometrist Dr. Shellie Ovalle referred pt to Dr. Ceci Alicea for suddenly crossing and seeing double for 5 days and wants him to be seen asap.  Please advise

## 2022-02-11 NOTE — TELEPHONE ENCOUNTER
Pts wife states that pt has had double vision since last Friday 2/4/22 ( NEW ONSET) and both eye are drifting in towards his nose at the same time since Tuesday 2/8/22. I told pts wife that we will ask Dr. Ivelisse Holguin Monday morning when she is back in the office where to schedule the patient. I also gave Dr. Bill Ward information to call and see if she can get pt in today or Monday. She will call me back if not appointment is made and still ask [facility] Monday. No scans have been done. Pt is scheduled with Dr. Ibis Stewart on 2/14/22 for poor diabetic control ( high A1C of 11.3)   Pt has high blood pressure/ on medication.

## 2022-02-14 ENCOUNTER — TELEPHONE (OUTPATIENT)
Dept: ENDOCRINOLOGY CLINIC | Facility: CLINIC | Age: 72
End: 2022-02-14

## 2022-02-14 ENCOUNTER — OFFICE VISIT (OUTPATIENT)
Dept: ENDOCRINOLOGY CLINIC | Facility: CLINIC | Age: 72
End: 2022-02-14
Payer: COMMERCIAL

## 2022-02-14 VITALS
HEART RATE: 80 BPM | DIASTOLIC BLOOD PRESSURE: 73 MMHG | SYSTOLIC BLOOD PRESSURE: 183 MMHG | WEIGHT: 208 LBS | BODY MASS INDEX: 31 KG/M2

## 2022-02-14 DIAGNOSIS — E78.5 DYSLIPIDEMIA: ICD-10-CM

## 2022-02-14 DIAGNOSIS — E11.65 TYPE 2 DIABETES MELLITUS WITH HYPERGLYCEMIA, WITH LONG-TERM CURRENT USE OF INSULIN (HCC): Primary | ICD-10-CM

## 2022-02-14 DIAGNOSIS — Z79.4 TYPE 2 DIABETES MELLITUS WITH HYPERGLYCEMIA, WITH LONG-TERM CURRENT USE OF INSULIN (HCC): Primary | ICD-10-CM

## 2022-02-14 DIAGNOSIS — E55.9 VITAMIN D DEFICIENCY: ICD-10-CM

## 2022-02-14 DIAGNOSIS — Z86.39 HISTORY OF HYPERPARATHYROIDISM: ICD-10-CM

## 2022-02-14 LAB
CARTRIDGE LOT#: ABNORMAL NUMERIC
GLUCOSE BLOOD: 314
HEMOGLOBIN A1C: 10.3 % (ref 4.3–5.6)
TEST STRIP LOT #: NORMAL NUMERIC

## 2022-02-14 PROCEDURE — 99214 OFFICE O/P EST MOD 30 MIN: CPT | Performed by: INTERNAL MEDICINE

## 2022-02-14 PROCEDURE — 3046F HEMOGLOBIN A1C LEVEL >9.0%: CPT | Performed by: INTERNAL MEDICINE

## 2022-02-14 PROCEDURE — 3078F DIAST BP <80 MM HG: CPT | Performed by: INTERNAL MEDICINE

## 2022-02-14 PROCEDURE — 83036 HEMOGLOBIN GLYCOSYLATED A1C: CPT | Performed by: INTERNAL MEDICINE

## 2022-02-14 PROCEDURE — 82947 ASSAY GLUCOSE BLOOD QUANT: CPT | Performed by: INTERNAL MEDICINE

## 2022-02-14 PROCEDURE — 36416 COLLJ CAPILLARY BLOOD SPEC: CPT | Performed by: INTERNAL MEDICINE

## 2022-02-14 PROCEDURE — 3077F SYST BP >= 140 MM HG: CPT | Performed by: INTERNAL MEDICINE

## 2022-02-14 RX ORDER — DULAGLUTIDE 0.75 MG/.5ML
0.75 INJECTION, SOLUTION SUBCUTANEOUS WEEKLY
Qty: 2 ML | Refills: 0 | Status: SHIPPED | OUTPATIENT
Start: 2022-02-14 | End: 2022-03-07

## 2022-02-14 RX ORDER — INSULIN HUMAN 100 [IU]/ML
60 INJECTION, SUSPENSION SUBCUTANEOUS 2 TIMES DAILY
Qty: 108 ML | Refills: 0 | Status: SHIPPED | OUTPATIENT
Start: 2022-02-14

## 2022-02-14 NOTE — TELEPHONE ENCOUNTER
Spoke to Dr. Ryan Cooper regarding patient's office visit today. Per Dr. Ryan Cooper, patient to increase insulin 70/30 to 60 units twice a day. Spoke to patient's wife Ashlyn Solis regarding this change. Patient's wife verbalized understanding and repeated back new dose. She did not have any other questions.

## 2022-02-22 ENCOUNTER — PATIENT MESSAGE (OUTPATIENT)
Dept: ENDOCRINOLOGY CLINIC | Facility: CLINIC | Age: 72
End: 2022-02-22

## 2022-02-23 NOTE — TELEPHONE ENCOUNTER
From: Edgardo Milan  To: Michael Deng MD  Sent: 2/22/2022 2:04 PM CST  Subject: Trulicity prescrption    Has this still not been approved by insurance?  I looked it up and insurance will cover but it needs prior approval.  Lucie Nelson

## 2022-02-23 NOTE — TELEPHONE ENCOUNTER
Medication PA Requested: Dulaglutide (TRULICITY) 4.96 DO/9.1SM Subcutaneous Solution Pen-injector                                                         CoverMyMeds Used:  Key:  Quantity: 2 mL  Day Supply:  Sig: Inject 0.75 mg into the skin once a week.   DX Code: E11.65, Z79.4                                    CPT code (if applicable):   Case Number/Pending Ref#:

## 2022-02-23 NOTE — TELEPHONE ENCOUNTER
Please submit PA for trulicity  Patient is on insulin 70/30 with uncontrolled DM with multiple co morbidities  H has tried and failed bydureon and victoza due to side effects  He ha CKD and hence metformin will not be a good option    Thanks

## 2022-02-24 ENCOUNTER — MED REC SCAN ONLY (OUTPATIENT)
Dept: FAMILY MEDICINE CLINIC | Facility: CLINIC | Age: 72
End: 2022-02-24

## 2022-02-24 NOTE — TELEPHONE ENCOUNTER
Tk Negrete from [facility] opthalmology calling to request call back from Dr. Rajan Stephens to Dr. Dorathy Ormond. 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 Tk Negrete stated that they have been trying to reach out since 2/11.  Requesting call back to Dr. Grant Fails cell [phone]    Office number [phone]

## 2022-03-01 RX ORDER — LOSARTAN POTASSIUM 50 MG/1
TABLET ORAL
Qty: 45 TABLET | Refills: 0 | Status: SHIPPED | OUTPATIENT
Start: 2022-03-01

## 2022-03-01 NOTE — TELEPHONE ENCOUNTER
What surgeries is he having? But it is really important to do STO to make sure his kidneys are stable.

## 2022-03-01 NOTE — TELEPHONE ENCOUNTER
LOV  6/25/21  RTC   6 Months  (12/25/21)  F/U  No schedule. GURPREET  Spoke with patient wife ,patient not able to do appointment for now he is going  For different surgeries ,he will call back on Friday to check if able to do phone visit.

## 2022-03-03 NOTE — TELEPHONE ENCOUNTER
Medication PA Requested: Dulaglutide (TRULICITY) 1.09 OK/6.3PJ Subcutaneous Solution Pen-injector                                                Quantity: 2 mL  Day Supply:  Sig: Inject 0.75 mg into the skin once a week. DX Code: E11.65, Z79.4     [employer] PA form has been completed and submitted with last office visit note, A1C results, tried/failed history per chart review and clinical information below. Awaiting response from plan.

## 2022-03-07 RX ORDER — DULAGLUTIDE 0.75 MG/.5ML
0.75 INJECTION, SOLUTION SUBCUTANEOUS WEEKLY
Qty: 6 ML | Refills: 0 | Status: SHIPPED | OUTPATIENT
Start: 2022-03-07

## 2022-03-07 NOTE — TELEPHONE ENCOUNTER
Received fax from [employer]. The request for trulicity is approved. From 03/03/22 - 03/03/23. Notified patient via [address]    Reference#: [id number]

## 2022-03-11 NOTE — TELEPHONE ENCOUNTER
Spoke with patient. He states he had half his thyroid removed and parathyroid removed in December. He states he will have labs done.

## 2022-03-21 ENCOUNTER — OFFICE VISIT (OUTPATIENT)
Dept: PODIATRY CLINIC | Facility: CLINIC | Age: 72
End: 2022-03-21
Payer: COMMERCIAL

## 2022-03-21 DIAGNOSIS — L85.3 XEROSIS OF SKIN: ICD-10-CM

## 2022-03-21 DIAGNOSIS — Z86.31 HISTORY OF DIABETIC ULCER OF FOOT: ICD-10-CM

## 2022-03-21 DIAGNOSIS — L84 CALLUS OF FOOT: ICD-10-CM

## 2022-03-21 DIAGNOSIS — I73.89 OTHER SPECIFIED PERIPHERAL VASCULAR DISEASES (HCC): ICD-10-CM

## 2022-03-21 DIAGNOSIS — B35.1 ONYCHOMYCOSIS: ICD-10-CM

## 2022-03-21 DIAGNOSIS — E11.59 TYPE 2 DIABETES MELLITUS WITH OTHER CIRCULATORY COMPLICATION, UNSPECIFIED WHETHER LONG TERM INSULIN USE (HCC): Primary | ICD-10-CM

## 2022-03-21 PROCEDURE — 11721 DEBRIDE NAIL 6 OR MORE: CPT | Performed by: PODIATRIST

## 2022-04-15 RX ORDER — LOSARTAN POTASSIUM 50 MG/1
TABLET ORAL
Qty: 45 TABLET | Refills: 0 | Status: SHIPPED | OUTPATIENT
Start: 2022-04-15

## 2022-04-15 NOTE — TELEPHONE ENCOUNTER
Confirm that he has to go to [facility].  I so we will have him do CBC, BMP, albumin, phosphorus and see. Make standing order.

## 2022-04-15 NOTE — TELEPHONE ENCOUNTER
LOV  6/25/21  RTC  6 Months (12/25/21)  F/U No Schedule    l spoke with wife she say he will call back for appointment.

## 2022-04-21 NOTE — LETTER
[facility], [place]  Authorization for Invasive Procedures  1.  I hereby authorize Dr. Jyoti Yin , my physician and whomever may be designated as the doctor's assistant, to perform the following operation and/or procedure:  Right low performed for the purposes of advancing medicine, science, and/or education, provided my identity is not revealed. If the procedure has been videotaped, the physician/surgeon will obtain the original videotape.  The hospital will not be responsible for stor My signature below affirms that prior to the time of the procedure, I have explained to the patient and/or his legal representative, the risks and benefits involved in the proposed treatment and any reasonable alternative to the proposed treatment.  I have yes

## 2022-05-14 ENCOUNTER — LAB ENCOUNTER (OUTPATIENT)
Dept: LAB | Age: 72
End: 2022-05-14
Attending: INTERNAL MEDICINE
Payer: COMMERCIAL

## 2022-05-14 DIAGNOSIS — E11.65 TYPE II DIABETES MELLITUS WITH HYPEROSMOLARITY, UNCONTROLLED (HCC): Primary | ICD-10-CM

## 2022-05-14 DIAGNOSIS — E11.00 TYPE II DIABETES MELLITUS WITH HYPEROSMOLARITY, UNCONTROLLED (HCC): Primary | ICD-10-CM

## 2022-05-14 DIAGNOSIS — E78.5 DYSLIPIDEMIA: ICD-10-CM

## 2022-05-14 DIAGNOSIS — Z79.4 ENCOUNTER FOR LONG-TERM (CURRENT) USE OF INSULIN (HCC): ICD-10-CM

## 2022-05-14 DIAGNOSIS — N18.30 STAGE 3 CHRONIC KIDNEY DISEASE, UNSPECIFIED WHETHER STAGE 3A OR 3B CKD (HCC): ICD-10-CM

## 2022-05-14 LAB
ALBUMIN SERPL-MCNC: 3.4 G/DL (ref 3.4–5)
ALBUMIN/GLOB SERPL: 0.7 {RATIO} (ref 1–2)
ALP LIVER SERPL-CCNC: 132 U/L
ALT SERPL-CCNC: 16 U/L
ANION GAP SERPL CALC-SCNC: 6 MMOL/L (ref 0–18)
AST SERPL-CCNC: 23 U/L (ref 15–37)
BASOPHILS # BLD AUTO: 0.09 X10(3) UL (ref 0–0.2)
BASOPHILS NFR BLD AUTO: 0.9 %
BILIRUB SERPL-MCNC: 0.3 MG/DL (ref 0.1–2)
BUN BLD-MCNC: 39 MG/DL (ref 7–18)
BUN/CREAT SERPL: 17.4 (ref 10–20)
CALCIUM BLD-MCNC: 9.8 MG/DL (ref 8.5–10.1)
CHLORIDE SERPL-SCNC: 105 MMOL/L (ref 98–112)
CHOLEST SERPL-MCNC: 184 MG/DL (ref ?–200)
CO2 SERPL-SCNC: 28 MMOL/L (ref 21–32)
CREAT BLD-MCNC: 2.24 MG/DL
CREAT UR-SCNC: 142 MG/DL
DEPRECATED RDW RBC AUTO: 41.4 FL (ref 35.1–46.3)
EOSINOPHIL # BLD AUTO: 0.73 X10(3) UL (ref 0–0.7)
EOSINOPHIL NFR BLD AUTO: 7.4 %
ERYTHROCYTE [DISTWIDTH] IN BLOOD BY AUTOMATED COUNT: 12.6 % (ref 11–15)
FASTING PATIENT LIPID ANSWER: YES
FASTING STATUS PATIENT QL REPORTED: YES
GLOBULIN PLAS-MCNC: 4.8 G/DL (ref 2.8–4.4)
GLUCOSE BLD-MCNC: 158 MG/DL (ref 70–99)
HCT VFR BLD AUTO: 42.1 %
HDLC SERPL-MCNC: 43 MG/DL (ref 40–59)
HGB BLD-MCNC: 13.5 G/DL
IMM GRANULOCYTES # BLD AUTO: 0.04 X10(3) UL (ref 0–1)
IMM GRANULOCYTES NFR BLD: 0.4 %
LDLC SERPL CALC-MCNC: 94 MG/DL (ref ?–100)
LYMPHOCYTES # BLD AUTO: 1.82 X10(3) UL (ref 1–4)
LYMPHOCYTES NFR BLD AUTO: 18.4 %
MCH RBC QN AUTO: 29.1 PG (ref 26–34)
MCHC RBC AUTO-ENTMCNC: 32.1 G/DL (ref 31–37)
MCV RBC AUTO: 90.7 FL
MICROALBUMIN UR-MCNC: 313 MG/DL
MICROALBUMIN/CREAT 24H UR-RTO: 2204.2 UG/MG (ref ?–30)
MONOCYTES # BLD AUTO: 0.75 X10(3) UL (ref 0.1–1)
MONOCYTES NFR BLD AUTO: 7.6 %
NEUTROPHILS # BLD AUTO: 6.46 X10 (3) UL (ref 1.5–7.7)
NEUTROPHILS # BLD AUTO: 6.46 X10(3) UL (ref 1.5–7.7)
NEUTROPHILS NFR BLD AUTO: 65.3 %
NONHDLC SERPL-MCNC: 141 MG/DL (ref ?–130)
OSMOLALITY SERPL CALC.SUM OF ELEC: 301 MOSM/KG (ref 275–295)
PHOSPHATE SERPL-MCNC: 3 MG/DL (ref 2.5–4.9)
PLATELET # BLD AUTO: 488 10(3)UL (ref 150–450)
POTASSIUM SERPL-SCNC: 4.1 MMOL/L (ref 3.5–5.1)
PROT SERPL-MCNC: 8.2 G/DL (ref 6.4–8.2)
RBC # BLD AUTO: 4.64 X10(6)UL
SODIUM SERPL-SCNC: 139 MMOL/L (ref 136–145)
TRIGL SERPL-MCNC: 278 MG/DL (ref 30–149)
VIT D+METAB SERPL-MCNC: 52.5 NG/ML (ref 30–100)
VLDLC SERPL CALC-MCNC: 46 MG/DL (ref 0–30)
WBC # BLD AUTO: 9.9 X10(3) UL (ref 4–11)

## 2022-05-14 PROCEDURE — 82043 UR ALBUMIN QUANTITATIVE: CPT

## 2022-05-14 PROCEDURE — 80061 LIPID PANEL: CPT

## 2022-05-14 PROCEDURE — 36415 COLL VENOUS BLD VENIPUNCTURE: CPT | Performed by: FAMILY MEDICINE

## 2022-05-14 PROCEDURE — 85025 COMPLETE CBC W/AUTO DIFF WBC: CPT | Performed by: FAMILY MEDICINE

## 2022-05-14 PROCEDURE — 80053 COMPREHEN METABOLIC PANEL: CPT | Performed by: FAMILY MEDICINE

## 2022-05-14 PROCEDURE — 82570 ASSAY OF URINE CREATININE: CPT

## 2022-05-14 PROCEDURE — 3060F POS MICROALBUMINURIA REV: CPT | Performed by: INTERNAL MEDICINE

## 2022-05-14 PROCEDURE — 84100 ASSAY OF PHOSPHORUS: CPT

## 2022-05-14 PROCEDURE — 82306 VITAMIN D 25 HYDROXY: CPT | Performed by: INTERNAL MEDICINE

## 2022-05-15 ENCOUNTER — TELEPHONE (OUTPATIENT)
Dept: NEPHROLOGY | Facility: CLINIC | Age: 72
End: 2022-05-15

## 2022-05-15 NOTE — TELEPHONE ENCOUNTER
Kidney function has gotten worse.   Please follow-up as scheduled and bring in any recent blood pressure readings

## 2022-05-16 NOTE — TELEPHONE ENCOUNTER
Spoke to patient and relayed Dr. Fields Pointer message as shown below. Patient verbalized understanding and had no further questions at this time.

## 2022-05-18 RX ORDER — LOSARTAN POTASSIUM 50 MG/1
TABLET ORAL
Qty: 45 TABLET | Refills: 0 | Status: SHIPPED | OUTPATIENT
Start: 2022-05-18

## 2022-05-20 ENCOUNTER — OFFICE VISIT (OUTPATIENT)
Dept: NEPHROLOGY | Facility: CLINIC | Age: 72
End: 2022-05-20
Payer: COMMERCIAL

## 2022-05-20 VITALS
HEART RATE: 85 BPM | BODY MASS INDEX: 30.66 KG/M2 | SYSTOLIC BLOOD PRESSURE: 178 MMHG | HEIGHT: 69 IN | DIASTOLIC BLOOD PRESSURE: 80 MMHG | WEIGHT: 207 LBS

## 2022-05-20 DIAGNOSIS — N18.32 STAGE 3B CHRONIC KIDNEY DISEASE (HCC): Primary | ICD-10-CM

## 2022-05-20 PROCEDURE — 99214 OFFICE O/P EST MOD 30 MIN: CPT | Performed by: INTERNAL MEDICINE

## 2022-05-20 PROCEDURE — 3008F BODY MASS INDEX DOCD: CPT | Performed by: INTERNAL MEDICINE

## 2022-05-20 PROCEDURE — 3077F SYST BP >= 140 MM HG: CPT | Performed by: INTERNAL MEDICINE

## 2022-05-20 PROCEDURE — 3079F DIAST BP 80-89 MM HG: CPT | Performed by: INTERNAL MEDICINE

## 2022-05-20 RX ORDER — METOPROLOL SUCCINATE 100 MG/1
150 TABLET, EXTENDED RELEASE ORAL DAILY
Qty: 90135 TABLET | Refills: 1 | Status: SHIPPED | OUTPATIENT
Start: 2022-05-20

## 2022-05-20 NOTE — PATIENT INSTRUCTIONS
Increase metoprolol to 150 mg daily. Check blood pressures and heart rates daily and call me in 10 days. Repeat your kidney blood test in 1 month. Orders are in the computer.

## 2022-05-20 NOTE — PROGRESS NOTES
05/20/22        Patient: Des Redi   YOB: 1950   Date of Visit: 5/20/2022       Dear  Dr. Nancy Leon MD,      Thank you for referring Des Reid to my practice. Please find my assessment and plan below. As you know he is a 72-year-old male with a history of adult onset diabetes mellitus diagnosed 1968, hypertension, history of coronary artery disease, status post coronary artery bypass graft surgery in 2017, history of peripheral vascular disease, status post right carotid endarterectomy and primary hyperparathyroidism status post removal of a parathyroid adenoma followed by endocrine who I now had the pleasure of seeing for follow-up of chronic kidney disease stage III associated with nephrotic syndrome secondary to diabetic nephropathy. He has not been compliant with follow-up and was last seen in June 2021. Overall patient states has been doing okay without any chest pain, shortness of breath, GI or urinary tract symptoms. Fairly sedentary. States blood sugars were too high but have started to improve on Trulicity. He has been checking his blood pressures during the last week and systolics are in the 460-802 range with diastolics in the 44-78 range. On physical exam his blood pressure 178/80 with a pulse of 85 and he weighed 207 pounds. His neck was supple without JVD. Lungs were clear. Heart revealed regular rate and rhythm with an S4 but no gallops, murmurs or rubs. Abdomen was soft, flat, nontender without organomegaly, masses or bruits. Extremities revealed no edema. I reviewed his most recent labs which were done on May 14, 2022. His creatinine has increased up to 2.24 with an estimated GFR 29 cc/min. Electrolytes were good. Urine microalbumin to creatinine ratio was 2204 with an albumin of 3.4. Hemoglobin 13.5. His last A1c in February 2022 was still 10.3. His last creatinine back in June 2021 was 1.86.     I therefore informed the patient and his wife that his renal function has deteriorated since I last saw him. I again reinforced importance of both blood pressure and blood sugar control. His blood pressures are running high. Will increase his Toprol XL to 150 mg daily. Check blood pressures and heart rates daily and call in 1 to 2 weeks. Reinforced importance of doing laboratory studies as advised. We will have him repeat a CBC and renal panel in 1 month. Maintain adequate hydration. Avoid nonsteroidals. Return in 6 months. Thank you again for allowing me to participate in the care of your patient. If you have any questions please feel free to call.                Sincerely,   Sarah Sams MD   [address]  [address] [facility] [address]  [address] [address] [phone]    Document electronically generated by:  Sarah Sams MD

## 2022-05-23 ENCOUNTER — OFFICE VISIT (OUTPATIENT)
Dept: PODIATRY CLINIC | Facility: CLINIC | Age: 72
End: 2022-05-23
Payer: COMMERCIAL

## 2022-05-23 DIAGNOSIS — L85.3 XEROSIS OF SKIN: ICD-10-CM

## 2022-05-23 DIAGNOSIS — L84 CALLUS OF FOOT: ICD-10-CM

## 2022-05-23 DIAGNOSIS — I73.89 OTHER SPECIFIED PERIPHERAL VASCULAR DISEASES (HCC): ICD-10-CM

## 2022-05-23 DIAGNOSIS — E11.59 TYPE 2 DIABETES MELLITUS WITH OTHER CIRCULATORY COMPLICATION, UNSPECIFIED WHETHER LONG TERM INSULIN USE (HCC): Primary | ICD-10-CM

## 2022-05-23 DIAGNOSIS — B35.1 ONYCHOMYCOSIS: ICD-10-CM

## 2022-06-08 RX ORDER — ERGOCALCIFEROL 1.25 MG/1
CAPSULE ORAL
Qty: 12 CAPSULE | Refills: 12 | Status: SHIPPED | OUTPATIENT
Start: 2022-06-08

## 2022-06-08 RX ORDER — LOSARTAN POTASSIUM 50 MG/1
TABLET ORAL
Qty: 45 TABLET | Refills: 5 | Status: SHIPPED | OUTPATIENT
Start: 2022-06-08 | End: 2022-12-22

## 2022-06-09 RX ORDER — DULAGLUTIDE 0.75 MG/.5ML
0.75 INJECTION, SOLUTION SUBCUTANEOUS WEEKLY
Qty: 6 ML | Refills: 0 | Status: SHIPPED | OUTPATIENT
Start: 2022-06-09 | End: 2022-08-23

## 2022-06-17 DIAGNOSIS — E11.65 TYPE 2 DIABETES MELLITUS WITH HYPERGLYCEMIA, WITH LONG-TERM CURRENT USE OF INSULIN (HCC): ICD-10-CM

## 2022-06-17 DIAGNOSIS — Z79.4 TYPE 2 DIABETES MELLITUS WITH HYPERGLYCEMIA, WITH LONG-TERM CURRENT USE OF INSULIN (HCC): ICD-10-CM

## 2022-06-17 RX ORDER — INSULIN HUMAN 100 [IU]/ML
60 INJECTION, SUSPENSION SUBCUTANEOUS 2 TIMES DAILY
Qty: 108 ML | Refills: 0 | Status: SHIPPED | OUTPATIENT
Start: 2022-06-17

## 2022-07-18 ENCOUNTER — OFFICE VISIT (OUTPATIENT)
Dept: PODIATRY CLINIC | Facility: CLINIC | Age: 72
End: 2022-07-18
Payer: COMMERCIAL

## 2022-07-18 DIAGNOSIS — E11.59 TYPE 2 DIABETES MELLITUS WITH OTHER CIRCULATORY COMPLICATION, UNSPECIFIED WHETHER LONG TERM INSULIN USE (HCC): Primary | ICD-10-CM

## 2022-07-18 DIAGNOSIS — I73.89 OTHER SPECIFIED PERIPHERAL VASCULAR DISEASES (HCC): ICD-10-CM

## 2022-07-18 DIAGNOSIS — L85.3 XEROSIS OF SKIN: ICD-10-CM

## 2022-07-18 DIAGNOSIS — B35.1 ONYCHOMYCOSIS: ICD-10-CM

## 2022-07-18 DIAGNOSIS — L84 CALLUS OF FOOT: ICD-10-CM

## 2022-07-18 PROCEDURE — 99213 OFFICE O/P EST LOW 20 MIN: CPT | Performed by: PODIATRIST
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
## 2022-08-03 ENCOUNTER — MED REC SCAN ONLY (OUTPATIENT)
Dept: FAMILY MEDICINE CLINIC | Facility: CLINIC | Age: 72
End: 2022-08-03

## 2022-08-23 ENCOUNTER — OFFICE VISIT (OUTPATIENT)
Dept: ENDOCRINOLOGY CLINIC | Facility: CLINIC | Age: 72
End: 2022-08-23
Payer: COMMERCIAL

## 2022-08-23 VITALS
BODY MASS INDEX: 30 KG/M2 | HEART RATE: 76 BPM | WEIGHT: 205 LBS | SYSTOLIC BLOOD PRESSURE: 158 MMHG | DIASTOLIC BLOOD PRESSURE: 77 MMHG

## 2022-08-23 DIAGNOSIS — Z79.4 TYPE 2 DIABETES MELLITUS WITH HYPERGLYCEMIA, WITH LONG-TERM CURRENT USE OF INSULIN (HCC): Primary | ICD-10-CM

## 2022-08-23 DIAGNOSIS — E55.9 VITAMIN D DEFICIENCY: ICD-10-CM

## 2022-08-23 DIAGNOSIS — E11.65 TYPE 2 DIABETES MELLITUS WITH HYPERGLYCEMIA, WITH LONG-TERM CURRENT USE OF INSULIN (HCC): Primary | ICD-10-CM

## 2022-08-23 DIAGNOSIS — E78.5 DYSLIPIDEMIA: ICD-10-CM

## 2022-08-23 LAB
CARTRIDGE LOT#: NORMAL NUMERIC
GLUCOSE BLOOD: 118
TEST STRIP LOT #: NORMAL NUMERIC

## 2022-08-23 PROCEDURE — 36416 COLLJ CAPILLARY BLOOD SPEC: CPT | Performed by: INTERNAL MEDICINE

## 2022-08-23 PROCEDURE — 3078F DIAST BP <80 MM HG: CPT | Performed by: INTERNAL MEDICINE

## 2022-08-23 PROCEDURE — 82947 ASSAY GLUCOSE BLOOD QUANT: CPT | Performed by: INTERNAL MEDICINE

## 2022-08-23 PROCEDURE — 3044F HG A1C LEVEL LT 7.0%: CPT | Performed by: INTERNAL MEDICINE

## 2022-08-23 PROCEDURE — 83036 HEMOGLOBIN GLYCOSYLATED A1C: CPT | Performed by: INTERNAL MEDICINE

## 2022-08-23 PROCEDURE — 99214 OFFICE O/P EST MOD 30 MIN: CPT | Performed by: INTERNAL MEDICINE

## 2022-08-23 PROCEDURE — 3077F SYST BP >= 140 MM HG: CPT | Performed by: INTERNAL MEDICINE

## 2022-08-23 RX ORDER — DULAGLUTIDE 1.5 MG/.5ML
1.5 INJECTION, SOLUTION SUBCUTANEOUS WEEKLY
Qty: 6 ML | Refills: 0 | Status: SHIPPED | OUTPATIENT
Start: 2022-08-23

## 2022-09-13 RX ORDER — PEN NEEDLE, DIABETIC 32GX 5/32"
NEEDLE, DISPOSABLE MISCELLANEOUS
Qty: 200 EACH | Refills: 0 | Status: SHIPPED | OUTPATIENT
Start: 2022-09-13

## 2022-09-24 DIAGNOSIS — Z79.4 TYPE 2 DIABETES MELLITUS WITH HYPERGLYCEMIA, WITH LONG-TERM CURRENT USE OF INSULIN (HCC): ICD-10-CM

## 2022-09-24 DIAGNOSIS — E11.65 TYPE 2 DIABETES MELLITUS WITH HYPERGLYCEMIA, WITH LONG-TERM CURRENT USE OF INSULIN (HCC): ICD-10-CM

## 2022-09-25 RX ORDER — INSULIN HUMAN 100 [IU]/ML
60 INJECTION, SUSPENSION SUBCUTANEOUS 2 TIMES DAILY
Qty: 108 ML | Refills: 0 | Status: SHIPPED | OUTPATIENT
Start: 2022-09-25 | End: 2023-01-16

## 2022-10-18 ENCOUNTER — OFFICE VISIT (OUTPATIENT)
Dept: PODIATRY CLINIC | Facility: CLINIC | Age: 72
End: 2022-10-18
Payer: COMMERCIAL

## 2022-10-18 DIAGNOSIS — L85.3 XEROSIS OF SKIN: ICD-10-CM

## 2022-10-18 DIAGNOSIS — B35.1 ONYCHOMYCOSIS: ICD-10-CM

## 2022-10-18 DIAGNOSIS — I73.89 OTHER SPECIFIED PERIPHERAL VASCULAR DISEASES (HCC): ICD-10-CM

## 2022-10-18 DIAGNOSIS — E11.59 TYPE 2 DIABETES MELLITUS WITH OTHER CIRCULATORY COMPLICATION, UNSPECIFIED WHETHER LONG TERM INSULIN USE (HCC): Primary | ICD-10-CM

## 2022-10-18 DIAGNOSIS — Z86.31 HISTORY OF DIABETIC ULCER OF FOOT: ICD-10-CM

## 2022-10-18 DIAGNOSIS — L84 CALLUS OF FOOT: ICD-10-CM

## 2022-10-18 PROCEDURE — 99213 OFFICE O/P EST LOW 20 MIN: CPT | Performed by: PODIATRIST

## 2022-10-18 RX ORDER — AMLODIPINE BESYLATE 5 MG/1
TABLET ORAL
COMMUNITY
Start: 2022-09-24

## 2022-11-16 ENCOUNTER — HOSPITAL ENCOUNTER (OUTPATIENT)
Dept: ULTRASOUND IMAGING | Age: 72
Discharge: HOME | End: 2022-11-16
Attending: PHYSICIAN ASSISTANT
Payer: COMMERCIAL

## 2022-11-16 DIAGNOSIS — I65.23 BILATERAL CAROTID ARTERY STENOSIS: ICD-10-CM

## 2022-11-16 PROCEDURE — 93880 EXTRACRANIAL BILAT STUDY: CPT | Performed by: PHYSICIAN ASSISTANT

## 2022-11-22 ENCOUNTER — HOSPITAL ENCOUNTER (OUTPATIENT)
Dept: ULTRASOUND IMAGING | Facility: HOSPITAL | Age: 72
Discharge: HOME | End: 2022-11-22
Attending: INTERNAL MEDICINE
Payer: COMMERCIAL

## 2022-11-22 DIAGNOSIS — Z98.62 PERIPHERAL VASCULAR ANGIOPLASTY STATUS: ICD-10-CM

## 2022-11-22 DIAGNOSIS — R60.0 EDEMA OF BOTH LEGS: ICD-10-CM

## 2022-11-22 DIAGNOSIS — I70.213 ATHEROSCLEROSIS OF BOTH LOWER EXTREMITIES WITH INTERMITTENT CLAUDICATION (HCC): ICD-10-CM

## 2022-11-22 PROCEDURE — 93922 UPR/L XTREMITY ART 2 LEVELS: CPT | Performed by: INTERNAL MEDICINE

## 2022-11-22 PROCEDURE — 93925 LOWER EXTREMITY STUDY: CPT | Performed by: INTERNAL MEDICINE

## 2022-12-22 RX ORDER — LOSARTAN POTASSIUM 50 MG/1
TABLET ORAL
Qty: 45 TABLET | Refills: 4 | Status: SHIPPED | OUTPATIENT
Start: 2022-12-22

## 2022-12-22 NOTE — TELEPHONE ENCOUNTER
LOV: 5/20/22  RTC: 6 months (11/20/22)  F/u: none      Refill request approved via MD out of office protocol

## 2023-01-09 ENCOUNTER — OFFICE VISIT (OUTPATIENT)
Dept: PODIATRY CLINIC | Facility: CLINIC | Age: 73
End: 2023-01-09
Payer: COMMERCIAL

## 2023-01-09 DIAGNOSIS — I73.89 OTHER SPECIFIED PERIPHERAL VASCULAR DISEASES (HCC): ICD-10-CM

## 2023-01-09 DIAGNOSIS — L84 CALLUS OF FOOT: ICD-10-CM

## 2023-01-09 DIAGNOSIS — L85.3 XEROSIS OF SKIN: ICD-10-CM

## 2023-01-09 DIAGNOSIS — E11.59 TYPE 2 DIABETES MELLITUS WITH OTHER CIRCULATORY COMPLICATION, UNSPECIFIED WHETHER LONG TERM INSULIN USE (HCC): Primary | ICD-10-CM

## 2023-01-09 DIAGNOSIS — B35.1 ONYCHOMYCOSIS: ICD-10-CM

## 2023-01-09 PROCEDURE — 99213 OFFICE O/P EST LOW 20 MIN: CPT | Performed by: PODIATRIST

## 2023-01-16 ENCOUNTER — TELEPHONE (OUTPATIENT)
Dept: ENDOCRINOLOGY CLINIC | Facility: CLINIC | Age: 73
End: 2023-01-16

## 2023-01-16 DIAGNOSIS — Z79.4 TYPE 2 DIABETES MELLITUS WITH HYPERGLYCEMIA, WITH LONG-TERM CURRENT USE OF INSULIN (HCC): ICD-10-CM

## 2023-01-16 DIAGNOSIS — E11.65 TYPE 2 DIABETES MELLITUS WITH HYPERGLYCEMIA, WITH LONG-TERM CURRENT USE OF INSULIN (HCC): ICD-10-CM

## 2023-01-16 RX ORDER — INSULIN HUMAN 100 [IU]/ML
60 INJECTION, SUSPENSION SUBCUTANEOUS 2 TIMES DAILY
Qty: 108 ML | Refills: 0 | Status: SHIPPED | OUTPATIENT
Start: 2023-01-16

## 2023-02-03 ENCOUNTER — HOSPITAL ENCOUNTER (OUTPATIENT)
Dept: ULTRASOUND IMAGING | Facility: HOSPITAL | Age: 73
Discharge: HOME | End: 2023-02-03
Attending: INTERNAL MEDICINE
Payer: COMMERCIAL

## 2023-02-03 DIAGNOSIS — R60.0 EDEMA OF BOTH LEGS: ICD-10-CM

## 2023-02-03 PROCEDURE — 93970 EXTREMITY STUDY: CPT | Performed by: INTERNAL MEDICINE

## 2023-03-07 ENCOUNTER — OFFICE VISIT (OUTPATIENT)
Dept: ENDOCRINOLOGY CLINIC | Facility: CLINIC | Age: 73
End: 2023-03-07

## 2023-03-07 VITALS — WEIGHT: 197.81 LBS | DIASTOLIC BLOOD PRESSURE: 82 MMHG | SYSTOLIC BLOOD PRESSURE: 140 MMHG | BODY MASS INDEX: 29 KG/M2

## 2023-03-07 DIAGNOSIS — E55.9 VITAMIN D DEFICIENCY: ICD-10-CM

## 2023-03-07 DIAGNOSIS — E11.65 TYPE 2 DIABETES MELLITUS WITH HYPERGLYCEMIA, WITH LONG-TERM CURRENT USE OF INSULIN (HCC): Primary | ICD-10-CM

## 2023-03-07 DIAGNOSIS — E78.5 DYSLIPIDEMIA: ICD-10-CM

## 2023-03-07 DIAGNOSIS — Z79.4 TYPE 2 DIABETES MELLITUS WITH HYPERGLYCEMIA, WITH LONG-TERM CURRENT USE OF INSULIN (HCC): Primary | ICD-10-CM

## 2023-03-07 LAB
CARTRIDGE LOT#: ABNORMAL NUMERIC
GLUCOSE BLOOD: 103
HEMOGLOBIN A1C: 8.7 % (ref 4.3–5.6)
TEST STRIP LOT #: NORMAL NUMERIC

## 2023-03-07 PROCEDURE — 99214 OFFICE O/P EST MOD 30 MIN: CPT | Performed by: INTERNAL MEDICINE

## 2023-03-07 PROCEDURE — 3077F SYST BP >= 140 MM HG: CPT | Performed by: INTERNAL MEDICINE

## 2023-03-07 PROCEDURE — 3052F HG A1C>EQUAL 8.0%<EQUAL 9.0%: CPT | Performed by: INTERNAL MEDICINE

## 2023-03-07 PROCEDURE — 83036 HEMOGLOBIN GLYCOSYLATED A1C: CPT | Performed by: INTERNAL MEDICINE

## 2023-03-07 PROCEDURE — 82947 ASSAY GLUCOSE BLOOD QUANT: CPT | Performed by: INTERNAL MEDICINE

## 2023-03-07 PROCEDURE — 3079F DIAST BP 80-89 MM HG: CPT | Performed by: INTERNAL MEDICINE

## 2023-03-07 RX ORDER — DULAGLUTIDE 3 MG/.5ML
3 INJECTION, SOLUTION SUBCUTANEOUS
Qty: 6 ML | Refills: 0 | Status: SHIPPED | OUTPATIENT
Start: 2023-03-07

## 2023-03-08 ENCOUNTER — TELEPHONE (OUTPATIENT)
Dept: ENDOCRINOLOGY CLINIC | Facility: CLINIC | Age: 73
End: 2023-03-08

## 2023-03-08 NOTE — TELEPHONE ENCOUNTER
Pt is calling because pharmacy states that since pt's trulicity dose has increased, they would need authorization from Dr to the pharmacy for the dose increase

## 2023-03-09 NOTE — TELEPHONE ENCOUNTER
[phone] - phone number to insurance per pharmacy    PA initiated via Epic    Case ID:  [id number]   Fax:  [phone]

## 2023-03-16 ENCOUNTER — TELEPHONE (OUTPATIENT)
Dept: ENDOCRINOLOGY CLINIC | Facility: CLINIC | Age: 73
End: 2023-03-16

## 2023-03-16 NOTE — TELEPHONE ENCOUNTER
0651 Kendra Shelton  States the original PA submitted \"timed out\". Couldn't explain what has happened to it and suggested to initiate a new one. RN answered clinical question over the phone and got an instant approval but still requiring chart note. Fax to [phone]    3/16/23-3/16/24 - approval    Tried to call the pharmacy 3x but no answer. Left detailed message regarding approval on their voicemail system.   [employer] message sent to patient regarding approval.

## 2023-03-16 NOTE — TELEPHONE ENCOUNTER
Spoke to patient's wife Shaina Mascorro who stated that patient and wife were able to get prescription from pharmacy. No further action required.

## 2023-03-16 NOTE — TELEPHONE ENCOUNTER
Received this from the insurance, can please let the patient know  [place] Longs 0/0.1, use as directed, is approved through 03/16/2024 or until coverage for the medication is no longer available under your benefit plan or the medication becomes subject to a pharmacy benefit coverage requirement, such as supply limits or notification, whichever occurs first as allowed by law. Please note: Doses/quantities above plan limits and/or maximum Food and Drug Administration (FDA) approved dosing may be subject to further review.  Reviewed by: Ruthie Holguin

## 2023-04-10 ENCOUNTER — OFFICE VISIT (OUTPATIENT)
Dept: NEPHROLOGY | Facility: CLINIC | Age: 73
End: 2023-04-10

## 2023-04-10 ENCOUNTER — OFFICE VISIT (OUTPATIENT)
Dept: PODIATRY CLINIC | Facility: CLINIC | Age: 73
End: 2023-04-10

## 2023-04-10 VITALS
WEIGHT: 197 LBS | HEART RATE: 78 BPM | SYSTOLIC BLOOD PRESSURE: 132 MMHG | BODY MASS INDEX: 29 KG/M2 | DIASTOLIC BLOOD PRESSURE: 61 MMHG

## 2023-04-10 DIAGNOSIS — I73.89 OTHER SPECIFIED PERIPHERAL VASCULAR DISEASES (HCC): ICD-10-CM

## 2023-04-10 DIAGNOSIS — E11.59 TYPE 2 DIABETES MELLITUS WITH OTHER CIRCULATORY COMPLICATION, UNSPECIFIED WHETHER LONG TERM INSULIN USE (HCC): Primary | ICD-10-CM

## 2023-04-10 DIAGNOSIS — Z86.31 HISTORY OF DIABETIC ULCER OF FOOT: ICD-10-CM

## 2023-04-10 DIAGNOSIS — N18.32 STAGE 3B CHRONIC KIDNEY DISEASE (HCC): Primary | ICD-10-CM

## 2023-04-10 DIAGNOSIS — B35.1 ONYCHOMYCOSIS: ICD-10-CM

## 2023-04-10 DIAGNOSIS — L85.3 XEROSIS OF SKIN: ICD-10-CM

## 2023-04-10 DIAGNOSIS — L84 CALLUS OF FOOT: ICD-10-CM

## 2023-04-10 PROCEDURE — 3078F DIAST BP <80 MM HG: CPT | Performed by: INTERNAL MEDICINE

## 2023-04-10 PROCEDURE — 99214 OFFICE O/P EST MOD 30 MIN: CPT | Performed by: INTERNAL MEDICINE

## 2023-04-10 PROCEDURE — 99213 OFFICE O/P EST LOW 20 MIN: CPT | Performed by: PODIATRIST

## 2023-04-10 PROCEDURE — 3075F SYST BP GE 130 - 139MM HG: CPT | Performed by: INTERNAL MEDICINE

## 2023-04-10 RX ORDER — ASPIRIN 81 MG/1
81 TABLET ORAL DAILY
COMMUNITY

## 2023-04-10 NOTE — PROGRESS NOTES
04/10/23        Patient: Edgardo Milan   YOB: 1950   Date of Visit: 4/10/2023       Dear  Dr. Mic Daly MD,      Thank you for referring Edgardo Milan to my practice. Please find my assessment and plan below. As you know he is a 74-year-old male with a history of adult onset diabetes mellitus diagnosed in 1968, hypertension, history of coronary artery disease, status post coronary artery bypass graft surgery in 2017, history of peripheral vascular disease, status post right carotid endarterectomy, history of primary hyperparathyroidism, status post removal of a parathyroid adenoma who I now had the pleasure of seeing for follow-up of chronic kidney disease stage III-IV associated with nephrotic syndrome secondary to diabetic nephropathy. Again the patient has been poorly compliant with follow-up. Last seen in May 2022. Has not been follow-up laboratory studies as instructed. Furthermore has been having recurrent problems with intermittent claudication. Scheduled for an angiogram of his right lower extremity on May 11, 2023 and ultimately will need the left leg done as well. Otherwise no chest pain, shortness of breath, GI or urinary tract symptoms. On physical exam his blood pressure 132/61 with a pulse of 78 and he weighed 197 pounds. His neck was supple without JVD. Lungs were clear. Heart revealed regular rate and rhythm and S4 but no gallops, murmurs or rubs. Abdomen was soft, flat, nontender without organomegaly, masses or bruits. Extremities revealed no edema. I therefore informed the patient and his wife that he does have chronic kidney disease stage III-IV associated with nephrotic syndrome secondary to diabetic nephropathy. Unfortunately  no recent labs to see whether or not his disease has progressed or not. Strongly advised to repeat a CBC, renal panel urine for microalbumin now.   Reinforced the importance of maintaining adequate hydration and avoiding all use of nonsteroidals. Blood pressure and blood sugar control. I also informed the patient that he is at increased risk for contrast-induced nephropathy with the angiograms tentatively scheduled for May 2023. We will see what laboratory studies reveal.  Probably prudent to repeat a CBC and renal panel 2 or 3 days after the procedure to ensure stability. Compliance was stressed. Return in 6 months. Thank you again for allowing me to participate in the care of your patient. If you have any questions please feel free to call.            Sincerely,   Pebbles Spence MD   [facility], [address] [place] [address]  [place] [address] [place] [address] 310  [id number] [facility] [id number]    Document electronically generated by:  Pebbles Spence MD

## 2023-04-17 ENCOUNTER — LAB ENCOUNTER (OUTPATIENT)
Dept: LAB | Age: 73
End: 2023-04-17
Attending: INTERNAL MEDICINE
Payer: COMMERCIAL

## 2023-04-17 DIAGNOSIS — N18.30 STAGE 3 CHRONIC KIDNEY DISEASE, UNSPECIFIED WHETHER STAGE 3A OR 3B CKD (HCC): ICD-10-CM

## 2023-04-17 DIAGNOSIS — E55.9 VITAMIN D DEFICIENCY: ICD-10-CM

## 2023-04-17 DIAGNOSIS — E11.65 TYPE 2 DIABETES MELLITUS WITH HYPERGLYCEMIA, WITH LONG-TERM CURRENT USE OF INSULIN (HCC): ICD-10-CM

## 2023-04-17 DIAGNOSIS — Z79.4 TYPE 2 DIABETES MELLITUS WITH HYPERGLYCEMIA, WITH LONG-TERM CURRENT USE OF INSULIN (HCC): ICD-10-CM

## 2023-04-17 DIAGNOSIS — E78.5 DYSLIPIDEMIA: ICD-10-CM

## 2023-04-17 DIAGNOSIS — N18.32 STAGE 3B CHRONIC KIDNEY DISEASE (HCC): ICD-10-CM

## 2023-04-17 LAB
ALBUMIN SERPL-MCNC: 3.5 G/DL (ref 3.4–5)
ALBUMIN/GLOB SERPL: 0.8 {RATIO} (ref 1–2)
ALP LIVER SERPL-CCNC: 107 U/L
ALT SERPL-CCNC: 15 U/L
ANION GAP SERPL CALC-SCNC: 4 MMOL/L (ref 0–18)
AST SERPL-CCNC: 15 U/L (ref 15–37)
BASOPHILS # BLD AUTO: 0.08 X10(3) UL (ref 0–0.2)
BASOPHILS NFR BLD AUTO: 1 %
BILIRUB SERPL-MCNC: 0.4 MG/DL (ref 0.1–2)
BUN BLD-MCNC: 31 MG/DL (ref 7–18)
BUN/CREAT SERPL: 12.6 (ref 10–20)
CALCIUM BLD-MCNC: 10.7 MG/DL (ref 8.5–10.1)
CHLORIDE SERPL-SCNC: 107 MMOL/L (ref 98–112)
CHOLEST SERPL-MCNC: 292 MG/DL (ref ?–200)
CO2 SERPL-SCNC: 27 MMOL/L (ref 21–32)
CREAT BLD-MCNC: 2.47 MG/DL
DEPRECATED RDW RBC AUTO: 44 FL (ref 35.1–46.3)
EOSINOPHIL # BLD AUTO: 0.53 X10(3) UL (ref 0–0.7)
EOSINOPHIL NFR BLD AUTO: 6.6 %
ERYTHROCYTE [DISTWIDTH] IN BLOOD BY AUTOMATED COUNT: 13.6 % (ref 11–15)
FASTING PATIENT LIPID ANSWER: YES
FASTING STATUS PATIENT QL REPORTED: YES
GFR SERPLBLD BASED ON 1.73 SQ M-ARVRAT: 27 ML/MIN/1.73M2 (ref 60–?)
GLOBULIN PLAS-MCNC: 4.4 G/DL (ref 2.8–4.4)
GLUCOSE BLD-MCNC: 138 MG/DL (ref 70–99)
HCT VFR BLD AUTO: 42.6 %
HDLC SERPL-MCNC: 49 MG/DL (ref 40–59)
HGB BLD-MCNC: 13.7 G/DL
IMM GRANULOCYTES # BLD AUTO: 0.02 X10(3) UL (ref 0–1)
IMM GRANULOCYTES NFR BLD: 0.3 %
LDLC SERPL CALC-MCNC: 137 MG/DL (ref ?–100)
LYMPHOCYTES # BLD AUTO: 1.35 X10(3) UL (ref 1–4)
LYMPHOCYTES NFR BLD AUTO: 16.9 %
MCH RBC QN AUTO: 28.7 PG (ref 26–34)
MCHC RBC AUTO-ENTMCNC: 32.2 G/DL (ref 31–37)
MCV RBC AUTO: 89.1 FL
MONOCYTES # BLD AUTO: 0.55 X10(3) UL (ref 0.1–1)
MONOCYTES NFR BLD AUTO: 6.9 %
NEUTROPHILS # BLD AUTO: 5.45 X10 (3) UL (ref 1.5–7.7)
NEUTROPHILS # BLD AUTO: 5.45 X10(3) UL (ref 1.5–7.7)
NEUTROPHILS NFR BLD AUTO: 68.3 %
NONHDLC SERPL-MCNC: 243 MG/DL (ref ?–130)
OSMOLALITY SERPL CALC.SUM OF ELEC: 295 MOSM/KG (ref 275–295)
PHOSPHATE SERPL-MCNC: 3.6 MG/DL (ref 2.5–4.9)
PLATELET # BLD AUTO: 454 10(3)UL (ref 150–450)
POTASSIUM SERPL-SCNC: 4.6 MMOL/L (ref 3.5–5.1)
PROT SERPL-MCNC: 7.9 G/DL (ref 6.4–8.2)
RBC # BLD AUTO: 4.78 X10(6)UL
SODIUM SERPL-SCNC: 138 MMOL/L (ref 136–145)
TRIGL SERPL-MCNC: 568 MG/DL (ref 30–149)
VIT D+METAB SERPL-MCNC: 33.8 NG/ML (ref 30–100)
VLDLC SERPL CALC-MCNC: 111 MG/DL (ref 0–30)
WBC # BLD AUTO: 8 X10(3) UL (ref 4–11)

## 2023-04-17 PROCEDURE — 80061 LIPID PANEL: CPT

## 2023-04-17 PROCEDURE — 80053 COMPREHEN METABOLIC PANEL: CPT

## 2023-04-17 PROCEDURE — 84100 ASSAY OF PHOSPHORUS: CPT

## 2023-04-17 PROCEDURE — 36415 COLL VENOUS BLD VENIPUNCTURE: CPT

## 2023-04-17 PROCEDURE — 85025 COMPLETE CBC W/AUTO DIFF WBC: CPT

## 2023-04-17 PROCEDURE — 82306 VITAMIN D 25 HYDROXY: CPT

## 2023-04-18 ENCOUNTER — TELEPHONE (OUTPATIENT)
Dept: NEPHROLOGY | Facility: CLINIC | Age: 73
End: 2023-04-18

## 2023-04-18 NOTE — TELEPHONE ENCOUNTER
Kidney function is worse than it was 11 months ago. He did not do the urine study as ordered. Have him do. Calcium is also high. Is he taking any calcium or vitamin D supplements? .  He is scheduled for an angiogram of his lower extremities in May. He is at increased risk for his kidney function to be affected by the dye. Make sure he drinks plenty of fluids the day before and the day of the procedure. Repeat CBC and renal panel 2 days after the procedure.

## 2023-04-18 NOTE — TELEPHONE ENCOUNTER
Verified name and . Went over results. He verbalized understanding. Spoke with patient states he has been taking Tums 3000mg per day as instructed by Dr. Keegan Quintana because he had parathyroid removed.

## 2023-04-19 NOTE — TELEPHONE ENCOUNTER
Spoke to patient and relayed Dr. Loida Dorsey message as shown below. Patient verbalized understanding and had no further questions at this time.

## 2023-04-23 ENCOUNTER — TELEPHONE (OUTPATIENT)
Dept: ENDOCRINOLOGY CLINIC | Facility: CLINIC | Age: 73
End: 2023-04-23

## 2023-04-23 DIAGNOSIS — E11.65 TYPE 2 DIABETES MELLITUS WITH HYPERGLYCEMIA, WITH LONG-TERM CURRENT USE OF INSULIN (HCC): Primary | ICD-10-CM

## 2023-04-23 DIAGNOSIS — E78.5 DYSLIPIDEMIA: ICD-10-CM

## 2023-04-23 DIAGNOSIS — Z79.4 TYPE 2 DIABETES MELLITUS WITH HYPERGLYCEMIA, WITH LONG-TERM CURRENT USE OF INSULIN (HCC): Primary | ICD-10-CM

## 2023-04-24 NOTE — TELEPHONE ENCOUNTER
Dr. Marisol Carter to patient to relay message below - patient stated understanding   Patient confirms taking atorvastatin 80mg daily  Patient stated he will not start another medication - \"I'm not taking any more drugs -every time I start a new one I have more side effects\"  Patient stated understanding to work on low-fat diet, daily exercise and good BG control  Thanks

## 2023-04-24 NOTE — TELEPHONE ENCOUNTER
Spoke to patient, \"I was already told this information\", RN communicated reagarding labs in 3 months.  Lab orders placed

## 2023-04-24 NOTE — TELEPHONE ENCOUNTER
LDL is high--> low fat diet, daily exercise  Has he been taking the atorvastatin 80 mg daily? Also his TG cholesterol is elevated over 500  This can increase risk of inflammation of pancreas ( pancreatitis)  Please review symptoms of pancreatitis  His TG were high but much better ( in the 200s) last year. Given the high TG levels, we can start him on fenofibrate to lower this.    However, when used together, fenofibrate and statins, the risk of SE ( muscle cramping/ elevation in liver enzymes) goes up  Hence, we can start a low dsoe and recheck labs ( cholesterol/ liver enzymes) in one month  Low fat diet, daily exercise and good BG control will also help  Please let me know if he is okay with this plan and we can proceed accoridngly

## 2023-04-24 NOTE — TELEPHONE ENCOUNTER
Noted  Please review symptoms of pancreatitis  To call if he experiences any   Repeat fasting lipid panel in three months  Thanks

## 2023-05-02 RX ORDER — MULTIVIT WITH MINERALS/LUTEIN
1000 TABLET ORAL DAILY
COMMUNITY

## 2023-05-02 RX ORDER — AMLODIPINE BESYLATE 5 MG/1
TABLET ORAL
COMMUNITY
Start: 2023-04-13

## 2023-05-03 ENCOUNTER — EKG ENCOUNTER (OUTPATIENT)
Dept: LAB | Age: 73
End: 2023-05-03
Attending: INTERNAL MEDICINE
Payer: COMMERCIAL

## 2023-05-03 ENCOUNTER — LAB ENCOUNTER (OUTPATIENT)
Dept: LAB | Age: 73
End: 2023-05-03
Attending: INTERNAL MEDICINE
Payer: COMMERCIAL

## 2023-05-03 DIAGNOSIS — Z01.818 PREOPERATIVE TESTING: ICD-10-CM

## 2023-05-03 LAB
ANION GAP SERPL CALC-SCNC: 6 MMOL/L (ref 0–18)
ATRIAL RATE: 91 BPM
BUN BLD-MCNC: 28 MG/DL (ref 7–18)
BUN/CREAT SERPL: 12 (ref 10–20)
CALCIUM BLD-MCNC: 9.3 MG/DL (ref 8.5–10.1)
CHLORIDE SERPL-SCNC: 111 MMOL/L (ref 98–112)
CO2 SERPL-SCNC: 24 MMOL/L (ref 21–32)
CREAT BLD-MCNC: 2.33 MG/DL
FASTING STATUS PATIENT QL REPORTED: NO
GFR SERPLBLD BASED ON 1.73 SQ M-ARVRAT: 29 ML/MIN/1.73M2 (ref 60–?)
GLUCOSE BLD-MCNC: 172 MG/DL (ref 70–99)
OSMOLALITY SERPL CALC.SUM OF ELEC: 302 MOSM/KG (ref 275–295)
P AXIS: 76 DEGREES
P-R INTERVAL: 162 MS
POTASSIUM SERPL-SCNC: 4.1 MMOL/L (ref 3.5–5.1)
Q-T INTERVAL: 390 MS
QRS DURATION: 122 MS
QTC CALCULATION (BEZET): 479 MS
R AXIS: -37 DEGREES
SODIUM SERPL-SCNC: 141 MMOL/L (ref 136–145)
T AXIS: 57 DEGREES
VENTRICULAR RATE: 91 BPM

## 2023-05-03 PROCEDURE — 93005 ELECTROCARDIOGRAM TRACING: CPT

## 2023-05-03 PROCEDURE — 80048 BASIC METABOLIC PNL TOTAL CA: CPT

## 2023-05-03 PROCEDURE — 36415 COLL VENOUS BLD VENIPUNCTURE: CPT

## 2023-05-03 PROCEDURE — 93010 ELECTROCARDIOGRAM REPORT: CPT | Performed by: INTERNAL MEDICINE

## 2023-05-09 DIAGNOSIS — E11.65 TYPE 2 DIABETES MELLITUS WITH HYPERGLYCEMIA, WITH LONG-TERM CURRENT USE OF INSULIN (HCC): ICD-10-CM

## 2023-05-09 DIAGNOSIS — Z79.4 TYPE 2 DIABETES MELLITUS WITH HYPERGLYCEMIA, WITH LONG-TERM CURRENT USE OF INSULIN (HCC): ICD-10-CM

## 2023-05-09 RX ORDER — INSULIN HUMAN 100 [IU]/ML
60 INJECTION, SUSPENSION SUBCUTANEOUS 2 TIMES DAILY
Qty: 108 ML | Refills: 0 | Status: SHIPPED | OUTPATIENT
Start: 2023-05-09

## 2023-05-09 NOTE — TELEPHONE ENCOUNTER
[place] pharmacy is requesting refill Insulin NPH & Regular (HUMULIN 70/30 [id number]) (70-30) 100 UNIT/ML Subcutaneous Suspension Pen-injector please follow up

## 2023-05-11 ENCOUNTER — HOSPITAL ENCOUNTER (OUTPATIENT)
Dept: INTERVENTIONAL RADIOLOGY/VASCULAR | Facility: HOSPITAL | Age: 73
Discharge: HOME | End: 2023-05-11
Attending: INTERNAL MEDICINE | Admitting: INTERNAL MEDICINE
Payer: COMMERCIAL

## 2023-05-11 VITALS
RESPIRATION RATE: 17 BRPM | TEMPERATURE: 97 F | SYSTOLIC BLOOD PRESSURE: 171 MMHG | OXYGEN SATURATION: 96 % | DIASTOLIC BLOOD PRESSURE: 92 MMHG | WEIGHT: 203 LBS | BODY MASS INDEX: 30 KG/M2 | HEART RATE: 52 BPM

## 2023-05-11 DIAGNOSIS — I25.10 CAD (CORONARY ARTERY DISEASE): ICD-10-CM

## 2023-05-11 DIAGNOSIS — M79.669 LOWER LEG PAIN: ICD-10-CM

## 2023-05-11 DIAGNOSIS — I73.9 PAD (PERIPHERAL ARTERY DISEASE) (HCC): ICD-10-CM

## 2023-05-11 DIAGNOSIS — Z01.818 PREOPERATIVE TESTING: Primary | ICD-10-CM

## 2023-05-11 LAB
DEPRECATED RDW RBC AUTO: 47.4 FL (ref 35.1–46.3)
ERYTHROCYTE [DISTWIDTH] IN BLOOD BY AUTOMATED COUNT: 14.6 % (ref 11–15)
GLUCOSE BLDC GLUCOMTR-MCNC: 210 MG/DL (ref 70–99)
HCT VFR BLD AUTO: 37.3 %
HGB BLD-MCNC: 12.2 G/DL
INR BLD: 1.01 (ref 0.85–1.16)
ISTAT ACTIVATED CLOTTING TIME: 197 SECONDS (ref 125–137)
MCH RBC QN AUTO: 29.1 PG (ref 26–34)
MCHC RBC AUTO-ENTMCNC: 32.7 G/DL (ref 31–37)
MCV RBC AUTO: 89 FL
PLATELET # BLD AUTO: 410 10(3)UL (ref 150–450)
PROTHROMBIN TIME: 13.2 SECONDS (ref 11.6–14.8)
RBC # BLD AUTO: 4.19 X10(6)UL
WBC # BLD AUTO: 10.7 X10(3) UL (ref 4–11)

## 2023-05-11 PROCEDURE — B41F1ZZ FLUOROSCOPY OF RIGHT LOWER EXTREMITY ARTERIES USING LOW OSMOLAR CONTRAST: ICD-10-PCS | Performed by: INTERNAL MEDICINE

## 2023-05-11 PROCEDURE — 047M3ZZ DILATION OF RIGHT POPLITEAL ARTERY, PERCUTANEOUS APPROACH: ICD-10-PCS | Performed by: INTERNAL MEDICINE

## 2023-05-11 PROCEDURE — 04CK3ZZ EXTIRPATION OF MATTER FROM RIGHT FEMORAL ARTERY, PERCUTANEOUS APPROACH: ICD-10-PCS | Performed by: INTERNAL MEDICINE

## 2023-05-11 PROCEDURE — 85027 COMPLETE CBC AUTOMATED: CPT | Performed by: INTERNAL MEDICINE

## 2023-05-11 PROCEDURE — 99152 MOD SED SAME PHYS/QHP 5/>YRS: CPT | Performed by: INTERNAL MEDICINE

## 2023-05-11 PROCEDURE — 047K3ZZ DILATION OF RIGHT FEMORAL ARTERY, PERCUTANEOUS APPROACH: ICD-10-PCS | Performed by: INTERNAL MEDICINE

## 2023-05-11 PROCEDURE — 85610 PROTHROMBIN TIME: CPT | Performed by: INTERNAL MEDICINE

## 2023-05-11 PROCEDURE — 85347 COAGULATION TIME ACTIVATED: CPT

## 2023-05-11 PROCEDURE — 36415 COLL VENOUS BLD VENIPUNCTURE: CPT

## 2023-05-11 PROCEDURE — 82962 GLUCOSE BLOOD TEST: CPT

## 2023-05-11 PROCEDURE — 37225 HC TRANSLUM ANGIOPLASTY W ATHERECT FEM POP UNILAT: CPT | Performed by: INTERNAL MEDICINE

## 2023-05-11 PROCEDURE — 99153 MOD SED SAME PHYS/QHP EA: CPT | Performed by: INTERNAL MEDICINE

## 2023-05-11 RX ORDER — CLOPIDOGREL BISULFATE 75 MG/1
TABLET ORAL
Status: COMPLETED
Start: 2023-05-11 | End: 2023-05-11

## 2023-05-11 RX ORDER — LIDOCAINE HYDROCHLORIDE 20 MG/ML
INJECTION, SOLUTION EPIDURAL; INFILTRATION; INTRACAUDAL; PERINEURAL
Status: COMPLETED
Start: 2023-05-11 | End: 2023-05-11

## 2023-05-11 RX ORDER — SODIUM CHLORIDE 0.9 % (FLUSH) 0.9 %
10 SYRINGE (ML) INJECTION AS NEEDED
Status: DISCONTINUED | OUTPATIENT
Start: 2023-05-11 | End: 2023-05-11

## 2023-05-11 RX ORDER — CLOPIDOGREL BISULFATE 75 MG/1
75 TABLET ORAL DAILY
Qty: 90 TABLET | Refills: 3 | Status: SHIPPED | OUTPATIENT
Start: 2023-05-11

## 2023-05-11 RX ORDER — SODIUM CHLORIDE 9 MG/ML
INJECTION, SOLUTION INTRAVENOUS CONTINUOUS
Status: DISCONTINUED | OUTPATIENT
Start: 2023-05-11 | End: 2023-05-11

## 2023-05-11 RX ORDER — HEPARIN SODIUM 1000 [USP'U]/ML
INJECTION, SOLUTION INTRAVENOUS; SUBCUTANEOUS
Status: COMPLETED
Start: 2023-05-11 | End: 2023-05-11

## 2023-05-11 RX ORDER — MIDAZOLAM HYDROCHLORIDE 1 MG/ML
INJECTION INTRAMUSCULAR; INTRAVENOUS
Status: COMPLETED
Start: 2023-05-11 | End: 2023-05-11

## 2023-05-11 RX ADMIN — SODIUM CHLORIDE: 9 INJECTION, SOLUTION INTRAVENOUS at 06:30:00

## 2023-05-11 NOTE — PROCEDURES
[facility]    Peripheral angiogram/intervention not  [id number] Patient Status:  Inpatient    1945 MRN [id number]   Location [facility] 5SW/SE Attending Miky Tai MD   Hosp Day # 1 PCP Adriana Conroy MD       Cardiologist: Rafi Alatorre MD  Primary Proceduralist: Rafi Alatorre MD  Procedure Performed: Right lower extremity angiogram with catheter placement in the right SFA/directional arthrectomy of proximal right SFA/balloon angioplasty of the proximal right SFA/balloon angioplasty of distal right SFA  Date of Procedure: 2023   Indication: Luz classification 4 symptoms of intermittent claudication    Post procedure findings:    Right common iliac: Heavily calcified with mild disease  Right external iliac: Heavily calcified with mild disease  Right internal iliac: Significant disease  Right common femoral: Heavy calcification with moderate disease  Right deep femoral: 80 to 90% ostial stenosis  Right proximal SFA: 99% stenosis of the right SFA within 2 cm of origin  Right mid SFA: Diffuse disease with 50% stenosis  Right distal SFA: Diffuse disease with focal 80% stenosis in the popliteal area  Right popliteal: Diffuse 80% stenosis  Right anterior tibial: Excellent runoff  Right peroneal: Patent till ankle  Right posterior tibial: Widely patent          Details of the procedure:  After informed consent was obtained, patient was electively brought to the Cath Lab in stable condition. Left groin was prepped in sterile fashion and using a micropuncture sheath, 5 [place] short sheath was placed into the left common femoral artery. Omni catheter was then taken over a J-wire and placed in the distal infrarenal aorta. Hand-injection was performed.   The catheter was then advanced over a wire into the right SFA and multiple injections were performed for the angiogram.    Details of the interventional procedure:  Patient was given heparin for anticoagulation and a 6 [place] crossover sheath was placed from the left groin into the right common femoral artery. 0.014 wire was used to cross the SFA lesion without any difficulty. Turbo Hawk 1 directional arthrectomy catheter was used to perform multiple runs in the 99% proximal right SFA lesion. There was perforation seen in the end of the arthrectomy. 6 mm x 40 mm balloon was used and flow inflated in the proximal left SFA at nominal pressure for 3 minutes with resolution of perforation and no extravasation of dye was visualized. 6 mm x 40 mm balloon was then used to dilate the right distal popliteal lesion and a 5 mm x 40 mm fresh balloon was used to dilate right popliteal artery with excellent results. At this point all the catheters were withdrawn and Perclose was used to achieve hemostasis. Specimen sent to: No specimen collected  Estimated blood loss: 20 cc      IV was maintained by RN and moderate conscious sedation of versed and fentanyl was given.   Patient was assessed and monitoring of oxygen, heart rate and blood pressure by nurse and myself during the exam from 8:25 AM to 9:10 Keegan Kearney MD

## 2023-05-11 NOTE — IVS NOTE
DISCHARGE NOTE     Pt is able to sit up and ambulate without difficulty. Pt voided and tolerated fluids and food. Left femoral procedural site remains dry and intact with good circulation, motion, and sensation. No signs and symptoms of bleeding/hematoma noted. Pt denies any pain or discomfort at this time. IV access removed  Instruction provided, patient/family verbalizes understanding. Dr. Donovan Rankin spoke with patient/family post procedure.      Pt discharge via wheelchair to [address] [place] [address] B       Follow up Appointment: 5/19 at 3:50pm    New Prescription: clopidogrel 75mg daily

## 2023-05-24 ENCOUNTER — OFFICE VISIT (OUTPATIENT)
Dept: FAMILY MEDICINE CLINIC | Facility: CLINIC | Age: 73
End: 2023-05-24

## 2023-05-24 ENCOUNTER — TELEPHONE (OUTPATIENT)
Dept: FAMILY MEDICINE CLINIC | Facility: CLINIC | Age: 73
End: 2023-05-24

## 2023-05-24 VITALS
WEIGHT: 200.38 LBS | DIASTOLIC BLOOD PRESSURE: 80 MMHG | HEART RATE: 89 BPM | SYSTOLIC BLOOD PRESSURE: 164 MMHG | BODY MASS INDEX: 30 KG/M2

## 2023-05-24 DIAGNOSIS — Z12.5 SCREENING PSA (PROSTATE SPECIFIC ANTIGEN): ICD-10-CM

## 2023-05-24 DIAGNOSIS — Z00.00 ROUTINE MEDICAL EXAM: ICD-10-CM

## 2023-05-24 DIAGNOSIS — Z79.4 INSULIN DEPENDENT TYPE 2 DIABETES MELLITUS (HCC): ICD-10-CM

## 2023-05-24 DIAGNOSIS — E11.9 INSULIN DEPENDENT TYPE 2 DIABETES MELLITUS (HCC): ICD-10-CM

## 2023-05-24 DIAGNOSIS — E11.29 DIABETES MELLITUS WITH PROTEINURIA (HCC): ICD-10-CM

## 2023-05-24 DIAGNOSIS — Z95.1 S/P CABG X 3: Primary | ICD-10-CM

## 2023-05-24 DIAGNOSIS — R80.9 DIABETES MELLITUS WITH PROTEINURIA (HCC): ICD-10-CM

## 2023-05-24 DIAGNOSIS — I10 PRIMARY HYPERTENSION: ICD-10-CM

## 2023-05-24 DIAGNOSIS — E78.5 HYPERLIPIDEMIA, UNSPECIFIED HYPERLIPIDEMIA TYPE: ICD-10-CM

## 2023-05-24 PROCEDURE — 90677 PCV20 VACCINE IM: CPT | Performed by: FAMILY MEDICINE

## 2023-05-24 PROCEDURE — 99397 PER PM REEVAL EST PAT 65+ YR: CPT | Performed by: FAMILY MEDICINE

## 2023-05-24 PROCEDURE — 3079F DIAST BP 80-89 MM HG: CPT | Performed by: FAMILY MEDICINE

## 2023-05-24 PROCEDURE — 90471 IMMUNIZATION ADMIN: CPT | Performed by: FAMILY MEDICINE

## 2023-05-24 PROCEDURE — 3077F SYST BP >= 140 MM HG: CPT | Performed by: FAMILY MEDICINE

## 2023-05-24 RX ORDER — AMLODIPINE BESYLATE 10 MG/1
10 TABLET ORAL DAILY
Qty: 90 TABLET | Refills: 4 | Status: SHIPPED | OUTPATIENT
Start: 2023-05-24

## 2023-05-24 NOTE — TELEPHONE ENCOUNTER
Sent signed cologuard requisition order form to [facility] [employer] fax# [phone].  Confirmation rec'd

## 2023-05-27 ENCOUNTER — LAB ENCOUNTER (OUTPATIENT)
Dept: LAB | Age: 73
End: 2023-05-27
Attending: INTERNAL MEDICINE
Payer: COMMERCIAL

## 2023-05-27 DIAGNOSIS — E11.65 TYPE 2 DIABETES MELLITUS WITH HYPERGLYCEMIA, WITH LONG-TERM CURRENT USE OF INSULIN (HCC): ICD-10-CM

## 2023-05-27 DIAGNOSIS — Z00.00 ROUTINE MEDICAL EXAM: ICD-10-CM

## 2023-05-27 DIAGNOSIS — E78.5 DYSLIPIDEMIA: ICD-10-CM

## 2023-05-27 DIAGNOSIS — Z79.4 TYPE 2 DIABETES MELLITUS WITH HYPERGLYCEMIA, WITH LONG-TERM CURRENT USE OF INSULIN (HCC): ICD-10-CM

## 2023-05-27 DIAGNOSIS — Z12.5 SCREENING PSA (PROSTATE SPECIFIC ANTIGEN): ICD-10-CM

## 2023-05-27 LAB
CHOLEST SERPL-MCNC: 184 MG/DL (ref ?–200)
COMPLEXED PSA SERPL-MCNC: 4.21 NG/ML (ref ?–4)
CREAT UR-SCNC: 109 MG/DL
FASTING PATIENT LIPID ANSWER: YES
HDLC SERPL-MCNC: 51 MG/DL (ref 40–59)
LDLC SERPL CALC-MCNC: 80 MG/DL (ref ?–100)
MICROALBUMIN UR-MCNC: 287 MG/DL
MICROALBUMIN/CREAT 24H UR-RTO: 2633 UG/MG (ref ?–30)
NONHDLC SERPL-MCNC: 133 MG/DL (ref ?–130)
TRIGL SERPL-MCNC: 327 MG/DL (ref 30–149)
VLDLC SERPL CALC-MCNC: 52 MG/DL (ref 0–30)

## 2023-05-27 PROCEDURE — 80061 LIPID PANEL: CPT

## 2023-05-27 PROCEDURE — 3060F POS MICROALBUMINURIA REV: CPT | Performed by: INTERNAL MEDICINE

## 2023-05-27 PROCEDURE — 36415 COLL VENOUS BLD VENIPUNCTURE: CPT

## 2023-05-27 PROCEDURE — 82570 ASSAY OF URINE CREATININE: CPT

## 2023-05-27 PROCEDURE — 82043 UR ALBUMIN QUANTITATIVE: CPT

## 2023-05-29 ENCOUNTER — TELEPHONE (OUTPATIENT)
Dept: ENDOCRINOLOGY CLINIC | Facility: CLINIC | Age: 73
End: 2023-05-29

## 2023-05-30 NOTE — TELEPHONE ENCOUNTER
Cholesterol is better . Low fat diet.  Daily exercise  PSA is high --> please FU with Dr. Clay Feldman MA is  High --> please follow with nephrology, if he does not have a nephrologist; can give referral to [place] [facility]  Good BG and BP control can also help    Thanks

## 2023-05-31 NOTE — TELEPHONE ENCOUNTER
rn called patient with message with message below, patient verbalized understanding   Has dr Sergio parker.

## 2023-06-01 DIAGNOSIS — Z12.5 SCREENING FOR PROSTATE CANCER: Primary | ICD-10-CM

## 2023-06-01 NOTE — PROGRESS NOTES
Ruddy Angel - the PSA was slightly elevated. Make sure you are not taking any biotin supplements and will repeat in 1 month.  If still elevated, will send you to see a urologist. - Dr. Ernst Client

## 2023-07-17 ENCOUNTER — OFFICE VISIT (OUTPATIENT)
Dept: ENDOCRINOLOGY CLINIC | Facility: CLINIC | Age: 73
End: 2023-07-17

## 2023-07-17 ENCOUNTER — TELEPHONE (OUTPATIENT)
Dept: ENDOCRINOLOGY CLINIC | Facility: CLINIC | Age: 73
End: 2023-07-17

## 2023-07-17 VITALS
WEIGHT: 203 LBS | HEIGHT: 69.5 IN | DIASTOLIC BLOOD PRESSURE: 74 MMHG | BODY MASS INDEX: 29.39 KG/M2 | SYSTOLIC BLOOD PRESSURE: 151 MMHG | HEART RATE: 62 BPM

## 2023-07-17 DIAGNOSIS — E78.5 DYSLIPIDEMIA: ICD-10-CM

## 2023-07-17 DIAGNOSIS — E55.9 VITAMIN D DEFICIENCY: ICD-10-CM

## 2023-07-17 DIAGNOSIS — E11.65 TYPE 2 DIABETES MELLITUS WITH HYPERGLYCEMIA, WITH LONG-TERM CURRENT USE OF INSULIN (HCC): Primary | ICD-10-CM

## 2023-07-17 DIAGNOSIS — Z79.4 TYPE 2 DIABETES MELLITUS WITH HYPERGLYCEMIA, WITH LONG-TERM CURRENT USE OF INSULIN (HCC): Primary | ICD-10-CM

## 2023-07-17 LAB
CARTRIDGE LOT#: ABNORMAL NUMERIC
GLUCOSE BLOOD: 199
HEMOGLOBIN A1C: 9.4 % (ref 4.3–5.6)
TEST STRIP LOT #: NORMAL NUMERIC

## 2023-07-17 PROCEDURE — 82947 ASSAY GLUCOSE BLOOD QUANT: CPT | Performed by: INTERNAL MEDICINE

## 2023-07-17 PROCEDURE — 99214 OFFICE O/P EST MOD 30 MIN: CPT | Performed by: INTERNAL MEDICINE

## 2023-07-17 PROCEDURE — 3077F SYST BP >= 140 MM HG: CPT | Performed by: INTERNAL MEDICINE

## 2023-07-17 PROCEDURE — 83036 HEMOGLOBIN GLYCOSYLATED A1C: CPT | Performed by: INTERNAL MEDICINE

## 2023-07-17 PROCEDURE — 3008F BODY MASS INDEX DOCD: CPT | Performed by: INTERNAL MEDICINE

## 2023-07-17 PROCEDURE — 3078F DIAST BP <80 MM HG: CPT | Performed by: INTERNAL MEDICINE

## 2023-07-17 PROCEDURE — 3046F HEMOGLOBIN A1C LEVEL >9.0%: CPT | Performed by: INTERNAL MEDICINE

## 2023-07-17 RX ORDER — DULAGLUTIDE 3 MG/.5ML
3 INJECTION, SOLUTION SUBCUTANEOUS WEEKLY
COMMUNITY

## 2023-07-17 RX ORDER — BLOOD-GLUCOSE SENSOR
1 EACH MISCELLANEOUS
Qty: 3 EACH | Refills: 2 | Status: SHIPPED | OUTPATIENT
Start: 2023-07-17

## 2023-07-17 NOTE — TELEPHONE ENCOUNTER
Current Outpatient Medications   Medication Sig Dispense Refill           Continuous Blood Gluc Sensor (DEXCOM G7 SENSOR) Does not apply Misc 1 each Every 10 days.  3 each 2                                                                                                      Pharmacy needs prior authorization  [id number]

## 2023-07-17 NOTE — PROGRESS NOTES
Received order from Dr. Cecile Lobo to place sample of a CGM on patient. RN discussed elizabeth 3 vs Dexcom G7. Pt has an adroid and had issues connecting to clinic's internet and was not able to proceed with registering for a Dexcom account (pt was able to download G7 anderson). RN started patient with Dexcom G7 using a  sample from clinic. Informed patient to finish registration at home and can be taught how to use phone as  in a week during CDE visit. RN demonstrated how to apply Dexcom G7 sensor on the back of the arm. Pt chose right upper arm. Informed patient that sensor gets changed every 10 days. RN assisted patient on applying sensor and tolerated it well. RN also paired sensor in front of patient demonstrating how to do it. Pt voiced understanding. RN gave a brochure in case he needs to reference back. RN made follow up appointment with CDE in a week. Patient's BP was elevated on the second repeat and per Dr. Cecile Lobo have patient contact PCP and low salt diet. This was relayed to patient and voiced understanding.      Future Appointments   Date Time Provider Vernell Myers   7/26/2023  1:00 PM [facility] ENDO CDE [facility]

## 2023-07-18 ENCOUNTER — OFFICE VISIT (OUTPATIENT)
Dept: PODIATRY CLINIC | Facility: CLINIC | Age: 73
End: 2023-07-18

## 2023-07-18 DIAGNOSIS — E11.59 TYPE 2 DIABETES MELLITUS WITH OTHER CIRCULATORY COMPLICATION, UNSPECIFIED WHETHER LONG TERM INSULIN USE (HCC): Primary | ICD-10-CM

## 2023-07-18 DIAGNOSIS — Z86.31 HISTORY OF DIABETIC ULCER OF FOOT: ICD-10-CM

## 2023-07-18 DIAGNOSIS — L84 CALLUS OF FOOT: ICD-10-CM

## 2023-07-18 DIAGNOSIS — B35.1 ONYCHOMYCOSIS: ICD-10-CM

## 2023-07-18 DIAGNOSIS — L85.3 XEROSIS OF SKIN: ICD-10-CM

## 2023-07-18 DIAGNOSIS — I73.89 OTHER SPECIFIED PERIPHERAL VASCULAR DISEASES (HCC): ICD-10-CM

## 2023-07-18 PROCEDURE — 99213 OFFICE O/P EST LOW 20 MIN: CPT | Performed by: PODIATRIST

## 2023-07-19 NOTE — PROGRESS NOTES
Daniel Hartmann is a 81year old male. Patient presents with:  Diabetic Foot Care: Nail care and foot check- FBS was not checked in the AM-A1c 9.4 07/17/23- patient denies pain at this time. HPI:   Patient returns to the clinic with a known history of PAD revascularization poor circulation diabetes with some degree of neuropathy for routine care complaining of a thickened callus on his heel from a prior ulceration on the right lower extremity as well as long thick toenails. Patient is anticipating further angioplasty work to his legs. At today's visit reviewed nurse's history as taken above, allergies medications and medical history as documented below. All changes duly noted. Allergies: Victoza and Trulicity [Dulaglutide]   Current Outpatient Medications   Medication Sig Dispense Refill    Dulaglutide (TRULICITY) 3 TX/7.8YU Subcutaneous Solution Pen-injector Inject 3 mg into the skin once a week. Continuous Blood Gluc Sensor (DEXCOM G7 SENSOR) Does not apply Misc 1 each Every 10 days. 3 each 2    amLODIPine 10 MG Oral Tab Take 1 tablet (10 mg total) by mouth daily. 90 tablet 4    clopidogrel 75 MG Oral Tab Take 1 tablet (75 mg total) by mouth daily. 90 tablet 3    Insulin NPH & Regular (HUMULIN 70/30 KWIKPEN) (70-30) 100 UNIT/ML Subcutaneous Suspension Pen-injector Inject 60 Units into the skin in the morning and 60 Units before bedtime. 108 mL 0    Ascorbic Acid (VITAMIN C) 1000 MG Oral Tab Take 1 tablet (1,000 mg total) by mouth daily. aspirin 81 MG Oral Tab EC Take 1 tablet (81 mg total) by mouth daily. LOSARTAN 50 MG Oral Tab TAKE 1 AND 1/2 TABLET BY MOUTH DAILY 45 tablet 4    Insulin Pen Needle (BD PEN NEEDLE BETHEL U/F) 32G X 4 MM Does not apply Misc INJECT TWICE A  each 0    ERGOCALCIFEROL 1.25 MG (55891 UT) Oral Cap TAKE 1 CAPSULE BY MOUTH ONCE A WEEK. 12 capsule 12    metoprolol succinate  MG Oral Tablet 24 Hr Take 1.5 tablets (150 mg total) by mouth daily.  15807 tablet 1 Glucose Blood In Vitro Strip Use to check blood sugar 2 times daily 200 strip 0    calcium carbonate antacid 500 MG Oral Chew Tab Chew 2 tablets (1,000 mg total) by mouth 3 (three) times daily. 90 tablet 0    atorvastatin 80 MG Oral Tab Take 1 tablet (80 mg total) by mouth daily. acetaminophen 500 MG Oral Tab Take 2 tablets (1,000 mg total) by mouth.      multivitamin Oral Tab Take 1 tablet by mouth daily.  30 tablet 0      Past Medical History:   Diagnosis Date    Atherosclerosis of coronary artery     Cataract     stage 1 cataracts    COPD (chronic obstructive pulmonary disease) ([facility])     Coronary atherosclerosis     cabg x3    Diabetes ([facility]     Diabetes mellitus ([facility]     Esophageal reflux     pt. states gets \"heartburn from diabetic med and since starting metoprolol    Essential hypertension     Glaucoma     had 2 years ago \"cleared Up\"    Hearing impairment     brayan hearing aids    High blood pressure     Hyperlipidemia     Nephropathy     Neuropathy     bilateral lower extremities    Osteoarthritis     Pneumonia due to organism     Visual impairment     wears glasses      Past Surgical History:   Procedure Laterality Date    ANESTH,OPEN HEART SURGERY  10/12/2017    triple bypass    ANGIOPLASTY (CORONARY) Left 03/2018    CABG  10/2017    CABGX3    CAROTID ENDARTERECTOMY  07/10/2019    COLONOSCOPY  7/6/15    COLONOSCOPY      5 years \"was fine\"    OTHER SURGICAL HISTORY  7/6/2010    Polyps Dr.M Lexie Barragan    OTHER SURGICAL HISTORY  03/05/2018    peripheral angioplasty    TONSILLECTOMY        Family History   Problem Relation Age of Onset    Diabetes Father     Heart Disorder Father     Lipids Father     Diabetes Maternal Grandfather     Cancer Mother       Social History    Socioeconomic History      Marital status:     Tobacco Use      Smoking status: Former        Packs/day: 1.00        Years: 40.00        Pack years: 40        Types: Cigarettes        Quit date: 10/17/2017        Years since quittin.7      Smokeless tobacco: Never    Vaping Use      Vaping Use: Never used    Substance and Sexual Activity      Alcohol use: No        Alcohol/week: 0.0 standard drinks of alcohol        Comment: rarely       Drug use: No      Sexual activity: Never          REVIEW OF SYSTEMS:   Today reviewed systens as documented below  GENERAL HEALTH: feels well otherwise  SKIN: Refer to exam below  RESPIRATORY: denies shortness of breath with exertion  CARDIOVASCULAR: denies chest pain on exertion  GI: denies abdominal pain and denies heartburn  NEURO: denies headaches    EXAM:   There were no vitals taken for this visit. GENERAL: well developed, well nourished, in no apparent distress  EXTREMITIES:   1. Integument: The skin on his feet was examined its cool and dry there is a hyperkeratosis of the lateral plantar right heel there is no ulceration present. His nails 1-5 on both feet are thickened yellowed and dystrophic with subungual debris and distal lysis from the nailbed. 2. Vascular: The patient does not have palpable pulses he has a known history of arterial sclerotic disease known history of intervention. 3. Neurologic: The patient has some diminished pain sensation. 4. Musculoskeletal: The patient is ambulatory with good muscle strength in his extremities. ASSESSMENT AND PLAN:   Diagnoses and all orders for this visit:    Type 2 diabetes mellitus with other circulatory complication, unspecified whether long term insulin use ([address]    Other specified peripheral vascular diseases ([address]    Xerosis of skin    Onychomycosis    Callus of foot    History of diabetic ulcer of foot        Plan: Today using a nail nippers were trimmed and debrided toenails 1-5 manually and mechanically in girth and width as far down to healthy tissue as possible on both feet. This was done uneventfully there was no hemorrhage.   There is mild incurvation of the medial and lateral nail borders of the hallux which using a slant back technique were removed and they would not get ingrown. Using a 15 blade trimmed down and debrided the hyperkeratosis at the lateral plantar right heel. .  Follow-up in 2 months. Also the patient had fallen got 2 small little bumps on his left foot but they are currently healing they are scabbed and they are not ulcerated. Patient saw Dr. Alicia Bull who confirmed his circulation was intact but is considering some venous procedure at this time  Patient will continue to moisturize his feet I recommended a urea cream with Sal-Acid  Advised proper foot checkups immediate return call if he has any questions or problems or the emergency room if he fears infection. The patient indicates understanding of these issues and agrees to the plan. Patient again reminded of early diagnosis and management if he notices any ulcers on his toes or foot.   Evangelina Joyce DPM

## 2023-08-09 DIAGNOSIS — N18.32 STAGE 3B CHRONIC KIDNEY DISEASE (HCC): Primary | ICD-10-CM

## 2023-08-09 RX ORDER — LOSARTAN POTASSIUM 50 MG/1
TABLET ORAL
Qty: 45 TABLET | Refills: 1 | Status: SHIPPED | OUTPATIENT
Start: 2023-08-09

## 2023-08-09 NOTE — TELEPHONE ENCOUNTER
LOV-4/10/23  RTC-6 months around 10/10/23  Follow up-no appt yet  Please sign pending order if appropriate,thanks.

## 2023-08-28 NOTE — PROGRESS NOTES
"Chief complaint:    Chief Complaint   Patient presents with    Other     Excesses saliva and "some that wont come out" has had some improvement c/o issue since fall 3 weeks ago hitting the back of the head   States when it wont come out it wont allow her to urinate            Referring Provider:  Shital Sunshine Md  [id number] [place] [address] [facility] [place] [address]      History of present illness:     Ms. Yancey is a 89 y.o. female patient with a PMHx of AICD, CAD, CHF, DM, HLD, HTN, thyroid disease, and TR who presents for issues with saliva.     Present for years, but symptoms worsened after falling a few weeks ago.     States she has sensation of something in her throat that won't come out. She will spit into a bag frequently.   Does have some dysphagia too.    No PNA, voice change, trismus, trouble breathing.    Neuro -   Does have issues with twitching muscles/tremors, shuffling gait.  Saw neuro last in 2017.     History of radiation to temple for a cancer. Caused dental disease.       History      Past Medical History:   Past Medical History:   Diagnosis Date    Abnormal ECG 12/17/2014    AICD (automatic cardioverter/defibrillator) present 6/14/2013    CAD (coronary artery disease) 6/14/2013    Cancer     basil cell carcinoma    Cardiac arrest 6/14/2013    CHF (congestive heart failure)     CRI (chronic renal insufficiency) 6/14/2013    Depression 6/14/2013    Diabetes mellitus     Fatigue 6/14/2013    Heart block     Hyperlipidemia     Hypertension     LBBB (left bundle branch block) 12/17/2014    Mitral regurgitation 6/14/2013    Mixed hyperlipidemia 6/14/2013    Pulmonary nodule 6/14/2013    Thyroid disease     TR (tricuspid regurgitation) 6/14/2013    Ventricular fibrillation 6/14/2013         Past Surgical History:No past surgical history on file.      Medications: Medication list reviewed. She  has a current medication list which includes the following prescription(s): aspirin, carvedilol, cefepime in " [facility]  Hospitalist Progress  Note     Thaddeus Doyle Patient Status:  Inpatient      74year old [facility] [id number]   Location 541/541-A Attending Mynor Covington MD   Hosp Day # 1 PCP Wing Cutler MD     ASSESSMENT/PLAN    Mandy Mackenzie bruit.  CV: RRR, S1S2, No gallop, rub, murmur. Pulm: Effort and breath sounds normal. No distress, wheezes, rales, rhonchi. Abd: Soft, NTND, BS normal, no mass, no HSM, no rebound/guarding. Neuro: Normal reflexes, CN.  Sensory/motor exams grossly normal dextrose 5 %, cetirizine, clonazepam, clonidine, fluoxetine, fluticasone propionate, furosemide, levothyroxine, naproxen sodium, omega-3 fatty acids, clindamycin, and [DISCONTINUED] sertraline.     Allergies:   Review of patient's allergies indicates:   Allergen Reactions    Zetia [ezetimibe]     Atorvastatin     Avelox [moxifloxacin]     Erythromycin     Lisinopril     Norvasc [amlodipine]     Pcn [penicillins]     Statins-hmg-coa reductase inhibitors     Cefepime Rash    Tylenol [acetaminophen] Rash         Family history: family history includes Heart attack in her father; Hyperlipidemia in her father; Hypertension in her daughter and father.         Social History          Alcohol use:  reports current alcohol use of about 7.0 standard drinks of alcohol per week.            Tobacco:  reports that she has never smoked. She has never used smokeless tobacco.         Physical Examination      Vitals: There were no vitals taken for this visit.      General: Well developed, well nourished, well hydrated.     Voice: no dysphonia, no dysarthria      Head/Face: Normocephalic, atraumatic. No scars or lesions. Facial musculature equal.     Eyes: No scleral icterus or conjunctival hemorrhage. EOMI. PERRLA.     Ears:     Right ear: No gross deformity. EAC is clear of debris and erythema. TM are intact with a pneumatized middle ear. No signs of retraction, fluid or infection.      Left ear: No gross deformity. EAC is clear of debris and erythema. TM are intact with a pneumatized middle ear. No signs of retraction, fluid or infection.      Nose: No gross deformity or lesions. No purulent discharge. No significant NSD.      Mouth/Oropharynx: Lips without any lesions. No mucosal lesions within the oropharynx. No tonsillar exudate or lesions. Pharyngeal walls symmetrical. Uvula midline. Tongue midline without lesions.     Neck: Trachea midline. No masses. No thyromegaly or nodules palpated.     Lymphatic: No lymphadenopathy in  HYDROcodone-acetaminophen    >35min spent, >50% spent counseling and coordinating care in the form of educating pt/family and d/w consultants and staff.   Extensive discussion with the patient and wife explaining the rationale behind DVT prophylaxis, yaakov the neck.     Extremities: No cyanosis. Warm and well-perfused.     Skin: No scars or lesions on face or neck.      Neurologic: Moving all extremities without gross abnormality.CN II-XII grossly intact. House-Brackmann 1/6. No signs of nystagmus.          Data reviewed      Review of records:      I reviewed records from the referring provider's office visits, including the history, workup, and/or treatment of this problem thus far.        Imaging:      I have independently reviewed the following imaging with the findings noted below:       CT head 8/1/23    Complete opacification of the left sphenoid sinus       FINDINGS:  Atrophy and chronic white matter changes.  No extra-axial blood or fluid collections.     No parenchymal mass, hemorrhage, edema or major vascular distribution infarct.     Skull/extracranial contents (limited evaluation): No fracture. Mastoid air cells and paranasal sinuses are essentially clear.     Mild moderate left posterior high convexity scalp hematoma     Impression:     No intracranial hemorrhage mass effect or midline shift.  Mild moderate left posterior high convexity scalp hematoma.      XR sinus   EXAM:  XR SINUSES COMP MIN 3 VIEWS   CLINICAL HISTORY:  DG1:Other specified symptoms and signs involving the   circulatory and respiratory systems   TECHNIQUE: Sinus x-ray 3 views   FINDINGS:  The paranasal sinuses appear grossly clear. No air-fluid levels are   identified. There is no significant nasal septal deviation.       Procedures:    Procedure -Transnasal fiberoptic laryngoscopy     Surgeon: Bi Kirkland M.D. .      Anesthesia: topical 0.05% oxymetazoline with 4% lidocaine      Complications: None.     Description of Procedure: With the patient in the sitting position, topical lidocaine and oxymetazoline was applied to the nose. The scope was passed through the nose. Examination was carried out of the nose, nasopharynx, oropharynx, hypopharynx, and larynx with findings as noted  below. Scope was removed. The patient tolerated the procedure well.      Findings: No masses or lesions in the nose, nasopharynx, oropharynx, hypopharynx, or larynx. Vocal fold abduction and adduction is normal. No pooling of secretions in the piriform sinuses, penetration, or aspiration.      Procedure Note - Flexible Nasal Endoscopy     The nose was sprayed with oxymetazoline and 4% lidocaine. With the patient in the upright position, a 4.0mm flexible endoscope was inserted into the patient's right and left nare. The overall appearance of the nasal cavity and paranasal sinuses were noted and the findings are described below.     Findings:  The nasal septum was intact and was not deviated.  The inferior turbinates were not enlarged. There was not any evidence of nasal polyps, masses, or lesions within the nasal cavity.  Mucopurulent drainage was noted at the left sphenoethmoidal recess.  Small amount of drainage down the NP. When the patient was experiencing sensation of throat being full of saliva, the purulent drainage was not collecting in her throat.        Assessment/Plan:    1. Sialorrhea    2. Chronic sphenoidal sinusitis    3. Tremor          I do not think her issues is excess salivary production and I would be very hestitant to start glycopyrrolate  as she also has a history of radiation xerostomia. Her major issues appears to be clearance of her saliva. I suspect it is of neurologic etiology and she is not initiating her normal swallowing mechanism frequently enough to clear her saliva. We discussed MBS to assess for swallow safety and neurology referral.     We also discussed that she does have evidence of sphenoid sinusitis. This may be a minor contributing factor to her globus sensation. We discussed starting 10d ay course of antibiotics, continuing flonsae, and saline irrigations. Return to clinic 3-4 weeks for recheck.      Bi Kirkland MD  [facility]   [facility]  [address] [place] [address]  P: [phone]  F: [phone]

## 2023-08-30 RX ORDER — METOPROLOL SUCCINATE 100 MG/1
150 TABLET, EXTENDED RELEASE ORAL DAILY
Qty: 135 TABLET | Refills: 0 | Status: SHIPPED | OUTPATIENT
Start: 2023-08-30

## 2023-09-26 ENCOUNTER — HOSPITAL ENCOUNTER (OUTPATIENT)
Dept: ULTRASOUND IMAGING | Age: 73
Discharge: HOME | End: 2023-09-26
Attending: PHYSICIAN ASSISTANT
Payer: COMMERCIAL

## 2023-09-26 DIAGNOSIS — I65.23 BILATERAL CAROTID ARTERY STENOSIS: ICD-10-CM

## 2023-09-26 PROCEDURE — 93880 EXTRACRANIAL BILAT STUDY: CPT | Performed by: PHYSICIAN ASSISTANT

## 2023-09-30 NOTE — TELEPHONE ENCOUNTER
Last vitamin D level 4/17/2023 33.8 per Dr. Burch Comp. No protocol for requested medication.   Please advise on refill request.

## 2023-10-02 DIAGNOSIS — Z79.4 TYPE 2 DIABETES MELLITUS WITH HYPERGLYCEMIA, WITH LONG-TERM CURRENT USE OF INSULIN (HCC): ICD-10-CM

## 2023-10-02 DIAGNOSIS — E11.65 TYPE 2 DIABETES MELLITUS WITH HYPERGLYCEMIA, WITH LONG-TERM CURRENT USE OF INSULIN (HCC): ICD-10-CM

## 2023-10-02 RX ORDER — INSULIN HUMAN 100 [IU]/ML
60 INJECTION, SUSPENSION SUBCUTANEOUS 2 TIMES DAILY
Qty: 108 ML | Refills: 0 | Status: SHIPPED | OUTPATIENT
Start: 2023-10-02

## 2023-10-02 RX ORDER — ERGOCALCIFEROL 1.25 MG/1
50000 CAPSULE ORAL WEEKLY
Qty: 12 CAPSULE | Refills: 11 | Status: SHIPPED | OUTPATIENT
Start: 2023-10-02

## 2023-10-02 NOTE — TELEPHONE ENCOUNTER
Dr. Jonathan Ferrer,     Please advise regarding refill request pending for approval    LOV: 7/17/23  RTC: 1/2/24

## 2023-10-02 NOTE — TELEPHONE ENCOUNTER
Pharmacy is calling to request a refill on the following     Medication Quantity Refills Start End   Insulin NPH & Regular (HUMULIN 70/30 KWIKPEN) (70-30) 100 UNIT/ML Subcutaneous Suspension Pen-injector 108 mL 0 5/9/2023    Sig:   Inject 60 Units into the skin in the morning and 60 Units before bedtime.      Route:   Subcutaneous     Order #:   [id number]

## 2023-10-02 NOTE — TELEPHONE ENCOUNTER
I sent refill  He canceled recent apt   I will please like to see him sooner  Can offer sooner apt Tuesday evening around 6 pm Thanks

## 2023-10-23 ENCOUNTER — OFFICE VISIT (OUTPATIENT)
Dept: PODIATRY CLINIC | Facility: CLINIC | Age: 73
End: 2023-10-23

## 2023-10-23 DIAGNOSIS — I73.89 OTHER SPECIFIED PERIPHERAL VASCULAR DISEASES (HCC): ICD-10-CM

## 2023-10-23 DIAGNOSIS — L85.3 XEROSIS OF SKIN: ICD-10-CM

## 2023-10-23 DIAGNOSIS — L84 CALLUS OF FOOT: ICD-10-CM

## 2023-10-23 DIAGNOSIS — Z86.31 HISTORY OF DIABETIC ULCER OF FOOT: ICD-10-CM

## 2023-10-23 DIAGNOSIS — B35.1 ONYCHOMYCOSIS: ICD-10-CM

## 2023-10-23 DIAGNOSIS — E11.59 TYPE 2 DIABETES MELLITUS WITH OTHER CIRCULATORY COMPLICATION, UNSPECIFIED WHETHER LONG TERM INSULIN USE (HCC): Primary | ICD-10-CM

## 2023-10-23 PROCEDURE — 99213 OFFICE O/P EST LOW 20 MIN: CPT | Performed by: PODIATRIST

## 2023-10-23 NOTE — PROGRESS NOTES
Fara Claudio is a 81year old male. No chief complaint on file. HPI:   Patient returns to the clinic with a known history of PAD revascularization poor circulation diabetes with some degree of neuropathy for routine care complaining of a thickened callus on his heel from a prior ulceration on the right lower extremity as well as long thick toenails. At today's visit reviewed nurse's history as taken above, allergies medications and medical history as documented below. All changes duly noted. Allergies: Victoza and Trulicity [Dulaglutide]   Current Outpatient Medications   Medication Sig Dispense Refill    ergocalciferol 1.25 MG (13118 UT) Oral Cap Take 1 capsule (50,000 Units total) by mouth once a week. 12 capsule 11    Insulin NPH & Regular (HUMULIN 70/30 KWIKPEN) (70-30) 100 UNIT/ML Subcutaneous Suspension Pen-injector Inject 60 Units into the skin in the morning and 60 Units before bedtime. 108 mL 0    metoprolol succinate  MG Oral Tablet 24 Hr Take 1.5 tablets (150 mg total) by mouth daily. 135 tablet 0    losartan 50 MG Oral Tab TAKE 1 AND 1/2 TABLET BY MOUTH DAILY 45 tablet 1    Dulaglutide (TRULICITY) 3 LI/6.3LN Subcutaneous Solution Pen-injector Inject 3 mg into the skin once a week. Continuous Blood Gluc Sensor (DEXCOM G7 SENSOR) Does not apply Misc 1 each Every 10 days. 3 each 2    amLODIPine 10 MG Oral Tab Take 1 tablet (10 mg total) by mouth daily. 90 tablet 4    clopidogrel 75 MG Oral Tab Take 1 tablet (75 mg total) by mouth daily. 90 tablet 3    Ascorbic Acid (VITAMIN C) 1000 MG Oral Tab Take 1 tablet (1,000 mg total) by mouth daily. aspirin 81 MG Oral Tab EC Take 1 tablet (81 mg total) by mouth daily.       Insulin Pen Needle (BD PEN NEEDLE BETHEL U/F) 32G X 4 MM Does not apply Misc INJECT TWICE A  each 0    Glucose Blood In Vitro Strip Use to check blood sugar 2 times daily 200 strip 0    calcium carbonate antacid 500 MG Oral Chew Tab Chew 2 tablets (1,000 mg total) by mouth 3 (three) times daily. 90 tablet 0    atorvastatin 80 MG Oral Tab Take 1 tablet (80 mg total) by mouth daily. acetaminophen 500 MG Oral Tab Take 2 tablets (1,000 mg total) by mouth.      multivitamin Oral Tab Take 1 tablet by mouth daily.  30 tablet 0      Past Medical History:   Diagnosis Date    Atherosclerosis of coronary artery     Cataract     stage 1 cataracts    COPD (chronic obstructive pulmonary disease) ([facility])     Coronary atherosclerosis     cabg x3    Diabetes ([address]     Diabetes mellitus ([address]     Esophageal reflux     pt. states gets \"heartburn from diabetic med and since starting metoprolol    Essential hypertension     Glaucoma     had 2 years ago \"cleared Up\"    Hearing impairment     brayan hearing aids    High blood pressure     Hyperlipidemia     Nephropathy     Neuropathy     bilateral lower extremities    Osteoarthritis     Pneumonia due to organism     Visual impairment     wears glasses      Past Surgical History:   Procedure Laterality Date    ANESTH,OPEN HEART SURGERY  10/12/2017    triple bypass    ANGIOPLASTY (CORONARY) Left 2018    CABG  10/2017    CABGX3    CAROTID ENDARTERECTOMY  07/10/2019    COLONOSCOPY  7/6/15    COLONOSCOPY      5 years \"was fine\"    OTHER SURGICAL HISTORY  2010    Polyps Dr.M Dolan Bamberger    OTHER SURGICAL HISTORY  2018    peripheral angioplasty    TONSILLECTOMY        Family History   Problem Relation Age of Onset    Diabetes Father     Heart Disorder Father     Lipids Father     Diabetes Maternal Grandfather     Cancer Mother       Social History    Socioeconomic History      Marital status:     Tobacco Use      Smoking status: Former        Packs/day: 1.00        Years: 40.00        Additional pack years: 0.00        Total pack years: 40.00        Types: Cigarettes        Quit date: 10/17/2017        Years since quittin.0      Smokeless tobacco: Never    Vaping Use      Vaping Use: Never used    Substance and Sexual Activity Alcohol use: No        Alcohol/week: 0.0 standard drinks of alcohol        Comment: rarely       Drug use: No      Sexual activity: Never          REVIEW OF SYSTEMS:   Today reviewed systens as documented below  GENERAL HEALTH: feels well otherwise  SKIN: Refer to exam below  RESPIRATORY: denies shortness of breath with exertion  CARDIOVASCULAR: denies chest pain on exertion  GI: denies abdominal pain and denies heartburn  NEURO: denies headaches    EXAM:   There were no vitals taken for this visit. GENERAL: well developed, well nourished, in no apparent distress  EXTREMITIES:   1. Integument: The skin on his feet was examined its cool and dry there is a hyperkeratosis of the lateral plantar right heel there is no ulceration present. His nails 1-5 on both feet are thickened yellowed and dystrophic with subungual debris and distal lysis from the nailbed. There are no sores or ulcerations noted on both feet. 2. Vascular: The patient does not have palpable pulses he has a known history of arterial sclerotic disease known history of intervention. There are no changes here   3. Neurologic: The patient has some diminished pain sensation. 4. Musculoskeletal: The patient is ambulatory with good muscle strength in his extremities. ASSESSMENT AND PLAN:   Diagnoses and all orders for this visit:    Type 2 diabetes mellitus with other circulatory complication, unspecified whether long term insulin use ([address]    Other specified peripheral vascular diseases ([address]    Xerosis of skin    Onychomycosis    Callus of foot    History of diabetic ulcer of foot        Plan: Today using a nail nippers were trimmed and debrided toenails 1-5 manually and mechanically in girth and width as far down to healthy tissue as possible on both feet. This was done uneventfully there was no hemorrhage.   There is mild incurvation of the medial and lateral nail borders of the hallux which using a slant back technique were removed and they would not get ingrown. Using a 15 blade trimmed down and debrided the hyperkeratosis at the lateral plantar right heel. .  Follow-up in 2 months. Also the patient had fallen got 2 small little bumps on his left foot but they are currently healing they are scabbed and they are not ulcerated. Patient saw Dr. Pilar Villalobos who confirmed his circulation was intact but is considering some venous procedure at this time  Patient will continue to moisturize his feet I recommended a urea cream with Sal-Acid  Advised proper foot checkups immediate return call if he has any questions or problems or the emergency room if he fears infection. The patient indicates understanding of these issues and agrees to the plan. Patient again reminded of early diagnosis and management if he notices any ulcers on his toes or foot.   Armand Rodriges DPM

## 2023-11-08 RX ORDER — LOSARTAN POTASSIUM 50 MG/1
TABLET ORAL
Qty: 45 TABLET | Refills: 0 | Status: SHIPPED | OUTPATIENT
Start: 2023-11-08

## 2023-11-08 NOTE — TELEPHONE ENCOUNTER
Called pt. Relayed MD note below. Offered to schedule but pt refused. Advised to call us back or schedule online. Verbalized understanding.

## 2023-11-09 ENCOUNTER — LAB ENCOUNTER (OUTPATIENT)
Dept: LAB | Age: 73
End: 2023-11-09
Attending: INTERNAL MEDICINE
Payer: COMMERCIAL

## 2023-11-09 DIAGNOSIS — Z12.5 SCREENING FOR PROSTATE CANCER: ICD-10-CM

## 2023-11-09 DIAGNOSIS — N18.32 STAGE 3B CHRONIC KIDNEY DISEASE (HCC): ICD-10-CM

## 2023-11-09 LAB
ALBUMIN SERPL-MCNC: 4.4 G/DL (ref 3.2–4.8)
ANION GAP SERPL CALC-SCNC: 6 MMOL/L (ref 0–18)
BUN BLD-MCNC: 32 MG/DL (ref 9–23)
BUN/CREAT SERPL: 10 (ref 10–20)
CALCIUM BLD-MCNC: 10.5 MG/DL (ref 8.7–10.4)
CHLORIDE SERPL-SCNC: 105 MMOL/L (ref 98–112)
CO2 SERPL-SCNC: 22 MMOL/L (ref 21–32)
COMPLEXED PSA SERPL-MCNC: 3.08 NG/ML (ref ?–4)
COMPLEXED PSA SERPL-MCNC: 4.28 NG/ML (ref ?–4)
CREAT BLD-MCNC: 3.19 MG/DL
EGFRCR SERPLBLD CKD-EPI 2021: 20 ML/MIN/1.73M2 (ref 60–?)
GLUCOSE BLD-MCNC: 260 MG/DL (ref 70–99)
OSMOLALITY SERPL CALC.SUM OF ELEC: 292 MOSM/KG (ref 275–295)
PHOSPHATE SERPL-MCNC: 4.4 MG/DL (ref 2.4–5.1)
POTASSIUM SERPL-SCNC: 4 MMOL/L (ref 3.5–5.1)
SODIUM SERPL-SCNC: 133 MMOL/L (ref 136–145)

## 2023-11-09 PROCEDURE — 82306 VITAMIN D 25 HYDROXY: CPT

## 2023-11-09 PROCEDURE — 85025 COMPLETE CBC W/AUTO DIFF WBC: CPT

## 2023-11-09 PROCEDURE — 36415 COLL VENOUS BLD VENIPUNCTURE: CPT

## 2023-11-09 PROCEDURE — 80069 RENAL FUNCTION PANEL: CPT

## 2023-11-09 PROCEDURE — 85060 BLOOD SMEAR INTERPRETATION: CPT

## 2023-11-10 ENCOUNTER — TELEPHONE (OUTPATIENT)
Dept: NEPHROLOGY | Facility: CLINIC | Age: 73
End: 2023-11-10

## 2023-11-10 ENCOUNTER — LAB ENCOUNTER (OUTPATIENT)
Dept: LAB | Age: 73
End: 2023-11-10
Attending: INTERNAL MEDICINE
Payer: COMMERCIAL

## 2023-11-10 DIAGNOSIS — N18.32 STAGE 3B CHRONIC KIDNEY DISEASE (HCC): Primary | ICD-10-CM

## 2023-11-10 DIAGNOSIS — N18.32 STAGE 3B CHRONIC KIDNEY DISEASE (HCC): ICD-10-CM

## 2023-11-10 LAB
BASOPHILS # BLD AUTO: 0.12 X10(3) UL (ref 0–0.2)
BASOPHILS NFR BLD AUTO: 1 %
CREAT UR-SCNC: 66 MG/DL
DEPRECATED RDW RBC AUTO: 43.2 FL (ref 35.1–46.3)
EOSINOPHIL # BLD AUTO: 1.06 X10(3) UL (ref 0–0.7)
EOSINOPHIL NFR BLD AUTO: 9.1 %
ERYTHROCYTE [DISTWIDTH] IN BLOOD BY AUTOMATED COUNT: 13.9 % (ref 11–15)
HCT VFR BLD AUTO: 38.3 %
HGB BLD-MCNC: 12.9 G/DL
IMM GRANULOCYTES # BLD AUTO: 0.06 X10(3) UL (ref 0–1)
IMM GRANULOCYTES NFR BLD: 0.5 %
LYMPHOCYTES # BLD AUTO: 1.56 X10(3) UL (ref 1–4)
LYMPHOCYTES NFR BLD AUTO: 13.4 %
MCH RBC QN AUTO: 28.9 PG (ref 26–34)
MCHC RBC AUTO-ENTMCNC: 33.7 G/DL (ref 31–37)
MCV RBC AUTO: 85.9 FL
MICROALBUMIN UR-MCNC: 193.3 MG/DL
MICROALBUMIN/CREAT 24H UR-RTO: 2928.8 UG/MG (ref ?–30)
MONOCYTES # BLD AUTO: 1 X10(3) UL (ref 0.1–1)
MONOCYTES NFR BLD AUTO: 8.6 %
NEUTROPHILS # BLD AUTO: 7.88 X10 (3) UL (ref 1.5–7.7)
NEUTROPHILS # BLD AUTO: 7.88 X10(3) UL (ref 1.5–7.7)
NEUTROPHILS NFR BLD AUTO: 67.4 %
PLATELET # BLD AUTO: 366 10(3)UL (ref 150–450)
RBC # BLD AUTO: 4.46 X10(6)UL
VIT D+METAB SERPL-MCNC: 33.9 NG/ML (ref 30–100)
WBC # BLD AUTO: 11.7 X10(3) UL (ref 4–11)

## 2023-11-10 PROCEDURE — 3060F POS MICROALBUMINURIA REV: CPT | Performed by: INTERNAL MEDICINE

## 2023-11-10 PROCEDURE — 82043 UR ALBUMIN QUANTITATIVE: CPT

## 2023-11-10 PROCEDURE — 82570 ASSAY OF URINE CREATININE: CPT

## 2023-11-10 NOTE — TELEPHONE ENCOUNTER
Spoke with patient and he understands. The lab is unable to add the labs on so I advised him to go to the lab to have them done. He states \"I'll think about it. \"

## 2023-11-10 NOTE — TELEPHONE ENCOUNTER
He had a couple parathyroid glands removed December 2021 and he's been taking that amount of Tums since then. He sees Dr. Jesica Huynh. Is it still ok for him to stop?

## 2023-11-10 NOTE — TELEPHONE ENCOUNTER
At least decrease Tums to 500 mg daily. It is possible that parathyroid hormone levels may have increased again. Do a PTH and vitamin D level. See if they can be add to the blood drawn yesterday. If not he will have to return to the lab.

## 2023-11-10 NOTE — TELEPHONE ENCOUNTER
Kidney function is getting worse. Calcium levels are still high. Is he still taking a lot of Tums. See soon for follow-up to review.

## 2023-11-11 DIAGNOSIS — R97.20 ELEVATED PSA: Primary | ICD-10-CM

## 2023-11-11 NOTE — PROGRESS NOTES
PSA is still elevated but just over normal range. Please see urologist for further recommendations.  Referral placed for Dr. Brielle Ray. - Dr. Sanjeev Barbosa

## 2023-11-14 ENCOUNTER — OFFICE VISIT (OUTPATIENT)
Dept: ENDOCRINOLOGY CLINIC | Facility: CLINIC | Age: 73
End: 2023-11-14
Payer: COMMERCIAL

## 2023-11-14 VITALS
WEIGHT: 212 LBS | DIASTOLIC BLOOD PRESSURE: 67 MMHG | HEART RATE: 79 BPM | BODY MASS INDEX: 31 KG/M2 | SYSTOLIC BLOOD PRESSURE: 178 MMHG

## 2023-11-14 DIAGNOSIS — E11.65 TYPE 2 DIABETES MELLITUS WITH HYPERGLYCEMIA, WITH LONG-TERM CURRENT USE OF INSULIN (HCC): Primary | ICD-10-CM

## 2023-11-14 DIAGNOSIS — E78.5 DYSLIPIDEMIA: ICD-10-CM

## 2023-11-14 DIAGNOSIS — Z79.4 TYPE 2 DIABETES MELLITUS WITH HYPERGLYCEMIA, WITH LONG-TERM CURRENT USE OF INSULIN (HCC): Primary | ICD-10-CM

## 2023-11-14 DIAGNOSIS — E55.9 VITAMIN D DEFICIENCY: ICD-10-CM

## 2023-11-14 LAB
CARTRIDGE LOT#: ABNORMAL NUMERIC
GLUCOSE BLOOD: 195
HEMOGLOBIN A1C: 11.4 % (ref 4.3–5.6)
TEST STRIP LOT #: NORMAL NUMERIC

## 2023-11-14 PROCEDURE — 3046F HEMOGLOBIN A1C LEVEL >9.0%: CPT | Performed by: INTERNAL MEDICINE

## 2023-11-14 PROCEDURE — 82947 ASSAY GLUCOSE BLOOD QUANT: CPT | Performed by: INTERNAL MEDICINE

## 2023-11-14 PROCEDURE — 3077F SYST BP >= 140 MM HG: CPT | Performed by: INTERNAL MEDICINE

## 2023-11-14 PROCEDURE — 3078F DIAST BP <80 MM HG: CPT | Performed by: INTERNAL MEDICINE

## 2023-11-14 PROCEDURE — 99214 OFFICE O/P EST MOD 30 MIN: CPT | Performed by: INTERNAL MEDICINE

## 2023-11-14 PROCEDURE — 83036 HEMOGLOBIN GLYCOSYLATED A1C: CPT | Performed by: INTERNAL MEDICINE

## 2023-11-14 RX ORDER — BLOOD-GLUCOSE SENSOR
1 EACH MISCELLANEOUS
Qty: 6 EACH | Refills: 0 | Status: SHIPPED | OUTPATIENT
Start: 2023-11-14

## 2023-11-18 ENCOUNTER — LAB ENCOUNTER (OUTPATIENT)
Dept: LAB | Age: 73
End: 2023-11-18
Attending: INTERNAL MEDICINE
Payer: COMMERCIAL

## 2023-11-18 DIAGNOSIS — N18.32 STAGE 3B CHRONIC KIDNEY DISEASE (HCC): ICD-10-CM

## 2023-11-18 DIAGNOSIS — E78.5 DYSLIPIDEMIA: ICD-10-CM

## 2023-11-18 LAB
CHOLEST SERPL-MCNC: 199 MG/DL (ref ?–200)
FASTING PATIENT LIPID ANSWER: YES
HDLC SERPL-MCNC: 43 MG/DL (ref 40–59)
LDLC SERPL CALC-MCNC: 101 MG/DL (ref ?–100)
NONHDLC SERPL-MCNC: 156 MG/DL (ref ?–130)
PTH-INTACT SERPL-MCNC: 56.2 PG/ML (ref 18.5–88)
TRIGL SERPL-MCNC: 327 MG/DL (ref 30–149)
VLDLC SERPL CALC-MCNC: 55 MG/DL (ref 0–30)

## 2023-11-18 PROCEDURE — 83970 ASSAY OF PARATHORMONE: CPT

## 2023-11-18 PROCEDURE — 80061 LIPID PANEL: CPT

## 2023-11-18 PROCEDURE — 36415 COLL VENOUS BLD VENIPUNCTURE: CPT

## 2023-11-27 ENCOUNTER — TELEPHONE (OUTPATIENT)
Dept: ENDOCRINOLOGY CLINIC | Facility: CLINIC | Age: 73
End: 2023-11-27

## 2023-11-27 RX ORDER — PEN NEEDLE, DIABETIC 32GX 5/32"
NEEDLE, DISPOSABLE MISCELLANEOUS
Qty: 200 EACH | Refills: 0 | Status: SHIPPED | OUTPATIENT
Start: 2023-11-27

## 2023-11-27 NOTE — TELEPHONE ENCOUNTER
Pharmacy is calling to request a refill on the following           Medication Quantity Refills Start End   Insulin Pen Needle (BD PEN NEEDLE BETHEL U/F) 32G X 4 MM Does not apply Misc 200 each 0 9/13/2022    Sig:   INJECT TWICE A DAY     Route:   (none)     Order #:   [id number]

## 2023-12-08 RX ORDER — METOPROLOL SUCCINATE 100 MG/1
150 TABLET, EXTENDED RELEASE ORAL DAILY
Qty: 135 TABLET | Refills: 1 | Status: SHIPPED | OUTPATIENT
Start: 2023-12-08

## 2023-12-08 RX ORDER — LOSARTAN POTASSIUM 50 MG/1
TABLET ORAL
Qty: 135 TABLET | Refills: 1 | Status: SHIPPED | OUTPATIENT
Start: 2023-12-08

## 2023-12-13 ENCOUNTER — OFFICE VISIT (OUTPATIENT)
Dept: NEPHROLOGY | Facility: CLINIC | Age: 73
End: 2023-12-13

## 2023-12-13 VITALS
WEIGHT: 213 LBS | DIASTOLIC BLOOD PRESSURE: 60 MMHG | SYSTOLIC BLOOD PRESSURE: 142 MMHG | HEART RATE: 63 BPM | BODY MASS INDEX: 30.84 KG/M2 | HEIGHT: 69.5 IN

## 2023-12-13 DIAGNOSIS — N18.4 CKD (CHRONIC KIDNEY DISEASE) STAGE 4, GFR 15-29 ML/MIN (HCC): Primary | ICD-10-CM

## 2023-12-13 PROCEDURE — 3008F BODY MASS INDEX DOCD: CPT | Performed by: INTERNAL MEDICINE

## 2023-12-13 PROCEDURE — 99214 OFFICE O/P EST MOD 30 MIN: CPT | Performed by: INTERNAL MEDICINE

## 2023-12-13 PROCEDURE — 3077F SYST BP >= 140 MM HG: CPT | Performed by: INTERNAL MEDICINE

## 2023-12-13 PROCEDURE — 3078F DIAST BP <80 MM HG: CPT | Performed by: INTERNAL MEDICINE

## 2023-12-13 NOTE — PATIENT INSTRUCTIONS
Check your blood pressures daily and call me in 1 week. Continue to work with endocrine to get your A1c under better control. Repeat your kidney blood test in 1 month. Orders are in the computer.

## 2023-12-13 NOTE — PROGRESS NOTES
12/13/23        Patient: Des Reid   YOB: 1950   Date of Visit: 12/13/2023       Dear  Dr. Nancy Leon MD,      Thank you for referring Des Reid to my practice. Please find my assessment and plan below. As you know he is a 42-year-old male with a history of adult onset diabetes mellitus diagnosed in 1968, hypertension, moderate aortic stenosis, coronary artery disease, status post coronary artery bypass graft surgery in 2017, history of peripheral vascular disease, status post right carotid endarterectomy, history of primary hyperparathyroidism, status post removal of parathyroid adenoma who I now had the pleasure of seeing for follow-up of chronic kidney disease now stage IV associated with nephrotic syndrome secondary to diabetic nephropathy. Again has not really been compliant with follow-up. On physical exam his blood pressure is 142/60 with a pulse 63 and he weighed 213 pounds. Neck was supple without JVD. Lungs were clear. Heart revealed a regular rate and rhythm with an S4 and a 3/6 systolic ejection murmur. Abdomen was soft, flat, nontender without organomegaly, masses or bruits. Extremities revealed trace edema bilaterally. I reviewed his most recent labs done in November 9, 2023. Creatinine continues to increase. Now up to 3.19 with an estimated GFR 20 cc/min. Calcium borderline high at 10.5. Albumin was 4.4. Urine microalbumin to creatinine ratio was 2929. Hemoglobin 12.9. Last A1c was 11.4. I therefore informed the patient and his wife that there has been continued deterioration in his renal function. His diabetes is not under adequate control. Blood pressures may be borderline high. Reinforced the importance of both blood pressure and blood sugar control. The significance of a GFR 20 cc/min and the indications for dialysis have been reviewed in detail. Signs and symptoms of uremia were discussed.   Recommended checking his blood pressures daily and call me in 1 week. Repeat CBC and renal panel in 1 month. If stable will continue quarterly. Will see again in 3 to 6 months depending upon clinical course. Compliance was stressed. Continue to work with endocrine to get his A1c under control. Thank you again for allowing me to participate in the care of your patient. If you have any questions please feel free to call.            Sincerely,   June Sanchez MD   [facility], [address] [place] [address]  [place] [address] [place] [address]  [id number] [facility] [id number]    Document electronically generated by:  June Sanchez MD

## 2024-01-03 DIAGNOSIS — Z79.4 TYPE 2 DIABETES MELLITUS WITH HYPERGLYCEMIA, WITH LONG-TERM CURRENT USE OF INSULIN (HCC): ICD-10-CM

## 2024-01-03 DIAGNOSIS — E11.65 TYPE 2 DIABETES MELLITUS WITH HYPERGLYCEMIA, WITH LONG-TERM CURRENT USE OF INSULIN (HCC): ICD-10-CM

## 2024-01-03 RX ORDER — INSULIN HUMAN 100 [IU]/ML
60 INJECTION, SUSPENSION SUBCUTANEOUS 2 TIMES DAILY
Qty: 108 ML | Refills: 0 | Status: SHIPPED | OUTPATIENT
Start: 2024-01-03

## 2024-01-03 NOTE — TELEPHONE ENCOUNTER
LOV: 11/14/23     Next office visit: 3/19/24     Last filled: 10/2/23     Order pended and routed to Provider

## 2024-01-24 ENCOUNTER — OFFICE VISIT (OUTPATIENT)
Dept: PODIATRY CLINIC | Facility: CLINIC | Age: 74
End: 2024-01-24
Payer: COMMERCIAL

## 2024-01-24 DIAGNOSIS — L84 CALLUS OF FOOT: ICD-10-CM

## 2024-01-24 DIAGNOSIS — B35.1 ONYCHOMYCOSIS: ICD-10-CM

## 2024-01-24 DIAGNOSIS — E11.59 TYPE 2 DIABETES MELLITUS WITH OTHER CIRCULATORY COMPLICATION, UNSPECIFIED WHETHER LONG TERM INSULIN USE (HCC): Primary | ICD-10-CM

## 2024-01-24 DIAGNOSIS — L85.3 XEROSIS OF SKIN: ICD-10-CM

## 2024-01-24 DIAGNOSIS — I73.89 OTHER SPECIFIED PERIPHERAL VASCULAR DISEASES (HCC): ICD-10-CM

## 2024-01-24 DIAGNOSIS — Z86.31 HISTORY OF DIABETIC ULCER OF FOOT: ICD-10-CM

## 2024-01-24 PROCEDURE — 99213 OFFICE O/P EST LOW 20 MIN: CPT | Performed by: PODIATRIST

## 2024-01-27 NOTE — PROGRESS NOTES
Stanley Mosher is a 73 year old male.   Chief Complaint   Patient presents with    Diabetic Foot Care     Pt here for diabetic foot care. Last A1C 11/14/23 AT 11.4 . . No pain today.          HPI:   Patient returns to the clinic with a known history of PAD revascularization poor circulation diabetes with some degree of neuropathy for routine care complaining of a thickened callus on his heel from a prior ulceration on the right lower extremity as well as long thick toenails.  At today's visit reviewed nurse's history as taken above, allergies medications and medical history as documented below.  All changes duly noted.  Allergies: Victoza and Trulicity [dulaglutide]   Current Outpatient Medications   Medication Sig Dispense Refill    Insulin NPH & Regular (HUMULIN 70/30 KWIKPEN) (70-30) 100 UNIT/ML Subcutaneous Suspension Pen-injector Inject 60 Units into the skin in the morning and 60 Units before bedtime. 108 mL 0    losartan 50 MG Oral Tab TAKE 1 AND 1/2 TABLET BY MOUTH DAILY 135 tablet 1    metoprolol succinate  MG Oral Tablet 24 Hr Take 1.5 tablets (150 mg total) by mouth daily. 135 tablet 1    Insulin Pen Needle (BD PEN NEEDLE BETHEL U/F) 32G X 4 MM Does not apply Misc INJECT TWICE A  each 0    Continuous Blood Gluc Sensor (FREESTYLE BENJI 3 SENSOR) Does not apply Misc 1 each every 14 (fourteen) days. 6 each 0    ergocalciferol 1.25 MG (42385 UT) Oral Cap Take 1 capsule (50,000 Units total) by mouth once a week. 12 capsule 11    amLODIPine 10 MG Oral Tab Take 1 tablet (10 mg total) by mouth daily. 90 tablet 4    clopidogrel 75 MG Oral Tab Take 1 tablet (75 mg total) by mouth daily. 90 tablet 3    Ascorbic Acid (VITAMIN C) 1000 MG Oral Tab Take 0.5 tablets (500 mg total) by mouth daily.      aspirin 81 MG Oral Tab EC Take 1 tablet (81 mg total) by mouth daily.      calcium carbonate antacid 500 MG Oral Chew Tab Chew 2 tablets (1,000 mg total) by mouth 3 (three) times daily. (Patient taking  differently: Chew 1 tablet by mouth daily.) 90 tablet 0    atorvastatin 80 MG Oral Tab Take 1 tablet (80 mg total) by mouth daily.      acetaminophen 500 MG Oral Tab Take 2 tablets (1,000 mg total) by mouth.      multivitamin Oral Tab Take 1 tablet by mouth daily. 30 tablet 0    Glucose Blood In Vitro Strip Use to check blood sugar 2 times daily 200 strip 0      Past Medical History:   Diagnosis Date    Atherosclerosis of coronary artery     Cataract     stage 1 cataracts    COPD (chronic obstructive pulmonary disease) (HCC)     Coronary atherosclerosis     cabg x3    Diabetes (HCC)     Diabetes mellitus (HCC)     Esophageal reflux     pt. states gets \"heartburn from diabetic med and since starting metoprolol    Essential hypertension     Glaucoma     had 2 years ago \"cleared Up\"    Hearing impairment     brayan hearing aids    High blood pressure     Hyperlipidemia     Nephropathy     Neuropathy     bilateral lower extremities    Osteoarthritis     Pneumonia due to organism     Visual impairment     wears glasses      Past Surgical History:   Procedure Laterality Date    ANESTH,OPEN HEART SURGERY  10/12/2017    triple bypass    ANGIOPLASTY (CORONARY) Left 2018    CABG  10/2017    CABGX3    CAROTID ENDARTERECTOMY  07/10/2019    COLONOSCOPY  7/6/15    COLONOSCOPY      5 years \"was fine\"    OTHER SURGICAL HISTORY  2010    Polyps Dr.M Goldberg    OTHER SURGICAL HISTORY  2018    peripheral angioplasty    TONSILLECTOMY        Family History   Problem Relation Age of Onset    Diabetes Father     Heart Disorder Father     Lipids Father     Diabetes Maternal Grandfather     Cancer Mother       Social History     Socioeconomic History    Marital status:    Tobacco Use    Smoking status: Former     Packs/day: 1.00     Years: 40.00     Additional pack years: 0.00     Total pack years: 40.00     Types: Cigarettes     Quit date: 10/17/2017     Years since quittin.2    Smokeless tobacco: Never   Vaping Use     Vaping Use: Never used   Substance and Sexual Activity    Alcohol use: No     Alcohol/week: 0.0 standard drinks of alcohol     Comment: rarely     Drug use: No    Sexual activity: Never           REVIEW OF SYSTEMS:   Today reviewed systens as documented below  GENERAL HEALTH: feels well otherwise  SKIN: Refer to exam below  RESPIRATORY: denies shortness of breath with exertion  CARDIOVASCULAR: denies chest pain on exertion  GI: denies abdominal pain and denies heartburn  NEURO: denies headaches    EXAM:   There were no vitals taken for this visit.  GENERAL: well developed, well nourished, in no apparent distress  EXTREMITIES:   1. Integument: The skin on his feet was examined its cool and dry there is a hyperkeratosis of the lateral plantar right heel there is no ulceration present.  His nails 1-5 on both feet are thickened yellowed and dystrophic with subungual debris and distal lysis from the nailbed.  There are no sores or ulcerations noted on both feet the callus on the lateral aspect of his right heel remains closed where he had a previous ulceration.   2. Vascular: The patient does not have palpable pulses he has a known history of arterial sclerotic disease known history of intervention.  There are no changes here   3. Neurologic: The patient has some diminished pain sensation.   4. Musculoskeletal: The patient is ambulatory with good muscle strength in his extremities.    ASSESSMENT AND PLAN:   Diagnoses and all orders for this visit:    Type 2 diabetes mellitus with other circulatory complication, unspecified whether long term insulin use (HCC)    Other specified peripheral vascular diseases (HCC)    History of diabetic ulcer of foot    Callus of foot    Xerosis of skin    Onychomycosis        Plan: Today using a nail nippers were trimmed and debrided toenails 1-5 manually and mechanically in girth and width as far down to healthy tissue as possible on both feet.  This was done uneventfully there was no  hemorrhage.  There is mild incurvation of the medial and lateral nail borders of the hallux which using a slant back technique were removed and they would not get ingrown.  Using a 15 blade trimmed down and debrided the hyperkeratosis at the lateral plantar right heel..  Follow-up in 2 months.    Also the patient had fallen got 2 small little bumps on his left foot but they are currently healing they are scabbed and they are not ulcerated.  Patient saw Dr. William who confirmed his circulation was intact but is considering some venous procedure at this time  Patient will continue to moisturize his feet I recommended a urea cream with Sal-Acid  Advised proper foot checkups immediate return call if he has any questions or problems or the emergency room if he fears infection.  The patient indicates understanding of these issues and agrees to the plan.  Patient again reminded of early diagnosis and management if he notices any ulcers on his toes or foot.  Rafael Hudson DPM

## 2024-02-05 RX ORDER — BLOOD-GLUCOSE SENSOR
1 EACH MISCELLANEOUS
Qty: 6 EACH | Refills: 1 | Status: SHIPPED | OUTPATIENT
Start: 2024-02-05

## 2024-02-19 ENCOUNTER — TELEPHONE (OUTPATIENT)
Dept: FAMILY MEDICINE CLINIC | Facility: CLINIC | Age: 74
End: 2024-02-19

## 2024-02-19 NOTE — TELEPHONE ENCOUNTER
Spoke with patient, attempted to schedule a HTN follow up but patient got upset stated \"I'm not talking to her about that!\" and disconnected the call.

## 2024-03-19 ENCOUNTER — OFFICE VISIT (OUTPATIENT)
Dept: ENDOCRINOLOGY CLINIC | Facility: CLINIC | Age: 74
End: 2024-03-19
Payer: COMMERCIAL

## 2024-03-19 VITALS
BODY MASS INDEX: 30.06 KG/M2 | DIASTOLIC BLOOD PRESSURE: 67 MMHG | HEIGHT: 70 IN | HEART RATE: 63 BPM | SYSTOLIC BLOOD PRESSURE: 138 MMHG | WEIGHT: 210 LBS

## 2024-03-19 DIAGNOSIS — Z79.4 TYPE 2 DIABETES MELLITUS WITH HYPERGLYCEMIA, WITH LONG-TERM CURRENT USE OF INSULIN (HCC): Primary | ICD-10-CM

## 2024-03-19 DIAGNOSIS — E11.65 TYPE 2 DIABETES MELLITUS WITH HYPERGLYCEMIA, WITH LONG-TERM CURRENT USE OF INSULIN (HCC): Primary | ICD-10-CM

## 2024-03-19 DIAGNOSIS — E78.5 DYSLIPIDEMIA: ICD-10-CM

## 2024-03-19 DIAGNOSIS — E16.2 HYPOGLYCEMIA: ICD-10-CM

## 2024-03-19 DIAGNOSIS — R79.89 HIGH SERUM CALCIUM: ICD-10-CM

## 2024-03-19 LAB
CARTRIDGE LOT#: ABNORMAL NUMERIC
GLUCOSE BLOOD: 106
HEMOGLOBIN A1C: 7.7 % (ref 4.3–5.6)
TEST STRIP LOT #: NORMAL NUMERIC

## 2024-03-19 PROCEDURE — 99214 OFFICE O/P EST MOD 30 MIN: CPT | Performed by: INTERNAL MEDICINE

## 2024-03-19 PROCEDURE — 82947 ASSAY GLUCOSE BLOOD QUANT: CPT | Performed by: INTERNAL MEDICINE

## 2024-03-19 PROCEDURE — 83036 HEMOGLOBIN GLYCOSYLATED A1C: CPT | Performed by: INTERNAL MEDICINE

## 2024-03-19 NOTE — PROGRESS NOTES
Return Office Visit     CHIEF COMPLAINT:    DM  Dyslipidemia   Vitamin D deficiency    HISTORY OF PRESENT ILLNESS:  Stanley Mosher is a 73 year old male who presents for follow up for DM.      DM HISTORY  He states he had \" borderline DM\" as a teenager.  He was on diabinese for a few years after he stopped.    The pills were resumed a few years later.    HISTORY OF DIABETES COMPLICATIONS: :  History of Retinopathy: No,  Eye exam: August 2023  History of Neuropathy: No  History of Nephropathy: Yes    ASSOCIATED COMPLICATIONS:    HTN: Yes  Hyperlipidemia: Yes  Coronary Artery Disease:  Yes  Cerebrovascular Disease: No  Has PVD      HOME GLUCOSE READINGS:   Has been using the elizabeth   Did not get his phone  Reports that BG are much better   Reports some lower Bg overnight a few times a week  ( 50-60, symptomatic)    CURRENT DIABETIC MEDICATIONS INCLUDE:    Insulin 70/30 60 am and 60 pm with meals    T/f trulicity, bydureon and Victoza due to GI SE    Does not want to try ozempic      MEALS:  Moderate compliance      CURRENT MEDICATION:    Current Outpatient Medications   Medication Sig Dispense Refill    Continuous Blood Gluc Sensor (ISC8STYLE ELIZABETH 3 SENSOR) Does not apply Misc 1 each every 14 (fourteen) days. 6 each 1    Insulin NPH & Regular (HUMULIN 70/30 KWIKPEN) (70-30) 100 UNIT/ML Subcutaneous Suspension Pen-injector Inject 60 Units into the skin in the morning and 60 Units before bedtime. 108 mL 0    losartan 50 MG Oral Tab TAKE 1 AND 1/2 TABLET BY MOUTH DAILY 135 tablet 1    metoprolol succinate  MG Oral Tablet 24 Hr Take 1.5 tablets (150 mg total) by mouth daily. 135 tablet 1    Insulin Pen Needle (BD PEN NEEDLE BETHEL U/F) 32G X 4 MM Does not apply Misc INJECT TWICE A  each 0    ergocalciferol 1.25 MG (91753 UT) Oral Cap Take 1 capsule (50,000 Units total) by mouth once a week. 12 capsule 11    amLODIPine 10 MG Oral Tab Take 1 tablet (10 mg total) by mouth daily. 90 tablet 4    clopidogrel 75 MG Oral  Tab Take 1 tablet (75 mg total) by mouth daily. 90 tablet 3    Ascorbic Acid (VITAMIN C) 1000 MG Oral Tab Take 0.5 tablets (500 mg total) by mouth daily.      aspirin 81 MG Oral Tab EC Take 1 tablet (81 mg total) by mouth daily.      calcium carbonate antacid 500 MG Oral Chew Tab Chew 2 tablets (1,000 mg total) by mouth 3 (three) times daily. (Patient taking differently: Chew 1 tablet (500 mg total) by mouth daily.) 90 tablet 0    atorvastatin 80 MG Oral Tab Take 1 tablet (80 mg total) by mouth daily.      multivitamin Oral Tab Take 1 tablet by mouth daily. 30 tablet 0    Glucose Blood In Vitro Strip Use to check blood sugar 2 times daily 200 strip 0    acetaminophen 500 MG Oral Tab Take 2 tablets (1,000 mg total) by mouth.           ALLERGY:  Allergies   Allergen Reactions    Victoza NAUSEA AND VOMITING    Trulicity [Dulaglutide] DIARRHEA and NAUSEA ONLY       PAST MEDICAL, SOCIAL AND FAMILY HISTORY:  See past medical history marked as reviewed.  See past surgical history marked as reviewed.  See past family history marked as reviewed.  See past social history marked as reviewed.    ASSESSMENTS:     REVIEW OF SYSTEMS:  Constitutional: Negative for:  fever, fatigue, cold/heat intolerance, weight change  Eyes: Negative for:  Visual changes, proptosis, blurring  ENT: Negative for:  dysphagia, neck swelling, dysphonia  Respiratory: Negative for:  dyspnea, cough  Cardiovascular: Negative for:  chest pain, palpitations, orthopnea  GI: Negative for:  abdominal pain, nausea, vomiting, diarrhea, constipation, bleeding  Neurology: Negative for: headache, numbness, weakness  Genito-Urinary: Negative for: dysuria, frequency  Psychiatric: Negative for:  depression, anxiety  Hematology/Lymphatics: Negative for: bruising, lower extremity edema  Endocrine: Negative for: polyuria, polydypsia  Skin: Negative for: rash, blister, cellulitis,       PHYSICAL EXAM:   Vitals:    03/19/24 1645   BP: 153/71   Pulse: 78   Weight: 210 lb  (95.3 kg)   Height: 5' 10\" (1.778 m)     BMI: Body mass index is 30.13 kg/m².         General Appearance:  alert, well developed, in no acute distress  Head: Atraumatic  Eyes:  normal conjunctivae, sclera., normal sclera and normal pupils  Throat/Neck: normal sound to voice. Normal hearing, normal speech  Respiratory:  Speaking in full sentences, non-labored. no increased work of breathing, no audible wheezing    Neuro: motor grossly intact, moving all extremities without difficulty  Psychiatric:  oriented to time, self, and place  Extremities: 7/17/2023  Bilateral barefoot  skin diabetic exam is normal, visualized feet and the appearance is normal.  Bilateral monofilament/sensation of both feet is normal.  Pulsation pedal pulse exam of both lower legs/feet is normal as well.        DATA:       A1c is 7.7  % ( 3/2024)    ASSESSMENT AND PLAN:    1. Type 2 DM: CKD, with hyperglycemia    Plan:  Discussed the pathogenesis, natural course of diabetes. Patient understands the importance of glycemic control and the implications of uncontrolled diabetes including Diabetic ketoacidosis and various micro vascular and macrovascular complications.      a). Medications:     A1c is much better    Insulin 70/30   60  units with breakfast and   60--> 54 units with dinner      Continue to monitor BG  Call as discussed   Always call if Bg is under 80      b). Has Nephropathy. Discussed importance of good BG control.   c). Discussed importance of annual eye exams  d). Discussed daily foot exam   e). BG log maintainence explained in great detail, to get log and glucometer on next visit.  f). Life style changes discussed  g). Hypoglycemia recognition and management discussed    2. Patient’s BP is slightly high  today.    Discussed importance of BP control to prevent complications associated with HTN.    Discussed low salt diet, compliance with medication and FU with PCP and Dr. Fletcher.   3. Dyslipidemia  A) Discussed lifestyle  modifications including reductions in dietary total and saturated fat, weight loss, aerobic exercise, and eating a diet rich in fruits and vegetables.  B) c/w  atorvastatin 40 mg daily.  C) Also his TG cholesterol was elevated over 500  He declined, fenofibrate   Repeat TG is in the 300s  Good BG control  Fasting lipid panel   4. H/o hyperparathyroidism s/p sx in 12/2021  He was on 3000 mg calcium--> last ca was high --> he decreased to 500 mg daily now, will recheck    He is also on vit D 50,000 q week , vitamin D normal  DXA shows normal BMD    RTC in three months  Call with BG as discussed          Labs as below                  Orders Placed This Encounter   Procedures    POC HemoCue Glucose 201 (Finger stick glucose)    POC Glycohemoglobin [18697]    Calcium    Lipid Panel [E]

## 2024-03-27 DIAGNOSIS — E11.65 TYPE 2 DIABETES MELLITUS WITH HYPERGLYCEMIA, WITH LONG-TERM CURRENT USE OF INSULIN (HCC): ICD-10-CM

## 2024-03-27 DIAGNOSIS — Z79.4 TYPE 2 DIABETES MELLITUS WITH HYPERGLYCEMIA, WITH LONG-TERM CURRENT USE OF INSULIN (HCC): ICD-10-CM

## 2024-03-27 RX ORDER — INSULIN HUMAN 100 [IU]/ML
INJECTION, SUSPENSION SUBCUTANEOUS
Qty: 108 ML | Refills: 0 | Status: SHIPPED | OUTPATIENT
Start: 2024-03-27 | End: 2024-06-25

## 2024-03-27 NOTE — TELEPHONE ENCOUNTER
Endocrine refill protocol for rapid acting, regular, intermediate, and mixed insulin:    Protocol Criteria:  Appointment with Endocrinology completed in the last 6 months or scheduled in the next 3 months     Verify appointment has been completed or scheduled in the appropriate timeline. If so can send a 90 day supply with 1 refill.   Verify A1C has been completed in the last 6 months and is <8.5%    -May substitute prescriptions for Novolog and Humalog unless documented allergy (pens and vials) at the same dose and concentration per insurance preference and provider protocol.   -May substitute prescriptions for Novolin R and Humulin R unless documented allergy (pens and vials) at the same dose and concentration per insurance preference and provider protocol.   -May substitute prescriptions for Novolin N and Humulin N unless documented allergy (pens and vials) at the same dose and concentration per insurance preference and provider protocol.   -May substitute prescriptions for Humulin and Novolin 70/30 insulin unless documented allergy at the same dose and concentration per insurance preference and provider protocol.    Last completed office visit: 3/19/24  Next scheduled Follow up:  No appointment scheduled.     Called patient to help schedule a follow up appointment, patient stated he will await until he knows his wife's work schedule as they will booking their appointment together.

## 2024-04-29 ENCOUNTER — OFFICE VISIT (OUTPATIENT)
Dept: PODIATRY CLINIC | Facility: CLINIC | Age: 74
End: 2024-04-29
Payer: COMMERCIAL

## 2024-04-29 DIAGNOSIS — L84 CALLUS OF FOOT: ICD-10-CM

## 2024-04-29 DIAGNOSIS — I73.89 OTHER SPECIFIED PERIPHERAL VASCULAR DISEASES (HCC): ICD-10-CM

## 2024-04-29 DIAGNOSIS — B35.1 ONYCHOMYCOSIS: ICD-10-CM

## 2024-04-29 DIAGNOSIS — E11.59 TYPE 2 DIABETES MELLITUS WITH OTHER CIRCULATORY COMPLICATION, UNSPECIFIED WHETHER LONG TERM INSULIN USE (HCC): Primary | ICD-10-CM

## 2024-04-29 DIAGNOSIS — L85.3 XEROSIS OF SKIN: ICD-10-CM

## 2024-04-29 DIAGNOSIS — Z86.31 HISTORY OF DIABETIC ULCER OF FOOT: ICD-10-CM

## 2024-04-29 PROCEDURE — 99213 OFFICE O/P EST LOW 20 MIN: CPT | Performed by: PODIATRIST

## 2024-05-05 NOTE — PROGRESS NOTES
Stanley Mosher is a 73 year old male.   Chief Complaint   Patient presents with    Toenail Care     Nail care and foot check- fbg 199- A1c 7.7 03/19/24          HPI:   Patient returns to the clinic with a known history of PAD revascularization poor circulation diabetes with some degree of neuropathy for routine care complaining of a thickened callus on his heel from a prior ulceration on the right lower extremity as well as long thick toenails patient relates on questioning exposed to his feet daily and has not noticed any sores or ulcerations..  At today's visit reviewed nurse's history as taken above, allergies medications and medical history as documented below.  All changes duly noted.  Allergies: Victoza and Trulicity [dulaglutide]   Current Outpatient Medications   Medication Sig Dispense Refill    Insulin NPH & Regular (HUMULIN 70/30 KWIKPEN) (70-30) 100 UNIT/ML Subcutaneous Suspension Pen-injector Inject 60 Units into the skin daily with breakfast AND 54 Units daily with dinner. INJECT 60 UNITS INTO THE SKIN IN THE MORNING AND 54 UNITS BEFORE BEDTIME. 108 mL 0    Continuous Blood Gluc Sensor (FREESTYLE BENJI 3 SENSOR) Does not apply Misc 1 each every 14 (fourteen) days. 6 each 1    losartan 50 MG Oral Tab TAKE 1 AND 1/2 TABLET BY MOUTH DAILY 135 tablet 1    metoprolol succinate  MG Oral Tablet 24 Hr Take 1.5 tablets (150 mg total) by mouth daily. 135 tablet 1    Insulin Pen Needle (BD PEN NEEDLE BETHEL U/F) 32G X 4 MM Does not apply Misc INJECT TWICE A  each 0    ergocalciferol 1.25 MG (65286 UT) Oral Cap Take 1 capsule (50,000 Units total) by mouth once a week. 12 capsule 11    amLODIPine 10 MG Oral Tab Take 1 tablet (10 mg total) by mouth daily. 90 tablet 4    clopidogrel 75 MG Oral Tab Take 1 tablet (75 mg total) by mouth daily. 90 tablet 3    Ascorbic Acid (VITAMIN C) 1000 MG Oral Tab Take 0.5 tablets (500 mg total) by mouth daily.      aspirin 81 MG Oral Tab EC Take 1 tablet (81 mg total) by  mouth daily.      calcium carbonate antacid 500 MG Oral Chew Tab Chew 2 tablets (1,000 mg total) by mouth 3 (three) times daily. (Patient taking differently: Chew 1 tablet (500 mg total) by mouth daily.) 90 tablet 0    atorvastatin 80 MG Oral Tab Take 1 tablet (80 mg total) by mouth daily.      multivitamin Oral Tab Take 1 tablet by mouth daily. 30 tablet 0    Glucose Blood In Vitro Strip Use to check blood sugar 2 times daily 200 strip 0    acetaminophen 500 MG Oral Tab Take 2 tablets (1,000 mg total) by mouth.        Past Medical History:    Atherosclerosis of coronary artery    Cataract    stage 1 cataracts    COPD (chronic obstructive pulmonary disease) ([facility])    Coronary atherosclerosis    cabg x3    Diabetes (HCC)    Diabetes mellitus (HCC)    Esophageal reflux    pt. states gets \"heartburn from diabetic med and since starting metoprolol    Essential hypertension    Glaucoma    had 2 years ago \"cleared Up\"    Hearing impairment    brayan hearing aids    High blood pressure    Hyperlipidemia    Nephropathy    Neuropathy    bilateral lower extremities    Osteoarthritis    Pneumonia due to organism    Visual impairment    wears glasses      Past Surgical History:   Procedure Laterality Date    Anesth,open heart surgery  10/12/2017    triple bypass    Angioplasty (coronary) Left 03/2018    Cabg  10/2017    CABGX3    Carotid endarterectomy  07/10/2019    Colonoscopy  7/6/15    Colonoscopy      5 years \"was fine\"    Other surgical history  7/6/2010    Polyps Dr.M Goldberg    Other surgical history  03/05/2018    peripheral angioplasty    Tonsillectomy        Family History   Problem Relation Age of Onset    Diabetes Father     Heart Disorder Father     Lipids Father     Diabetes Maternal Grandfather     Cancer Mother       Social History     Socioeconomic History    Marital status:    Tobacco Use    Smoking status: Former     Current packs/day: 0.00     Average packs/day: 1 pack/day for 40.0 years (40.0 ttl  pk-yrs)     Types: Cigarettes     Start date: 10/17/1977     Quit date: 10/17/2017     Years since quittin.5    Smokeless tobacco: Never   Vaping Use    Vaping status: Never Used   Substance and Sexual Activity    Alcohol use: No     Alcohol/week: 0.0 standard drinks of alcohol     Comment: rarely     Drug use: No    Sexual activity: Never           REVIEW OF SYSTEMS:   Today reviewed systens as documented below  GENERAL HEALTH: feels well otherwise  SKIN: Refer to exam below  RESPIRATORY: denies shortness of breath with exertion  CARDIOVASCULAR: denies chest pain on exertion  GI: denies abdominal pain and denies heartburn  NEURO: denies headaches    EXAM:   There were no vitals taken for this visit.  GENERAL: well developed, well nourished, in no apparent distress  EXTREMITIES:   1. Integument: The skin on his feet was examined its cool and dry there is a hyperkeratosis of the lateral plantar right heel there is no ulceration present.  His nails 1-5 on both feet are thickened yellowed and dystrophic with subungual debris and distal lysis from the nailbed.  There are no sores or ulcerations noted on both feet the callus on the lateral aspect of his right heel remains closed where he had a previous ulceration.   2. Vascular: The patient does not have palpable pulses he has a known history of arterial sclerotic disease known history of intervention.  There are no changes here   3. Neurologic: The patient has some diminished pain sensation.   4. Musculoskeletal: The patient is ambulatory with good muscle strength in his extremities.    ASSESSMENT AND PLAN:   Diagnoses and all orders for this visit:    Type 2 diabetes mellitus with other circulatory complication, unspecified whether long term insulin use (HCC)    Other specified peripheral vascular diseases (HCC)    History of diabetic ulcer of foot    Callus of foot    Xerosis of skin    Onychomycosis        Plan: Today using a nail nippers were trimmed and  debrided toenails 1-5 manually and mechanically in girth and width as far down to healthy tissue as possible on both feet.  This was done uneventfully there was no hemorrhage.  There is mild incurvation of the medial and lateral nail borders of the hallux which using a slant back technique were removed and they would not get ingrown.  Using a 15 blade trimmed down and debrided the hyperkeratosis at the lateral plantar right heel..  Follow-up in 2 months.    Also the patient had fallen got 2 small little bumps on his left foot but they are currently healing they are scabbed and they are not ulcerated.  Patient will continue to moisturize his feet I recommended a urea cream with Sal-Acid  Advised proper foot checkups immediate return call if he has any questions or problems or the emergency room if he fears infection.  The patient indicates understanding of these issues and agrees to the plan.  Patient again reminded of early diagnosis and management if he notices any ulcers on his toes or foot.  Rafael Hudson DPM

## 2024-05-22 ENCOUNTER — TELEPHONE (OUTPATIENT)
Dept: ENDOCRINOLOGY CLINIC | Facility: CLINIC | Age: 74
End: 2024-05-22

## 2024-05-22 RX ORDER — PEN NEEDLE, DIABETIC 32GX 5/32"
NEEDLE, DISPOSABLE MISCELLANEOUS
Qty: 200 EACH | Refills: 1 | Status: SHIPPED | OUTPATIENT
Start: 2024-05-22

## 2024-05-22 NOTE — TELEPHONE ENCOUNTER
Pharmacy calling regards refill request, please call.     Insulin Pen Needle (BD PEN NEEDLE BETHEL U/F) 32G X 4 MM Does not apply Misc

## 2024-05-22 NOTE — TELEPHONE ENCOUNTER
Endocrine Refill protocol for Glucose testing supplies       Protocol Criteria: PASSED  Appointment with Endocrinology completed in the last 12 months or scheduled in the next 6 months     Verify appointment has been completed or scheduled in the appropriate timeline. If so can send a 90 day supply with 1 refill.        Last completed office visit: 3/19/2024  Next scheduled Follow up:   Future Appointments   Date Time Provider Department Center   6/10/2024  1:15 PM Radha Marte MD [facility]   7/11/2024 11:15 AM Rafael Hudson DPM [id number]

## 2024-06-10 ENCOUNTER — OFFICE VISIT (OUTPATIENT)
Dept: ENDOCRINOLOGY CLINIC | Facility: CLINIC | Age: 74
End: 2024-06-10
Payer: COMMERCIAL

## 2024-06-10 VITALS
HEIGHT: 69 IN | SYSTOLIC BLOOD PRESSURE: 128 MMHG | HEART RATE: 57 BPM | BODY MASS INDEX: 30.75 KG/M2 | WEIGHT: 207.63 LBS | DIASTOLIC BLOOD PRESSURE: 62 MMHG

## 2024-06-10 DIAGNOSIS — E11.65 TYPE 2 DIABETES MELLITUS WITH HYPERGLYCEMIA, WITH LONG-TERM CURRENT USE OF INSULIN (HCC): Primary | ICD-10-CM

## 2024-06-10 DIAGNOSIS — Z79.4 TYPE 2 DIABETES MELLITUS WITH HYPERGLYCEMIA, WITH LONG-TERM CURRENT USE OF INSULIN (HCC): Primary | ICD-10-CM

## 2024-06-10 DIAGNOSIS — E78.5 DYSLIPIDEMIA: ICD-10-CM

## 2024-06-10 DIAGNOSIS — Z90.89 HISTORY OF PARATHYROIDECTOMY: ICD-10-CM

## 2024-06-10 DIAGNOSIS — Z98.890 HISTORY OF PARATHYROIDECTOMY: ICD-10-CM

## 2024-06-10 DIAGNOSIS — E55.9 VITAMIN D DEFICIENCY: ICD-10-CM

## 2024-06-10 LAB
GLUCOSE BLOOD: 147
HEMOGLOBIN A1C: 7.6 % (ref 4.3–5.6)
TEST STRIP LOT #: NORMAL NUMERIC

## 2024-06-10 PROCEDURE — 83036 HEMOGLOBIN GLYCOSYLATED A1C: CPT | Performed by: INTERNAL MEDICINE

## 2024-06-10 PROCEDURE — 82947 ASSAY GLUCOSE BLOOD QUANT: CPT | Performed by: INTERNAL MEDICINE

## 2024-06-10 PROCEDURE — 99214 OFFICE O/P EST MOD 30 MIN: CPT | Performed by: INTERNAL MEDICINE

## 2024-06-10 NOTE — PROGRESS NOTES
Return Office Visit     CHIEF COMPLAINT:    DM  Dyslipidemia   Vitamin D deficiency    HISTORY OF PRESENT ILLNESS:  Stanley Mosher is a 73 year old male who presents for follow up for DM.      DM HISTORY  He states he had \" borderline DM\" as a teenager.  He was on diabinese for a few years after he stopped.    The pills were resumed a few years later.    HISTORY OF DIABETES COMPLICATIONS: :  History of Retinopathy: No,  Eye exam: August 2023  History of Neuropathy: No  History of Nephropathy: Yes    ASSOCIATED COMPLICATIONS:    HTN: Yes  Hyperlipidemia: Yes  Coronary Artery Disease:  Yes  Cerebrovascular Disease: No  Has PVD      HOME GLUCOSE READINGS:   Has been using the [facility]   Did not get his phone     Fasting: average around 150  Pre dinner:  average around 150    Low Bg under 80:     CURRENT DIABETIC MEDICATIONS INCLUDE:    Insulin 70/30 60 am and 60 pm with meals    T/f trulicity, bydureon and Victoza due to GI SE    Does not want to try ozempic      MEALS:  Moderate compliance      CURRENT MEDICATION:    Current Outpatient Medications   Medication Sig Dispense Refill    Insulin NPH & Regular (HUMULIN 70/30 KWIKPEN) (70-30) 100 UNIT/ML Subcutaneous Suspension Pen-injector Inject 60 Units into the skin daily with breakfast AND 54 Units daily with dinner. INJECT 60 UNITS INTO THE SKIN IN THE MORNING AND 54 UNITS BEFORE BEDTIME. 108 mL 0    Insulin Pen Needle (BD PEN NEEDLE BETHEL U/F) 32G X 4 MM Does not apply Misc INJECT TWICE A  each 1    Continuous Blood Gluc Sensor (FREESTYLE ELIZABETH 3 SENSOR) Does not apply Misc 1 each every 14 (fourteen) days. 6 each 1    losartan 50 MG Oral Tab TAKE 1 AND 1/2 TABLET BY MOUTH DAILY 135 tablet 1    metoprolol succinate  MG Oral Tablet 24 Hr Take 1.5 tablets (150 mg total) by mouth daily. 135 tablet 1    ergocalciferol 1.25 MG (78728 UT) Oral Cap Take 1 capsule (50,000 Units total) by mouth once a week. 12 capsule 11    amLODIPine 10 MG Oral Tab Take 1 tablet (10 mg  total) by mouth daily. 90 tablet 4    clopidogrel 75 MG Oral Tab Take 1 tablet (75 mg total) by mouth daily. 90 tablet 3    Ascorbic Acid (VITAMIN C) 1000 MG Oral Tab Take 0.5 tablets (500 mg total) by mouth daily.      aspirin 81 MG Oral Tab EC Take 1 tablet (81 mg total) by mouth daily.      Glucose Blood In Vitro Strip Use to check blood sugar 2 times daily 200 strip 0    calcium carbonate antacid 500 MG Oral Chew Tab Chew 2 tablets (1,000 mg total) by mouth 3 (three) times daily. (Patient taking differently: Chew 1 tablet (500 mg total) by mouth daily.) 90 tablet 0    atorvastatin 80 MG Oral Tab Take 1 tablet (80 mg total) by mouth daily.      acetaminophen 500 MG Oral Tab Take 2 tablets (1,000 mg total) by mouth.      multivitamin Oral Tab Take 1 tablet by mouth daily. 30 tablet 0         ALLERGY:  Allergies   Allergen Reactions    Victoza NAUSEA AND VOMITING    Trulicity [Dulaglutide] DIARRHEA and NAUSEA ONLY       PAST MEDICAL, SOCIAL AND FAMILY HISTORY:  See past medical history marked as reviewed.  See past surgical history marked as reviewed.  See past family history marked as reviewed.  See past social history marked as reviewed.    ASSESSMENTS:     REVIEW OF SYSTEMS:  Constitutional: Negative for:  fever, fatigue, cold/heat intolerance, weight change  Eyes: Negative for:  Visual changes, proptosis, blurring  ENT: Negative for:  dysphagia, neck swelling, dysphonia  Respiratory: Negative for:  dyspnea, cough  Cardiovascular: Negative for:  chest pain, palpitations, orthopnea  GI: Negative for:  abdominal pain, nausea, vomiting, diarrhea, constipation, bleeding  Neurology: Negative for: headache, numbness, weakness  Genito-Urinary: Negative for: dysuria, frequency  Psychiatric: Negative for:  depression, anxiety  Hematology/Lymphatics: Negative for: bruising, lower extremity edema  Endocrine: Negative for: polyuria, polydypsia  Skin: Negative for: rash, blister, cellulitis,       PHYSICAL EXAM:   Vitals:     06/10/24 1303   BP: 128/62   Pulse: 57   Weight: 207 lb 9.6 oz (94.2 kg)   Height: 5' 9\" (1.753 m)     BMI: Body mass index is 30.66 kg/m².         General Appearance:  alert, well developed, in no acute distress  Head: Atraumatic  Eyes:  normal conjunctivae, sclera., normal sclera and normal pupils  Throat/Neck: normal sound to voice. Normal hearing, normal speech  Respiratory:  Speaking in full sentences, non-labored. no increased work of breathing, no audible wheezing    Neuro: motor grossly intact, moving all extremities without difficulty  Psychiatric:  oriented to time, self, and place  Extremities: 7/17/2023  Bilateral barefoot  skin diabetic exam is normal, visualized feet and the appearance is normal.  Bilateral monofilament/sensation of both feet is normal.  Pulsation pedal pulse exam of both lower legs/feet is normal as well.        DATA:       A1c is 7.6 % ( 6/2024)    ASSESSMENT AND PLAN:    1. Type 2 DM: CKD, with hyperglycemia    Plan:  Discussed the pathogenesis, natural course of diabetes. Patient understands the importance of glycemic control and the implications of uncontrolled diabetes including Diabetic ketoacidosis and various micro vascular and macrovascular complications.      a). Medications:     A1c is stable  CPM  Work on lifestyle changes    Insulin 70/30   60  units with breakfast and  60 units with dinner      Continue to monitor BG  Call as discussed   Always call if Bg is under 80      b). Has Nephropathy. Discussed importance of good BG control.   c). Discussed importance of annual eye exams  d). Discussed daily foot exam   e). BG log maintainence explained in great detail, to get log and glucometer on next visit.  f). Life style changes discussed  g). Hypoglycemia recognition and management discussed    2. Patient’s BP is normal today.    3. Dyslipidemia  A) Discussed lifestyle modifications including reductions in dietary total and saturated fat, weight loss, aerobic exercise, and  eating a diet rich in fruits and vegetables.  B) c/w  atorvastatin 40 mg daily.  C) Also his TG cholesterol was elevated over 500  He declined, fenofibrate   Repeat TG is in the 300s  Good BG control  Fasting lipid panel   4. H/o hyperparathyroidism s/p sx in 12/2021  He was on calcium 500 mg daily now, will recheck    He is also on vit D 50,000 q week , vitamin D normal  DXA shows normal BMD  Recheck labs  RTC in three months  Call with BG as discussed          Labs as below                  Orders Placed This Encounter   Procedures    POC HemoCue Glucose 201 (Finger stick glucose)    POC Glycohemoglobin [10117]    Vitamin D [E]    Calcium    Lipid Panel [E]

## 2024-06-12 ENCOUNTER — LAB ENCOUNTER (OUTPATIENT)
Dept: LAB | Facility: REFERENCE LAB | Age: 74
End: 2024-06-12
Attending: INTERNAL MEDICINE
Payer: COMMERCIAL

## 2024-06-12 ENCOUNTER — TELEPHONE (OUTPATIENT)
Dept: ENDOCRINOLOGY CLINIC | Facility: CLINIC | Age: 74
End: 2024-06-12

## 2024-06-12 DIAGNOSIS — Z79.4 TYPE 2 DIABETES MELLITUS WITH HYPERGLYCEMIA, WITH LONG-TERM CURRENT USE OF INSULIN (HCC): Primary | ICD-10-CM

## 2024-06-12 DIAGNOSIS — E11.65 TYPE 2 DIABETES MELLITUS WITH HYPERGLYCEMIA, WITH LONG-TERM CURRENT USE OF INSULIN (HCC): Primary | ICD-10-CM

## 2024-06-12 DIAGNOSIS — Z98.890 HISTORY OF PARATHYROIDECTOMY: ICD-10-CM

## 2024-06-12 DIAGNOSIS — E78.5 DYSLIPIDEMIA: ICD-10-CM

## 2024-06-12 DIAGNOSIS — E55.9 VITAMIN D DEFICIENCY: ICD-10-CM

## 2024-06-12 DIAGNOSIS — R79.89 HIGH SERUM CALCIUM: ICD-10-CM

## 2024-06-12 DIAGNOSIS — N18.32 STAGE 3B CHRONIC KIDNEY DISEASE (HCC): ICD-10-CM

## 2024-06-12 DIAGNOSIS — Z90.89 HISTORY OF PARATHYROIDECTOMY: ICD-10-CM

## 2024-06-12 LAB
ALBUMIN SERPL-MCNC: 4.5 G/DL (ref 3.2–4.8)
ANION GAP SERPL CALC-SCNC: 5 MMOL/L (ref 0–18)
BASOPHILS # BLD AUTO: 0.09 X10(3) UL (ref 0–0.2)
BASOPHILS NFR BLD AUTO: 1.1 %
BUN BLD-MCNC: 40 MG/DL (ref 9–23)
BUN/CREAT SERPL: 10.9 (ref 10–20)
CALCIUM BLD-MCNC: 10 MG/DL (ref 8.7–10.4)
CHLORIDE SERPL-SCNC: 112 MMOL/L (ref 98–112)
CHOLEST SERPL-MCNC: 231 MG/DL (ref ?–200)
CO2 SERPL-SCNC: 23 MMOL/L (ref 21–32)
CREAT BLD-MCNC: 3.68 MG/DL
CREAT UR-SCNC: 80.7 MG/DL
DEPRECATED RDW RBC AUTO: 46.3 FL (ref 35.1–46.3)
EGFRCR SERPLBLD CKD-EPI 2021: 17 ML/MIN/1.73M2 (ref 60–?)
EOSINOPHIL # BLD AUTO: 1.06 X10(3) UL (ref 0–0.7)
EOSINOPHIL NFR BLD AUTO: 12.4 %
ERYTHROCYTE [DISTWIDTH] IN BLOOD BY AUTOMATED COUNT: 14.2 % (ref 11–15)
FASTING PATIENT LIPID ANSWER: YES
GLUCOSE BLD-MCNC: 151 MG/DL (ref 70–99)
HCT VFR BLD AUTO: 42.7 %
HDLC SERPL-MCNC: 40 MG/DL (ref 40–59)
HGB BLD-MCNC: 14 G/DL
IMM GRANULOCYTES # BLD AUTO: 0.03 X10(3) UL (ref 0–1)
IMM GRANULOCYTES NFR BLD: 0.4 %
LDLC SERPL CALC-MCNC: 142 MG/DL (ref ?–100)
LYMPHOCYTES # BLD AUTO: 1.31 X10(3) UL (ref 1–4)
LYMPHOCYTES NFR BLD AUTO: 15.3 %
MCH RBC QN AUTO: 29.4 PG (ref 26–34)
MCHC RBC AUTO-ENTMCNC: 32.8 G/DL (ref 31–37)
MCV RBC AUTO: 89.5 FL
MICROALBUMIN UR-MCNC: 218.4 MG/DL
MICROALBUMIN/CREAT 24H UR-RTO: 2706.3 UG/MG (ref ?–30)
MONOCYTES # BLD AUTO: 0.79 X10(3) UL (ref 0.1–1)
MONOCYTES NFR BLD AUTO: 9.2 %
NEUTROPHILS # BLD AUTO: 5.29 X10 (3) UL (ref 1.5–7.7)
NEUTROPHILS # BLD AUTO: 5.29 X10(3) UL (ref 1.5–7.7)
NEUTROPHILS NFR BLD AUTO: 61.6 %
NONHDLC SERPL-MCNC: 191 MG/DL (ref ?–130)
OSMOLALITY SERPL CALC.SUM OF ELEC: 303 MOSM/KG (ref 275–295)
PHOSPHATE SERPL-MCNC: 4.3 MG/DL (ref 2.4–5.1)
PLATELET # BLD AUTO: 389 10(3)UL (ref 150–450)
POTASSIUM SERPL-SCNC: 4.6 MMOL/L (ref 3.5–5.1)
RBC # BLD AUTO: 4.77 X10(6)UL
SODIUM SERPL-SCNC: 140 MMOL/L (ref 136–145)
TRIGL SERPL-MCNC: 268 MG/DL (ref 30–149)
VIT D+METAB SERPL-MCNC: 33 NG/ML (ref 30–100)
VLDLC SERPL CALC-MCNC: 51 MG/DL (ref 0–30)
WBC # BLD AUTO: 8.6 X10(3) UL (ref 4–11)

## 2024-06-12 PROCEDURE — 36415 COLL VENOUS BLD VENIPUNCTURE: CPT

## 2024-06-12 PROCEDURE — 82043 UR ALBUMIN QUANTITATIVE: CPT

## 2024-06-12 PROCEDURE — 80069 RENAL FUNCTION PANEL: CPT

## 2024-06-12 PROCEDURE — 82570 ASSAY OF URINE CREATININE: CPT

## 2024-06-12 PROCEDURE — 80061 LIPID PANEL: CPT

## 2024-06-12 PROCEDURE — 82306 VITAMIN D 25 HYDROXY: CPT

## 2024-06-12 PROCEDURE — 85060 BLOOD SMEAR INTERPRETATION: CPT

## 2024-06-12 PROCEDURE — 85025 COMPLETE CBC W/AUTO DIFF WBC: CPT

## 2024-06-12 NOTE — TELEPHONE ENCOUNTER
GFR low--> FU with nephrology   LDL is elevated It is important to lower the LDL under 100 to decrease risk of Heart attacks and strokes.   Decreasing fried and greasy foods and eating out less is helpful. Also decreasing soda, desserts and processed foods is recommended  Can mail information on low fat diet   Start rosuvastatin 5 mg daily   Main SE: muscle cramps  Please repeat labs in three months  Fasting lipid panel    Vit D and calcium normal     Thanks

## 2024-06-13 NOTE — TELEPHONE ENCOUNTER
Dr. Marte, Patient already on Atrovastatin 80 mg. Would you like him to stop the Atorvastatin and start the Rosuvastatin. Thank you.          Called and spoke to patient, informed him Dr. Marte reviewed labs, see TE. Patient reports he already sees Dr. Fletcher.    Reviewed low fat diet, offered to send low fat diet information to him, patient declined.    Patient is currently on Atorvastatin 80 mg.

## 2024-06-14 ENCOUNTER — TELEPHONE (OUTPATIENT)
Dept: NEPHROLOGY | Facility: CLINIC | Age: 74
End: 2024-06-14

## 2024-06-14 NOTE — TELEPHONE ENCOUNTER
Sorry about that.  I did not realize he is on atorvastatin 80 mg daily.  Please check with the patient if he has been compliant with the medication.  Please continue taking the medication along with a low-fat diet and daily exercise.  If he has been compliant since his LDL is still elevated I would recommend a cardiology evaluation.  If patient is agreeable can please provide a referral to cardiology.  Thanks.

## 2024-06-14 NOTE — TELEPHONE ENCOUNTER
Kidney function is getting worse.  See soon for follow-up to review.  Bring in any recent blood pressure readings.

## 2024-06-14 NOTE — TELEPHONE ENCOUNTER
Informed patient wife of note below ASHLEY verified and verbalized understanding ,offered to schedule will call back.

## 2024-06-20 NOTE — TELEPHONE ENCOUNTER
Called pt and spoke with wife per ASHLEY. Advised to continue atorvastatin 80 mg, low fat diet and daily exercise. Pt scheduled appt with cardio for 7/9.

## 2024-06-26 DIAGNOSIS — E11.65 TYPE 2 DIABETES MELLITUS WITH HYPERGLYCEMIA, WITH LONG-TERM CURRENT USE OF INSULIN (HCC): ICD-10-CM

## 2024-06-26 DIAGNOSIS — Z79.4 TYPE 2 DIABETES MELLITUS WITH HYPERGLYCEMIA, WITH LONG-TERM CURRENT USE OF INSULIN (HCC): ICD-10-CM

## 2024-06-26 RX ORDER — INSULIN HUMAN 100 [IU]/ML
INJECTION, SUSPENSION SUBCUTANEOUS
Qty: 111 ML | Refills: 1 | Status: SHIPPED | OUTPATIENT
Start: 2024-06-26

## 2024-06-26 NOTE — TELEPHONE ENCOUNTER
Endocrine refill protocol for rapid acting, regular, intermediate, and mixed insulin:      Protocol Criteria: PASSED  Appointment with Endocrinology completed in the last 6 months or scheduled in the next 3 months     Verify appointment has been completed or scheduled in the appropriate timeline. If so can send a 90 day supply with 1 refill.   Verify A1C has been completed in the last 6 months and is <8.5%    -May substitute prescriptions for Novolog and Humalog unless documented allergy (pens and vials) at the same dose and concentration per insurance preference and provider protocol.   -May substitute prescriptions for Novolin R and Humulin R unless documented allergy (pens and vials) at the same dose and concentration per insurance preference and provider protocol.   -May substitute prescriptions for Novolin N and Humulin N unless documented allergy (pens and vials) at the same dose and concentration per insurance preference and provider protocol.   -May substitute prescriptions for Humulin and Novolin 70/30 insulin unless documented allergy at the same dose and concentration per insurance preference and provider protocol.        Last completed office visit: 6/10/2024 Radha Marte MD   Next scheduled Follow up:   Future Appointments   Date Time Provider Department Center   9/10/2024  3:15 PM Radha Marte MD [facility]      Last A1C result: 7.6% done 6/10/2024.

## 2024-07-05 ENCOUNTER — OFFICE VISIT (OUTPATIENT)
Dept: NEPHROLOGY | Facility: CLINIC | Age: 74
End: 2024-07-05
Payer: COMMERCIAL

## 2024-07-05 VITALS
HEIGHT: 69 IN | DIASTOLIC BLOOD PRESSURE: 61 MMHG | BODY MASS INDEX: 30.96 KG/M2 | SYSTOLIC BLOOD PRESSURE: 161 MMHG | HEART RATE: 55 BPM | WEIGHT: 209 LBS

## 2024-07-05 DIAGNOSIS — N18.4 CKD (CHRONIC KIDNEY DISEASE) STAGE 4, GFR 15-29 ML/MIN (HCC): Primary | ICD-10-CM

## 2024-07-05 PROCEDURE — 99214 OFFICE O/P EST MOD 30 MIN: CPT | Performed by: INTERNAL MEDICINE

## 2024-07-05 NOTE — PROGRESS NOTES
07/05/24        Patient: Stanley Mosher   YOB: 1950   Date of Visit: 7/5/2024       Dear  Dr. Ulisses MD,      Thank you for referring Stanley Mosher to my practice.  Please find my assessment and plan below.      As you know he is a 73-year-old male with a history of adult onset diabetes mellitus diagnosed in 1968, hypertension, moderate aortic stenosis, coronary artery disease, status post coronary artery bypass graft surgery in 2017, history of peripheral vascular disease, status post right carotid endarterectomy, history of primary hyperparathyroidism, status post removal of a parathyroid adenoma who I now had the pleasure of seeing for follow-up of chronic kidney disease stage IV associated with nephrotic syndrome thought to be secondary to diabetic nephropathy.  Overall patient states he is doing okay without any chest pain, shortness of breath, GI or urinary tract symptoms.  He does check his blood pressure readings at home but forgot to bring in readings.  States his blood sugars are improving.  His A1c was 11.4 back in November 2023.  Now down to 7.6.  Doing a better job with diet.    On physical exam his blood pressure is 161/61 with a pulse of 55 and he weighed 209 pounds.  His neck was supple without JVD.  Lungs were clear.  Heart revealed a regular rate and rhythm with an S4 but no gallops, murmurs or rubs.  Abdomen soft, flat, nontender without organomegaly, masses or bruits.  Extremities reveal trace edema bilaterally.    I reviewed his most recent labs done on June 12, 2024.  Unfortunately creatinine continues to creep up.  Now 3.68 with an estimated GFR of 17 cc/min.  Electrolytes were good.  Urine microalbumin creatinine ratio was 2706.  Albumin 4.5.  Hemoglobin still good at 14.0.    I therefore informed the patient and his wife that there has been continued deterioration in his renal function.  Reinforced importance of maintaining adequate hydration and avoiding nonsteroidals.  Blood  pressure and blood sugar pressure control was reemphasized.  His A1c is improving.  Systolic blood pressure was high today.  He will check his blood pressures daily and call me in 1 week.  Repeat CBC, renal panel in 1 month.  If stable will continue quarterly.  Will see again in 3 to 6 months for follow-up depending upon clinical course.  The significance of a GFR of 17 cc/min have been reviewed with the patient.  Indications for dialysis have been discussed.  Patient states he would be interested in peritoneal dialysis if i and when dialysis was recommended.    Thank you again for allowing me to participate in the care of your patient.  If you have any questions please feel free to call.           Sincerely,   Werner Fletcher MD   [facility], [place]  [address]  [place] [address]    Document electronically generated by:  Werner Fletcher MD

## 2024-07-05 NOTE — PATIENT INSTRUCTIONS
Check your blood pressures daily and call me in 1 week.  Repeat your kidney blood test in 1 month.  Orders are in the computer.

## 2024-07-11 RX ORDER — BLOOD-GLUCOSE SENSOR
1 EACH MISCELLANEOUS
Qty: 6 EACH | Refills: 4 | Status: SHIPPED | OUTPATIENT
Start: 2024-07-11

## 2024-07-11 NOTE — OPERATIVE REPORT
[facility] 2W/SW  Operative Note     Srinivasa Fisher Location: OR   [facility] [id number] MRN [id number]   Admission Date 10/12/2017 Operation Date 10/12/2017   Attending Physician Ivelisse Yates MD Operating Physician Feroz Nj MD      Preoperat or stab and grab was utilized. The vein was removed flushed with heparinized saline the branches were clipped and tied and prepared for use. The vein size was good and quality was adequate as there was some sclerosis of the vein and thickening.   Median s of adequate size and quality. After testing the anastomosis of the LIMA to LAD and ensuring adequate flow with the probe the pedicle was attached to the anterior surface of the heart. Hot shot was given retrograde antegrade in the aorta was unclamped.   A Azithromycin Counseling:  I discussed with the patient the risks of azithromycin including but not limited to GI upset, allergic reaction, drug rash, diarrhea, and yeast infections.

## 2024-07-12 RX ORDER — LOSARTAN POTASSIUM 50 MG/1
TABLET ORAL
Qty: 135 TABLET | Refills: 1 | Status: SHIPPED | OUTPATIENT
Start: 2024-07-12

## 2024-07-12 NOTE — TELEPHONE ENCOUNTER
Last office visit- 7/5/24    Return to clinic- 3 months    Follow up- no appointment scheduled

## 2024-08-12 ENCOUNTER — OFFICE VISIT (OUTPATIENT)
Dept: PODIATRY CLINIC | Facility: CLINIC | Age: 74
End: 2024-08-12

## 2024-08-12 DIAGNOSIS — B35.1 ONYCHOMYCOSIS: ICD-10-CM

## 2024-08-12 DIAGNOSIS — L84 CALLUS OF FOOT: ICD-10-CM

## 2024-08-12 DIAGNOSIS — I73.89 OTHER SPECIFIED PERIPHERAL VASCULAR DISEASES (HCC): ICD-10-CM

## 2024-08-12 DIAGNOSIS — E11.59 TYPE 2 DIABETES MELLITUS WITH OTHER CIRCULATORY COMPLICATION, UNSPECIFIED WHETHER LONG TERM INSULIN USE (HCC): Primary | ICD-10-CM

## 2024-08-12 DIAGNOSIS — Z86.31 HISTORY OF DIABETIC ULCER OF FOOT: ICD-10-CM

## 2024-08-12 DIAGNOSIS — L85.3 XEROSIS OF SKIN: ICD-10-CM

## 2024-08-12 PROCEDURE — 99213 OFFICE O/P EST LOW 20 MIN: CPT | Performed by: PODIATRIST

## 2024-08-16 NOTE — PROGRESS NOTES
Stanley Mosher is a 73 year old male.   Chief Complaint   Patient presents with    Toenail Care     Nail care and foot check- fbg 211- A1c 7.6 06/10/24          HPI:   Patient presents for nail care at today's visit he has a known history of PAD with ulceration this and slow healing he has had an ulceration on the lateral right heel.  He is now complaining of rest pain even in his left leg.  This is a more recent development he does have a checkup with his interventional cardiologist to have this looked at.  He said his left leg is cold to the touch.  At today's visit reviewed nurse's history as taken above, allergies medications and medical history as documented below.  All changes duly noted  Allergies: Victoza and Trulicity [dulaglutide]   Current Outpatient Medications   Medication Sig Dispense Refill    losartan 50 MG Oral Tab TAKE 1 AND 1/2 TABLET BY MOUTH DAILY 135 tablet 1    FREESTYLE BENJI 3 SENSOR Does not apply Misc 1 EACH EVERY 14 (FOURTEEN) DAYS. 6 each 4    Insulin NPH & Regular (HUMULIN 70/30 KWIKPEN) (70-30) 100 UNIT/ML Subcutaneous Suspension Pen-injector INJECT 60 UNITS INTO THE SKIN DAILY WITH BREAKFAST AND 60 UNITS DAILY WITH DINNER 111 mL 1    Insulin Pen Needle (BD PEN NEEDLE BETHEL U/F) 32G X 4 MM Does not apply Misc INJECT TWICE A  each 1    metoprolol succinate  MG Oral Tablet 24 Hr Take 1.5 tablets (150 mg total) by mouth daily. 135 tablet 1    ergocalciferol 1.25 MG (78204 UT) Oral Cap Take 1 capsule (50,000 Units total) by mouth once a week. 12 capsule 11    amLODIPine 10 MG Oral Tab Take 1 tablet (10 mg total) by mouth daily. 90 tablet 4    clopidogrel 75 MG Oral Tab Take 1 tablet (75 mg total) by mouth daily. 90 tablet 3    Ascorbic Acid (VITAMIN C) 1000 MG Oral Tab Take 0.5 tablets (500 mg total) by mouth daily.      calcium carbonate antacid 500 MG Oral Chew Tab Chew 2 tablets (1,000 mg total) by mouth 3 (three) times daily. (Patient taking differently: Chew 1 tablet (500 mg  total) by mouth daily.) 90 tablet 0    atorvastatin 80 MG Oral Tab Take 1 tablet (80 mg total) by mouth daily.      multivitamin Oral Tab Take 1 tablet by mouth daily. 30 tablet 0    aspirin 81 MG Oral Tab EC Take 1 tablet (81 mg total) by mouth daily.        Past Medical History:    Atherosclerosis of coronary artery    Cataract    stage 1 cataracts    COPD (chronic obstructive pulmonary disease) (HCC)    Coronary atherosclerosis    cabg x3    Diabetes (HCC)    Diabetes mellitus (HCC)    Esophageal reflux    pt. states gets \"heartburn from diabetic med and since starting metoprolol    Essential hypertension    Glaucoma    had 2 years ago \"cleared Up\"    Hearing impairment    brayan hearing aids    High blood pressure    Hyperlipidemia    Nephropathy    Neuropathy    bilateral lower extremities    Osteoarthritis    Pneumonia due to organism    Visual impairment    wears glasses      Past Surgical History:   Procedure Laterality Date    Anesth,open heart surgery  10/12/2017    triple bypass    Angioplasty (coronary) Left 2018    Cabg  10/2017    CABGX3    Carotid endarterectomy  07/10/2019    Colonoscopy  7/6/15    Colonoscopy      5 years \"was fine\"    Other surgical history  2010    Polyps Dr.M Goldberg    Other surgical history  2018    peripheral angioplasty    Tonsillectomy        Family History   Problem Relation Age of Onset    Diabetes Father     Heart Disorder Father     Lipids Father     Diabetes Maternal Grandfather     Cancer Mother       Social History     Socioeconomic History    Marital status:    Tobacco Use    Smoking status: Former     Current packs/day: 0.00     Average packs/day: 1 pack/day for 40.0 years (40.0 ttl pk-yrs)     Types: Cigarettes     Start date: 10/17/1977     Quit date: 10/17/2017     Years since quittin.8    Smokeless tobacco: Never   Vaping Use    Vaping status: Never Used   Substance and Sexual Activity    Alcohol use: No     Alcohol/week: 0.0 standard  drinks of alcohol     Comment: rarely     Drug use: No    Sexual activity: Never           REVIEW OF SYSTEMS:   Today reviewed systens as documented below  GENERAL HEALTH: feels well otherwise  SKIN: Refer to exam below  RESPIRATORY: denies shortness of breath with exertion  CARDIOVASCULAR: denies chest pain on exertion  GI: denies abdominal pain and denies heartburn  NEURO: denies headaches    EXAM:   There were no vitals taken for this visit.  GENERAL: well developed, well nourished, in no apparent distress  EXTREMITIES:   1. Integument: The skin on his feet was evaluated on the right it is a little warmer.  But the left leg is very cold its white.  Patient has thickened toenails 1-5 bilateral feet has hyperkeratosis at the lateral aspect of his heel where he had a previous ulceration on the right lower extremity.  There are no ulcers formed at this time.  The skin on the left lower extremity is very cool to the touch.   2. Vascular: Patient has nonpalpable pulses bilateral but has a more acute problem on his left than his right   3. Neurologic: Patient has diminished pedal sensorium   4. Musculoskeletal: Patient has adequate muscle strength but has not developed claudication on his left side.    ASSESSMENT AND PLAN:   Diagnoses and all orders for this visit:    Type 2 diabetes mellitus with other circulatory complication, unspecified whether long term insulin use (HCC)    Other specified peripheral vascular diseases (HCC)    History of diabetic ulcer of foot    Callus of foot    Onychomycosis    Xerosis of skin        Plan: Urged him to see his interventional cardiologist ASAP does have appointment scheduled will follow-up with him.  Today using a nail nippers were trimmed and debrided toenails 1-5 manually and mechanically in girth and width as far down to healthy tissue as possible on both feet.  This was done uneventfully there was no hemorrhage.  There is mild incurvation of the medial and lateral nail  borders of the hallux which using a slant back technique were removed and they would not get ingrown.  At today's visit I reminded the patient about daily foot checks  Reminded patient again of proper control of his diabetes to help prevent any severe complications in his feet  Proper foot hygiene moisturizing except between the toes was reviewed  Advised follow-up again every 2 to 3 months for routine care but emphasized to him the importance of coming in sooner if he notices any problems.  The patient indicates understanding of these issues and agrees to the plan.    Rafael Hudson DPM

## 2024-08-26 ENCOUNTER — TELEPHONE (OUTPATIENT)
Dept: PODIATRY CLINIC | Facility: CLINIC | Age: 74
End: 2024-08-26

## 2024-08-26 NOTE — TELEPHONE ENCOUNTER
Pt's wife called.  Pt saw Dr. Bernabe.  Having surgery on 9-4-24 to open up the veins on the left side.  Leg.  Advised by Dr. Hudson to call and leave information.

## 2024-08-26 NOTE — H&P
Stanley Mosher is a 73 year old male with PAD, CKD referred for peripheral angiogram with possible intervention indicated for critical limb ischemia with rest pain and discoloration. H&P reviewed and there are no significant changes. Indications, risks, benefits and alternate options reviewed with the patient and they are agreeable to proceed with the procedure.    Dr. Jhon Love

## 2024-08-27 RX ORDER — ROSUVASTATIN CALCIUM 40 MG/1
TABLET, COATED ORAL
COMMUNITY
Start: 2024-07-09

## 2024-08-31 ENCOUNTER — LAB ENCOUNTER (OUTPATIENT)
Dept: LAB | Age: 74
End: 2024-08-31
Attending: INTERNAL MEDICINE
Payer: COMMERCIAL

## 2024-08-31 DIAGNOSIS — I10 HYPERTENSION, ESSENTIAL: ICD-10-CM

## 2024-08-31 DIAGNOSIS — I25.810 CAD (CORONARY ARTERY DISEASE) OF BYPASS GRAFT: Primary | ICD-10-CM

## 2024-08-31 LAB
ANION GAP SERPL CALC-SCNC: 11 MMOL/L (ref 0–18)
BASOPHILS # BLD AUTO: 0.12 X10(3) UL (ref 0–0.2)
BASOPHILS NFR BLD AUTO: 1.1 %
BUN BLD-MCNC: 50 MG/DL (ref 9–23)
BUN/CREAT SERPL: 13.7 (ref 10–20)
CALCIUM BLD-MCNC: 9.9 MG/DL (ref 8.7–10.4)
CHLORIDE SERPL-SCNC: 109 MMOL/L (ref 98–112)
CO2 SERPL-SCNC: 21 MMOL/L (ref 21–32)
CREAT BLD-MCNC: 3.65 MG/DL
DEPRECATED RDW RBC AUTO: 42.9 FL (ref 35.1–46.3)
EGFRCR SERPLBLD CKD-EPI 2021: 17 ML/MIN/1.73M2 (ref 60–?)
EOSINOPHIL # BLD AUTO: 1 X10(3) UL (ref 0–0.7)
EOSINOPHIL NFR BLD AUTO: 9.1 %
ERYTHROCYTE [DISTWIDTH] IN BLOOD BY AUTOMATED COUNT: 13.4 % (ref 11–15)
FASTING STATUS PATIENT QL REPORTED: NO
GLUCOSE BLD-MCNC: 173 MG/DL (ref 70–99)
HCT VFR BLD AUTO: 41 %
HGB BLD-MCNC: 14.2 G/DL
IMM GRANULOCYTES # BLD AUTO: 0.06 X10(3) UL (ref 0–1)
IMM GRANULOCYTES NFR BLD: 0.5 %
LYMPHOCYTES # BLD AUTO: 1.53 X10(3) UL (ref 1–4)
LYMPHOCYTES NFR BLD AUTO: 13.9 %
MCH RBC QN AUTO: 30.4 PG (ref 26–34)
MCHC RBC AUTO-ENTMCNC: 34.6 G/DL (ref 31–37)
MCV RBC AUTO: 87.8 FL
MONOCYTES # BLD AUTO: 0.85 X10(3) UL (ref 0.1–1)
MONOCYTES NFR BLD AUTO: 7.7 %
NEUTROPHILS # BLD AUTO: 7.48 X10 (3) UL (ref 1.5–7.7)
NEUTROPHILS # BLD AUTO: 7.48 X10(3) UL (ref 1.5–7.7)
NEUTROPHILS NFR BLD AUTO: 67.7 %
OSMOLALITY SERPL CALC.SUM OF ELEC: 309 MOSM/KG (ref 275–295)
PLATELET # BLD AUTO: 371 10(3)UL (ref 150–450)
POTASSIUM SERPL-SCNC: 4.9 MMOL/L (ref 3.5–5.1)
RBC # BLD AUTO: 4.67 X10(6)UL
SODIUM SERPL-SCNC: 141 MMOL/L (ref 136–145)
WBC # BLD AUTO: 11 X10(3) UL (ref 4–11)

## 2024-08-31 PROCEDURE — 36415 COLL VENOUS BLD VENIPUNCTURE: CPT

## 2024-08-31 PROCEDURE — 85025 COMPLETE CBC W/AUTO DIFF WBC: CPT

## 2024-08-31 PROCEDURE — 80048 BASIC METABOLIC PNL TOTAL CA: CPT

## 2024-09-04 ENCOUNTER — HOSPITAL ENCOUNTER (OUTPATIENT)
Dept: INTERVENTIONAL RADIOLOGY/VASCULAR | Facility: HOSPITAL | Age: 74
Discharge: HOME | End: 2024-09-04
Attending: INTERNAL MEDICINE | Admitting: INTERNAL MEDICINE
Payer: COMMERCIAL

## 2024-09-04 VITALS
BODY MASS INDEX: 30 KG/M2 | OXYGEN SATURATION: 96 % | DIASTOLIC BLOOD PRESSURE: 60 MMHG | SYSTOLIC BLOOD PRESSURE: 148 MMHG | HEART RATE: 58 BPM | WEIGHT: 206 LBS | TEMPERATURE: 97 F | RESPIRATION RATE: 18 BRPM

## 2024-09-04 DIAGNOSIS — I70.219: ICD-10-CM

## 2024-09-04 LAB
GLUCOSE BLDC GLUCOMTR-MCNC: 222 MG/DL (ref 70–99)
ISTAT ACTIVATED CLOTTING TIME: 232 SECONDS (ref 125–137)
ISTAT ACTIVATED CLOTTING TIME: 250 SECONDS (ref 125–137)

## 2024-09-04 PROCEDURE — 37228 HC TRANSLUMINAL ANGIO TIBIAL PERONEAL UNILAT INIT: CPT | Performed by: INTERNAL MEDICINE

## 2024-09-04 PROCEDURE — 85347 COAGULATION TIME ACTIVATED: CPT

## 2024-09-04 PROCEDURE — 99152 MOD SED SAME PHYS/QHP 5/>YRS: CPT | Performed by: INTERNAL MEDICINE

## 2024-09-04 PROCEDURE — 99153 MOD SED SAME PHYS/QHP EA: CPT | Performed by: INTERNAL MEDICINE

## 2024-09-04 PROCEDURE — 36415 COLL VENOUS BLD VENIPUNCTURE: CPT

## 2024-09-04 PROCEDURE — 82962 GLUCOSE BLOOD TEST: CPT

## 2024-09-04 PROCEDURE — 75716 ARTERY X-RAYS ARMS/LEGS: CPT | Performed by: INTERNAL MEDICINE

## 2024-09-04 RX ORDER — CLOPIDOGREL BISULFATE 75 MG/1
TABLET ORAL
Status: COMPLETED
Start: 2024-09-04 | End: 2024-09-04

## 2024-09-04 RX ORDER — HEPARIN SODIUM 1000 [USP'U]/ML
INJECTION, SOLUTION INTRAVENOUS; SUBCUTANEOUS
Status: COMPLETED
Start: 2024-09-04 | End: 2024-09-04

## 2024-09-04 RX ORDER — CLOPIDOGREL BISULFATE 75 MG/1
75 TABLET ORAL DAILY
Status: DISCONTINUED | OUTPATIENT
Start: 2024-09-05 | End: 2024-09-04

## 2024-09-04 RX ORDER — SODIUM CHLORIDE 9 MG/ML
INJECTION, SOLUTION INTRAVENOUS CONTINUOUS
Status: ACTIVE | OUTPATIENT
Start: 2024-09-04 | End: 2024-09-04

## 2024-09-04 RX ORDER — IOPAMIDOL 612 MG/ML
200 INJECTION, SOLUTION INTRAVASCULAR
Status: COMPLETED | OUTPATIENT
Start: 2024-09-04 | End: 2024-09-04

## 2024-09-04 RX ORDER — LIDOCAINE HYDROCHLORIDE 20 MG/ML
INJECTION, SOLUTION EPIDURAL; INFILTRATION; INTRACAUDAL; PERINEURAL
Status: COMPLETED
Start: 2024-09-04 | End: 2024-09-04

## 2024-09-04 RX ORDER — MIDAZOLAM HYDROCHLORIDE 1 MG/ML
INJECTION INTRAMUSCULAR; INTRAVENOUS
Status: COMPLETED
Start: 2024-09-04 | End: 2024-09-04

## 2024-09-04 RX ORDER — NITROGLYCERIN 20 MG/100ML
INJECTION INTRAVENOUS
Status: COMPLETED
Start: 2024-09-04 | End: 2024-09-04

## 2024-09-04 RX ORDER — SODIUM CHLORIDE 9 MG/ML
3 INJECTION, SOLUTION INTRAVENOUS CONTINUOUS
Status: DISCONTINUED | OUTPATIENT
Start: 2024-09-04 | End: 2024-09-04

## 2024-09-04 RX ORDER — ASPIRIN 81 MG/1
81 TABLET ORAL DAILY
Status: DISCONTINUED | OUTPATIENT
Start: 2024-09-05 | End: 2024-09-04

## 2024-09-04 RX ADMIN — IOPAMIDOL 100 ML: 612 INJECTION, SOLUTION INTRAVASCULAR at 12:28:00

## 2024-09-04 RX ADMIN — SODIUM CHLORIDE: 9 INJECTION, SOLUTION INTRAVENOUS at 13:15:00

## 2024-09-04 RX ADMIN — SODIUM CHLORIDE 3 ML/KG/HR: 9 INJECTION, SOLUTION INTRAVENOUS at 08:00:00

## 2024-09-04 NOTE — BRIEF OP NOTE
Pre-Operative Diagnosis: pad     Post-Operative Diagnosis: pad     Procedure Performed:   lithotripsy and DCB left sfa, pop  Poba left tp trunk    * No surgeons found in log *    Assistant(s):        Surgical Findings:  sfa     Specimen: none     Estimated Blood Loss: No data recorded    A/p  Dapt  Start low dose xarelto as OP  Stage right sfa/pop PTA with Dr Bernabe in 2-3 weeks    Jhon Love DO  9/4/2024  12:46 PM

## 2024-09-04 NOTE — DISCHARGE INSTRUCTIONS
HOME CARE INSTRUCTIONS FOLLOWING PERIPHERAL ANGIOGRAPHY, ANGIOPLASTY (PTCA/PTA) IN THE PERIPHERAL ARTERIES      Activity     DO NOT drive after the procedure.  You may resume driving late the following day according to the nurse or physician's instructions  Plan on resting and relaxing tonight and tomorrow  Resume your normal activity after 48 hours, or as instructed by your physician  Do not lift anything over 10 pounds for 1 WEEK. Nothing heavier than a gallon of milk.  Avoid repeated stair use and excessive walking for the next 24 hours.  Avoid repetitive squatting/bending exercises/motions for 1 week.   Avoid drinking alcohol for the next 24 hours      What is Normal?    A small lump at the procedure site associated with mild tenderness when touched  The procedure site may be bruised or discolored  There may be a small amount of drainage on the bandage    Special Instructions    Drink plenty of fluids during the next 24 hours to \"flush\" the contrast from your system  Keep the bandage clean and dry   After 24 hours, remove the bandage. TOMORROW at 1 pm.   You may shower after removing the bandage, and wash the procedure site gently with soap and water  Do NOT apply any powders, ointments or creams to the site for 1 week.   DO NOT submerge the procedure site for 1 WEEK (no bath tubs or pools)  If you choose to wear a bandage for a few days, make sure it remains clean and dry and that it is changed daily    When you should NOTIFY YOUR PHYSICIAN    Bleeding can occur at the procedure site - both on the outside of the skin and/or beneath the surface of the skin  Swelling or a large lump at the procedure site can occur, which may be accompanied by moderate to severe pain    If either of the above occurs, lie down flat.    Have someone apply pressure to the procedure site with both hands, as instructed by the nurse.    Hold pressure for 20 minutes and the bleeding should stop.    Notify your physician of the  occurrence  If the bleeding does not stop, call 911 and continue to apply pressure    If you experience signs of a fever, temperature > 101°, chills, infection (redness, swelling, thick yellow drainage, or a foul odor from the procedure site)  If you notice any numbness, tingling, or loss of feeling to your leg or foot or groin access    Other    You may resume your present diet, unless otherwise specified by your physician.  You may resume all of your medications as prescribed, unless otherwise directed by your physician.  A list of your medications was provided to you at discharge.  Gradually resume your previous aerobic exercise schedule as tolerated.      Closure device utilized?  Yes (see additional instructions below)  If yes, type of closure used: Angioseal   Perclose  Additional Instructions:  Leave the dressing/band-aid over the site for 24 hours.   You may feel a \"pea or olive pit\" sized lumpand/or note mild tenderness in the groin area for approximately 5-7 days.         The collagen will be resorbed (broken down) by your body in approximately 6-12 weeks.  After you shower, apply a freshband-aid to the site and change if it becomes soiled or wet.   See appropriate pamphlet information

## 2024-09-04 NOTE — IVS NOTE
DISCHARGE NOTE      Pt is able to sit up and ambulate without difficulty.   Pt voided and tolerated fluids and food.   Procedural site remains dry and intact with good circulation, motion, and sensation.   No signs and symptoms of bleeding/hematoma noted.   IV access removed  Instruction provided, patient/family verbalizes understanding.   Dr. Love  spoke with patient/family post procedure.      Pt discharge via wheelchair to [facility]      Follow up Appointment: 9/12 Maria Ines love     New Prescription: n/a

## 2024-09-05 NOTE — PROCEDURES
[facility]  part of [facility] Peripheral Angiography Procedure Note  Stanley Mosher Patient Status:  Outpatient    1950 MRN [id number]   Location [facility] INTERVENTIONAL SUITES Attending No att. 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  Outpatient noninvasive testing suggests severe PAD, primarily due to femoral disease.  Given patient's symptoms, history and noninvasive testing, has been referred for peripheral angiography with possible PTA.    Summary of Case: After written informed consent was obtained from the patient, patient was brought to the cardiac catheterization laboratory.  Patient was prepped and draped in the usual sterile fashion. Lidocaine 1% was used to infiltrate the right groin for local anesthesia and a 5 Thai introducer sheath was inserted into the right femoral artery using modified Seldinger technique.  5 Thai Omni Flush catheter was advanced into the infrarenal aorta and aortobifemoral angiogram was performed using CO2, catheter was then advanced into the left iliac region and selective images of the left common and external  iliac arteries were performed in multiple views.  Further imaging of the remainder of the left lower extremity was performed using CO2, lesion within the distal SFA and popliteal artery was identified.  6 Moroccan by 45 cm destination sheath was advanced into the distal left external iliac artery from the right CFA.  A Beckwith cross 035 catheter was advanced into the mid SFA on the left over wire and multiple images were performed using contrast dye.  The left SFA  was crossed using 018 glide advantage wire and the wire was advanced into the left peroneal artery.  Lesion prep of the distal SFA and popliteal arteries was performed using a 3 x 40 mm Roman balloon.  I was unable to advance a 6 mm shockwave balloon into the the distal SFA due to tight, heavily calcified stenosis.  A 4 mm chocolate balloon was used to treat this region, this was followed by successful advancement of a 6 x 60 mm shockwave lithotripsy balloon into the distal popliteal artery.  Multiple rounds of lithotripsy were performed of the distal mid, proximal popliteal artery as well as the mid and distal left SFA with very good balloon expansion.  This is followed by treatment of the popliteal artery with afive 5 x 120 mm impact Admiral DCB.  A 6 x 150 mm impact Admiral DCB was advanced into the mid and distal left SFA however the balloon ruptured during initial inflation.  Balloon was removed and exchanged out for a 6 x 150 mm Lutonix DCB which had adequate expansion.  Postintervention angiography showed very good results however there was a focal hydrotalcite lesion of the distal TP trunk extending to the peroneal artery, this was treated with a 3 x 40 mm Roman balloon with regular results.  Further imaging of the right lower extremity was then performed using contrast.  Perclose was attempted however failed and Angio-Seal was utilized to  achieve hemostasis.      Findings:  Aorta-normal diameter, no significant disease    Right lower  extremity  - Common iliac artery: Widely patent with no significant disease  - External iliac artery: Widely patent with no significant disease  - Internal iliac artery: Widely patent with no significant disease  - Common femoral artery: Widely patent with mild disease  - Profunda femoris: Mostly patent with mild disease  - Superficial femoral artery: Diffusely and heavily calcified with multiple lesions in the mid and distal segments ranging between 70 and 80%.  - Popliteal artery: Not fully imaged  - Anterior tibial artery: Not imaged due to contrast limitation  - TP trunk: Not imaged due to contrast limitation  - Peroneal artery: Not imaged due to contrast imitation  - Posterior tibial artery: Not imaged due to contrast limitation    Left lower extremity  - Common iliac artery: Widely patent with no significant disease  - External iliac artery: Widely patent with no significant disease  - Internal iliac artery: Widely patent with no significant disease  - Common femoral artery: Mild disease with 30 to 40% stenosis in the midsegment  - Profunda femoris: 80% stenosis of ostial segment due to heavily calcified disease, multiple lesions of the mid and distal segments ranging between 70 to 80%  - Superficial femoral artery: Diffusely calcified, 90% stenosis in distal segment due to heavily calcified plaque.  This was followed by a short segment of 100%  with reconstitution at approximately P1 segment.  - Popliteal artery: Moderate to severe diffuse heavily calcified disease ranging between 50 to 70%  - Anterior tibial artery:  100% chronic total occlusion of the proximal segment  - TP trunk: Severe 90% stenosis with distal segment extending into the peroneal artery  - Peroneal artery: Widely patent with no significant disease  - Posterior tibial artery: 100% chronic total occlusion of the proximal segment    Specimen sent to: No specimen collected  Estimated blood loss: 10cc  Closure:  perclose (failed),  angioseal      Lesion #1 left distal SFA, popliteal artery  Working sheath: 6 x 45 Terumo destination  Wire: 0.018 glide advantage wire, 0.014 glide advantage wire   Pre dilation Balloon: 3 x 40 Roman balloon, 4 x 60 chocolate pressure balloon, 6 x 60 mm shockwave lithotripsy balloon  Post-dilation Balloon: 5 x 120 mm impact Admiral Balloon and popliteal artery, 6 x 150 mm lutonix balloon and mid and distal SFA.    Lesion #2 left distal TP trunk  4 x 40 mm Roman balloon used to treat distal TP trunk into the peroneal artery with multiple inflations.    IV was maintained by RN and moderate conscious sedation of versed 3mg and fentanyl 75mcg was given.  Patient was assessed and monitoring of oxygen, heart rate and blood pressure by nurse and myself during the exam from 129 minutes.      Assessment and Plan:  73-year-old male with above history presenting with severe bilateral claudication worsening claudication of the left leg with intermittent rest pain.  Patient referred for further urgent angiography with possible PTA indicated for critical limb ischemia.  Patient found to have short segment  of the distal SFA, proximal popliteal artery and high-grade highly calcified lesions of the distal SFA.  Left distal SFA, popliteal artery and TP trunk treated as detailed above with very good results.  CO2 used for imaging due to renal insufficiency, converted to iodinated contrast for intervention.  Heparin is used for anticoagulation and ACT was monitored.  Will recommend continue aspirin, Plavix.    Patient will need staged intervention of right SFA and popliteal arteries with Dr. Bernabe in 2 to 3 weeks.    Jhon Love DO  [facility]   Interventional Cardiac and Vascular Services      Jhon Love DO  09/04/24

## 2024-09-10 ENCOUNTER — OFFICE VISIT (OUTPATIENT)
Dept: ENDOCRINOLOGY CLINIC | Facility: CLINIC | Age: 74
End: 2024-09-10
Payer: COMMERCIAL

## 2024-09-10 VITALS
DIASTOLIC BLOOD PRESSURE: 73 MMHG | HEART RATE: 58 BPM | WEIGHT: 209 LBS | HEIGHT: 70 IN | BODY MASS INDEX: 29.92 KG/M2 | SYSTOLIC BLOOD PRESSURE: 152 MMHG

## 2024-09-10 DIAGNOSIS — E11.65 TYPE 2 DIABETES MELLITUS WITH HYPERGLYCEMIA, WITH LONG-TERM CURRENT USE OF INSULIN (HCC): ICD-10-CM

## 2024-09-10 DIAGNOSIS — E55.9 VITAMIN D DEFICIENCY: ICD-10-CM

## 2024-09-10 DIAGNOSIS — Z79.4 TYPE 2 DIABETES MELLITUS WITH HYPERGLYCEMIA, WITH LONG-TERM CURRENT USE OF INSULIN (HCC): ICD-10-CM

## 2024-09-10 DIAGNOSIS — E78.5 DYSLIPIDEMIA: Primary | ICD-10-CM

## 2024-09-10 LAB
GLUCOSE BLOOD: 132
HEMOGLOBIN A1C: 8.6 % (ref 4.3–5.6)
TEST STRIP LOT #: NORMAL NUMERIC

## 2024-09-10 PROCEDURE — 99214 OFFICE O/P EST MOD 30 MIN: CPT | Performed by: INTERNAL MEDICINE

## 2024-09-10 PROCEDURE — 83036 HEMOGLOBIN GLYCOSYLATED A1C: CPT | Performed by: INTERNAL MEDICINE

## 2024-09-10 PROCEDURE — 82947 ASSAY GLUCOSE BLOOD QUANT: CPT | Performed by: INTERNAL MEDICINE

## 2024-09-16 NOTE — H&P
The above referenced H&P was reviewed by Marv Bernabe MD on 10/3/2024, the patient was examined and no significant changes have occurred in the patient's condition since the H&P was performed. The risks, benefits, and alternatives were discussed with the patient and/or the family who consented.  The patient had a screening history (including review of previous problems with anesthesia and history of heavy snoring or sleep apnea)  and exam completed and there are no contraindications to sedation.  ASA designation is ASA 3 - Patient with moderate systemic disease with functional limitations.  Mallampati score: II (hard and soft palate, upper portion of tonsils anduvula visible).

## 2024-09-30 ENCOUNTER — LAB ENCOUNTER (OUTPATIENT)
Dept: LAB | Age: 74
End: 2024-09-30
Attending: INTERNAL MEDICINE
Payer: COMMERCIAL

## 2024-09-30 DIAGNOSIS — I25.810 CAD (CORONARY ARTERY DISEASE) OF BYPASS GRAFT: Primary | ICD-10-CM

## 2024-09-30 DIAGNOSIS — I10 HTN (HYPERTENSION): ICD-10-CM

## 2024-09-30 LAB
ANION GAP SERPL CALC-SCNC: 10 MMOL/L (ref 0–18)
BASOPHILS # BLD AUTO: 0.13 X10(3) UL (ref 0–0.2)
BASOPHILS NFR BLD AUTO: 1.1 %
BUN BLD-MCNC: 43 MG/DL (ref 9–23)
BUN/CREAT SERPL: 11.1 (ref 10–20)
CALCIUM BLD-MCNC: 10.4 MG/DL (ref 8.7–10.4)
CHLORIDE SERPL-SCNC: 109 MMOL/L (ref 98–112)
CO2 SERPL-SCNC: 22 MMOL/L (ref 21–32)
CREAT BLD-MCNC: 3.89 MG/DL
DEPRECATED RDW RBC AUTO: 44.1 FL (ref 35.1–46.3)
EGFRCR SERPLBLD CKD-EPI 2021: 16 ML/MIN/1.73M2 (ref 60–?)
EOSINOPHIL # BLD AUTO: 1.62 X10(3) UL (ref 0–0.7)
EOSINOPHIL NFR BLD AUTO: 14.2 %
ERYTHROCYTE [DISTWIDTH] IN BLOOD BY AUTOMATED COUNT: 13.5 % (ref 11–15)
FASTING STATUS PATIENT QL REPORTED: NO
GLUCOSE BLD-MCNC: 213 MG/DL (ref 70–99)
HCT VFR BLD AUTO: 40.6 %
HGB BLD-MCNC: 13.4 G/DL
IMM GRANULOCYTES # BLD AUTO: 0.05 X10(3) UL (ref 0–1)
IMM GRANULOCYTES NFR BLD: 0.4 %
LYMPHOCYTES # BLD AUTO: 1.73 X10(3) UL (ref 1–4)
LYMPHOCYTES NFR BLD AUTO: 15.2 %
MCH RBC QN AUTO: 29.6 PG (ref 26–34)
MCHC RBC AUTO-ENTMCNC: 33 G/DL (ref 31–37)
MCV RBC AUTO: 89.6 FL
MONOCYTES # BLD AUTO: 0.86 X10(3) UL (ref 0.1–1)
MONOCYTES NFR BLD AUTO: 7.6 %
NEUTROPHILS # BLD AUTO: 6.98 X10 (3) UL (ref 1.5–7.7)
NEUTROPHILS # BLD AUTO: 6.98 X10(3) UL (ref 1.5–7.7)
NEUTROPHILS NFR BLD AUTO: 61.5 %
OSMOLALITY SERPL CALC.SUM OF ELEC: 309 MOSM/KG (ref 275–295)
PLATELET # BLD AUTO: 342 10(3)UL (ref 150–450)
POTASSIUM SERPL-SCNC: 4.7 MMOL/L (ref 3.5–5.1)
RBC # BLD AUTO: 4.53 X10(6)UL
SODIUM SERPL-SCNC: 141 MMOL/L (ref 136–145)
WBC # BLD AUTO: 11.4 X10(3) UL (ref 4–11)

## 2024-09-30 PROCEDURE — 36415 COLL VENOUS BLD VENIPUNCTURE: CPT

## 2024-09-30 PROCEDURE — 80048 BASIC METABOLIC PNL TOTAL CA: CPT

## 2024-09-30 PROCEDURE — 85025 COMPLETE CBC W/AUTO DIFF WBC: CPT

## 2024-10-03 ENCOUNTER — HOSPITAL ENCOUNTER (OUTPATIENT)
Dept: INTERVENTIONAL RADIOLOGY/VASCULAR | Facility: HOSPITAL | Age: 74
Discharge: HOME | End: 2024-10-03
Attending: INTERNAL MEDICINE | Admitting: INTERNAL MEDICINE
Payer: COMMERCIAL

## 2024-10-03 VITALS
BODY MASS INDEX: 31.1 KG/M2 | DIASTOLIC BLOOD PRESSURE: 77 MMHG | SYSTOLIC BLOOD PRESSURE: 152 MMHG | TEMPERATURE: 97 F | HEART RATE: 69 BPM | OXYGEN SATURATION: 96 % | RESPIRATION RATE: 16 BRPM | HEIGHT: 69 IN | WEIGHT: 210 LBS

## 2024-10-03 DIAGNOSIS — I73.9 PAD (PERIPHERAL ARTERY DISEASE) (HCC): ICD-10-CM

## 2024-10-03 LAB
GLUCOSE BLDC GLUCOMTR-MCNC: 235 MG/DL (ref 70–99)
ISTAT ACTIVATED CLOTTING TIME: 232 SECONDS (ref 125–137)

## 2024-10-03 PROCEDURE — 99152 MOD SED SAME PHYS/QHP 5/>YRS: CPT | Performed by: INTERNAL MEDICINE

## 2024-10-03 PROCEDURE — 04FK3ZZ FRAGMENTATION OF RIGHT FEMORAL ARTERY, PERCUTANEOUS APPROACH: ICD-10-PCS | Performed by: INTERNAL MEDICINE

## 2024-10-03 PROCEDURE — 82962 GLUCOSE BLOOD TEST: CPT

## 2024-10-03 PROCEDURE — 37224 HC TRANSLUMINAL ANGIOPLASTY FEM POP UNILATERAL: CPT | Performed by: INTERNAL MEDICINE

## 2024-10-03 PROCEDURE — 85347 COAGULATION TIME ACTIVATED: CPT

## 2024-10-03 PROCEDURE — 047K3Z1 DILATION OF RIGHT FEMORAL ARTERY USING DRUG-COATED BALLOON, PERCUTANEOUS APPROACH: ICD-10-PCS | Performed by: INTERNAL MEDICINE

## 2024-10-03 PROCEDURE — 99153 MOD SED SAME PHYS/QHP EA: CPT | Performed by: INTERNAL MEDICINE

## 2024-10-03 RX ORDER — CLOPIDOGREL BISULFATE 75 MG/1
75 TABLET ORAL ONCE
Status: COMPLETED | OUTPATIENT
Start: 2024-10-03 | End: 2024-10-03

## 2024-10-03 RX ORDER — SODIUM CHLORIDE 9 MG/ML
INJECTION, SOLUTION INTRAVENOUS CONTINUOUS
Status: DISCONTINUED | OUTPATIENT
Start: 2024-10-03 | End: 2024-10-03

## 2024-10-03 RX ORDER — ASPIRIN 81 MG/1
81 TABLET, CHEWABLE ORAL ONCE
Status: COMPLETED | OUTPATIENT
Start: 2024-10-03 | End: 2024-10-03

## 2024-10-03 RX ORDER — IOPAMIDOL 612 MG/ML
100 INJECTION, SOLUTION INTRAVASCULAR
Status: COMPLETED | OUTPATIENT
Start: 2024-10-03 | End: 2024-10-03

## 2024-10-03 RX ORDER — SODIUM CHLORIDE 0.9 % (FLUSH) 0.9 %
10 SYRINGE (ML) INJECTION AS NEEDED
Status: DISCONTINUED | OUTPATIENT
Start: 2024-10-03 | End: 2024-10-03

## 2024-10-03 RX ORDER — ASPIRIN 81 MG/1
TABLET, CHEWABLE ORAL
Status: COMPLETED
Start: 2024-10-03 | End: 2024-10-03

## 2024-10-03 RX ORDER — MIDAZOLAM HYDROCHLORIDE 1 MG/ML
INJECTION INTRAMUSCULAR; INTRAVENOUS
Status: COMPLETED
Start: 2024-10-03 | End: 2024-10-03

## 2024-10-03 RX ORDER — LIDOCAINE HYDROCHLORIDE 20 MG/ML
INJECTION, SOLUTION INFILTRATION; PERINEURAL
Status: COMPLETED
Start: 2024-10-03 | End: 2024-10-03

## 2024-10-03 RX ORDER — CLOPIDOGREL BISULFATE 75 MG/1
TABLET ORAL
Status: COMPLETED
Start: 2024-10-03 | End: 2024-10-03

## 2024-10-03 RX ADMIN — SODIUM CHLORIDE: 9 INJECTION, SOLUTION INTRAVENOUS at 10:30:00

## 2024-10-03 RX ADMIN — CLOPIDOGREL BISULFATE 75 MG: 75 TABLET ORAL at 11:21:00

## 2024-10-03 RX ADMIN — IOPAMIDOL 15 ML: 612 INJECTION, SOLUTION INTRAVASCULAR at 13:00:00

## 2024-10-03 RX ADMIN — ASPIRIN 81 MG: 81 TABLET, CHEWABLE ORAL at 11:20:00

## 2024-10-03 NOTE — PROCEDURES
[facility]    Peripheral angiogram/intervention note      Cardiologist: Marv Bernabe MD  Primary Proceduralist: Marv Bernabe MD  Procedure Performed:  Lower extremity angiogram/balloon angioplasty of the right SFA/drug-coated balloon angioplasty of the right SFA/shockwave lithotripsy of the distal right SFA and proximal popliteal artery  Date of Procedure: 10/3/2024     Pre procedure diagnosis: Right lower extremity claudication, staged intervention  Post procedure diagnosis: As above        Post procedure findings:    Right proximal SFA: Heavily calcified mild disease  Right mid SFA: Heavily calcified moderate to severe disease 80 to 90%  Right distal SFA: Heavily calcified 90% stenosis  Right popliteal: Heavily calcified 80% stenosis  Right posterior tibial: Widely patent      Details of the procedure:  After informed consent was obtained, patient was electively brought to the Cath Lab in stable condition.  Left groin was prepped in sterile fashion and using a micropuncture sheath, 5 Albanian short sheath was placed into the left common femoral artery.  Omni catheter was then taken over a J-wire and placed in the distal infrarenal aorta.  Hand-injection was performed.  The catheter was then advanced over a wire into the right SFA and multiple injections were performed for the angiogram.    Details of the interventional procedure:  Patient was given heparin for anticoagulation and a 6 Albanian crossover sheath was placed into the right common femoral artery.  A run-through wire was used to cross the SFA lesion without any difficulty.  4 mm x 220 mm balloon was used to predilate the SFA lesion.  This was followed by 5 mm x 300 mm loop tonics drug-coated balloon starting in the P3 segment of the popliteal all the way to the mid SFA.  There was persistent heavily calcified 60% stenosis seen in the P1 segment after drug-coated balloon.  Shockwave balloon lithotripsy was performed using a 6 mm x 60  mm balloon to that area.  150 pulses were delivered.  The lesion was then postdilated using a 5 mm x 12 mm noncompliant coronary balloon at high pressure.  At the end of the procedure all the catheters wire were withdrawn and Perclose was used to achieve hemostasis.    Specimen sent to: No specimen collected  Estimated blood loss: 10 cc    IV was maintained by RN and moderate conscious sedation of versed and fentanyl was given.  Patient was assessed and monitoring of oxygen, heart rate and blood pressure by nurse and myself during the exam from 12:03 PM to 12:53 PM.    Marv Bernabe MD

## 2024-10-03 NOTE — DISCHARGE INSTRUCTIONS
HOME CARE INSTRUCTIONS FOLLOWING PERIPHERAL ANGIOGRAPHY, ANGIOPLASTY     Activity     DO NOT drive after the procedure.  You may resume driving late the following day according to the nurse or physician's instructions  Plan on resting and relaxing tonight and tomorrow  Resume your normal activity after 48 hours, or as instructed by your physician  Do not lift anything over 10 pounds for 1 WEEK  Avoid repeated stair use and excessive walking for the next 24 hours.  Avoid repetitive squatting/bending exercises/motions for 1 week.   Avoid drinking alcohol for the next 24 hours      What is Normal?    A small lump at the procedure site associated with mild tenderness when touched  The procedure site may be bruised or discolored  There may be a small amount of drainage on the bandage    Special Instructions    Drink plenty of fluids during the next 24 hours to \"flush\" the contrast from your system  Keep the bandage clean and dry   After 24 hours, remove the bandage   You may shower after removing the bandage, and wash the procedure site gently with soap and water  Do NOT apply any powders, ointments or creams to the site for 1 week.   DO NOT submerge the procedure site for 1 WEEK (no bath tubs or pools)  If you choose to wear a bandage for a few days, make sure it remains clean and dry and that it is changed daily    Additional Instructions:  Do not take glucophage/metformin containing products for at least 48 hours after procedure, unless otherwise directed by your physician.    When you should NOTIFY YOUR PHYSICIAN    Bleeding can occur at the procedure site - both on the outside of the skin and/or beneath the surface of the skin  Swelling or a large lump at the procedure site can occur, which may be accompanied by moderate to severe pain    If either of the above occurs, lie down flat.    Have someone apply pressure to the procedure site with both hands, as instructed by the nurse.    Hold pressure for 20 minutes and  the bleeding should stop.    Notify your physician of the occurrence  If the bleeding does not stop, call 911 and continue to apply pressure    If you experience signs of a fever, temperature > 101°, chills, infection (redness, swelling, thick yellow drainage, or a foul odor from the procedure site)  If you notice any numbness, tingling, or loss of feeling to your leg or foot or groin access      Other    You may resume your present diet, unless otherwise specified by your physician.  A list of your medications was provided to you at discharge.      Closure device utilized?  Yes (see additional instructions below)  If yes, type of closure used:  Perclose - permanent suture at the blood vessel in the groin

## 2024-10-03 NOTE — IVS NOTE
DISCHARGE NOTE     Pt is able to sit up and ambulate without difficulty.   Pt voided and tolerated fluids and food.   Procedural site remains dry and intact with good circulation, motion, and sensation.   No signs and symptoms of bleeding/hematoma noted.   IV access removed  Instruction provided, patient/family verbalizes understanding.   Dr. Bernabe spoke with patient/family post procedure.     Pt discharge via wheelchair to [facility]       Follow up Appointment: 10/11 at 330pm    New Prescription: none

## 2024-10-14 ENCOUNTER — OFFICE VISIT (OUTPATIENT)
Dept: PODIATRY CLINIC | Facility: CLINIC | Age: 74
End: 2024-10-14

## 2024-10-14 DIAGNOSIS — L84 CALLUS OF FOOT: ICD-10-CM

## 2024-10-14 DIAGNOSIS — B35.1 ONYCHOMYCOSIS: ICD-10-CM

## 2024-10-14 DIAGNOSIS — E11.59 TYPE 2 DIABETES MELLITUS WITH OTHER CIRCULATORY COMPLICATION, UNSPECIFIED WHETHER LONG TERM INSULIN USE (HCC): Primary | ICD-10-CM

## 2024-10-14 DIAGNOSIS — I73.89 OTHER SPECIFIED PERIPHERAL VASCULAR DISEASES (HCC): ICD-10-CM

## 2024-10-14 DIAGNOSIS — L85.3 XEROSIS OF SKIN: ICD-10-CM

## 2024-10-14 DIAGNOSIS — Z86.31 HISTORY OF DIABETIC ULCER OF FOOT: ICD-10-CM

## 2024-10-14 PROCEDURE — 99213 OFFICE O/P EST LOW 20 MIN: CPT | Performed by: PODIATRIST

## 2024-10-26 NOTE — PROGRESS NOTES
Stanley Mosher is a 73 year old male.   Chief Complaint   Patient presents with    Diabetic Foot Care     Nail care and foot check- No FBS taken today- A1C 8.6 on 9/10         HPI:   Patient presents for nail care at today's visit he has a known history of PAD with ulceration this and slow healing he has had an ulceration on the lateral right heel.  He is now complaining of rest pain even in his left leg.  This is a more recent development he does have a checkup with his interventional cardiologist to have this looked at.  He said his left leg is cold to the touch.  At today's visit reviewed nurse's history as taken above, allergies medications and medical history as documented below.  All changes duly noted  Allergies: Victoza and Trulicity [dulaglutide]   Current Outpatient Medications   Medication Sig Dispense Refill    rosuvastatin 40 MG Oral Tab Take 1 tablet (40 mg total) by mouth daily.      losartan 50 MG Oral Tab TAKE 1 AND 1/2 TABLET BY MOUTH DAILY 135 tablet 1    FREESTYLE BENJI 3 SENSOR Does not apply Misc 1 EACH EVERY 14 (FOURTEEN) DAYS. 6 each 4    Insulin NPH & Regular (HUMULIN 70/30 KWIKPEN) (70-30) 100 UNIT/ML Subcutaneous Suspension Pen-injector INJECT 60 UNITS INTO THE SKIN DAILY WITH BREAKFAST AND 60 UNITS DAILY WITH DINNER 111 mL 1    Insulin Pen Needle (BD PEN NEEDLE BETHEL U/F) 32G X 4 MM Does not apply Misc INJECT TWICE A  each 1    metoprolol succinate  MG Oral Tablet 24 Hr Take 1.5 tablets (150 mg total) by mouth daily. 135 tablet 1    ergocalciferol 1.25 MG (36801 UT) Oral Cap Take 1 capsule (50,000 Units total) by mouth once a week. 12 capsule 11    amLODIPine 10 MG Oral Tab Take 1 tablet (10 mg total) by mouth daily. 90 tablet 4    clopidogrel 75 MG Oral Tab Take 1 tablet (75 mg total) by mouth daily. 90 tablet 3    Ascorbic Acid (VITAMIN C) 1000 MG Oral Tab Take 0.5 tablets (500 mg total) by mouth daily.      aspirin 81 MG Oral Tab EC Take 1 tablet (81 mg total) by mouth daily.       calcium carbonate antacid 500 MG Oral Chew Tab Chew 2 tablets (1,000 mg total) by mouth 3 (three) times daily. (Patient taking differently: Chew 1 tablet (500 mg total) by mouth daily.) 90 tablet 0    multivitamin Oral Tab Take 1 tablet by mouth daily. 30 tablet 0      Past Medical History:    Atherosclerosis of coronary artery    Cataract    stage 1 cataracts    COPD (chronic obstructive pulmonary disease) (HCC)    Coronary atherosclerosis    cabg x3    Diabetes (HCC)    Diabetes mellitus (HCC)    Esophageal reflux    pt. states gets \"heartburn from diabetic med and since starting metoprolol    Essential hypertension    Glaucoma    had 2 years ago \"cleared Up\"    Hearing impairment    brayan hearing aids    High blood pressure    Hyperlipidemia    Nephropathy    Neuropathy    bilateral lower extremities    Osteoarthritis    Pneumonia due to organism    Visual impairment    wears glasses      Past Surgical History:   Procedure Laterality Date    Anesth,open heart surgery  10/12/2017    triple bypass    Angioplasty (coronary) Left 2018    Cabg  10/2017    CABGX3    Carotid endarterectomy  07/10/2019    Colonoscopy  7/6/15    Colonoscopy      5 years \"was fine\"    Other surgical history  2010    Polyps Dr.M Goldberg    Other surgical history  2018    peripheral angioplasty    Tonsillectomy        Family History   Problem Relation Age of Onset    Diabetes Father     Heart Disorder Father     Lipids Father     Diabetes Maternal Grandfather     Cancer Mother       Social History     Socioeconomic History    Marital status:    Tobacco Use    Smoking status: Former     Current packs/day: 0.00     Average packs/day: 1 pack/day for 40.0 years (40.0 ttl pk-yrs)     Types: Cigarettes     Start date: 10/17/1977     Quit date: 10/17/2017     Years since quittin.0    Smokeless tobacco: Never   Vaping Use    Vaping status: Never Used   Substance and Sexual Activity    Alcohol use: No     Alcohol/week: 0.0  standard drinks of alcohol     Comment: rarely     Drug use: No    Sexual activity: Never           REVIEW OF SYSTEMS:   Today reviewed systens as documented below  GENERAL HEALTH: feels well otherwise  SKIN: Refer to exam below  RESPIRATORY: denies shortness of breath with exertion  CARDIOVASCULAR: denies chest pain on exertion  GI: denies abdominal pain and denies heartburn  NEURO: denies headaches    EXAM:   There were no vitals taken for this visit.  GENERAL: well developed, well nourished, in no apparent distress  EXTREMITIES:   1. Integument: The skin on his feet was evaluated on the right it is a little warmer.  But the left leg is very cold its white.  Patient has thickened toenails 1-5 bilateral feet has hyperkeratosis at the lateral aspect of his heel where he had a previous ulceration on the right lower extremity.  There are no ulcers formed at this time.  The skin on the left lower extremity is very cool to the touch.   2. Vascular: Patient has nonpalpable pulses bilateral but has a more acute problem on his left than his right   3. Neurologic: Patient has diminished pedal sensorium   4. Musculoskeletal: Patient has adequate muscle strength but has not developed claudication on his left side.    ASSESSMENT AND PLAN:   Diagnoses and all orders for this visit:    Type 2 diabetes mellitus with other circulatory complication, unspecified whether long term insulin use (HCC)    Other specified peripheral vascular diseases (HCC)    History of diabetic ulcer of foot    Onychomycosis    Xerosis of skin    Callus of foot        Plan: Urged him to see his interventional cardiologist ASAP does have appointment scheduled will follow-up with him.  Today using a nail nippers were trimmed and debrided toenails 1-5 manually and mechanically in girth and width as far down to healthy tissue as possible on both feet.  This was done uneventfully there was no hemorrhage.  There is mild incurvation of the medial and lateral  nail borders of the hallux which using a slant back technique were removed and they would not get ingrown.  At today's visit I reminded the patient about daily foot checks  Reminded patient again of proper control of his diabetes to help prevent any severe complications in his feet  Proper foot hygiene moisturizing except between the toes was reviewed  Advised follow-up again every 2 to 3 months for routine care but emphasized to him the importance of coming in sooner if he notices any problems.  At today's office visit while trimming his heel callus there was a hemorrhage of his treated with silver nitrate to control hemorrhage antibiotic ointment and a Band-Aid which she will apply for the next few days call me if there is any problems he was told to expect a little bit of bleeding when he got home.  The patient indicates understanding of these issues and agrees to the plan.    Rafael Hudson, IRON

## 2024-12-16 ENCOUNTER — TELEPHONE (OUTPATIENT)
Dept: FAMILY MEDICINE CLINIC | Facility: CLINIC | Age: 74
End: 2024-12-16

## 2024-12-16 ENCOUNTER — OFFICE VISIT (OUTPATIENT)
Dept: FAMILY MEDICINE CLINIC | Facility: CLINIC | Age: 74
End: 2024-12-16
Payer: COMMERCIAL

## 2024-12-16 VITALS
SYSTOLIC BLOOD PRESSURE: 138 MMHG | WEIGHT: 210.81 LBS | HEIGHT: 69 IN | BODY MASS INDEX: 31.22 KG/M2 | HEART RATE: 71 BPM | DIASTOLIC BLOOD PRESSURE: 80 MMHG

## 2024-12-16 DIAGNOSIS — Z95.1 S/P CABG X 3: ICD-10-CM

## 2024-12-16 DIAGNOSIS — Z98.890 S/P CAROTID ENDARTERECTOMY: ICD-10-CM

## 2024-12-16 DIAGNOSIS — N40.0 PROSTATE HYPERTROPHY: ICD-10-CM

## 2024-12-16 DIAGNOSIS — Z12.11 SCREEN FOR COLON CANCER: ICD-10-CM

## 2024-12-16 DIAGNOSIS — E11.9 INSULIN DEPENDENT TYPE 2 DIABETES MELLITUS (HCC): Primary | ICD-10-CM

## 2024-12-16 DIAGNOSIS — Z87.891 HISTORY OF TOBACCO USE: ICD-10-CM

## 2024-12-16 DIAGNOSIS — N18.4 CKD (CHRONIC KIDNEY DISEASE) STAGE 4, GFR 15-29 ML/MIN (HCC): ICD-10-CM

## 2024-12-16 DIAGNOSIS — N52.8 OTHER MALE ERECTILE DYSFUNCTION: ICD-10-CM

## 2024-12-16 DIAGNOSIS — Z79.4 INSULIN DEPENDENT TYPE 2 DIABETES MELLITUS (HCC): Primary | ICD-10-CM

## 2024-12-16 DIAGNOSIS — Z00.00 ROUTINE MEDICAL EXAM: ICD-10-CM

## 2024-12-16 DIAGNOSIS — I10 PRIMARY HYPERTENSION: ICD-10-CM

## 2024-12-16 DIAGNOSIS — E78.2 MIXED HYPERLIPIDEMIA: ICD-10-CM

## 2024-12-16 PROBLEM — Z01.818 PREOPERATIVE TESTING: Status: RESOLVED | Noted: 2021-12-13 | Resolved: 2024-12-16

## 2024-12-16 LAB — HEMOGLOBIN A1C: 8.2 % (ref 4.3–5.6)

## 2024-12-16 PROCEDURE — 83036 HEMOGLOBIN GLYCOSYLATED A1C: CPT | Performed by: FAMILY MEDICINE

## 2024-12-16 PROCEDURE — 99397 PER PM REEVAL EST PAT 65+ YR: CPT | Performed by: FAMILY MEDICINE

## 2024-12-16 RX ORDER — TADALAFIL 20 MG/1
20 TABLET ORAL
Qty: 30 TABLET | Refills: 4 | Status: SHIPPED | OUTPATIENT
Start: 2024-12-16

## 2024-12-16 NOTE — PROGRESS NOTES
Stanley Mosher is a 74 year old male who presents for a complete physical exam.   HPI:   Reports BP at home is good. Drinking plenty of water. Sees podiatry for foot care.   Pain in legs and fingertips off and on. Does not exercise. Having issues with ED.   Has tried viagra but did not work for him.   Wt Readings from Last 3 Encounters:   12/16/24 210 lb 12.8 oz (95.6 kg)   09/25/24 210 lb (95.3 kg)   09/10/24 209 lb (94.8 kg)     Body mass index is 31.13 kg/m².     Current Outpatient Medications   Medication Sig Dispense Refill    rosuvastatin 40 MG Oral Tab Take 1 tablet (40 mg total) by mouth daily.      losartan 50 MG Oral Tab TAKE 1 AND 1/2 TABLET BY MOUTH DAILY 135 tablet 1    FREESTYLE BENJI 3 SENSOR Does not apply Misc 1 EACH EVERY 14 (FOURTEEN) DAYS. 6 each 4    Insulin NPH & Regular (HUMULIN 70/30 KWIKPEN) (70-30) 100 UNIT/ML Subcutaneous Suspension Pen-injector INJECT 60 UNITS INTO THE SKIN DAILY WITH BREAKFAST AND 60 UNITS DAILY WITH DINNER 111 mL 1    Insulin Pen Needle (BD PEN NEEDLE BETHEL U/F) 32G X 4 MM Does not apply Misc INJECT TWICE A  each 1    metoprolol succinate  MG Oral Tablet 24 Hr Take 1.5 tablets (150 mg total) by mouth daily. 135 tablet 1    ergocalciferol 1.25 MG (90141 UT) Oral Cap Take 1 capsule (50,000 Units total) by mouth once a week. 12 capsule 11    amLODIPine 10 MG Oral Tab Take 1 tablet (10 mg total) by mouth daily. 90 tablet 4    clopidogrel 75 MG Oral Tab Take 1 tablet (75 mg total) by mouth daily. 90 tablet 3    Ascorbic Acid (VITAMIN C) 1000 MG Oral Tab Take 0.5 tablets (500 mg total) by mouth daily.      aspirin 81 MG Oral Tab EC Take 1 tablet (81 mg total) by mouth daily.      calcium carbonate antacid 500 MG Oral Chew Tab Chew 2 tablets (1,000 mg total) by mouth 3 (three) times daily. (Patient taking differently: Chew 1 tablet (500 mg total) by mouth daily.) 90 tablet 0    multivitamin Oral Tab Take 1 tablet by mouth daily. 30 tablet 0      Past Medical History:     Atherosclerosis of coronary artery    Cataract    stage 1 cataracts    COPD (chronic obstructive pulmonary disease) (HCC)    Coronary atherosclerosis    cabg x3    Diabetes (HCC)    Diabetes mellitus (HCC)    Esophageal reflux    pt. states gets \"heartburn from diabetic med and since starting metoprolol    Essential hypertension    Glaucoma    had 2 years ago \"cleared Up\"    Hearing impairment    brayan hearing aids    High blood pressure    Hyperlipidemia    Nephropathy    Neuropathy    bilateral lower extremities    Osteoarthritis    Pneumonia due to organism    Visual impairment    wears glasses      Past Surgical History:   Procedure Laterality Date    Anesth,open heart surgery  10/12/2017    triple bypass    Angioplasty (coronary) Left 2018    Cabg  10/2017    CABGX3    Carotid endarterectomy  07/10/2019    Colonoscopy  2015    Colonoscopy      5 years \"was fine\"    Other surgical history  2010    Polyps Dr.M Goldberg    Other surgical history  2018    peripheral angioplasty    Tonsillectomy      Vasectomy  unknown      Family History   Problem Relation Age of Onset    Diabetes Father     Heart Disorder Father     Lipids Father     Diabetes Maternal Grandfather     Cancer Mother     Diabetes Brother       Social History:  Social History     Socioeconomic History    Marital status:    Tobacco Use    Smoking status: Former     Current packs/day: 0.00     Average packs/day: 1 pack/day for 40.0 years (40.0 ttl pk-yrs)     Types: Cigarettes     Start date: 10/17/1977     Quit date: 10/17/2017     Years since quittin.1    Smokeless tobacco: Never   Vaping Use    Vaping status: Never Used   Substance and Sexual Activity    Alcohol use: No     Comment: rarely     Drug use: No    Sexual activity: Never           REVIEW OF SYSTEMS:   GENERAL: feels well otherwise  Review of Systems   See HPI   EXAM:   BP (!) 166/76   Pulse 71   Ht 5' 9\" (1.753 m)   Wt 210 lb 12.8 oz (95.6 kg)   BMI  31.13 kg/m²   /80   Pulse 71   Ht 5' 9\" (1.753 m)   Wt 210 lb 12.8 oz (95.6 kg)   BMI 31.13 kg/m²     GENERAL: well developed, well nourished,in no apparent distress  SKIN: no rashes,no suspicious lesions  HEENT: atraumatic, normocephalic,ears and throat are clear  EYES:PERRLA, EOMI, ,conjunctiva are clear  NECK: supple,no adenopathy,no bruits  CHEST: no chest tenderness  LUNGS: clear to auscultation  CARDIO: RRR without murmur  GI: good BS's,no masses, HSM or tenderness  EXTREMITIES: no cyanosis, clubbing or edema  NEURO: Oriented times three,cranial nerves are intact,motor and sensory are grossly intact    ASSESSMENT AND PLAN:   Stanley Mosher is a 74 year old male who presents for a complete physical exam.    1. Insulin dependent type 2 diabetes mellitus (HCC)  Per endo.   - POC Glycohemoglobin [03908]  - Nephrology Referral - In Network    2. CKD (chronic kidney disease) stage 4, GFR 15-29 ml/min (HCC)  Overdue for labs and follow up. Should go to lab today and schedule with Dr. Fletcher   - Nephrology Referral - In Network    3. Mixed hyperlipidemia    - Nephrology Referral - In Network    4. Primary hypertension  BP elevated but improved   - Nephrology Referral - In Network    5. S/P CABG x 3      6. S/P carotid endarterectomy      7. Routine medical exam      8. History of tobacco use    - CT LUNG LD SCREENING(CPT=71271); Future    9. Screen for colon cancer    - [facility] COLON CANCER SCREENING (EXTERNAL)  Trial of cialis for ED.      Jackie Gtz MD  12/16/2024  9:19 AM

## 2024-12-17 ENCOUNTER — OFFICE VISIT (OUTPATIENT)
Dept: PODIATRY CLINIC | Facility: CLINIC | Age: 74
End: 2024-12-17
Payer: COMMERCIAL

## 2024-12-17 DIAGNOSIS — Z86.31 HISTORY OF DIABETIC ULCER OF FOOT: ICD-10-CM

## 2024-12-17 DIAGNOSIS — B35.1 ONYCHOMYCOSIS: ICD-10-CM

## 2024-12-17 DIAGNOSIS — L85.3 XEROSIS OF SKIN: ICD-10-CM

## 2024-12-17 DIAGNOSIS — I73.89 OTHER SPECIFIED PERIPHERAL VASCULAR DISEASES (HCC): ICD-10-CM

## 2024-12-17 DIAGNOSIS — E11.59 TYPE 2 DIABETES MELLITUS WITH OTHER CIRCULATORY COMPLICATION, UNSPECIFIED WHETHER LONG TERM INSULIN USE (HCC): Primary | ICD-10-CM

## 2024-12-17 DIAGNOSIS — L84 CALLUS OF FOOT: ICD-10-CM

## 2024-12-17 PROCEDURE — 99213 OFFICE O/P EST LOW 20 MIN: CPT | Performed by: PODIATRIST

## 2024-12-17 NOTE — PROGRESS NOTES
Stanley Mosher is a 74 year old male.   Chief Complaint   Patient presents with    Diabetic Foot Care     Nail care and foot check- fbg 138- A1c 8.2 12/16/24- pcp ldv 12/17/28          HPI:   Patient returns to the clinic today for routine footcare.  He has a history of a heel ulcer which is formed callus on the lateral plantar aspect of his right heel he comes in for nail care and because of his peripheral vascular disease as well as diabetes he does not provide self-care.  At today's visit reviewed nurse's history as taken above, allergies medications and medical history as documented below.  All changes duly noted  Allergies: Victoza and Trulicity [dulaglutide]   Current Outpatient Medications   Medication Sig Dispense Refill    Tadalafil 20 MG Oral Tab Take 1 tablet (20 mg total) by mouth daily as needed for Erectile Dysfunction. 30 tablet 4    rosuvastatin 40 MG Oral Tab Take 1 tablet (40 mg total) by mouth daily.      losartan 50 MG Oral Tab TAKE 1 AND 1/2 TABLET BY MOUTH DAILY 135 tablet 1    FREESTYLE BENJI 3 SENSOR Does not apply Misc 1 EACH EVERY 14 (FOURTEEN) DAYS. 6 each 4    Insulin NPH & Regular (HUMULIN 70/30 KWIKPEN) (70-30) 100 UNIT/ML Subcutaneous Suspension Pen-injector INJECT 60 UNITS INTO THE SKIN DAILY WITH BREAKFAST AND 60 UNITS DAILY WITH DINNER 111 mL 1    Insulin Pen Needle (BD PEN NEEDLE BETHEL U/F) 32G X 4 MM Does not apply Misc INJECT TWICE A  each 1    metoprolol succinate  MG Oral Tablet 24 Hr Take 1.5 tablets (150 mg total) by mouth daily. 135 tablet 1    ergocalciferol 1.25 MG (84887 UT) Oral Cap Take 1 capsule (50,000 Units total) by mouth once a week. 12 capsule 11    amLODIPine 10 MG Oral Tab Take 1 tablet (10 mg total) by mouth daily. 90 tablet 4    clopidogrel 75 MG Oral Tab Take 1 tablet (75 mg total) by mouth daily. 90 tablet 3    Ascorbic Acid (VITAMIN C) 1000 MG Oral Tab Take 0.5 tablets (500 mg total) by mouth daily.      aspirin 81 MG Oral Tab EC Take 1 tablet (81  mg total) by mouth daily.      calcium carbonate antacid 500 MG Oral Chew Tab Chew 2 tablets (1,000 mg total) by mouth 3 (three) times daily. (Patient taking differently: Chew 1 tablet (500 mg total) by mouth daily.) 90 tablet 0    multivitamin Oral Tab Take 1 tablet by mouth daily. 30 tablet 0      Past Medical History:    Atherosclerosis of coronary artery    Cataract    stage 1 cataracts    COPD (chronic obstructive pulmonary disease) (HCC)    Coronary atherosclerosis    cabg x3    Diabetes (HCC)    Diabetes mellitus (HCC)    Esophageal reflux    pt. states gets \"heartburn from diabetic med and since starting metoprolol    Essential hypertension    Glaucoma    had 2 years ago \"cleared Up\"    Hearing impairment    brayan hearing aids    High blood pressure    Hyperlipidemia    Nephropathy    Neuropathy    bilateral lower extremities    Osteoarthritis    Pneumonia due to organism    Visual impairment    wears glasses      Past Surgical History:   Procedure Laterality Date    Anesth,open heart surgery  10/12/2017    triple bypass    Angioplasty (coronary) Left 2018    Cabg  10/2017    CABGX3    Carotid endarterectomy  07/10/2019    Colonoscopy  2015    Colonoscopy      5 years \"was fine\"    Other surgical history  2010    Polyps Dr.M Goldberg    Other surgical history  2018    peripheral angioplasty    Tonsillectomy      Vasectomy  unknown      Family History   Problem Relation Age of Onset    Diabetes Father     Heart Disorder Father     Lipids Father     Diabetes Maternal Grandfather     Cancer Mother     Diabetes Brother       Social History     Socioeconomic History    Marital status:    Tobacco Use    Smoking status: Former     Current packs/day: 0.00     Average packs/day: 1 pack/day for 40.0 years (40.0 ttl pk-yrs)     Types: Cigarettes     Start date: 10/17/1977     Quit date: 10/17/2017     Years since quittin.1    Smokeless tobacco: Never   Vaping Use    Vaping status: Never  Used   Substance and Sexual Activity    Alcohol use: No     Comment: rarely     Drug use: No    Sexual activity: Never           REVIEW OF SYSTEMS:   Today reviewed systens as documented below  GENERAL HEALTH: feels well otherwise  SKIN: Refer to exam below  RESPIRATORY: denies shortness of breath with exertion  CARDIOVASCULAR: denies chest pain on exertion  GI: denies abdominal pain and denies heartburn  NEURO: denies headaches    EXAM:   There were no vitals taken for this visit.  GENERAL: well developed, well nourished, in no apparent distress  EXTREMITIES:   1. Integument: The skin on his feet was evaluated on the right it is a little warmer.  But the left leg is very cold its white.  Patient has thickened toenails 1-5 bilateral feet has hyperkeratosis at the lateral aspect of his heel where he had a previous ulceration on the right lower extremity.  There are no ulcers formed at this time.  The skin on the left lower extremity is very cool to the touch.   2. Vascular: Patient has nonpalpable pulses bilateral but has a more acute problem on his left than his right   3. Neurologic: Patient has diminished pedal sensorium   4. Musculoskeletal: Patient has adequate muscle strength but has not developed claudication on his left side.    ASSESSMENT AND PLAN:   Diagnoses and all orders for this visit:    Type 2 diabetes mellitus with other circulatory complication, unspecified whether long term insulin use (HCC)    Other specified peripheral vascular diseases (HCC)    History of diabetic ulcer of foot    Onychomycosis    Xerosis of skin    Callus of foot        Plan: Urged him to see his interventional cardiologist ASAP does have appointment scheduled will follow-up with him.  Today using a nail nippers were trimmed and debrided toenails 1-5 manually and mechanically in girth and width as far down to healthy tissue as possible on both feet.  This was done uneventfully there was no hemorrhage.  There is mild incurvation  of the medial and lateral nail borders of the hallux which using a slant back technique were removed and they would not get ingrown.  Today using a sterile 15 blade trim down debrided the callus on the lateral plantar left heel this was done uneventfully without hemorrhage.  At today's visit I reminded the patient about daily foot checks  Reminded patient again of proper control of his diabetes to help prevent any severe complications in his feet  Proper foot hygiene moisturizing except between the toes was reviewed  Advised follow-up again every 2 to 3 months for routine care but emphasized to him the importance of coming in sooner if he notices any problems.    The patient indicates understanding of these issues and agrees to the plan.    Rafael Hudson DPM

## 2024-12-21 ENCOUNTER — HOSPITAL ENCOUNTER (OUTPATIENT)
Dept: CT IMAGING | Facility: HOSPITAL | Age: 74
Discharge: HOME | End: 2024-12-21
Attending: FAMILY MEDICINE
Payer: COMMERCIAL

## 2024-12-21 DIAGNOSIS — Z87.891 HISTORY OF TOBACCO USE: ICD-10-CM

## 2024-12-21 PROCEDURE — 71271 CT THORAX LUNG CANCER SCR C-: CPT | Performed by: FAMILY MEDICINE

## 2025-01-07 ENCOUNTER — OFFICE VISIT (OUTPATIENT)
Dept: ENDOCRINOLOGY CLINIC | Facility: CLINIC | Age: 75
End: 2025-01-07

## 2025-01-07 VITALS
BODY MASS INDEX: 31.25 KG/M2 | SYSTOLIC BLOOD PRESSURE: 162 MMHG | WEIGHT: 211 LBS | DIASTOLIC BLOOD PRESSURE: 68 MMHG | HEART RATE: 64 BPM | HEIGHT: 69 IN

## 2025-01-07 DIAGNOSIS — Z98.890 H/O PARATHYROIDECTOMY: ICD-10-CM

## 2025-01-07 DIAGNOSIS — Z79.4 TYPE 2 DIABETES MELLITUS WITH HYPERGLYCEMIA, WITH LONG-TERM CURRENT USE OF INSULIN (HCC): Primary | ICD-10-CM

## 2025-01-07 DIAGNOSIS — E11.65 TYPE 2 DIABETES MELLITUS WITH HYPERGLYCEMIA, WITH LONG-TERM CURRENT USE OF INSULIN (HCC): Primary | ICD-10-CM

## 2025-01-07 DIAGNOSIS — E78.5 DYSLIPIDEMIA: ICD-10-CM

## 2025-01-07 DIAGNOSIS — Z90.89 H/O PARATHYROIDECTOMY: ICD-10-CM

## 2025-01-07 DIAGNOSIS — E55.9 VITAMIN D DEFICIENCY: ICD-10-CM

## 2025-01-07 LAB
GLUCOSE BLOOD: 142
TEST STRIP LOT #: NORMAL NUMERIC

## 2025-01-07 PROCEDURE — 99214 OFFICE O/P EST MOD 30 MIN: CPT | Performed by: INTERNAL MEDICINE

## 2025-01-07 PROCEDURE — 82947 ASSAY GLUCOSE BLOOD QUANT: CPT | Performed by: INTERNAL MEDICINE

## 2025-01-07 RX ORDER — HYDROCHLOROTHIAZIDE 12.5 MG/1
1 CAPSULE ORAL
Qty: 6 EACH | Refills: 0 | Status: SHIPPED | OUTPATIENT
Start: 2025-01-07

## 2025-01-15 RX ORDER — ERGOCALCIFEROL 1.25 MG/1
50000 CAPSULE, LIQUID FILLED ORAL WEEKLY
Qty: 12 CAPSULE | Refills: 13 | OUTPATIENT
Start: 2025-01-15

## 2025-01-18 ENCOUNTER — LAB ENCOUNTER (OUTPATIENT)
Dept: LAB | Age: 75
End: 2025-01-18
Attending: INTERNAL MEDICINE
Payer: COMMERCIAL

## 2025-01-18 DIAGNOSIS — N18.4 CKD (CHRONIC KIDNEY DISEASE) STAGE 4, GFR 15-29 ML/MIN (HCC): ICD-10-CM

## 2025-01-18 DIAGNOSIS — Z98.890 H/O PARATHYROIDECTOMY: ICD-10-CM

## 2025-01-18 DIAGNOSIS — E55.9 VITAMIN D DEFICIENCY: ICD-10-CM

## 2025-01-18 DIAGNOSIS — E78.5 DYSLIPIDEMIA: ICD-10-CM

## 2025-01-18 DIAGNOSIS — Z90.89 H/O PARATHYROIDECTOMY: ICD-10-CM

## 2025-01-18 LAB
ALBUMIN SERPL-MCNC: 4.7 G/DL (ref 3.2–4.8)
ANION GAP SERPL CALC-SCNC: 9 MMOL/L (ref 0–18)
BASOPHILS # BLD AUTO: 0.11 X10(3) UL (ref 0–0.2)
BASOPHILS NFR BLD AUTO: 1.1 %
BUN BLD-MCNC: 38 MG/DL (ref 9–23)
BUN/CREAT SERPL: 9.6 (ref 10–20)
CALCIUM BLD-MCNC: 10.1 MG/DL (ref 8.7–10.4)
CHLORIDE SERPL-SCNC: 109 MMOL/L (ref 98–112)
CHOLEST SERPL-MCNC: 164 MG/DL (ref ?–200)
CO2 SERPL-SCNC: 23 MMOL/L (ref 21–32)
CREAT BLD-MCNC: 3.96 MG/DL
CREAT UR-SCNC: 62.5 MG/DL
DEPRECATED RDW RBC AUTO: 41 FL (ref 35.1–46.3)
EGFRCR SERPLBLD CKD-EPI 2021: 15 ML/MIN/1.73M2 (ref 60–?)
EOSINOPHIL # BLD AUTO: 1.25 X10(3) UL (ref 0–0.7)
EOSINOPHIL NFR BLD AUTO: 12.2 %
ERYTHROCYTE [DISTWIDTH] IN BLOOD BY AUTOMATED COUNT: 13.2 % (ref 11–15)
FASTING PATIENT LIPID ANSWER: YES
GLUCOSE BLD-MCNC: 148 MG/DL (ref 70–99)
HCT VFR BLD AUTO: 38.1 %
HDLC SERPL-MCNC: 44 MG/DL (ref 40–59)
HGB BLD-MCNC: 12.8 G/DL
IMM GRANULOCYTES # BLD AUTO: 0.05 X10(3) UL (ref 0–1)
IMM GRANULOCYTES NFR BLD: 0.5 %
LDLC SERPL CALC-MCNC: 90 MG/DL (ref ?–100)
LYMPHOCYTES # BLD AUTO: 1.29 X10(3) UL (ref 1–4)
LYMPHOCYTES NFR BLD AUTO: 12.6 %
MCH RBC QN AUTO: 29 PG (ref 26–34)
MCHC RBC AUTO-ENTMCNC: 33.6 G/DL (ref 31–37)
MCV RBC AUTO: 86.2 FL
MICROALBUMIN UR-MCNC: 206.9 MG/DL
MICROALBUMIN/CREAT 24H UR-RTO: 3310.4 UG/MG (ref ?–30)
MONOCYTES # BLD AUTO: 0.92 X10(3) UL (ref 0.1–1)
MONOCYTES NFR BLD AUTO: 9 %
NEUTROPHILS # BLD AUTO: 6.6 X10 (3) UL (ref 1.5–7.7)
NEUTROPHILS # BLD AUTO: 6.6 X10(3) UL (ref 1.5–7.7)
NEUTROPHILS NFR BLD AUTO: 64.6 %
NONHDLC SERPL-MCNC: 120 MG/DL (ref ?–130)
OSMOLALITY SERPL CALC.SUM OF ELEC: 304 MOSM/KG (ref 275–295)
PHOSPHATE SERPL-MCNC: 4.7 MG/DL (ref 2.4–5.1)
PLATELET # BLD AUTO: 355 10(3)UL (ref 150–450)
POTASSIUM SERPL-SCNC: 4.6 MMOL/L (ref 3.5–5.1)
RBC # BLD AUTO: 4.42 X10(6)UL
SODIUM SERPL-SCNC: 141 MMOL/L (ref 136–145)
TRIGL SERPL-MCNC: 173 MG/DL (ref 30–149)
VIT D+METAB SERPL-MCNC: 49.1 NG/ML (ref 30–100)
VLDLC SERPL CALC-MCNC: 28 MG/DL (ref 0–30)
WBC # BLD AUTO: 10.2 X10(3) UL (ref 4–11)

## 2025-01-18 PROCEDURE — 85025 COMPLETE CBC W/AUTO DIFF WBC: CPT

## 2025-01-18 PROCEDURE — 82306 VITAMIN D 25 HYDROXY: CPT

## 2025-01-18 PROCEDURE — 36415 COLL VENOUS BLD VENIPUNCTURE: CPT

## 2025-01-18 PROCEDURE — 80061 LIPID PANEL: CPT

## 2025-01-18 PROCEDURE — 82043 UR ALBUMIN QUANTITATIVE: CPT

## 2025-01-18 PROCEDURE — 80069 RENAL FUNCTION PANEL: CPT

## 2025-01-18 PROCEDURE — 82570 ASSAY OF URINE CREATININE: CPT

## 2025-01-19 ENCOUNTER — TELEPHONE (OUTPATIENT)
Dept: NEPHROLOGY | Facility: CLINIC | Age: 75
End: 2025-01-19

## 2025-01-19 ENCOUNTER — TELEPHONE (OUTPATIENT)
Dept: ENDOCRINOLOGY CLINIC | Facility: CLINIC | Age: 75
End: 2025-01-19

## 2025-01-19 NOTE — TELEPHONE ENCOUNTER
Vitamin D and LDL cholesterol normal   There is message from Dr Fletcher about his GFR being low and need for early evaluation   He should please call the nephrology office for a sooner apt   Thanks

## 2025-01-19 NOTE — TELEPHONE ENCOUNTER
Kidney function is slowly getting worse.  Please see soon for follow-up to review.  He has an appointment in April but that is too late.  Bring in any recent blood pressure readings.

## 2025-01-20 NOTE — TELEPHONE ENCOUNTER
Dr Fletcher informed of note below  and last name verified ,verbalized understanding.offered to reschedule but pt prefers to keep his appt in April.

## 2025-01-20 NOTE — TELEPHONE ENCOUNTER
Called patient; patient was upset for nurse calling him back to relate message and hung up.    Called him back- instructed to repeat standing lab orders prior to April visit.   And informed him of test results indicating to come in sooner- he stated he is aware and does not want to come in sooner.    Instructed to call office if he changed his mind.  Made him aware if any symptoms to seek emergency care if feeling:  fatigued, nausea and vomiting, shortness of breath, muscle cramps, swelling.    He voiced understanding and still refused to speak any further with nurse.

## 2025-01-21 NOTE — TELEPHONE ENCOUNTER
Per TE with Dr. Fletcher on 1/19, nephro RN had spoken with patient and pt declined coming in sooner.     Called pt and wife. Provided results written below by MD. RN advised pt to follow up with Dr. Fletcher due to GFR being low. Verbalized understanding. Denies further questions or concerns.

## 2025-02-20 NOTE — LETTER
6/25/2021          To Whom It May Concern:    Debbie Landa is currently under my medical care. Please reconsider denial of NM Parathyroid  (CPT [id number]) case number [id number].  He recently had an US of thyroid showing  :    Bilateral posterior hypoechoic nod PROVIDER:[TOKEN:[[id number]],FOLLOWUP:[1 week]]

## 2025-02-21 DIAGNOSIS — Z79.4 TYPE 2 DIABETES MELLITUS WITH HYPERGLYCEMIA, WITH LONG-TERM CURRENT USE OF INSULIN (HCC): ICD-10-CM

## 2025-02-21 DIAGNOSIS — E11.65 TYPE 2 DIABETES MELLITUS WITH HYPERGLYCEMIA, WITH LONG-TERM CURRENT USE OF INSULIN (HCC): ICD-10-CM

## 2025-02-22 RX ORDER — INSULIN HUMAN 100 [IU]/ML
64 INJECTION, SUSPENSION SUBCUTANEOUS 2 TIMES DAILY WITH MEALS
Qty: 116 ML | Refills: 1 | Status: SHIPPED | OUTPATIENT
Start: 2025-02-22

## 2025-03-10 ENCOUNTER — OFFICE VISIT (OUTPATIENT)
Dept: PODIATRY CLINIC | Facility: CLINIC | Age: 75
End: 2025-03-10
Payer: COMMERCIAL

## 2025-03-10 DIAGNOSIS — L84 CALLUS OF FOOT: ICD-10-CM

## 2025-03-10 DIAGNOSIS — L85.3 XEROSIS OF SKIN: ICD-10-CM

## 2025-03-10 DIAGNOSIS — Z86.31 HISTORY OF DIABETIC ULCER OF FOOT: ICD-10-CM

## 2025-03-10 DIAGNOSIS — I73.89 OTHER SPECIFIED PERIPHERAL VASCULAR DISEASES: ICD-10-CM

## 2025-03-10 DIAGNOSIS — E11.59 TYPE 2 DIABETES MELLITUS WITH OTHER CIRCULATORY COMPLICATION, UNSPECIFIED WHETHER LONG TERM INSULIN USE (HCC): Primary | ICD-10-CM

## 2025-03-10 DIAGNOSIS — B35.1 ONYCHOMYCOSIS: ICD-10-CM

## 2025-03-10 PROCEDURE — 99213 OFFICE O/P EST LOW 20 MIN: CPT | Performed by: PODIATRIST

## 2025-03-11 NOTE — PROGRESS NOTES
Stanley Mosher is a 74 year old male.   Chief Complaint   Patient presents with    Diabetic Foot Care     Nail care and foot check- fbg 138- A1c 8.2 12/16/24- pcp lov 01/07/25          HPI:   With a known history of diabetes and PAD returns to the clinic for routine podiatric care and evaluation he relates no sores or concerns.  At today's visit reviewed nurse's history as taken above, allergies medications and medical history as documented below.  All changes duly noted  Allergies: Victoza and Trulicity [dulaglutide]   Current Outpatient Medications   Medication Sig Dispense Refill    Insulin NPH & Regular (HUMULIN 70/30 KWIKPEN) (70-30) 100 UNIT/ML Subcutaneous Suspension Pen-injector Inject 64 Units into the skin 2 (two) times daily with meals. 116 mL 1    Continuous Glucose Sensor (FREESTYLE BENJI 3 PLUS SENSOR) Does not apply Misc 1 each every 15 (fifteen) days. 6 each 0    Glucose Blood (CONTOUR NEXT TEST) In Vitro Strip Check sugars twice a day E 11.65 with insulin use 200 each 0    Tadalafil 20 MG Oral Tab Take 1 tablet (20 mg total) by mouth daily as needed for Erectile Dysfunction. 30 tablet 4    rosuvastatin 40 MG Oral Tab Take 1 tablet (40 mg total) by mouth daily.      losartan 50 MG Oral Tab TAKE 1 AND 1/2 TABLET BY MOUTH DAILY 135 tablet 1    Insulin Pen Needle (BD PEN NEEDLE BETHEL U/F) 32G X 4 MM Does not apply Misc INJECT TWICE A  each 1    metoprolol succinate  MG Oral Tablet 24 Hr Take 1.5 tablets (150 mg total) by mouth daily. 135 tablet 1    ergocalciferol 1.25 MG (47918 UT) Oral Cap Take 1 capsule (50,000 Units total) by mouth once a week. 12 capsule 11    amLODIPine 10 MG Oral Tab Take 1 tablet (10 mg total) by mouth daily. 90 tablet 4    clopidogrel 75 MG Oral Tab Take 1 tablet (75 mg total) by mouth daily. 90 tablet 3    Ascorbic Acid (VITAMIN C) 1000 MG Oral Tab Take 0.5 tablets (500 mg total) by mouth daily.      aspirin 81 MG Oral Tab EC Take 1 tablet (81 mg total) by mouth  daily.      calcium carbonate antacid 500 MG Oral Chew Tab Chew 2 tablets (1,000 mg total) by mouth 3 (three) times daily. (Patient taking differently: Chew 1 tablet (500 mg total) by mouth daily.) 90 tablet 0    multivitamin Oral Tab Take 1 tablet by mouth daily. 30 tablet 0      Past Medical History:    Atherosclerosis of coronary artery    Cataract    stage 1 cataracts    COPD (chronic obstructive pulmonary disease) (HCC)    Coronary atherosclerosis    cabg x3    Diabetes (HCC)    Diabetes mellitus (HCC)    Esophageal reflux    pt. states gets \"heartburn from diabetic med and since starting metoprolol    Essential hypertension    Glaucoma    had 2 years ago \"cleared Up\"    Hearing impairment    brayan hearing aids    High blood pressure    Hyperlipidemia    Nephropathy    Neuropathy    bilateral lower extremities    Osteoarthritis    Pneumonia due to organism    Visual impairment    wears glasses      Past Surgical History:   Procedure Laterality Date    Anesth,open heart surgery  10/12/2017    triple bypass    Angioplasty (coronary) Left 2018    Cabg  10/2017    CABGX3    Carotid endarterectomy  07/10/2019    Colonoscopy  2015    Colonoscopy      5 years \"was fine\"    Other surgical history  2010    Polyps Dr.M Goldberg    Other surgical history  2018    peripheral angioplasty    Tonsillectomy      Vasectomy  unknown      Family History   Problem Relation Age of Onset    Diabetes Father     Heart Disorder Father     Lipids Father     Diabetes Maternal Grandfather     Cancer Mother     Diabetes Brother       Social History     Socioeconomic History    Marital status:    Tobacco Use    Smoking status: Former     Current packs/day: 0.00     Average packs/day: 1 pack/day for 40.0 years (40.0 ttl pk-yrs)     Types: Cigarettes     Start date: 10/17/1977     Quit date: 10/17/2017     Years since quittin.4    Smokeless tobacco: Never   Vaping Use    Vaping status: Never Used   Substance and  Sexual Activity    Alcohol use: No     Comment: rarely     Drug use: No    Sexual activity: Never           REVIEW OF SYSTEMS:   Today reviewed systens as documented below  GENERAL HEALTH: feels well otherwise  SKIN: Refer to exam below  RESPIRATORY: denies shortness of breath with exertion  CARDIOVASCULAR: denies chest pain on exertion  GI: denies abdominal pain and denies heartburn  NEURO: denies headaches    EXAM:   There were no vitals taken for this visit.  GENERAL: well developed, well nourished, in no apparent distress  EXTREMITIES:   1. Integument: The skin on his feet was evaluated on the right it is a little warmer.  But the left leg is very cold its white.  Patient has thickened toenails 1-5 bilateral feet has hyperkeratosis at the lateral aspect of his heel where he had a previous ulceration on the right lower extremity.  There are no ulcers formed at this time.  The skin on the left lower extremity is very cool to the touch.   2. Vascular: Patient has nonpalpable pulses bilateral but has a more acute problem on his left than his right   3. Neurologic: Patient has diminished pedal sensorium   4. Musculoskeletal: Patient has adequate muscle strength but has not developed claudication on his left side.    ASSESSMENT AND PLAN:   Diagnoses and all orders for this visit:    Type 2 diabetes mellitus with other circulatory complication, unspecified whether long term insulin use (HCC)    Other specified peripheral vascular diseases    History of diabetic ulcer of foot    Onychomycosis    Xerosis of skin    Callus of foot        Plan: Urged him to see his interventional cardiologist ASAP does have appointment scheduled will follow-up with him.  Today using a nail nippers were trimmed and debrided toenails 1-5 manually and mechanically in girth and width as far down to healthy tissue as possible on both feet.  This was done uneventfully there was no hemorrhage.  There is mild incurvation of the medial and lateral  nail borders of the hallux which using a slant back technique were removed and they would not get ingrown.  Today using a sterile 15 blade trim down debrided the callus on the lateral plantar left heel this was done uneventfully without hemorrhage.  At today's visit I reminded the patient about daily foot checks  Reminded patient again of proper control of his diabetes to help prevent any severe complications in his feet  Proper foot hygiene moisturizing except between the toes was reviewed  Advised follow-up again every 2 to 3 months for routine care but emphasized to him the importance of coming in sooner if he notices any problems.    The patient indicates understanding of these issues and agrees to the plan.    Rafael Hudson DPM

## 2025-03-25 ENCOUNTER — LAB ENCOUNTER (OUTPATIENT)
Dept: LAB | Age: 75
End: 2025-03-25
Attending: INTERNAL MEDICINE
Payer: COMMERCIAL

## 2025-03-25 DIAGNOSIS — N18.4 CKD (CHRONIC KIDNEY DISEASE) STAGE 4, GFR 15-29 ML/MIN (HCC): ICD-10-CM

## 2025-03-25 LAB
ALBUMIN SERPL-MCNC: 4.7 G/DL (ref 3.2–4.8)
ANION GAP SERPL CALC-SCNC: 10 MMOL/L (ref 0–18)
BASOPHILS # BLD AUTO: 0.12 X10(3) UL (ref 0–0.2)
BASOPHILS NFR BLD AUTO: 1.1 %
BUN BLD-MCNC: 40 MG/DL (ref 9–23)
BUN/CREAT SERPL: 9 (ref 10–20)
CALCIUM BLD-MCNC: 10.4 MG/DL (ref 8.7–10.4)
CHLORIDE SERPL-SCNC: 104 MMOL/L (ref 98–112)
CO2 SERPL-SCNC: 25 MMOL/L (ref 21–32)
CREAT BLD-MCNC: 4.46 MG/DL
CREAT UR-SCNC: 52.7 MG/DL
DEPRECATED RDW RBC AUTO: 42 FL (ref 35.1–46.3)
EGFRCR SERPLBLD CKD-EPI 2021: 13 ML/MIN/1.73M2 (ref 60–?)
EOSINOPHIL # BLD AUTO: 1.2 X10(3) UL (ref 0–0.7)
EOSINOPHIL NFR BLD AUTO: 11.4 %
ERYTHROCYTE [DISTWIDTH] IN BLOOD BY AUTOMATED COUNT: 13.5 % (ref 11–15)
GLUCOSE BLD-MCNC: 248 MG/DL (ref 70–99)
HCT VFR BLD AUTO: 43.5 %
HGB BLD-MCNC: 14.5 G/DL
IMM GRANULOCYTES # BLD AUTO: 0.04 X10(3) UL (ref 0–1)
IMM GRANULOCYTES NFR BLD: 0.4 %
LYMPHOCYTES # BLD AUTO: 1.77 X10(3) UL (ref 1–4)
LYMPHOCYTES NFR BLD AUTO: 16.8 %
MCH RBC QN AUTO: 28.5 PG (ref 26–34)
MCHC RBC AUTO-ENTMCNC: 33.3 G/DL (ref 31–37)
MCV RBC AUTO: 85.5 FL
MICROALBUMIN UR-MCNC: 242.8 MG/DL
MICROALBUMIN/CREAT 24H UR-RTO: 4607.2 UG/MG (ref ?–30)
MONOCYTES # BLD AUTO: 0.76 X10(3) UL (ref 0.1–1)
MONOCYTES NFR BLD AUTO: 7.2 %
NEUTROPHILS # BLD AUTO: 6.67 X10 (3) UL (ref 1.5–7.7)
NEUTROPHILS # BLD AUTO: 6.67 X10(3) UL (ref 1.5–7.7)
NEUTROPHILS NFR BLD AUTO: 63.1 %
OSMOLALITY SERPL CALC.SUM OF ELEC: 306 MOSM/KG (ref 275–295)
PHOSPHATE SERPL-MCNC: 4.1 MG/DL (ref 2.4–5.1)
PLATELET # BLD AUTO: 360 10(3)UL (ref 150–450)
POTASSIUM SERPL-SCNC: 4.4 MMOL/L (ref 3.5–5.1)
RBC # BLD AUTO: 5.09 X10(6)UL
SODIUM SERPL-SCNC: 139 MMOL/L (ref 136–145)
WBC # BLD AUTO: 10.6 X10(3) UL (ref 4–11)

## 2025-03-25 PROCEDURE — 82570 ASSAY OF URINE CREATININE: CPT

## 2025-03-25 PROCEDURE — 85025 COMPLETE CBC W/AUTO DIFF WBC: CPT

## 2025-03-25 PROCEDURE — 82043 UR ALBUMIN QUANTITATIVE: CPT

## 2025-03-25 PROCEDURE — 80069 RENAL FUNCTION PANEL: CPT

## 2025-03-25 PROCEDURE — 85060 BLOOD SMEAR INTERPRETATION: CPT

## 2025-03-25 PROCEDURE — 36415 COLL VENOUS BLD VENIPUNCTURE: CPT

## 2025-03-26 ENCOUNTER — OFFICE VISIT (OUTPATIENT)
Dept: NEPHROLOGY | Facility: CLINIC | Age: 75
End: 2025-03-26
Payer: COMMERCIAL

## 2025-03-26 VITALS
HEIGHT: 69 IN | BODY MASS INDEX: 30.96 KG/M2 | SYSTOLIC BLOOD PRESSURE: 160 MMHG | HEART RATE: 98 BPM | WEIGHT: 209 LBS | DIASTOLIC BLOOD PRESSURE: 80 MMHG

## 2025-03-26 DIAGNOSIS — N18.5 CKD (CHRONIC KIDNEY DISEASE) STAGE 5, GFR LESS THAN 15 ML/MIN (HCC): Primary | ICD-10-CM

## 2025-03-26 PROCEDURE — 99215 OFFICE O/P EST HI 40 MIN: CPT | Performed by: INTERNAL MEDICINE

## 2025-03-26 NOTE — PATIENT INSTRUCTIONS
Check your blood pressures and heart rates daily and call me in 1 week.  Repeat your kidney blood test in 1 month.  Orders are in the computer.

## 2025-03-26 NOTE — PROGRESS NOTES
03/26/25        Patient: Stanley Mosher   YOB: 1950   Date of Visit: 3/26/2025       Dear  Dr. Ulisses MD,      Thank you for referring Stanley Mosher to my practice.  Please find my assessment and plan below.      As you know he is a 74-year-old male with a history of adult onset diabetes mellitus diagnosed in 1968, hypertension, coronary artery disease, status post coronary artery bypass graft surgery in 2017, aortic stenosis, peripheral vascular disease, status post right carotid endarterectomy, history of hyperparathyroidism, status post removal of a parathyroid adenoma who I now the pleasure of seeing for follow-up of what is now chronic kidney disease stage V associated with nephrotic syndrome thought to be secondary to diabetic nephropathy.  Patient has not been compliant with follow-up and was last seen in July 2024.    At the present time the patient states he is feeling okay without any chest pain, increased shortness of breath, GI or urinary tract symptoms.  Times wishes he did have more energy.  Appetite remains good.  No signs of congestive heart failure.  Recently saw his cardiologist, Dr. Bernabe.  It looks as though he will require a TAVR procedure.  Will be getting repeat echocardiogram and seeing cardiology on July 15.  Final recommendations at that time.    On physical exam his blood pressure 160/80 with a pulse of 98 and he weighed 209 pounds.  His neck was supple without JVD.  Lungs were clear.  Heart revealed a regular rate and rhythm with an S4 and a 3/6 systolic ejection murmur.  His abdomen was soft, flat, nontender without organomegaly, masses or bruits.  Extremities revealed no edema.    I reviewed his recent laboratory studies.  There were last done on March 25, 2025.  Creatinine has been gradually increasing now up to 4.46 with an estimated GFR 13 cc/min.  Electrolytes were good.  Hemoglobin 14.5.  Albumin 4.7 and urine microalbumin to creatinine ratio was elevated 4607.  Last  A1c was 8.2.    I therefore informed the patient and his wife that unfortunately he is nearing end-stage renal disease and the need for dialytic therapy.  The significance of a GFR 13 cc/min and the indications for dialysis have been reviewed.  His blood pressures and blood sugars are probably not under optimal control.  He does tend to have whitecoat syndrome.  He will check his blood pressures daily and call me in a week.  Continue to work with endocrine to get his A1c under better control.  They also understand that the workup and the procedure of doing a TAVR procedure may potentially exacerbate his renal insufficiency.  He was advised to repeat his laboratory studies in 1 month to ensure stability.  We discussed the pros and cons of hemodialysis versus peritoneal dialysis he is definitely more interested in peritoneal dialysis.  Will have our peritoneal dialysis nurse contact him to discuss this in more detail.  Otherwise return in 3 months or sooner if clinically indicated.  Maintain adequate hydration.  Avoid nonsteroidals.  Compliance was stressed.    Thank you again for allowing me to participate in the care of your patient.  If you have any questions please were free to call.           Sincerely,   Werner Fletchre MD   [facility], [facility], [place]  [address]  [place] [address]    Document electronically generated by:  Werner Fletcher MD

## 2025-04-03 RX ORDER — HYDROCHLOROTHIAZIDE 12.5 MG/1
1 CAPSULE ORAL
Qty: 6 EACH | Refills: 1 | Status: SHIPPED | OUTPATIENT
Start: 2025-04-03

## 2025-04-03 NOTE — TELEPHONE ENCOUNTER
Endocrine Refill protocol for CGM supplies     Protocol Criteria:  PASSED Reason: N/A    If below requirement is met, send a 90-day supply with 1 refill per provider protocol.     Verify appointment with Endocrinology completed in the last 12 months or scheduled in the next 6 months     Last completed office visit:1/7/2025 Radha Marte MD   Next scheduled Follow up:   Future Appointments   Date Time Provider Department Center   4/22/2025  5:00 PM Radha Marte MD [facility]    90 Day + 1 Refill authorized per protocol

## 2025-04-15 ENCOUNTER — OFFICE VISIT (OUTPATIENT)
Dept: SURGERY | Facility: CLINIC | Age: 75
End: 2025-04-15
Payer: COMMERCIAL

## 2025-04-15 VITALS
HEART RATE: 73 BPM | WEIGHT: 209 LBS | DIASTOLIC BLOOD PRESSURE: 81 MMHG | SYSTOLIC BLOOD PRESSURE: 162 MMHG | BODY MASS INDEX: 31 KG/M2

## 2025-04-15 DIAGNOSIS — N18.4 CKD (CHRONIC KIDNEY DISEASE) STAGE 4, GFR 15-29 ML/MIN (HCC): Primary | ICD-10-CM

## 2025-04-15 PROCEDURE — 99204 OFFICE O/P NEW MOD 45 MIN: CPT | Performed by: SURGERY

## 2025-04-15 NOTE — H&P
HPI:    Patient ID: Stanley Mosher is a 74 year old male presenting with   Chief Complaint   Patient presents with    Consult     Patient here to discuss placement of a peritoneal dialysis catheter.   .    Consult        Past Medical History[1]  Past Surgical History[2]  Family History[3]  Social History     Socioeconomic History    Marital status:      Spouse name: Not on file    Number of children: Not on file    Years of education: Not on file    Highest education level: Not on file   Occupational History    Not on file   Tobacco Use    Smoking status: Former     Current packs/day: 0.00     Average packs/day: 1 pack/day for 40.0 years (40.0 ttl pk-yrs)     Types: Cigarettes     Start date: 10/17/1977     Quit date: 10/17/2017     Years since quittin.4     Passive exposure: Past    Smokeless tobacco: Never   Vaping Use    Vaping status: Never Used   Substance and Sexual Activity    Alcohol use: No     Comment: rarely     Drug use: No    Sexual activity: Never   Other Topics Concern    Not on file   Social History Narrative    Not on file     Social Drivers of Health     Food Insecurity: Not on file   Transportation Needs: Not on file   Stress: Not on file   Housing Stability: Not on file       Review of Systems   Constitutional: Negative.    HENT: Negative.     Eyes: Negative.    Respiratory: Negative.     Cardiovascular: Negative.    Gastrointestinal: Negative.    Endocrine: Negative.    Genitourinary: Negative.    Musculoskeletal: Negative.    Skin: Negative.    Allergic/Immunologic: Negative.    Neurological: Negative.    Hematological:  Does not bruise/bleed easily.   Psychiatric/Behavioral: Negative.           Current Medications[4]    Allergies:Allergies[5]   PHYSICAL EXAM:   BP (!) 162/81 (BP Location: Left arm, Patient Position: Sitting, Cuff Size: large)   Pulse 73   Wt 209 lb (94.8 kg)   BMI 30.86 kg/m²   Physical Exam  Vitals reviewed.   Constitutional:       Appearance: Normal appearance.  He is well-developed.   HENT:      Head: Normocephalic and atraumatic.      Nose: Nose normal.   Cardiovascular:      Rate and Rhythm: Normal rate and regular rhythm.   Pulmonary:      Effort: Pulmonary effort is normal.      Breath sounds: Normal breath sounds.   Abdominal:      General: There is no distension.      Palpations: Abdomen is soft. There is no mass.      Tenderness: There is no abdominal tenderness. There is no guarding or rebound.   Musculoskeletal:         General: Normal range of motion.      Cervical back: Normal range of motion and neck supple.   Skin:     General: Skin is warm and dry.   Neurological:      Mental Status: He is alert and oriented to person, place, and time.   Psychiatric:         Speech: Speech normal.         Behavior: Behavior normal.                 ASSESSMENT/PLAN:   Diagnoses and all orders for this visit:    CKD (chronic kidney disease) stage 4, GFR 15-29 ml/min ([facility])    Plan for a laparoscopic peritoneal catheter placement.         Michael Hazel MD  4/15/2025       [1]   Past Medical History:   Atherosclerosis of coronary artery    Cataract    stage 1 cataracts    COPD (chronic obstructive pulmonary disease) ([facility])    Coronary atherosclerosis    cabg x3    Diabetes ([facility])    Diabetes mellitus ([facility])    Esophageal reflux    pt. states gets \"heartburn from diabetic med and since starting metoprolol    Essential hypertension    Glaucoma    had 2 years ago \"cleared Up\"    Hearing impairment    brayan hearing aids    High blood pressure    Hyperlipidemia    Nephropathy    Neuropathy    bilateral lower extremities    Osteoarthritis    Pneumonia due to organism    Visual impairment    wears glasses   [2]   Past Surgical History:  Procedure Laterality Date    Anesth,open heart surgery  10/12/2017    triple bypass    Angioplasty (coronary) Left 03/2018    Cabg  10/2017    CABGX3    Carotid endarterectomy  07/10/2019    Colonoscopy  07/06/2015    Colonoscopy      5 years \"was fine\"    Other  surgical history  07/06/2010    Polyps Dr.M Goldberg    Other surgical history  03/05/2018    peripheral angioplasty    Tonsillectomy      Vasectomy  unknown   [3]   Family History  Problem Relation Age of Onset    Diabetes Father     Heart Disorder Father     Lipids Father     Diabetes Maternal Grandfather     Cancer Mother     Diabetes Brother    [4]   Current Outpatient Medications   Medication Sig Dispense Refill    Continuous Glucose Sensor (FREESTYLE BENJI 3 PLUS SENSOR) Does not apply Misc 1 EACH EVERY 15 (FIFTEEN) DAYS. 6 each 1    Insulin NPH & Regular (HUMULIN 70/30 KWIKPEN) (70-30) 100 UNIT/ML Subcutaneous Suspension Pen-injector Inject 64 Units into the skin 2 (two) times daily with meals. 116 mL 1    Glucose Blood (CONTOUR NEXT TEST) In Vitro Strip Check sugars twice a day E 11.65 with insulin use 200 each 0    Tadalafil 20 MG Oral Tab Take 1 tablet (20 mg total) by mouth daily as needed for Erectile Dysfunction. 30 tablet 4    rosuvastatin 40 MG Oral Tab Take 1 tablet (40 mg total) by mouth daily.      losartan 50 MG Oral Tab TAKE 1 AND 1/2 TABLET BY MOUTH DAILY 135 tablet 1    Insulin Pen Needle (BD PEN NEEDLE BETHEL U/F) 32G X 4 MM Does not apply Misc INJECT TWICE A  each 1    metoprolol succinate  MG Oral Tablet 24 Hr Take 1.5 tablets (150 mg total) by mouth daily. 135 tablet 1    amLODIPine 10 MG Oral Tab Take 1 tablet (10 mg total) by mouth daily. 90 tablet 4    clopidogrel 75 MG Oral Tab Take 1 tablet (75 mg total) by mouth daily. 90 tablet 3    Ascorbic Acid (VITAMIN C) 1000 MG Oral Tab Take 0.5 tablets (500 mg total) by mouth daily.      aspirin 81 MG Oral Tab EC Take 1 tablet (81 mg total) by mouth daily.      calcium carbonate antacid 500 MG Oral Chew Tab Chew 2 tablets (1,000 mg total) by mouth 3 (three) times daily. 90 tablet 0    multivitamin Oral Tab Take 1 tablet by mouth daily. 30 tablet 0   [5]   Allergies  Allergen Reactions    Victoza NAUSEA AND VOMITING    Trulicity  [Dulaglutide] DIARRHEA and NAUSEA ONLY

## 2025-04-22 ENCOUNTER — OFFICE VISIT (OUTPATIENT)
Dept: ENDOCRINOLOGY CLINIC | Facility: CLINIC | Age: 75
End: 2025-04-22

## 2025-04-22 VITALS
DIASTOLIC BLOOD PRESSURE: 68 MMHG | BODY MASS INDEX: 31.4 KG/M2 | HEART RATE: 74 BPM | WEIGHT: 212 LBS | SYSTOLIC BLOOD PRESSURE: 132 MMHG | HEIGHT: 69 IN

## 2025-04-22 DIAGNOSIS — Z79.4 TYPE 2 DIABETES MELLITUS WITH HYPERGLYCEMIA, WITH LONG-TERM CURRENT USE OF INSULIN (HCC): Primary | ICD-10-CM

## 2025-04-22 DIAGNOSIS — E55.9 VITAMIN D DEFICIENCY: ICD-10-CM

## 2025-04-22 DIAGNOSIS — Z13.820 OSTEOPOROSIS SCREENING: ICD-10-CM

## 2025-04-22 DIAGNOSIS — Z98.890 H/O PARATHYROIDECTOMY: ICD-10-CM

## 2025-04-22 DIAGNOSIS — Z86.39 HISTORY OF HYPERPARATHYROIDISM: ICD-10-CM

## 2025-04-22 DIAGNOSIS — Z90.89 H/O PARATHYROIDECTOMY: ICD-10-CM

## 2025-04-22 DIAGNOSIS — E78.5 DYSLIPIDEMIA: ICD-10-CM

## 2025-04-22 DIAGNOSIS — E11.65 TYPE 2 DIABETES MELLITUS WITH HYPERGLYCEMIA, WITH LONG-TERM CURRENT USE OF INSULIN (HCC): Primary | ICD-10-CM

## 2025-04-22 LAB
GLUCOSE BLOOD: 204
HEMOGLOBIN A1C: 8.3 % (ref 4.3–5.6)
TEST STRIP LOT #: NORMAL NUMERIC

## 2025-04-22 PROCEDURE — 83036 HEMOGLOBIN GLYCOSYLATED A1C: CPT | Performed by: INTERNAL MEDICINE

## 2025-04-22 PROCEDURE — 99214 OFFICE O/P EST MOD 30 MIN: CPT | Performed by: INTERNAL MEDICINE

## 2025-04-22 PROCEDURE — 82947 ASSAY GLUCOSE BLOOD QUANT: CPT | Performed by: INTERNAL MEDICINE

## 2025-04-30 ENCOUNTER — PATIENT MESSAGE (OUTPATIENT)
Dept: ENDOCRINOLOGY CLINIC | Facility: CLINIC | Age: 75
End: 2025-04-30

## 2025-04-30 DIAGNOSIS — Z79.4 TYPE 2 DIABETES MELLITUS WITH HYPERGLYCEMIA, WITH LONG-TERM CURRENT USE OF INSULIN (HCC): ICD-10-CM

## 2025-04-30 DIAGNOSIS — E11.65 TYPE 2 DIABETES MELLITUS WITH HYPERGLYCEMIA, WITH LONG-TERM CURRENT USE OF INSULIN (HCC): ICD-10-CM

## 2025-04-30 RX ORDER — INSULIN HUMAN 100 [IU]/ML
INJECTION, SUSPENSION SUBCUTANEOUS
Qty: 135 ML | Refills: 1 | Status: SHIPPED | OUTPATIENT
Start: 2025-04-30

## 2025-04-30 NOTE — TELEPHONE ENCOUNTER
Endocrine refill protocol for rapid acting, regular, intermediate, and mixed insulin:    Protocol Criteria:  PASSED Reason: N/A    If all below requirements are met, send a 90-day supply with 1 refill per provider protocol.    Verify appointment with Endocrinology completed in the last 6 months or scheduled in the next 3 months.  Verify A1C has been completed within the last 6 months and is below 8.5%    -May substitute prescriptions for Novolog and Humalog unless documented allergy (pens and vials) at the same dose and concentration per insurance preference and provider protocol.   -May substitute prescriptions for Novolin R and Humulin R unless documented allergy (pens and vials) at the same dose and concentration per insurance preference and provider protocol.   -May substitute prescriptions for Novolin N and Humulin N unless documented allergy (pens and vials) at the same dose and concentration per insurance preference and provider protocol.   -May substitute prescriptions for Humulin and Novolin 70/30 insulin unless documented allergy at the same dose and concentration per insurance preference and provider protocol.    Last completed office visit: 4/22/2025 Radha Marte MD   Next scheduled Follow up:   Future Appointments     Last A1C result: 8.3% done 4/22/2025.

## 2025-05-22 RX ORDER — LOSARTAN POTASSIUM 50 MG/1
TABLET ORAL
Qty: 135 TABLET | Refills: 0 | Status: SHIPPED | OUTPATIENT
Start: 2025-05-22

## 2025-05-22 NOTE — TELEPHONE ENCOUNTER
How are blood pressure readings.??  Also he saw Dr. Hazel.  Is he scheduled for placement of a PD catheter.  Make sure he does his follow-up labs in early June.  How is he feeling??

## 2025-05-22 NOTE — TELEPHONE ENCOUNTER
Last office visit- 3/26/25    Return to clinic- 3 months    Follow up- no appointment scheduled     Last refill- 7/12/24

## 2025-05-22 NOTE — TELEPHONE ENCOUNTER
Spoke with patient and he states that he is feeing fine. He does not have an appointment for the PD Catheter. He has not been doing his blood pressure readings at home.

## 2025-05-29 ENCOUNTER — OFFICE VISIT (OUTPATIENT)
Dept: PODIATRY CLINIC | Facility: CLINIC | Age: 75
End: 2025-05-29

## 2025-05-29 DIAGNOSIS — B35.1 ONYCHOMYCOSIS: ICD-10-CM

## 2025-05-29 DIAGNOSIS — E11.59 TYPE 2 DIABETES MELLITUS WITH OTHER CIRCULATORY COMPLICATION, UNSPECIFIED WHETHER LONG TERM INSULIN USE (HCC): Primary | ICD-10-CM

## 2025-05-29 DIAGNOSIS — I73.89 OTHER SPECIFIED PERIPHERAL VASCULAR DISEASES: ICD-10-CM

## 2025-05-29 DIAGNOSIS — L84 CALLUS OF FOOT: ICD-10-CM

## 2025-05-29 DIAGNOSIS — L85.3 XEROSIS OF SKIN: ICD-10-CM

## 2025-05-29 PROCEDURE — 99213 OFFICE O/P EST LOW 20 MIN: CPT | Performed by: PODIATRIST

## 2025-05-31 NOTE — PROGRESS NOTES
Stanley Mosher is a 74 year old male.   Chief Complaint   Patient presents with    Diabetic Foot Care     Nail care and foot check- A1c 8.3 04/22/25- pcp lov 04/22/25 Dr Radha Marte- fbg was not checked          HPI:   Patient with a known history of diabetes previous ulceration peripheral vascular disease presents to the clinic for routine footcare and evaluation he has no complaints of ulcerations or sores at this present.  At today's visit reviewed nurse's history as taken above, allergies medications and medical history as documented below.  All changes duly noted  Allergies: Victoza and Trulicity [dulaglutide]   Current Outpatient Medications   Medication Sig Dispense Refill    losartan 50 MG Oral Tab TAKE 1 AND 1/2 TABLET BY MOUTH DAILY 135 tablet 0    Insulin NPH & Regular (HUMULIN 70/30 KWIKPEN) (70-30) 100 UNIT/ML Subcutaneous Suspension Pen-injector Inject 70 units in the morning  and 65 units in the evening with meals 135 mL 1    Continuous Glucose Sensor (FREESTYLE BENJI 3 PLUS SENSOR) Does not apply Misc 1 EACH EVERY 15 (FIFTEEN) DAYS. 6 each 1    Glucose Blood (CONTOUR NEXT TEST) In Vitro Strip Check sugars twice a day E 11.65 with insulin use 200 each 0    Tadalafil 20 MG Oral Tab Take 1 tablet (20 mg total) by mouth daily as needed for Erectile Dysfunction. 30 tablet 4    rosuvastatin 40 MG Oral Tab Take 1 tablet (40 mg total) by mouth daily.      Insulin Pen Needle (BD PEN NEEDLE BETHEL U/F) 32G X 4 MM Does not apply Misc INJECT TWICE A  each 1    metoprolol succinate  MG Oral Tablet 24 Hr Take 1.5 tablets (150 mg total) by mouth daily. 135 tablet 1    amLODIPine 10 MG Oral Tab Take 1 tablet (10 mg total) by mouth daily. 90 tablet 4    clopidogrel 75 MG Oral Tab Take 1 tablet (75 mg total) by mouth daily. 90 tablet 3    Ascorbic Acid (VITAMIN C) 1000 MG Oral Tab Take 0.5 tablets (500 mg total) by mouth daily.      aspirin 81 MG Oral Tab EC Take 1 tablet (81 mg total) by mouth daily.       calcium carbonate antacid 500 MG Oral Chew Tab Chew 2 tablets (1,000 mg total) by mouth 3 (three) times daily. 90 tablet 0    multivitamin Oral Tab Take 1 tablet by mouth daily. 30 tablet 0      Past Medical History:    Atherosclerosis of coronary artery    Cataract    stage 1 cataracts    COPD (chronic obstructive pulmonary disease) (HCC)    Coronary atherosclerosis    cabg x3    Diabetes (HCC)    Diabetes mellitus (HCC)    Esophageal reflux    pt. states gets \"heartburn from diabetic med and since starting metoprolol    Essential hypertension    Glaucoma    had 2 years ago \"cleared Up\"    Hearing impairment    brayan hearing aids    High blood pressure    Hyperlipidemia    Nephropathy    Neuropathy    bilateral lower extremities    Osteoarthritis    Pneumonia due to organism    Visual impairment    wears glasses      Past Surgical History:   Procedure Laterality Date    Anesth,open heart surgery  10/12/2017    triple bypass    Angioplasty (coronary) Left 2018    Cabg  10/2017    CABGX3    Carotid endarterectomy  07/10/2019    Colonoscopy  2015    Colonoscopy      5 years \"was fine\"    Other surgical history  2010    Polyps Dr.M Goldberg    Other surgical history  2018    peripheral angioplasty    Tonsillectomy      Vasectomy  unknown      Family History   Problem Relation Age of Onset    Diabetes Father     Heart Disorder Father     Lipids Father     Diabetes Maternal Grandfather     Cancer Mother     Diabetes Brother       Social History     Socioeconomic History    Marital status:    Tobacco Use    Smoking status: Former     Current packs/day: 0.00     Average packs/day: 1 pack/day for 40.0 years (40.0 ttl pk-yrs)     Types: Cigarettes     Start date: 10/17/1977     Quit date: 10/17/2017     Years since quittin.6     Passive exposure: Past    Smokeless tobacco: Never   Vaping Use    Vaping status: Never Used   Substance and Sexual Activity    Alcohol use: No     Comment: rarely      Drug use: No    Sexual activity: Never           REVIEW OF SYSTEMS:   Today reviewed systens as documented below  GENERAL HEALTH: feels well otherwise  SKIN: Refer to exam below  RESPIRATORY: denies shortness of breath with exertion  CARDIOVASCULAR: denies chest pain on exertion  GI: denies abdominal pain and denies heartburn  NEURO: denies headaches    EXAM:   There were no vitals taken for this visit.  GENERAL: well developed, well nourished, in no apparent distress  EXTREMITIES:   1. Integument: The skin on his feet was evaluated on the right it is a little warmer.  But the left leg is very cold its white.  Patient has thickened toenails 1-5 bilateral feet has hyperkeratosis at the lateral aspect of his heel where he had a previous ulceration on the right lower extremity.  There are no ulcers formed at this time.  The skin on the left lower extremity is very cool to the touch.   2. Vascular: Patient has nonpalpable pulses bilateral but has a more acute problem on his left than his right   3. Neurologic: Patient has diminished pedal sensorium   4. Musculoskeletal: Patient has adequate muscle strength but has not developed claudication on his left side.    ASSESSMENT AND PLAN:   Diagnoses and all orders for this visit:    Type 2 diabetes mellitus with other circulatory complication, unspecified whether long term insulin use (HCC)    Other specified peripheral vascular diseases    Onychomycosis    Callus of foot    Xerosis of skin        Plan: Urged him to see his interventional cardiologist ASAP does have appointment scheduled will follow-up with him.  Today using a nail nippers were trimmed and debrided toenails 1-5 manually and mechanically in girth and width as far down to healthy tissue as possible on both feet.  This was done uneventfully there was no hemorrhage.  There is mild incurvation of the medial and lateral nail borders of the hallux which using a slant back technique were removed and they would not  get ingrown.  Today using a sterile 15 blade trim down debrided the callus on the lateral plantar left heel this was done uneventfully without hemorrhage.  At today's visit I reminded the patient about daily foot checks  Reminded patient again of proper control of his diabetes to help prevent any severe complications in his feet  Proper foot hygiene moisturizing except between the toes was reviewed  Advised follow-up again every 2 to 3 months for routine care but emphasized to him the importance of coming in sooner if he notices any problems.    The patient indicates understanding of these issues and agrees to the plan.    Rafael Hudson DPM

## 2025-06-21 ENCOUNTER — LAB ENCOUNTER (OUTPATIENT)
Dept: LAB | Age: 75
End: 2025-06-21
Attending: INTERNAL MEDICINE
Payer: COMMERCIAL

## 2025-06-21 DIAGNOSIS — E55.9 VITAMIN D DEFICIENCY: ICD-10-CM

## 2025-06-21 DIAGNOSIS — N18.4 CKD (CHRONIC KIDNEY DISEASE) STAGE 4, GFR 15-29 ML/MIN (HCC): ICD-10-CM

## 2025-06-21 LAB
ALBUMIN SERPL-MCNC: 4.4 G/DL (ref 3.2–4.8)
ANION GAP SERPL CALC-SCNC: 9 MMOL/L (ref 0–18)
BASOPHILS # BLD AUTO: 0.12 X10(3) UL (ref 0–0.2)
BASOPHILS NFR BLD AUTO: 1.1 %
BUN BLD-MCNC: 43 MG/DL (ref 9–23)
BUN/CREAT SERPL: 9.6 (ref 10–20)
CALCIUM BLD-MCNC: 9.7 MG/DL (ref 8.7–10.4)
CHLORIDE SERPL-SCNC: 109 MMOL/L (ref 98–112)
CO2 SERPL-SCNC: 20 MMOL/L (ref 21–32)
CREAT BLD-MCNC: 4.49 MG/DL (ref 0.7–1.3)
CREAT UR-SCNC: 83.2 MG/DL
DEPRECATED RDW RBC AUTO: 46.1 FL (ref 35.1–46.3)
EGFRCR SERPLBLD CKD-EPI 2021: 13 ML/MIN/1.73M2 (ref 60–?)
EOSINOPHIL # BLD AUTO: 1.36 X10(3) UL (ref 0–0.7)
EOSINOPHIL NFR BLD AUTO: 12.3 %
ERYTHROCYTE [DISTWIDTH] IN BLOOD BY AUTOMATED COUNT: 14.1 % (ref 11–15)
GLUCOSE BLD-MCNC: 91 MG/DL (ref 70–99)
HCT VFR BLD AUTO: 42.7 % (ref 39–53)
HGB BLD-MCNC: 13.8 G/DL (ref 13–17.5)
IMM GRANULOCYTES # BLD AUTO: 0.04 X10(3) UL (ref 0–1)
IMM GRANULOCYTES NFR BLD: 0.4 %
LYMPHOCYTES # BLD AUTO: 1.83 X10(3) UL (ref 1–4)
LYMPHOCYTES NFR BLD AUTO: 16.5 %
MCH RBC QN AUTO: 29.1 PG (ref 26–34)
MCHC RBC AUTO-ENTMCNC: 32.3 G/DL (ref 31–37)
MCV RBC AUTO: 89.9 FL (ref 80–100)
MICROALBUMIN UR-MCNC: 303.1 MG/DL
MICROALBUMIN/CREAT 24H UR-RTO: 3643 UG/MG (ref ?–30)
MONOCYTES # BLD AUTO: 0.91 X10(3) UL (ref 0.1–1)
MONOCYTES NFR BLD AUTO: 8.2 %
NEUTROPHILS # BLD AUTO: 6.84 X10 (3) UL (ref 1.5–7.7)
NEUTROPHILS # BLD AUTO: 6.84 X10(3) UL (ref 1.5–7.7)
NEUTROPHILS NFR BLD AUTO: 61.5 %
OSMOLALITY SERPL CALC.SUM OF ELEC: 296 MOSM/KG (ref 275–295)
PHOSPHATE SERPL-MCNC: 5.4 MG/DL (ref 2.4–5.1)
PLATELET # BLD AUTO: 341 10(3)UL (ref 150–450)
PLATELET MORPHOLOGY: NORMAL
POTASSIUM SERPL-SCNC: 4.2 MMOL/L (ref 3.5–5.1)
RBC # BLD AUTO: 4.75 X10(6)UL (ref 3.8–5.8)
SODIUM SERPL-SCNC: 138 MMOL/L (ref 136–145)
VIT D+METAB SERPL-MCNC: 23.7 NG/ML (ref 30–100)
WBC # BLD AUTO: 11.1 X10(3) UL (ref 4–11)

## 2025-06-21 PROCEDURE — 82306 VITAMIN D 25 HYDROXY: CPT

## 2025-06-21 PROCEDURE — 82043 UR ALBUMIN QUANTITATIVE: CPT

## 2025-06-21 PROCEDURE — 80069 RENAL FUNCTION PANEL: CPT

## 2025-06-21 PROCEDURE — 82570 ASSAY OF URINE CREATININE: CPT

## 2025-06-21 PROCEDURE — 36415 COLL VENOUS BLD VENIPUNCTURE: CPT

## 2025-06-21 PROCEDURE — 85025 COMPLETE CBC W/AUTO DIFF WBC: CPT

## 2025-06-22 ENCOUNTER — TELEPHONE (OUTPATIENT)
Dept: NEPHROLOGY | Facility: CLINIC | Age: 75
End: 2025-06-22

## 2025-06-22 NOTE — TELEPHONE ENCOUNTER
Kidney function is stable.  I believe he is seeing cardiology in July.  Have him see me for follow-up after he sees them.

## 2025-06-23 NOTE — TELEPHONE ENCOUNTER
Notified patient of Dr. Fletcher  test result message.  Patient refuse to make appointments now, he will schedule on line.  Patient voiced understanding.

## 2025-06-26 ENCOUNTER — TELEPHONE (OUTPATIENT)
Dept: ENDOCRINOLOGY CLINIC | Facility: CLINIC | Age: 75
End: 2025-06-26

## 2025-06-26 DIAGNOSIS — E55.9 VITAMIN D DEFICIENCY: Primary | ICD-10-CM

## 2025-06-26 NOTE — TELEPHONE ENCOUNTER
Vitamin D is low  Ergocalciferol 50,000 unist weekly x 5 weeks followed b once capsule once a [place] h  Repeat 25 OH vit D in 3 months    Also, Ur MA is high -->recommend FU with nephrology, good BG and BP control  Can please provide neprho referral if he does not have a doctor    Please also book first available apt     thanks

## 2025-07-01 RX ORDER — ERGOCALCIFEROL 1.25 MG/1
50000 CAPSULE, LIQUID FILLED ORAL WEEKLY
Qty: 12 CAPSULE | Refills: 13 | OUTPATIENT
Start: 2025-07-01

## 2025-07-01 RX ORDER — ERGOCALCIFEROL 1.25 MG/1
CAPSULE ORAL
Qty: 8 CAPSULE | Refills: 0 | Status: SHIPPED | OUTPATIENT
Start: 2025-07-01 | End: 2025-11-03

## 2025-07-01 NOTE — TELEPHONE ENCOUNTER
Spoke to pt verified name LUCERO, informed pt of MD message below, pt states understanding , he also stated he has already followed up with nephrologist Malinda. Pt also declined to schedule appt today, he will call back to schedule once he verifies his wifes schedule. Rx sent and labs ordered.

## 2025-07-04 NOTE — PROGRESS NOTES
Return Office Visit     CHIEF COMPLAINT:    DM  Dyslipidemia   Vitamin D deficiency    HISTORY OF PRESENT ILLNESS:  Stanley Mosher is a 74 year old male who presents for follow up for DM.      DM HISTORY  He states he had \" borderline DM\" as a teenager.  He was on diabinese for a few years after he stopped.    The pills were resumed a few years later.    HISTORY OF DIABETES COMPLICATIONS: :  History of Retinopathy: No,  Eye exam: Oct 2024   History of Neuropathy: No  History of Nephropathy: Yes    ASSOCIATED COMPLICATIONS:    HTN: Yes  Hyperlipidemia: Yes  Coronary Artery Disease:  Yes  Cerebrovascular Disease: No  Has PVD      HOME GLUCOSE READINGS:   Did not get reader to the visit today     Fastins  Pre dinner: late 100s to early 200s     CURRENT DIABETIC MEDICATIONS INCLUDE:    Insulin 70/30 65 am and 65 pm with meals    T/f trulicity, bydureon and Victoza due to GI SE    Does not want to try ozempic      MEALS:  Moderate compliance      CURRENT MEDICATION:    Current Outpatient Medications   Medication Sig Dispense Refill    Continuous Glucose Sensor (FREESTYLE BENJI 3 PLUS SENSOR) Does not apply Misc 1 EACH EVERY 15 (FIFTEEN) DAYS. 6 each 1    Insulin NPH & Regular (HUMULIN 70/30 KWIKPEN) (70-30) 100 UNIT/ML Subcutaneous Suspension Pen-injector Inject 64 Units into the skin 2 (two) times daily with meals. 116 mL 1    Glucose Blood (CONTOUR NEXT TEST) In Vitro Strip Check sugars twice a day E 11.65 with insulin use 200 each 0    Tadalafil 20 MG Oral Tab Take 1 tablet (20 mg total) by mouth daily as needed for Erectile Dysfunction. 30 tablet 4    rosuvastatin 40 MG Oral Tab Take 1 tablet (40 mg total) by mouth daily.      losartan 50 MG Oral Tab TAKE 1 AND 1/2 TABLET BY MOUTH DAILY 135 tablet 1    Insulin Pen Needle (BD PEN NEEDLE BETHEL U/F) 32G X 4 MM Does not apply Misc INJECT TWICE A  each 1    metoprolol succinate  MG Oral Tablet 24 Hr Take 1.5 tablets (150 mg total) by mouth daily. 135  XRAY @ bedside    tablet 1    amLODIPine 10 MG Oral Tab Take 1 tablet (10 mg total) by mouth daily. 90 tablet 4    clopidogrel 75 MG Oral Tab Take 1 tablet (75 mg total) by mouth daily. 90 tablet 3    Ascorbic Acid (VITAMIN C) 1000 MG Oral Tab Take 0.5 tablets (500 mg total) by mouth daily.      aspirin 81 MG Oral Tab EC Take 1 tablet (81 mg total) by mouth daily.      calcium carbonate antacid 500 MG Oral Chew Tab Chew 2 tablets (1,000 mg total) by mouth 3 (three) times daily. 90 tablet 0    multivitamin Oral Tab Take 1 tablet by mouth daily. 30 tablet 0         ALLERGY:  Allergies   Allergen Reactions    Victoza NAUSEA AND VOMITING    Trulicity [Dulaglutide] DIARRHEA and NAUSEA ONLY       PAST MEDICAL, SOCIAL AND FAMILY HISTORY:  See past medical history marked as reviewed.  See past surgical history marked as reviewed.  See past family history marked as reviewed.  See past social history marked as reviewed.    ASSESSMENTS:     REVIEW OF SYSTEMS:  Constitutional: Negative for:  fever, fatigue, cold/heat intolerance, weight change  Eyes: Negative for:  Visual changes, proptosis, blurring  ENT: Negative for:  dysphagia, neck swelling, dysphonia  Respiratory: Negative for:  dyspnea, cough  Cardiovascular: Negative for:  chest pain, palpitations, orthopnea  GI: Negative for:  abdominal pain, nausea, vomiting, diarrhea, constipation, bleeding  Neurology: Negative for: headache, numbness, weakness  Genito-Urinary: Negative for: dysuria, frequency  Psychiatric: Negative for:  depression, anxiety  Hematology/Lymphatics: Negative for: bruising, lower extremity edema  Endocrine: Negative for: polyuria, polydypsia  Skin: Negative for: rash, blister, cellulitis,       PHYSICAL EXAM:   Vitals:    04/22/25 1636   BP: (!) 163/73   Pulse: 74   Weight: 212 lb (96.2 kg)   Height: 5' 9\" (1.753 m)     BMI: Body mass index is 31.31 kg/m².         General Appearance:  alert, well developed, in no acute distress  Head: Atraumatic  Eyes:  normal  conjunctivae, sclera., normal sclera and normal pupils  Throat/Neck: normal sound to voice. Normal hearing, normal speech  Respiratory:  Speaking in full sentences, non-labored. no increased work of breathing, no audible wheezing    Neuro: motor grossly intact, moving all extremities without difficulty  Psychiatric:  oriented to time, self, and place  Extremities: 9/2024  Bilateral barefoot  skin diabetic exam is normal, visualized feet and the appearance is normal.  Bilateral monofilament/sensation of both feet is normal.  Pulsation pedal pulse exam of both lower legs/feet is normal as well.        DATA:       A1c is 8.3 % ( 4/2025 )    ASSESSMENT AND PLAN:    1. Type 2 DM: CKD, with hyperglycemia    Plan:  Discussed the pathogenesis, natural course of diabetes. Patient understands the importance of glycemic control and the implications of uncontrolled diabetes including Diabetic ketoacidosis and various micro vascular and macrovascular complications.      a). Medications:         Work on lifestyle changes    Insulin 70/30   65--> 70  units with breakfast and    65 units with dinner      Continue to monitor BG  Call as discussed   Always call if Bg is under 80      b). Has Nephropathy. Discussed importance of good BG control.   c). Discussed importance of annual eye exams  d). Discussed daily foot exam   e). BG log maintainence explained in great detail, to get log and glucometer on next visit.  f). Life style changes discussed  g). Hypoglycemia recognition and management discussed    2. Patient’s BP is normal today   3. Dyslipidemia  A) Discussed lifestyle modifications including reductions in dietary total and saturated fat, weight loss, aerobic exercise, and eating a diet rich in fruits and vegetables.  B) on rosuvastatin to  40 mg daily.  C) Also his TG cholesterol was elevated over 500  He declined, fenofibrate   Repeat TG is in the 200s  Good BG control  Fasting lipid panel  ordered  4. H/o  hyperparathyroidism s/p sx in 12/2021  He was on calcium 500 mg daily now, will recheck    He was  on vit D 50,000 q week , this was stopped a few months ago, will recheck labs  DXA shows normal BMD  Recheck labs  RTC in three months  Call with BG as discussed                          Orders Placed This Encounter   Procedures    POC HemoCue Glucose 201 (Finger stick glucose)    POC Glycohemoglobin [22874]    Vitamin D [E]

## 2025-07-07 ENCOUNTER — OFFICE VISIT (OUTPATIENT)
Dept: PODIATRY CLINIC | Facility: CLINIC | Age: 75
End: 2025-07-07

## 2025-07-07 DIAGNOSIS — B35.1 ONYCHOMYCOSIS: ICD-10-CM

## 2025-07-07 DIAGNOSIS — E11.59 TYPE 2 DIABETES MELLITUS WITH OTHER CIRCULATORY COMPLICATION, UNSPECIFIED WHETHER LONG TERM INSULIN USE (HCC): Primary | ICD-10-CM

## 2025-07-07 DIAGNOSIS — L84 CALLUS OF FOOT: ICD-10-CM

## 2025-07-07 DIAGNOSIS — L85.3 XEROSIS OF SKIN: ICD-10-CM

## 2025-07-07 DIAGNOSIS — Z86.31 HISTORY OF DIABETIC ULCER OF FOOT: ICD-10-CM

## 2025-07-07 DIAGNOSIS — I73.89 OTHER SPECIFIED PERIPHERAL VASCULAR DISEASES: ICD-10-CM

## 2025-07-07 PROCEDURE — 99213 OFFICE O/P EST LOW 20 MIN: CPT | Performed by: PODIATRIST

## 2025-07-13 NOTE — PROGRESS NOTES
Stanley Mosher is a 74 year old male.   Chief Complaint   Patient presents with    Diabetic Foot Care     Nail care and foot check- - A1C 8.3 on 4/22- LOV Endocrinology Danya Marte on 4/22         HPI:   Patient returns to the clinic his wife is present with him he is here for routine care he has a known history of diabetes with PAD previous ulcerations and difficulty healing he cannot provide self-care.  At today's visit reviewed nurse's history as taken above, allergies medications and medical history as documented below.  All changes duly noted  Allergies: Victoza and Trulicity [dulaglutide]   Current Outpatient Medications   Medication Sig Dispense Refill    ergocalciferol 1.25 MG (25327 UT) Oral Cap Take 1 capsule (50,000 Units total) by mouth once a week for 35 days, THEN 1 capsule (50,000 Units total) every 30 (thirty) days. 8 capsule 0    losartan 50 MG Oral Tab TAKE 1 AND 1/2 TABLET BY MOUTH DAILY 135 tablet 0    Insulin NPH & Regular (HUMULIN 70/30 KWIKPEN) (70-30) 100 UNIT/ML Subcutaneous Suspension Pen-injector Inject 70 units in the morning  and 65 units in the evening with meals 135 mL 1    Continuous Glucose Sensor (FREESTYLE BENJI 3 PLUS SENSOR) Does not apply Misc 1 EACH EVERY 15 (FIFTEEN) DAYS. 6 each 1    Glucose Blood (CONTOUR NEXT TEST) In Vitro Strip Check sugars twice a day E 11.65 with insulin use 200 each 0    Tadalafil 20 MG Oral Tab Take 1 tablet (20 mg total) by mouth daily as needed for Erectile Dysfunction. 30 tablet 4    rosuvastatin 40 MG Oral Tab Take 1 tablet (40 mg total) by mouth daily.      Insulin Pen Needle (BD PEN NEEDLE BETHEL U/F) 32G X 4 MM Does not apply Misc INJECT TWICE A  each 1    metoprolol succinate  MG Oral Tablet 24 Hr Take 1.5 tablets (150 mg total) by mouth daily. 135 tablet 1    amLODIPine 10 MG Oral Tab Take 1 tablet (10 mg total) by mouth daily. 90 tablet 4    clopidogrel 75 MG Oral Tab Take 1 tablet (75 mg total) by mouth daily. 90 tablet 3     Ascorbic Acid (VITAMIN C) 1000 MG Oral Tab Take 0.5 tablets (500 mg total) by mouth daily.      aspirin 81 MG Oral Tab EC Take 1 tablet (81 mg total) by mouth daily.      calcium carbonate antacid 500 MG Oral Chew Tab Chew 2 tablets (1,000 mg total) by mouth 3 (three) times daily. 90 tablet 0    multivitamin Oral Tab Take 1 tablet by mouth daily. 30 tablet 0      Past Medical History:    Atherosclerosis of coronary artery    Cataract    stage 1 cataracts    COPD (chronic obstructive pulmonary disease) (HCC)    Coronary atherosclerosis    cabg x3    Diabetes (HCC)    Diabetes mellitus (HCC)    Esophageal reflux    pt. states gets \"heartburn from diabetic med and since starting metoprolol    Essential hypertension    Glaucoma    had 2 years ago \"cleared Up\"    Hearing impairment    brayan hearing aids    High blood pressure    Hyperlipidemia    Nephropathy    Neuropathy    bilateral lower extremities    Osteoarthritis    Pneumonia due to organism    Visual impairment    wears glasses      Past Surgical History:   Procedure Laterality Date    Anesth,open heart surgery  10/12/2017    triple bypass    Angioplasty (coronary) Left 2018    Cabg  10/2017    CABGX3    Carotid endarterectomy  07/10/2019    Colonoscopy  2015    Colonoscopy      5 years \"was fine\"    Other surgical history  2010    Polyps Dr.M Goldberg    Other surgical history  2018    peripheral angioplasty    Tonsillectomy      Vasectomy  unknown      Family History   Problem Relation Age of Onset    Diabetes Father     Heart Disorder Father     Lipids Father     Diabetes Maternal Grandfather     Cancer Mother     Diabetes Brother       Social History     Socioeconomic History    Marital status:    Tobacco Use    Smoking status: Former     Current packs/day: 0.00     Average packs/day: 1 pack/day for 40.0 years (40.0 ttl pk-yrs)     Types: Cigarettes     Start date: 10/17/1977     Quit date: 10/17/2017     Years since quittin.7      Passive exposure: Past    Smokeless tobacco: Never   Vaping Use    Vaping status: Never Used   Substance and Sexual Activity    Alcohol use: No     Comment: rarely     Drug use: No    Sexual activity: Never           REVIEW OF SYSTEMS:   Today reviewed systens as documented below  GENERAL HEALTH: feels well otherwise  SKIN: Refer to exam below  RESPIRATORY: denies shortness of breath with exertion  CARDIOVASCULAR: denies chest pain on exertion  GI: denies abdominal pain and denies heartburn  NEURO: denies headaches    EXAM:   There were no vitals taken for this visit.  GENERAL: well developed, well nourished, in no apparent distress  EXTREMITIES:   1. Integument: The skin on his feet was evaluated on the right it is a little warmer.  But the left leg is very cold its white.  Patient has thickened toenails 1-5 bilateral feet has hyperkeratosis at the lateral aspect of his heel where he had a previous ulceration on the right lower extremity.  There are no ulcers formed at this time.  The skin on the left lower extremity is very cool to the touch.   2. Vascular: Patient has nonpalpable pulses bilateral but has a more acute problem on his left than his right   3. Neurologic: Patient has diminished pedal sensorium   4. Musculoskeletal: Patient has adequate muscle strength but has not developed claudication on his left side.    ASSESSMENT AND PLAN:   Diagnoses and all orders for this visit:    Type 2 diabetes mellitus with other circulatory complication, unspecified whether long term insulin use (HCC)    Other specified peripheral vascular diseases    Onychomycosis    Callus of foot    Xerosis of skin    History of diabetic ulcer of foot        Plan: Urged him to see his interventional cardiologist ASAP does have appointment scheduled will follow-up with him.  Today using a nail nippers were trimmed and debrided toenails 1-5 manually and mechanically in girth and width as far down to healthy tissue as possible on both  feet.  This was done uneventfully there was no hemorrhage.  There is mild incurvation of the medial and lateral nail borders of the hallux which using a slant back technique were removed and they would not get ingrown.  Today using a sterile 15 blade trim down debrided the callus on the lateral plantar left heel this was done uneventfully without hemorrhage.  At today's visit I reminded the patient about daily foot checks  Reminded patient again of proper control of his diabetes to help prevent any severe complications in his feet  Proper foot hygiene moisturizing except between the toes was reviewed  Advised follow-up again every 2 to 3 months for routine care but emphasized to him the importance of coming in sooner if he notices any problems.    The patient indicates understanding of these issues and agrees to the plan.    Rafael Hudson DPM

## 2025-07-16 ENCOUNTER — TELEPHONE (OUTPATIENT)
Dept: NEPHROLOGY | Facility: CLINIC | Age: 75
End: 2025-07-16

## 2025-07-22 ENCOUNTER — OFFICE VISIT (OUTPATIENT)
Dept: NEPHROLOGY | Facility: CLINIC | Age: 75
End: 2025-07-22
Payer: COMMERCIAL

## 2025-07-22 VITALS
HEIGHT: 69 IN | DIASTOLIC BLOOD PRESSURE: 69 MMHG | BODY MASS INDEX: 31.7 KG/M2 | HEART RATE: 72 BPM | WEIGHT: 214 LBS | SYSTOLIC BLOOD PRESSURE: 160 MMHG

## 2025-07-22 DIAGNOSIS — N18.5 CKD (CHRONIC KIDNEY DISEASE) STAGE 5, GFR LESS THAN 15 ML/MIN (HCC): Primary | ICD-10-CM

## 2025-07-22 PROCEDURE — 99214 OFFICE O/P EST MOD 30 MIN: CPT | Performed by: INTERNAL MEDICINE

## 2025-07-22 NOTE — PATIENT INSTRUCTIONS
Please contact Dr. Hazel's office and arrange for placement of the peritoneal dialysis catheter ASAP.  Let me know when it is scheduled.  I have also let Tori our peritoneal dialysis nurse of our plans.  Repeat your kidney blood test now.  Orders are in the computer.

## 2025-07-22 NOTE — PROGRESS NOTES
07/22/25        Patient: Stanley Mosher   YOB: 1950   Date of Visit: 7/22/2025       Dear  Dr. Ulisses MD,      Thank you for referring Stanley Mosher to my practice.  Please find my assessment and plan below.      As you know he is a 74-year-old male with a history of adult onset diabetes mellitus diagnosed 1968, hypertension, coronary artery disease, status post CABG in 2017, history of aortic stenosis, peripheral vascular disease, status post right carotid endarterectomy, history of hyperparathyroidism, status post removal of a parathyroid adenoma who I now had the pleasure of seeing for follow-up of chronic kidney disease stage V associated with nephrotic syndrome thought to be secondary to diabetic nephropathy.    The patient has just seen Dr. Bernabe.  There is concern that his aortic stenosis has worsened and will need a TAVR procedure.  He is scheduled to see the valve clinic at [facility] on August 28, 2025.  He will also require a cardiac catheterization prior to this procedure.    At the present time the patient denies any obvious chest pain or shortness of breath.  Does describe chronic fatigue.  He is however eating well.  Has noted some mild edema.  No GI or urinary tract symptoms.    Physical exam initial blood pressure was 160/69.  Repeat was 140/70 with a pulse of 72 and he weighed 214 pounds.  His neck was supple without JVD.  Lungs were clear.  Heart revealed a regular rate and rhythm with an S4 and a 3/6 systolic ejection murmur.  His abdomen was soft, flat, nontender without organomegaly, masses or bruits.  Extremities revealed trace edema bilaterally.    I reviewed his most recent labs done on June 21, 2025.  Creatinine was 4.49 with an estimated GFR 13 cc/min.  Electrolytes were good except for CO2 of 20.  Random urine microalbumin to creatinine ratio was elevated at 3643.  Albumin though was still okay at 4.4.  Hemoglobin 13.8.  Last A1c was 8.3.    I therefore informed the  patient and his wife that he is very close to end-stage renal disease and the need for dialytic therapy.  Cardiology has informed him that the TAVR procedure and the cardiac catheterization will most likely push him in to end-stage renal disease and the need for dialytic therapy.  The patient understands this and is willing to accept that.  He has met with my peritoneal dialysis nurse and definitely prefers peritoneal dialysis over hemodialysis.  Therefore it is prudent that we place the peritoneal dialysis catheter now.  He will see Dr. Hazel for this ASAP.  Ideally if the timing works out we will train him and initiate peritoneal dialysis before he undergoes these cardiac procedures.  The patient and his wife are agreeable to this.  The patient will do follow-up laboratory studies now and continue monthly.  Signs and symptoms of uremia have been reviewed.  Maintain adequate hydration.  Avoid nonsteroidals.    Thank you again for allowing me to participate in the care of your patient.  If you have any questions please feel free to call.      Sincerely,   Werner Fletcher MD   [facility], [facility], [place]  [address]  [facility] [address]    Document electronically generated by:  Werner Fletcher MD

## 2025-07-26 DIAGNOSIS — E55.9 VITAMIN D DEFICIENCY: ICD-10-CM

## 2025-07-26 NOTE — TELEPHONE ENCOUNTER
Endocrine Refill protocol for Ergocalciferol     Protocol Criteria: FAILED Reason: Abnormal labs    If all below requirements are met, send a 90-day supply with 1 refill per provider protocol.    Verify appointment with Endocrinology completed in the last 6 months or scheduled in the next 3 months.  Vitamin D level must have been completed within the last 6 months  Vitamin D level must be within the normal range     Vitamin D, 25OH, Total (ng/mL)   Date Value   06/21/2025 23.7 (L)    (32.0-100.0)    Last completed office visit:4/22/2025 Radha Marte MD   Last completed telemed visit: Visit date not found  Next scheduled Follow up:   Future Appointments   Date Time Provider Department Center   11/4/2025  5:00 PM Estuardo, Radha, MD [facility]

## 2025-07-27 RX ORDER — ERGOCALCIFEROL 1.25 MG/1
50000 CAPSULE, LIQUID FILLED ORAL
Qty: 3 CAPSULE | Refills: 1 | Status: SHIPPED | OUTPATIENT
Start: 2025-07-27

## 2025-07-29 ENCOUNTER — LAB ENCOUNTER (OUTPATIENT)
Dept: LAB | Age: 75
End: 2025-07-29
Attending: INTERNAL MEDICINE

## 2025-07-29 DIAGNOSIS — N18.5 CKD (CHRONIC KIDNEY DISEASE) STAGE 5, GFR LESS THAN 15 ML/MIN (HCC): ICD-10-CM

## 2025-07-29 LAB
ALBUMIN SERPL-MCNC: 4.6 G/DL (ref 3.2–4.8)
ANION GAP SERPL CALC-SCNC: 13 MMOL/L (ref 0–18)
BASOPHILS # BLD AUTO: 0.11 X10(3) UL (ref 0–0.2)
BASOPHILS NFR BLD AUTO: 1 %
BUN BLD-MCNC: 42 MG/DL (ref 9–23)
BUN/CREAT SERPL: 9.1 (ref 10–20)
CALCIUM BLD-MCNC: 9.6 MG/DL (ref 8.7–10.4)
CHLORIDE SERPL-SCNC: 108 MMOL/L (ref 98–112)
CO2 SERPL-SCNC: 19 MMOL/L (ref 21–32)
CREAT BLD-MCNC: 4.64 MG/DL (ref 0.7–1.3)
CREAT UR-SCNC: 45.5 MG/DL
DEPRECATED RDW RBC AUTO: 44.2 FL (ref 35.1–46.3)
EGFRCR SERPLBLD CKD-EPI 2021: 13 ML/MIN/1.73M2 (ref 60–?)
EOSINOPHIL # BLD AUTO: 1.32 X10(3) UL (ref 0–0.7)
EOSINOPHIL NFR BLD AUTO: 12.3 %
ERYTHROCYTE [DISTWIDTH] IN BLOOD BY AUTOMATED COUNT: 13.6 % (ref 11–15)
GLUCOSE BLD-MCNC: 115 MG/DL (ref 70–99)
HCT VFR BLD AUTO: 41.2 % (ref 39–53)
HGB BLD-MCNC: 13.3 G/DL (ref 13–17.5)
IMM GRANULOCYTES # BLD AUTO: 0.04 X10(3) UL (ref 0–1)
IMM GRANULOCYTES NFR BLD: 0.4 %
LYMPHOCYTES # BLD AUTO: 1.75 X10(3) UL (ref 1–4)
LYMPHOCYTES NFR BLD AUTO: 16.3 %
MCH RBC QN AUTO: 28.9 PG (ref 26–34)
MCHC RBC AUTO-ENTMCNC: 32.3 G/DL (ref 31–37)
MCV RBC AUTO: 89.4 FL (ref 80–100)
MICROALBUMIN UR-MCNC: 222.7 MG/DL
MICROALBUMIN/CREAT 24H UR-RTO: 4894.5 UG/MG (ref ?–30)
MONOCYTES # BLD AUTO: 0.98 X10(3) UL (ref 0.1–1)
MONOCYTES NFR BLD AUTO: 9.1 %
NEUTROPHILS # BLD AUTO: 6.52 X10 (3) UL (ref 1.5–7.7)
NEUTROPHILS # BLD AUTO: 6.52 X10(3) UL (ref 1.5–7.7)
NEUTROPHILS NFR BLD AUTO: 60.9 %
OSMOLALITY SERPL CALC.SUM OF ELEC: 301 MOSM/KG (ref 275–295)
PHOSPHATE SERPL-MCNC: 4.8 MG/DL (ref 2.4–5.1)
PLATELET # BLD AUTO: 359 10(3)UL (ref 150–450)
POTASSIUM SERPL-SCNC: 4.4 MMOL/L (ref 3.5–5.1)
RBC # BLD AUTO: 4.61 X10(6)UL (ref 3.8–5.8)
SODIUM SERPL-SCNC: 140 MMOL/L (ref 136–145)
WBC # BLD AUTO: 10.7 X10(3) UL (ref 4–11)

## 2025-07-29 PROCEDURE — 36415 COLL VENOUS BLD VENIPUNCTURE: CPT

## 2025-07-29 PROCEDURE — 82043 UR ALBUMIN QUANTITATIVE: CPT

## 2025-07-29 PROCEDURE — 85025 COMPLETE CBC W/AUTO DIFF WBC: CPT

## 2025-07-29 PROCEDURE — 80069 RENAL FUNCTION PANEL: CPT

## 2025-07-29 PROCEDURE — 82570 ASSAY OF URINE CREATININE: CPT

## 2025-07-30 ENCOUNTER — RESULTS FOLLOW-UP (OUTPATIENT)
Dept: NEPHROLOGY | Facility: CLINIC | Age: 75
End: 2025-07-30

## 2025-07-30 DIAGNOSIS — N18.5 CKD (CHRONIC KIDNEY DISEASE) STAGE 5, GFR LESS THAN 15 ML/MIN (HCC): Primary | ICD-10-CM

## 2025-08-06 ENCOUNTER — HOSPITAL ENCOUNTER (OUTPATIENT)
Facility: HOSPITAL | Age: 75
Setting detail: HOSPITAL OUTPATIENT SURGERY
Discharge: HOME | End: 2025-08-06
Attending: SURGERY | Admitting: SURGERY

## 2025-08-06 VITALS
SYSTOLIC BLOOD PRESSURE: 149 MMHG | OXYGEN SATURATION: 97 % | HEART RATE: 62 BPM | BODY MASS INDEX: 30.96 KG/M2 | WEIGHT: 209 LBS | DIASTOLIC BLOOD PRESSURE: 94 MMHG | HEIGHT: 69 IN | RESPIRATION RATE: 16 BRPM | TEMPERATURE: 98 F

## 2025-08-06 RX ORDER — FAMOTIDINE 20 MG/1
20 TABLET, FILM COATED ORAL ONCE
Status: DISCONTINUED | OUTPATIENT
Start: 2025-08-06 | End: 2025-08-06

## 2025-08-06 RX ORDER — METOCLOPRAMIDE 10 MG/1
10 TABLET ORAL ONCE
Status: DISCONTINUED | OUTPATIENT
Start: 2025-08-06 | End: 2025-08-06

## 2025-08-06 RX ORDER — SODIUM CHLORIDE 9 MG/ML
INJECTION, SOLUTION INTRAVENOUS CONTINUOUS
Status: DISCONTINUED | OUTPATIENT
Start: 2025-08-06 | End: 2025-08-06

## 2025-08-06 RX ORDER — ACETAMINOPHEN 500 MG
1000 TABLET ORAL ONCE
Status: DISCONTINUED | OUTPATIENT
Start: 2025-08-06 | End: 2025-08-06

## 2025-08-06 RX ORDER — METOPROLOL TARTRATE 25 MG/1
25 TABLET, FILM COATED ORAL ONCE AS NEEDED
Status: DISCONTINUED | OUTPATIENT
Start: 2025-08-06 | End: 2025-08-06

## 2025-08-06 RX ORDER — METOCLOPRAMIDE HYDROCHLORIDE 5 MG/ML
10 INJECTION INTRAMUSCULAR; INTRAVENOUS ONCE
Status: DISCONTINUED | OUTPATIENT
Start: 2025-08-06 | End: 2025-08-06

## 2025-08-06 RX ORDER — FAMOTIDINE 10 MG/ML
20 INJECTION, SOLUTION INTRAVENOUS ONCE
Status: DISCONTINUED | OUTPATIENT
Start: 2025-08-06 | End: 2025-08-06

## 2025-08-28 ENCOUNTER — HOSPITAL ENCOUNTER (OUTPATIENT)
Dept: CV DIAGNOSTICS | Facility: HOSPITAL | Age: 75
Discharge: HOME | End: 2025-08-28
Attending: INTERNAL MEDICINE

## 2025-08-28 ENCOUNTER — HOSPITAL ENCOUNTER (OUTPATIENT)
Dept: CT IMAGING | Facility: HOSPITAL | Age: 75
Discharge: HOME | End: 2025-08-28
Attending: INTERNAL MEDICINE

## 2025-08-28 ENCOUNTER — HOSPITAL ENCOUNTER (OUTPATIENT)
Dept: ULTRASOUND IMAGING | Facility: HOSPITAL | Age: 75
Discharge: HOME | End: 2025-08-28
Attending: INTERNAL MEDICINE

## 2025-08-28 ENCOUNTER — HOSPITAL ENCOUNTER (OUTPATIENT)
Dept: LAB | Facility: HOSPITAL | Age: 75
Discharge: HOME | End: 2025-08-28
Attending: INTERNAL MEDICINE

## 2025-08-28 ENCOUNTER — HOSPITAL ENCOUNTER (OUTPATIENT)
Dept: CARDIOLOGY CLINIC | Facility: HOSPITAL | Age: 75
Discharge: HOME | End: 2025-08-28
Attending: INTERNAL MEDICINE

## 2025-08-28 DIAGNOSIS — E11.9 DM (DIABETES MELLITUS) (HCC): ICD-10-CM

## 2025-08-28 DIAGNOSIS — I10 HTN (HYPERTENSION): ICD-10-CM

## 2025-08-28 DIAGNOSIS — Z95.1 HX OF CABG: ICD-10-CM

## 2025-08-28 DIAGNOSIS — I73.9 PVD (PERIPHERAL VASCULAR DISEASE): ICD-10-CM

## 2025-08-28 DIAGNOSIS — E11.9 DIABETES MELLITUS TYPE 2, INSULIN DEPENDENT (HCC): ICD-10-CM

## 2025-08-28 DIAGNOSIS — Z98.890 H/O CAROTID ENDARTERECTOMY: ICD-10-CM

## 2025-08-28 DIAGNOSIS — Z79.4 DIABETES MELLITUS TYPE 2, INSULIN DEPENDENT (HCC): ICD-10-CM

## 2025-08-28 DIAGNOSIS — I35.0 AORTIC STENOSIS: ICD-10-CM

## 2025-08-28 LAB
ANION GAP SERPL CALC-SCNC: 14 MMOL/L (ref 0–18)
ATRIAL RATE: 84 BPM
BUN BLD-MCNC: 40 MG/DL (ref 9–23)
CALCIUM BLD-MCNC: 9.8 MG/DL (ref 8.7–10.6)
CHLORIDE SERPL-SCNC: 105 MMOL/L (ref 98–112)
CO2 SERPL-SCNC: 18 MMOL/L (ref 21–32)
CREAT BLD-MCNC: 5.18 MG/DL (ref 0.7–1.3)
EGFRCR SERPLBLD CKD-EPI 2021: 11 ML/MIN/1.73M2 (ref 60–?)
GLUCOSE BLD-MCNC: 333 MG/DL (ref 70–99)
OSMOLALITY SERPL CALC.SUM OF ELEC: 307 MOSM/KG (ref 275–295)
P AXIS: 42 DEGREES
P-R INTERVAL: 168 MS
POTASSIUM SERPL-SCNC: 4.7 MMOL/L (ref 3.5–5.1)
Q-T INTERVAL: 380 MS
QRS DURATION: 134 MS
QTC CALCULATION (BEZET): 449 MS
R AXIS: -27 DEGREES
SODIUM SERPL-SCNC: 137 MMOL/L (ref 136–145)
T AXIS: 245 DEGREES
VENTRICULAR RATE: 84 BPM

## 2025-08-28 PROCEDURE — 93010 ELECTROCARDIOGRAM REPORT: CPT | Performed by: INTERNAL MEDICINE

## 2025-08-28 PROCEDURE — 36415 COLL VENOUS BLD VENIPUNCTURE: CPT | Performed by: INTERNAL MEDICINE

## 2025-08-28 PROCEDURE — 93005 ELECTROCARDIOGRAM TRACING: CPT

## 2025-08-28 PROCEDURE — 80048 BASIC METABOLIC PNL TOTAL CA: CPT | Performed by: INTERNAL MEDICINE

## 2025-08-28 PROCEDURE — 99212 OFFICE O/P EST SF 10 MIN: CPT

## 2025-08-29 ENCOUNTER — TELEPHONE (OUTPATIENT)
Dept: CARDIOLOGY CLINIC | Facility: HOSPITAL | Age: 75
End: 2025-08-29

## (undated) DEVICE — SUTURE PROLENE 6-0 C-1

## (undated) DEVICE — SUTURE PROLENE 7-0 BV-1

## (undated) DEVICE — INSERT SUTURE OPEN HEART

## (undated) DEVICE — NEEDLE HPO 18GA 1.5IN ECLPS

## (undated) DEVICE — CHLORAPREP 26ML APPLICATOR

## (undated) DEVICE — DEVICE BLWR/MSTR ACCUMIST ATCH

## (undated) DEVICE — TRAY ENDOVEIN KTV16 HARVESTING

## (undated) DEVICE — FORESIGHT LARGE SENSOR: Brand: FORESIGHT

## (undated) DEVICE — STERILE (10.2 X 147CM) TELESCOPICALLY-FOLDED COVER: Brand: CIV-FLEX™ TRANSDUCER COVER

## (undated) DEVICE — THORACIC CATHETER, RIGHT ANGLE, SILICONE, WITH CLOTSTOP®: Brand: AXIOM® ATRAUM™ WITH CLOTSTOP®

## (undated) DEVICE — STERILE TETRA-FLEX CF, ELASTIC BANDAGE, 4" X 5.5YD: Brand: TETRA-FLEX™CF

## (undated) DEVICE — COVER SGL STRL LGHT HNDL BLU

## (undated) DEVICE — CONNECTOR PRFSN QCK PRM .25IN

## (undated) DEVICE — CATH SECURING DEVICE STATLOCK

## (undated) DEVICE — OCCLUDER VASC 12MM 1.25MM

## (undated) DEVICE — SOL  .9 1000ML BTL

## (undated) DEVICE — Device: Brand: CDI™ SHUNT SENSOR

## (undated) DEVICE — ABSORBABLE HEMOSTAT (OXIDIZED REGENERATED CELLULOSE, U.S.P.): Brand: SURGICEL

## (undated) DEVICE — SUTURE SILK 2-0 FS

## (undated) DEVICE — STERILE TETRA-FLEX CF, ELASTIC BANDAGE LATEX FREE 6IN X5.5 YD: Brand: TETRA-FLEX™CF

## (undated) DEVICE — CARTRIDGE HC HMS+ CRTDG SYR

## (undated) DEVICE — SUTURE ETHIBOND 0 CT-1

## (undated) DEVICE — SUTURE PROLENE 7-0 8701H

## (undated) DEVICE — ARISTA 1 GRAM

## (undated) DEVICE — ENCORE® LATEX MICRO SIZE 8.5, STERILE LATEX POWDER-FREE SURGICAL GLOVE: Brand: ENCORE

## (undated) DEVICE — CARD SMRT MEDISTEM VERIQ TRNST

## (undated) DEVICE — SUTURE PROLENE 4-0 BB

## (undated) DEVICE — Device

## (undated) DEVICE — SUCTION CANISTER, 3000CC,SAFELINER: Brand: DEROYAL

## (undated) DEVICE — CS5/5+ FASTPACK, 225ML 150U RES: Brand: HAEMONETICS CELL SAVER 5/5+ SYSTEMS

## (undated) DEVICE — SUTURE SILK 2-0 SH

## (undated) DEVICE — TUBING INSUFLATION HEATED GRAY

## (undated) DEVICE — DRAIN INCS 7MM 20CMX7MM SIL

## (undated) DEVICE — DRESSING FM 4.25X4.25IN PLMM

## (undated) DEVICE — SUTURE SILK 4-0 SA63H

## (undated) DEVICE — SUTURE CHROMIC 2-0 801H

## (undated) DEVICE — SUTURE SILK 1-0 SA87G

## (undated) DEVICE — SOLUTION IV 1000ML 0.9% NACL PRESERVATIVE

## (undated) DEVICE — SUTURE SURGICAL STEEL #7

## (undated) DEVICE — CV: Brand: MEDLINE INDUSTRIES, INC.

## (undated) DEVICE — HEAD & NECK: Brand: MEDLINE INDUSTRIES, INC.

## (undated) DEVICE — LEAD BIPOLAR TEMP 6495XF53

## (undated) DEVICE — PACK CDS LAP CHOLE

## (undated) DEVICE — SUTURE MONOCRYL 3-0 Y936H

## (undated) DEVICE — 12 ML SYRINGE LUER-LOCK TIP: Brand: MONOJECT

## (undated) DEVICE — STABILIZER SRG UNV AST CABG

## (undated) DEVICE — NEEDLE INSUF 13GA L120MM LAP SPR LD BLNT STYL

## (undated) DEVICE — FLOSEAL SEALENT STERILE 10ML

## (undated) DEVICE — 3M™ TEGADERM™ TRANSPARENT FILM DRESSING, 1626W, 4 IN X 4-3/4 IN (10 CM X 12 CM), 50 EACH/CARTON, 4 CARTON/CASE: Brand: 3M™ TEGADERM™

## (undated) DEVICE — SOL  .9 1000ML BAG

## (undated) DEVICE — SUTURE MONOCRYL 4-0 Y845G

## (undated) DEVICE — SUTURE SILK 0 FSL

## (undated) DEVICE — PACK CUSTOM TUBING

## (undated) DEVICE — SUTURE CHROMIC GUT 3-0 SH

## (undated) DEVICE — SPONGE: SPECIALTY PEANUT XR 100/CS: Brand: MEDICAL ACTION INDUSTRIES

## (undated) DEVICE — BLOOD TRANSFUSION FILTER: Brand: HAEMONETICS

## (undated) DEVICE — PAD PLMM SLVR 12.5X4IN SLVR

## (undated) DEVICE — STERILE POLYISOPRENE POWDER-FREE SURGICAL GLOVES: Brand: PROTEXIS

## (undated) DEVICE — DRAPE SLUSH/WARMER W/DISC

## (undated) DEVICE — SUTURE PROLENE 6-0 BV-1

## (undated) DEVICE — BATTERY

## (undated) DEVICE — CANNULA PRFSN 15IN 32/40FR .5

## (undated) DEVICE — SUTURE SILK 2-0 SA65H

## (undated) DEVICE — HEART DRAPE & SUPPLY PACK: Brand: MEDLINE INDUSTRIES, INC.

## (undated) DEVICE — 6 ML SYRINGE LUER-LOCK TIP: Brand: MONOJECT

## (undated) DEVICE — NECK ACCESSORY: Brand: MEDLINE INDUSTRIES, INC.

## (undated) DEVICE — SPONGE LAP 18X18 XRAY STRL

## (undated) DEVICE — 60 ML SYRINGE LUER-LOCK TIP: Brand: MONOJECT

## (undated) DEVICE — [HIGH FLOW INSUFFLATOR,  DO NOT USE IF PACKAGE IS DAMAGED,  KEEP DRY,  KEEP AWAY FROM SUNLIGHT,  PROTECT FROM HEAT AND RADIOACTIVE SOURCES.]: Brand: PNEUMOSURE

## (undated) DEVICE — SYRINGE MED IRRIG 20ML POLYPR BRL STD LL TIP

## (undated) DEVICE — RETROGRADE CARDIOPLEGIA CATHETER: Brand: EDWARDS LIFESCIENCES RETROGRADE CARDIOPLEGIA CATHETER

## (undated) DEVICE — POUCH: SSEAL TYVEK 2000/CS: Brand: MEDICAL ACTION INDUSTRIES

## (undated) DEVICE — SUT ETHLN 2-0 18IN FS NABSRB BLK 26MM 3/8 CIR

## (undated) DEVICE — 12 FOOT DISPOSABLE EXTENSION CABLE WITH SAFE CONNECT / SCREW-DOWN

## (undated) DEVICE — DRAIN RELIAVAC 100CC

## (undated) DEVICE — SUTURE SILK 2-0 SA85H

## (undated) DEVICE — SUTURE PROLENE 7-0 BV175-6

## (undated) DEVICE — TROCAR 5MM L100 NON BLADED

## (undated) DEVICE — HEART A: Brand: MEDLINE INDUSTRIES, INC.

## (undated) DEVICE — SUTURE PROLENE 5-0 C-1

## (undated) DEVICE — GOWN SURG AERO BLUE PERF LG

## (undated) DEVICE — PUNCH 3MM LNG HNDL AOR

## (undated) DEVICE — ENCORE® LATEX ACCLAIM SIZE 8, STERILE LATEX POWDER-FREE SURGICAL GLOVE: Brand: ENCORE

## (undated) DEVICE — DRESSING FOAM 1IN 4MM H DISK CHG IMPREG AEGIS

## (undated) DEVICE — ALARM PT PTCH LVL

## (undated) DEVICE — LIGACLIP MCA MULTIPLE CLIP APPLIERS, 20 SMALL CLIPS: Brand: LIGACLIP

## (undated) DEVICE — SUTURE WIRE DOUBLE STERNOTOMY

## (undated) DEVICE — PUNCH AORTIC 4.0 LONG HANDLE

## (undated) DEVICE — SUTURE PDS II 1 CTX

## (undated) DEVICE — SUTURE VICRYL 1-0 J977H

## (undated) DEVICE — SUTURE PROLENE 4-0 FS-2

## (undated) DEVICE — SUTURE PROLENE 3-0 SH

## (undated) DEVICE — ADAPTER CRDPLG ANTGRD RTRGD 3W

## (undated) DEVICE — 3M™ IOBAN™ 2 ANTIMICROBIAL INCISE DRAPE 6650EZ: Brand: IOBAN™ 2

## (undated) DEVICE — PACK ASSRY CUSTOM TUBING

## (undated) DEVICE — SUTURE PDS II 2-0 CT-1

## (undated) DEVICE — THORACIC CATHETER, STRAIGHT, SILICONE, WITH CLOTSTOP®: Brand: AXIOM® ATRAUM™ WITH CLOTSTOP®

## (undated) DEVICE — SOLUTION IRRIG 1000ML 0.9% NACL USP BTL

## (undated) DEVICE — INTENDED TO BE USED TO OCCLUDE, RETRACT AND IDENTIFY ARTERIES, VEINS, TENDONS AND NERVES IN SURGICAL PROCEDURES: Brand: STERION®  VESSEL LOOP

## (undated) DEVICE — GAMMEX® PI HYBRID SIZE 7.5, STERILE POWDER-FREE SURGICAL GLOVE, POLYISOPRENE AND NEOPRENE BLEND: Brand: GAMMEX

## (undated) DEVICE — X-RAY DETECTABLE SPONGES,16 PLY: Brand: VISTEC

## (undated) DEVICE — Device: Brand: JELCO

## (undated) DEVICE — CANNULA AORTIC 21 FR SOFT FLOW

## (undated) DEVICE — SUTURE PROLENE 6-0 8807H

## (undated) DEVICE — CLIP SM W INTNL HRZN TI TPE LF

## (undated) DEVICE — BARD® JAVID™ CAROTID BYPASS SHUNT, 17F - 10F X 27.5CM: Brand: BARD® JAVID

## (undated) NOTE — MR AVS SNAPSHOT
[facility]  [address] [place] [id number] [phone]               Thank you for choosing us for your health care visit with Merritt Pillai MD.  We are glad to serve you and happy to provide you with this summary of your visit. Assoc Dx:  Proteinuria, unspecified type [R80.9], CKD (chronic kidney disease), stage II [N18.2], Diabetic nephropathy associated with type 2 diabetes mellitus ([facility] [address] [E11.21], Hypercalcemia [E83.52]           Immunofixation (RAE) [E]    Complete by:   Apr Harrell, [address], [address] [facility]  [address]  [address] [place] [address]    [address]  [address] [place], [place]    It is the patient's responsibility to check MetFORMIN HCl 500 MG Tabs   Take 1 tablet (500 mg total) by mouth 2 (two) times daily with meals. Commonly known as:  GLUCOPHAGE           simvastatin 40 MG Tabs   Take 1 tablet (40 mg total) by mouth nightly.    Commonly known as:  Mark Whitfield

## (undated) NOTE — LETTER
No referring provider defined for this encounter.       07/05/24        Patient: Stanley Mosher   YOB: 1950   Date of Visit: 7/5/2024       Dear  Dr. Ulisses MD,      Thank you for referring Stanley Mosher to my practice.  Please find my assessment and plan below.      As you know he is a 73-year-old male with a history of adult onset diabetes mellitus diagnosed in 1968, hypertension, moderate aortic stenosis, coronary artery disease, status post coronary artery bypass graft surgery in 2017, history of peripheral vascular disease, status post right carotid endarterectomy, history of primary hyperparathyroidism, status post removal of a parathyroid adenoma who I now had the pleasure of seeing for follow-up of chronic kidney disease stage IV associated with nephrotic syndrome thought to be secondary to diabetic nephropathy.  Overall patient states he is doing okay without any chest pain, shortness of breath, GI or urinary tract symptoms.  He does check his blood pressure readings at home but forgot to bring in readings.  States his blood sugars are improving.  His A1c was 11.4 back in November 2023.  Now down to 7.6.  Doing a better job with diet.    On physical exam his blood pressure is 161/61 with a pulse of 55 and he weighed 209 pounds.  His neck was supple without JVD.  Lungs were clear.  Heart revealed a regular rate and rhythm with an S4 but no gallops, murmurs or rubs.  Abdomen soft, flat, nontender without organomegaly, masses or bruits.  Extremities reveal trace edema bilaterally.    I reviewed his most recent labs done on June 12, 2024.  Unfortunately creatinine continues to creep up.  Now 3.68 with an estimated GFR of 17 cc/min.  Electrolytes were good.  Urine microalbumin creatinine ratio was 2706.  Albumin 4.5.  Hemoglobin still good at 14.0.    I therefore informed the patient and his wife that there has been continued deterioration in his renal function.  Reinforced importance of  maintaining adequate hydration and avoiding nonsteroidals.  Blood pressure and blood sugar pressure control was reemphasized.  His A1c is improving.  Systolic blood pressure was high today.  He will check his blood pressures daily and call me in 1 week.  Repeat CBC, renal panel in 1 month.  If stable will continue quarterly.  Will see again in 3 to 6 months for follow-up depending upon clinical course.  The significance of a GFR of 17 cc/min have been reviewed with the patient.  Indications for dialysis have been discussed.  Patient states he would be interested in peritoneal dialysis if i and when dialysis was recommended.    Thank you again for allowing me to participate in the care of your patient.  If you have any questions please feel free to call.           Sincerely,   Werner Fletcher MD   [facility], [facility], [place]  [address]  [facility] [address]    Document electronically generated by:  Werner Fletcher MD

## (undated) NOTE — LETTER
[address]  [facility] [address], [place], [place]  [address]  INFORMED CONSENT FOR TRANSFUSION OF BLOOD OR BLOOD PRODUCTS   My physician has informed me of the nature, purpose, benefits and risks of transfusion for blood and blood components miko (Signature of Patient)                                                           (Responsible party in case of Minor,                                                                                                Incompetent, or unconscious Patient)  _____

## (undated) NOTE — MR AVS SNAPSHOT
[address]  [phone]               Thank you for choosing us for your health care visit with Kim Saldana MD.  We are glad to serve you and happy to provide you with this summary of your visit. Commonly known as:  GLUCOTROL           MetFORMIN HCl 500 MG Tabs   Take 1 tablet (500 mg total) by mouth 2 (two) times daily with meals. Commonly known as:  GLUCOPHAGE           NIFEdipine ER 30 MG Tb24   Take 1 tablet (30 mg total) by mouth daily.    Co

## (undated) NOTE — LETTER
No referring provider defined for this encounter. 06/25/21        Patient: Thomas Laboy   YOB: 1950   Date of Visit: 6/25/2021       Dear  Dr. River Soto MD,      Thank you for referring Thomas Laboy to my practice.   Please find my ass Recommended checking his blood pressures daily and call me in 1 week. Will adjust medications if needed. Finally recommend to do CBC and renal panels quarterly. I will see him again in 6 months for follow-up or sooner if clinically indicated.     Thank y

## (undated) NOTE — LETTER
No referring provider defined for this encounter.       03/26/25        Patient: Stanley Mosher   YOB: 1950   Date of Visit: 3/26/2025       Dear  Dr. Ulisses MD,      Thank you for referring Stanley Mosher to my practice.  Please find my assessment and plan below.      As you know he is a 74-year-old male with a history of adult onset diabetes mellitus diagnosed in 1968, hypertension, coronary artery disease, status post coronary artery bypass graft surgery in 2017, aortic stenosis, peripheral vascular disease, status post right carotid endarterectomy, history of hyperparathyroidism, status post removal of a parathyroid adenoma who I now the pleasure of seeing for follow-up of what is now chronic kidney disease stage V associated with nephrotic syndrome thought to be secondary to diabetic nephropathy.  Patient has not been compliant with follow-up and was last seen in July 2024.    At the present time the patient states he is feeling okay without any chest pain, increased shortness of breath, GI or urinary tract symptoms.  Times wishes he did have more energy.  Appetite remains good.  No signs of congestive heart failure.  Recently saw his cardiologist, Dr. Bernabe.  It looks as though he will require a TAVR procedure.  Will be getting repeat echocardiogram and seeing cardiology on July 15.  Final recommendations at that time.    On physical exam his blood pressure 160/80 with a pulse of 98 and he weighed 209 pounds.  His neck was supple without JVD.  Lungs were clear.  Heart revealed a regular rate and rhythm with an S4 and a 3/6 systolic ejection murmur.  His abdomen was soft, flat, nontender without organomegaly, masses or bruits.  Extremities revealed no edema.    I reviewed his recent laboratory studies.  There were last done on March 25, 2025.  Creatinine has been gradually increasing now up to 4.46 with an estimated GFR 13 cc/min.  Electrolytes were good.  Hemoglobin 14.5.  Albumin 4.7 and urine  microalbumin to creatinine ratio was elevated 4607.  Last A1c was 8.2.    I therefore informed the patient and his wife that unfortunately he is nearing end-stage renal disease and the need for dialytic therapy.  The significance of a GFR 13 cc/min and the indications for dialysis have been reviewed.  His blood pressures and blood sugars are probably not under optimal control.  He does tend to have whitecoat syndrome.  He will check his blood pressures daily and call me in a week.  Continue to work with endocrine to get his A1c under better control.  They also understand that the workup and the procedure of doing a TAVR procedure may potentially exacerbate his renal insufficiency.  He was advised to repeat his laboratory studies in 1 month to ensure stability.  We discussed the pros and cons of hemodialysis versus peritoneal dialysis he is definitely more interested in peritoneal dialysis.  Will have our peritoneal dialysis nurse contact him to discuss this in more detail.  Otherwise return in 3 months or sooner if clinically indicated.  Maintain adequate hydration.  Avoid nonsteroidals.  Compliance was stressed.    Thank you again for allowing me to participate in the care of your patient.  If you have any questions please were free to call.           Sincerely,   Werner Fletcher MD   [facility], [facility], [place]  [address]  [facility] [address]    Document electronically generated by:  Werner Fletcher MD

## (undated) NOTE — LETTER
[address], [place]  Authorization for Invasive Procedures  1.  I hereby authorize Dr. Alpa Barclay , my physician and whomever may be designated as the doctor's assistant, to perform the following operation and/or procedure:  *PERIPH performed for the purposes of advancing medicine, science, and/or education, provided my identity is not revealed. If the procedure has been videotaped, the physician/surgeon will obtain the original videotape.  The hospital will not be responsible for stor My signature below affirms that prior to the time of the procedure, I have explained to the patient and/or his legal representative, the risks and benefits involved in the proposed treatment and any reasonable alternative to the proposed treatment.  I have

## (undated) NOTE — LETTER
No referring provider defined for this encounter. 09/19/17        Patient: Marta Jerez   YOB: 1950   Date of Visit: 9/19/2017       Dear  Dr. Rosina East MD,      Thank you for referring Marta Jerez to my practice.   Please find my ass

## (undated) NOTE — LETTER
04/28/21        Thaddeus Doyle  [address] [facility]      Dear Lupis Rodriguez records indicate that you have outstanding lab work and or testing that was ordered for you and has not yet been completed:  Orders Placed This Encounter      CT L

## (undated) NOTE — LETTER
No referring provider defined for this encounter. 05/06/21        Patient: Tara Plascencia   YOB: 1950   Date of Visit: 5/6/2021       Dear  Dr. Vicky Vasquez MD,      Thank you for referring Tara Plascencia to my practice.   Please find my asse

## (undated) NOTE — LETTER
9/1/2021          To Whom It May Concern:    Inderjit Harper is currently under my medical care. He can hold his Plavix 2 days prior to his procedure day and 2 days after his procedure. If you require additional information please contact our office.         Sincerely,    Livier Call MD          Document generated by:  Livier Call MD

## (undated) NOTE — Clinical Note
3/21/2017          To Whom It May Concern:    Tete Peraza is currently under my medical care and may not return to work at this time. Please excuse Jennifer Srivastava for 6 days. He may return to work on 03/27/17. Chelsy Freda had pneumonia.   If you require additional in

## (undated) NOTE — LETTER
No referring provider defined for this encounter. 04/10/23        Patient: Edgardo Milan   YOB: 1950   Date of Visit: 4/10/2023       Dear  Dr. Mic Daly MD,      Thank you for referring Edgardo Milan to my practice. Please find my assessment and plan below. As you know he is a 68-year-old male with a history of adult onset diabetes mellitus diagnosed in 1968, hypertension, history of coronary artery disease, status post coronary artery bypass graft surgery in 2017, history of peripheral vascular disease, status post right carotid endarterectomy, history of primary hyperparathyroidism, status post removal of a parathyroid adenoma who I now had the pleasure of seeing for follow-up of chronic kidney disease stage III-IV associated with nephrotic syndrome secondary to diabetic nephropathy. Again the patient has been poorly compliant with follow-up. Last seen in May 2022. Has not been follow-up laboratory studies as instructed. Furthermore has been having recurrent problems with intermittent claudication. Scheduled for an angiogram of his right lower extremity on May 11, 2023 and ultimately will need the left leg done as well. Otherwise no chest pain, shortness of breath, GI or urinary tract symptoms. On physical exam his blood pressure 132/61 with a pulse of 78 and he weighed 197 pounds. His neck was supple without JVD. Lungs were clear. Heart revealed regular rate and rhythm and S4 but no gallops, murmurs or rubs. Abdomen was soft, flat, nontender without organomegaly, masses or bruits. Extremities revealed no edema. I therefore informed the patient and his wife that he does have chronic kidney disease stage III-IV associated with nephrotic syndrome secondary to diabetic nephropathy. Unfortunately  no recent labs to see whether or not his disease has progressed or not. Strongly advised to repeat a CBC, renal panel urine for microalbumin now.   Reinforced the importance of maintaining adequate hydration and avoiding all use of nonsteroidals. Blood pressure and blood sugar control. I also informed the patient that he is at increased risk for contrast-induced nephropathy with the angiograms tentatively scheduled for May 2023. We will see what laboratory studies reveal.  Probably prudent to repeat a CBC and renal panel 2 or 3 days after the procedure to ensure stability. Compliance was stressed. Return in 6 months. Thank you again for allowing me to participate in the care of your patient. If you have any questions please feel free to call.            Sincerely,   Clint Kern MD   [facility], [address] [place] [address]  [address] [place] [address]  [address] [address]    Document electronically generated by:  Clint Kern MD

## (undated) NOTE — LETTER
[address] [place] [address]  Authorization for Invasive Procedures  1.  I hereby authorize  ___________________ , my physician and whomever may be designated as the doctor's assistant, to perform the following operation and/or procedur some, but not all, of the potential risks that can occur: fever and allergic reactions, hemolytic reactions, transmission of disease such as hepatitis, AIDS, cytomegalovirus (CMV), and flluid overload.  In the event that I wish to have autologous transfusio necessary or desirable in the judgment of my physician.      Signature of Patient:  ________________________________________________ Date: _________Time: _________    Responsible person in case of minor or unconscious: _____________________________Relations

## (undated) NOTE — Clinical Note
Thank you for the consult. I saw Mr. Claudia Carrasco in the endocrine/diabetes clinic today. Please see attached my note. Please feel free to contact me with any questions. Thanks!

## (undated) NOTE — LETTER
[address], [place], [place]     AUTHORIZATION FOR SURGICAL OPERATION OR PROCEDURE    1.  I hereby authorize Dr. Case Bernstein MD, my Physician(s) and whomever may be designated as the doctor's Assistant, to perform t 5. I consent to the photographing of procedure(s) to be performed for the purposes of advancing medicine, science and/or education, provided my identity is not revealed.  If the procedure has been videotaped, the physician/surgeon will obtain the original v (Witness signature)                                                                                                  (Date)                                (Time)  STATEMENT OF PHYSICIAN My signature below affirms that prior to the time of the procedure;  I

## (undated) NOTE — MR AVS SNAPSHOT
[address]  [address] [facility]  [phone]               Thank you for choosing us for your health care visit with Estella Duarte MD.  We are glad to serve you and happy to provide you with this summa ([facility] [E11.22, Z79.4, E11.65], Cough [R05]           Microalb/Creat Ratio, Random Urine [E]    Complete by:   Mar 21, 2017    Assoc Dx:  Uncontrolled type 2 diabetes mellitus with chronic kidney disease, with long-term current use of insulin, unspecified CK Uncontrolled type 2 diabetes mellitus with chronic kidney disease, with long-term current use of insulin, unspecified CKD stage        Cough        Pneumonia of right upper lobe due to infectious organism          Instructions and Information about Your H - Amoxicillin-Pot Clavulanate 875-125 MG Tabs            MyChart     Visit [facility]  You can access your MyChart to more actively manage your health care and view more details from this visit by going to [email or web address].   If you've recently ha

## (undated) NOTE — LETTER
[facility]  Administration of Anesthesia  1. Liam Sebastian, or _________________________________ acting on his behalf, (Patient) (Dependent/Representative) request to receive anesthesia for my pending procedure/operation/treatment.   A ph infections, high spinal block, spinal bleeding, seizure, cardiac arrest and death. 7. AWARENESS: I understand that it is possible (but unlikely) to have explicit memory of events from the operating room while under general anesthesia.   8. ELECTROCONVULSIV (Date) (Time)                                                                                               (Responsible person in case of minor/ unconscious pt) /Relationship    My signature below affirms that prior to the time of the procedure, I have ex

## (undated) NOTE — LETTER
No referring provider defined for this encounter. 05/11/21        Patient: Lyndsay Green   YOB: 1950   Date of Visit: 5/11/2021       Dear  Dr. Kary Watson MD,      Thank you for referring Lyndsay Green to my practice.   Please find my ass any chest pain, shortness of breath, GI or urinary tract symptoms. 10 point review systems is otherwise unremarkable. On physical exam his blood pressure is 151/73 with a pulse of 88 and he weighed 207 pounds.   His neck was supple without JVD or lympha

## (undated) NOTE — MR AVS SNAPSHOT
[address]  [address] [facility]  [phone]               Thank you for choosing us for your health care visit with Ирина Dubois MD.  We are glad to serve you and happy to provide you with this summa insurance company's guidelines for prior authorization for this test.  You may be held responsible for payment in full if proper authorization is not acquired. Please contact the Patient Business Office at [phone] if you have any questions related to i view more details from this visit by going to [email or web address]. If you've recently had a stay at the Hospital you can access your discharge instructions in [facility] by going to Visits < Admission Summaries.  If you've been to the Emergency Depar priority on exercise in your life                    Visit [facility] online at  [email or web address]

## (undated) NOTE — LETTER
07/28/21        Tara Plascencia  [address] [facility]      Dear Jaki Montano records indicate that you have outstanding lab work and or testing that was ordered for you and has not yet been completed:  Orders Placed This Encounter      PSA

## (undated) NOTE — ED AVS SNAPSHOT
Mr. Chon Foote   MRN: [id number]    Department:  [facility] Emergency Department   Date of Visit:  8/28/2019           Disclosure     Insurance plans vary and the physician(s) referred by the ER may not be covered by your plan.  Please contact within the next three months to obtain basic health screening including reassessment of your blood pressure.     IF THERE IS ANY CHANGE OR WORSENING OF YOUR CONDITION, CALL YOUR PRIMARY CARE PHYSICIAN AT ONCE OR RETURN IMMEDIATELY TO THE EMERGENCY DEPARTMEN

## (undated) NOTE — LETTER
No referring provider defined for this encounter.       07/22/25        Patient: Stanley Mosher   YOB: 1950   Date of Visit: 7/22/2025       Dear  Dr. Ulisses MD,      Thank you for referring Stanley Mosher to my practice.  Please find my assessment and plan below.      As you know he is a 74-year-old male with a history of adult onset diabetes mellitus diagnosed 1968, hypertension, coronary artery disease, status post CABG in 2017, history of aortic stenosis, peripheral vascular disease, status post right carotid endarterectomy, history of hyperparathyroidism, status post removal of a parathyroid adenoma who I now had the pleasure of seeing for follow-up of chronic kidney disease stage V associated with nephrotic syndrome thought to be secondary to diabetic nephropathy.    The patient has just seen Dr. Bernabe.  There is concern that his aortic stenosis has worsened and will need a TAVR procedure.  He is scheduled to see the valve clinic at [facility] on August 28, 2025.  He will also require a cardiac catheterization prior to this procedure.    At the present time the patient denies any obvious chest pain or shortness of breath.  Does describe chronic fatigue.  He is however eating well.  Has noted some mild edema.  No GI or urinary tract symptoms.    Physical exam initial blood pressure was 160/69.  Repeat was 140/70 with a pulse of 72 and he weighed 214 pounds.  His neck was supple without JVD.  Lungs were clear.  Heart revealed a regular rate and rhythm with an S4 and a 3/6 systolic ejection murmur.  His abdomen was soft, flat, nontender without organomegaly, masses or bruits.  Extremities revealed trace edema bilaterally.    I reviewed his most recent labs done on June 21, 2025.  Creatinine was 4.49 with an estimated GFR 13 cc/min.  Electrolytes were good except for CO2 of 20.  Random urine microalbumin to creatinine ratio was elevated at 3643.  Albumin though was still okay at 4.4.   Hemoglobin 13.8.  Last A1c was 8.3.    I therefore informed the patient and his wife that he is very close to end-stage renal disease and the need for dialytic therapy.  Cardiology has informed him that the TAVR procedure and the cardiac catheterization will most likely push him in to end-stage renal disease and the need for dialytic therapy.  The patient understands this and is willing to accept that.  He has met with my peritoneal dialysis nurse and definitely prefers peritoneal dialysis over hemodialysis.  Therefore it is prudent that we place the peritoneal dialysis catheter now.  He will see Dr. Hazel for this ASAP.  Ideally if the timing works out we will train him and initiate peritoneal dialysis before he undergoes these cardiac procedures.  The patient and his wife are agreeable to this.  The patient will do follow-up laboratory studies now and continue monthly.  Signs and symptoms of uremia have been reviewed.  Maintain adequate hydration.  Avoid nonsteroidals.    Thank you again for allowing me to participate in the care of your patient.  If you have any questions please feel free to call.      Sincerely,   Werner Fletcher MD   [facility], [place]  [address]  [facility] [address]    Document electronically generated by:  Werner Fletcher MD

## (undated) NOTE — LETTER
No referring provider defined for this encounter. 05/20/22        Patient: Sheree Lopez   YOB: 1950   Date of Visit: 5/20/2022       Dear  Dr. Dasia Galicia MD,      Thank you for referring Sheree Lopez to my practice. Please find my assessment and plan below. As you know he is a 74-year-old male with a history of adult onset diabetes mellitus diagnosed 1968, hypertension, history of coronary artery disease, status post coronary artery bypass graft surgery in 2017, history of peripheral vascular disease, status post right carotid endarterectomy and primary hyperparathyroidism followed by endocrine who I now had the pleasure of seeing for follow-up of chronic kidney disease stage III associated with nephrotic syndrome secondary to diabetic nephropathy. He has not been compliant with follow-up and was last seen in June 2021. Overall patient states has been doing okay without any chest pain, shortness of breath, GI or urinary tract symptoms. Fairly sedentary. States blood sugars were too high but have started to improve on Trulicity. He has been checking his blood pressures during the last week and systolics are in the 083-761 range with diastolics in the 72-93 range. On physical exam his blood pressure 178/80 with a pulse of 85 and he weighed 207 pounds. His neck was supple without JVD. Lungs were clear. Heart revealed regular rate and rhythm with an S4 but no gallops, murmurs or rubs. Abdomen was soft, flat, nontender without organomegaly, masses or bruits. Extremities revealed no edema. I reviewed his most recent labs which were done on May 14, 2022. His creatinine has increased up to 2.24 with an estimated GFR 29 cc/min. Electrolytes were good. Urine microalbumin to creatinine ratio was 2204 with an albumin of 3.4. Hemoglobin 13.5. His last A1c in February 2022 was still 10.3. His last creatinine back in June 2021 was 1.86.     I therefore informed the patient and his wife that his renal function has deteriorated since I last saw him. I again reinforced importance of both blood pressure and blood sugar control. His blood pressures are running high. Will increase his Toprol XL to 150 mg daily. Check blood pressures and heart rates daily and call in 1 to 2 weeks. Reinforced importance of doing laboratory studies as advised. We will have him repeat a CBC and renal panel in 1 month. Maintain adequate hydration. Avoid nonsteroidals. Return in 6 months. Thank you again for allowing me to participate in the care of your patient. If you have any questions please feel free to call.                Sincerely,   Tulio Clayton MD   [address]  [address] [facility] [address]  [address] [facility] [id number]    Document electronically generated by:  Tulio Clayton MD

## (undated) NOTE — LETTER
10/28/20        Ellen Milian  [address] [facility]      Dear Yaya White records indicate that you have outstanding lab work and or testing that was ordered for you and has not yet been completed:  Orders Placed This Encounter      PSA

## (undated) NOTE — LETTER
[address] [place] [address]  Authorization for Surgical Operation or Procedure    1.  I hereby authorize  ________________ , my physician and the assistant, to perform the following operation and/or procedure:  ULTRASOUND GUIDED LEFT M or procedures to be performed for the purposes of advancing medicine, science, and/or education, provided my identity is not revealed. If the procedure has been videotaped, the physician/surgeon will obtain the original videotape.  The hospital will not be reasonable alternative to the proposed treatment. I have also explained the risks and benefits involved in the refusal of the proposed treatment and have answered the patient's questions.  If I have a significant financial interest in a co-management agreem

## (undated) NOTE — MR AVS SNAPSHOT
[address] [facility]  [address] [place] [address]  [phone]               Thank you for choosing us for your health care visit with Austyn Fan MD.  We are glad to serve you and happy to provide you with this summary of your visit. This list is accurate as of: 5/4/17  2:34 PM.  Always use your most recent med list.                glipiZIDE 5 MG Tabs   Take 1.5 tablets (7.5 mg total) by mouth 2 (two) times daily before meals.    What changed:  how much to take   Commonly known a

## (undated) NOTE — MR AVS SNAPSHOT
[address]  [phone]               Thank you for choosing us for your health care visit with Mary Lou Sneed MD.  We are glad to serve you and happy to provide you with this summary of your visit. Take 1.5 tablets (7.5 mg total) by mouth 2 (two) times daily before meals. Commonly known as:  GLUCOTROL           Glucose Blood Strp   by Other route. Insulin Pen Needle 31G X 8 MM Misc   by Does not apply route.            MetFORMIN HCl 500 MG

## (undated) NOTE — Clinical Note
GURPREET, pt eligible for TCM until 7/25/2019. He declined appt at this time, TE sent to clinical pool to follow up. TCM template completed.

## (undated) NOTE — LETTER
[facility], [place]  Authorization for Invasive Procedures  1.  I hereby authorize Dr. Remy Hayden , my physician and whomever may be designated as the doctor's assistant, to perform the following operation and/or procedure:  Right low 5. I consent to the photographing of the operations or procedures to be performed for the purposes of advancing medicine, science, and/or education, provided my identity is not revealed.  If the procedure has been videotaped, the physician/surgeon will obta __________ Time: ___________    Statement of Physician  My signature below affirms that prior to the time of the procedure, I have explained to the patient and/or his legal representative, the risks and benefits involved in the proposed treatment and any r

## (undated) NOTE — LETTER
No referring provider defined for this encounter. 12/13/23        Patient: Nohemy Moore   YOB: 1950   Date of Visit: 12/13/2023       Dear  Dr. Carolina Nam MD,      Thank you for referring Nohemy Moore to my practice. Please find my assessment and plan below. As you know he is a 53-year-old male with a history of adult onset diabetes mellitus diagnosed in 1968, hypertension, moderate aortic stenosis, coronary artery disease, status post coronary artery bypass graft surgery in 2017, history of peripheral vascular disease, status post right carotid endarterectomy, history of primary hyperparathyroidism, status post removal of parathyroid adenoma who I now had the pleasure of seeing for follow-up of chronic kidney disease now stage IV associated with nephrotic syndrome secondary to diabetic nephropathy. Again has not really been compliant with follow-up. On physical exam his blood pressure is 142/60 with a pulse 63 and he weighed 213 pounds. Neck was supple without JVD. Lungs were clear. Heart revealed a regular rate and rhythm with an S4 and a 3/6 systolic ejection murmur. Abdomen was soft, flat, nontender without organomegaly, masses or bruits. Extremities revealed trace edema bilaterally. I reviewed his most recent labs done in November 9, 2023. Creatinine continues to increase. Now up to 3.19 with an estimated GFR 20 cc/min. Calcium borderline high at 10.5. Albumin was 4.4. Urine microalbumin to creatinine ratio was 2929. Hemoglobin 12.9. Last A1c was 11.4. I therefore informed the patient and his wife that there has been continued deterioration in his renal function. His diabetes is not under adequate control. Blood pressures may be borderline high. Reinforced the importance of both blood pressure and blood sugar control. The significance of a GFR 20 cc/min and the indications for dialysis have been reviewed in detail.   Signs and symptoms of uremia were discussed. Recommended checking his blood pressures daily and call me in 1 week. Repeat CBC and renal panel in 1 month. If stable will continue quarterly. Will see again in 3 to 6 months depending upon clinical course. Compliance was stressed. Continue to work with endocrine to get his A1c under control. Thank you again for allowing me to participate in the care of your patient. If you have any questions please feel free to call.            Sincerely,   Temi Ruth MD   [facility], [address] [place] [address]  [address] [place] [address]  [address] [facility] [phone]    Document electronically generated by:  Temi Ruth MD

## (undated) NOTE — LETTER
[address], [place]  Authorization for Invasive Procedures  1.  I hereby authorize  Dr Saurav Freire , my physician and whomever may be designated as the doctor's assistant, to perform the following operation and/or procedure: Cardiac Ca 5. I consent to the photographing of the operations or procedures to be performed for the purposes of advancing medicine, science, and/or education, provided my identity is not revealed.  If the procedure has been videotaped, the physician/surgeon will obta __________ Time: ___________    Statement of Physician  My signature below affirms that prior to the time of the procedure, I have explained to the patient and/or his legal representative, the risks and benefits involved in the proposed treatment and any r

## (undated) NOTE — LETTER
01/28/21        [facility]  [address] [facility]      Dear Elissa Chao records indicate that you have outstanding lab work and or testing that was ordered for you and has not yet been completed:  Orders Placed This Encounter      PSA

## (undated) NOTE — LETTER
[facility], [place]  Authorization for Invasive Procedures  1.  I hereby authorize  Dr Ayers Rater  my physician and whomever may be designated as the doctor's assistant, to perform the following operation and/or procedure: Lower Extre 5. I consent to the photographing of the operations or procedures to be performed for the purposes of advancing medicine, science, and/or education, provided my identity is not revealed.  If the procedure has been videotaped, the physician/surgeon will obta __________ Time: ___________    Statement of Physician  My signature below affirms that prior to the time of the procedure, I have explained to the patient and/or his legal representative, the risks and benefits involved in the proposed treatment and any r